# Patient Record
Sex: MALE | Race: WHITE | NOT HISPANIC OR LATINO | Employment: OTHER | URBAN - NONMETROPOLITAN AREA
[De-identification: names, ages, dates, MRNs, and addresses within clinical notes are randomized per-mention and may not be internally consistent; named-entity substitution may affect disease eponyms.]

---

## 2017-01-11 ENCOUNTER — OFFICE VISIT (OUTPATIENT)
Dept: CARDIOLOGY | Facility: CLINIC | Age: 57
End: 2017-01-11

## 2017-01-11 VITALS
SYSTOLIC BLOOD PRESSURE: 134 MMHG | OXYGEN SATURATION: 98 % | DIASTOLIC BLOOD PRESSURE: 80 MMHG | BODY MASS INDEX: 31.42 KG/M2 | WEIGHT: 195.5 LBS | HEART RATE: 69 BPM | HEIGHT: 66 IN

## 2017-01-11 DIAGNOSIS — I73.9 CLAUDICATION OF BOTH LOWER EXTREMITIES (HCC): ICD-10-CM

## 2017-01-11 DIAGNOSIS — I25.10 CORONARY ARTERY DISEASE INVOLVING NATIVE CORONARY ARTERY OF NATIVE HEART WITHOUT ANGINA PECTORIS: Primary | ICD-10-CM

## 2017-01-11 DIAGNOSIS — I10 ESSENTIAL HYPERTENSION: ICD-10-CM

## 2017-01-11 PROCEDURE — 99213 OFFICE O/P EST LOW 20 MIN: CPT | Performed by: INTERNAL MEDICINE

## 2017-01-11 RX ORDER — PRAVASTATIN SODIUM 40 MG
40 TABLET ORAL NIGHTLY
Qty: 30 TABLET | Refills: 6 | Status: SHIPPED | OUTPATIENT
Start: 2017-01-11 | End: 2017-02-10

## 2017-01-11 NOTE — MR AVS SNAPSHOT
Nima Portillo   1/11/2017 2:30 PM   Office Visit    Dept Phone:  [phone]   Encounter #:  [id number]    Provider:  Lara Santiago MD   Department:  [facility] CARDIOLOGY                Your Full Care Plan              Today's Medication Changes          These changes are accurate as of: 1/11/17  3:00 PM.  If you have any questions, ask your nurse or doctor.               New Medication(s)Ordered:     pravastatin 40 MG tablet   Commonly known as:  PRAVACHOL   Take 1 tablet by mouth Every Night for 30 days.   Started by:  Lara Santiago MD         Medication(s)that have changed:     hydrochlorothiazide 12.5 MG tablet   Commonly known as:  HYDRODIURIL   Take 12.5 mg by mouth daily.   What changed:  Another medication with the same name was removed. Continue taking this medication, and follow the directions you see here.   Changed by:  [place]            Where to Get Your Medications      These medications were sent to [facility]/pharmacy #[id number] - [address] [phone] [place] [phone] [address] [address]     Phone:  [phone]     pravastatin 40 MG tablet                  Your Updated Medication List          This list is accurate as of: 1/11/17  3:00 PM.  Always use your most recent med list.                aspirin 81 MG chewable tablet       hydrochlorothiazide 12.5 MG tablet   Commonly known as:  HYDRODIURIL       isosorbide mononitrate 30 MG 24 hr tablet   Commonly known as:  IMDUR   TAKE 1 TABLET BY MOUTH DAILY       lisinopril 40 MG tablet   Commonly known as:  PRINIVIL,ZESTRIL       magnesium oxide 400 MG tablet   Commonly known as:  MAG-OX       metoprolol succinate XL 25 MG 24 hr tablet   Commonly known as:  TOPROL-XL       pravastatin 40 MG tablet   Commonly known as:  PRAVACHOL   Take 1 tablet by mouth Every Night for 30 days.               You Were Diagnosed With        Codes Comments    Coronary artery disease  "involving native coronary artery of native heart without angina pectoris    -  Primary ICD-10-CM: I25.10  ICD-9-CM: 414.01     Claudication of both lower extremities     ICD-10-CM: I73.9  ICD-9-CM: 443.9     Essential hypertension     ICD-10-CM: I10  ICD-9-CM: 401.9       Instructions     None    Patient Instructions History      Upcoming Appointments     Visit Type Date Time Department    OFFICE VISIT 2017  2:30 PM [facility]    OFFICE VISIT 2017  2:00 PM [facility]     [facility] [employer] allows you to send messages to your doctor, view your test results, renew your prescriptions, schedule appointments, and more. To sign up, go to [employer] and click on the Sign Up Now link in the New User? box. Enter your [employer] Activation Code exactly as it appears below along with the last four digits of your Social Security Number and your Date of Birth () to complete the sign-up process. If you do not sign up before the expiration date, you must request a new code.    [employer] Activation Code: [email or web address]  Expires: 2017  2:59 PM    If you have questions, you can email [email or web address] or call [phone] to talk to our [employer] staff. Remember, [employer] is NOT to be used for urgent needs. For medical emergencies, dial 911.               Other Info from Your Visit           Your Appointments     2017  2:00 PM CDT   Office Visit with Lara Santiago MD   [facility]ARD[facility] (--)    [address] [place] [address] [address]  [facility] [address] [place] [id number] [phone]           Arrive 15 minutes prior to appointment.              Allergies     No Known Allergies      Reason for Visit     Coronary Artery Disease     Hypertension           Vital Signs     Blood Pressure Pulse Height Weight Oxygen Saturation Body Mass Index    134/80 (BP Location: Left arm, Patient Position: Sitting, Cuff Size: Adult) 69 66\" (167.6 cm) 195 " lb 8 oz (88.7 kg) 98% 31.55 kg/m2    Smoking Status                   Current Every Day Smoker           Problems and Diagnoses Noted     Claudication of both lower extremities    Coronary artery disease involving native coronary artery of native heart without angina pectoris    High blood pressure

## 2017-01-11 NOTE — PROGRESS NOTES
Chief complaint: CAD, HTN, f/u    History of Present Illness: Mr. Portillo is in the office today for a follow up visit. He has no chest pain, PND, orthopnea, palpitations, shortness of breath, or dyspnea on exertion. He states that he hurts in the upper gastric region when he drinks hot or cold liquids. This is non-radiating pain. He has pain in his legs.    Subjective      Review of Systems   Constitution: Negative.   HENT: Negative.    Cardiovascular: Positive for chest pain and claudication.   Respiratory: Negative.    Endocrine: Negative.    Skin: Negative.    Musculoskeletal: Negative.    Gastrointestinal: Negative.    Genitourinary: Negative.    Neurological: Negative.    Psychiatric/Behavioral: Negative.        Past Medical History   Diagnosis Date   • Acute bronchitis    • Acute sinusitis    • Chest pain    • Chronic bronchitis      secondary to smoking   • Claudication    • Coronary arteriosclerosis    • Cough    • Elevated blood-pressure reading without diagnosis of hypertension    • Gastroesophageal reflux disease    • Health maintenance examination      Individual health examination   • History of echocardiogram 10/21/2013     Echocardiogram W/ color flow 66593 (1) - Left atrium normal. Mild CLVH. EF 50%. Valves intact. Mild mitral and tricuspid regurg. Pericardium normal.   • History of echocardiogram 10/25/2011     Echocardiogram W/O color flow 75749 (1) - Normal LV sytolic function with mild LVH & mild diastolic dysfunction   • Hyperlipidemia    • Hypertensive disorder    • Pernicious anemia    • Right upper quadrant pain    • Tobacco dependence syndrome        No family history on file.    Review of patient's allergies indicates no known allergies.     reports that he has been smoking.  He does not have any smokeless tobacco history on file.    Objective     Vital Signs  Heart Rate:  [69] 69  BP: (134)/(80) 134/80    Physical Exam   Constitutional: He is oriented to person, place, and time. He  appears well-developed and well-nourished.   HENT:   Head: Normocephalic and atraumatic.   Eyes: Conjunctivae and EOM are normal. Pupils are equal, round, and reactive to light.   Neck: Neck supple. No JVD present. Carotid bruit is not present. No tracheal deviation and no edema present.   Cardiovascular: Normal rate, regular rhythm, S1 normal, S2 normal, normal heart sounds and intact distal pulses.  Exam reveals no gallop, no S3, no S4 and no friction rub.    No murmur heard.  Pulmonary/Chest: Effort normal and breath sounds normal. He has no wheezes. He has no rales. He exhibits no tenderness.   Abdominal: Bowel sounds are normal. He exhibits no abdominal bruit and no pulsatile midline mass. There is no rebound and no guarding.   Musculoskeletal: Normal range of motion. He exhibits no edema.   Neurological: He is alert and oriented to person, place, and time.   Skin: Skin is warm and dry.   Psychiatric: He has a normal mood and affect.       Procedures    Assessment/Plan     Patient Active Problem List   Diagnosis   • Claudication of both lower extremities   • Coronary artery disease involving native coronary artery of native heart without angina pectoris   • Essential hypertension   • Tobacco abuse     1. Coronary artery disease involving native coronary artery of native heart without angina pectoris  Patient has been advised and counseled again regarding tobacco smoking cessation.  He stated that he is not ready to quit smoking.  That he promised that he is going to try and cut back.  We will continue with current guideline direct medical therapy.  I have added pravastatin to his medication regimen.  We'll obtain lipid panel and liver function test when he comes back in 6 months.    2. Claudication of both lower extremities  Patient does have peripheral arterial disease based on the CT angiogram that was performed.  I have counseled patient regarding risk factor modification especially tobacco smoking  cessation.    3. Essential hypertension  Patient's blood pressure is well controlled we'll continue with current medications.    I discussed the patients findings and my recommendations with patient.    Lara Santiago MD  01/11/17  2:54 PM    EMR Dragon/Transcription disclaimer:   Much of this encounter note is an electronic transcription/translation of spoken language to printed text. The electronic translation of spoken language may permit erroneous, or at times, nonsensical words or phrases to be inadvertently transcribed; Although I have reviewed the note for such errors, some may still exist.

## 2017-01-13 RX ORDER — METOPROLOL SUCCINATE 25 MG/1
TABLET, EXTENDED RELEASE ORAL
Qty: 30 TABLET | Refills: 11 | Status: SHIPPED | OUTPATIENT
Start: 2017-01-13 | End: 2018-01-31 | Stop reason: SDUPTHER

## 2017-02-21 RX ORDER — LISINOPRIL 40 MG/1
TABLET ORAL
Qty: 30 TABLET | Refills: 6 | Status: SHIPPED | OUTPATIENT
Start: 2017-02-21 | End: 2017-10-02 | Stop reason: SDUPTHER

## 2017-02-21 RX ORDER — HYDROCHLOROTHIAZIDE 12.5 MG/1
CAPSULE, GELATIN COATED ORAL
Qty: 30 CAPSULE | Refills: 6 | Status: SHIPPED | OUTPATIENT
Start: 2017-02-21 | End: 2017-10-02 | Stop reason: SDUPTHER

## 2017-04-20 RX ORDER — ISOSORBIDE MONONITRATE 30 MG/1
TABLET, EXTENDED RELEASE ORAL
Qty: 30 TABLET | Refills: 5 | Status: SHIPPED | OUTPATIENT
Start: 2017-04-20 | End: 2017-10-29 | Stop reason: SDUPTHER

## 2017-07-12 ENCOUNTER — OFFICE VISIT (OUTPATIENT)
Dept: CARDIOLOGY | Facility: CLINIC | Age: 57
End: 2017-07-12

## 2017-07-12 VITALS
DIASTOLIC BLOOD PRESSURE: 64 MMHG | WEIGHT: 191 LBS | HEIGHT: 66 IN | HEART RATE: 71 BPM | SYSTOLIC BLOOD PRESSURE: 112 MMHG | BODY MASS INDEX: 30.7 KG/M2 | OXYGEN SATURATION: 91 %

## 2017-07-12 DIAGNOSIS — Z72.0 TOBACCO ABUSE: ICD-10-CM

## 2017-07-12 DIAGNOSIS — I25.10 CORONARY ARTERY DISEASE INVOLVING NATIVE CORONARY ARTERY OF NATIVE HEART WITHOUT ANGINA PECTORIS: ICD-10-CM

## 2017-07-12 DIAGNOSIS — I73.9 CLAUDICATION OF BOTH LOWER EXTREMITIES (HCC): ICD-10-CM

## 2017-07-12 DIAGNOSIS — I25.10 CAD IN NATIVE ARTERY: Primary | ICD-10-CM

## 2017-07-12 DIAGNOSIS — I10 ESSENTIAL HYPERTENSION: Primary | ICD-10-CM

## 2017-07-12 PROCEDURE — 99214 OFFICE O/P EST MOD 30 MIN: CPT | Performed by: INTERNAL MEDICINE

## 2017-07-12 PROCEDURE — 93000 ELECTROCARDIOGRAM COMPLETE: CPT | Performed by: INTERNAL MEDICINE

## 2017-07-12 RX ORDER — PRAVASTATIN SODIUM 40 MG
TABLET ORAL
Refills: 5 | COMMUNITY
Start: 2017-06-17 | End: 2018-07-11 | Stop reason: SDUPTHER

## 2017-07-12 NOTE — PROGRESS NOTES
Chief complaint : Coronary artery disease    History of Present Illness very pleasant 56-year-old gentleman who comes today for a follow-up visit.  Patient has no chest pain or chest discomfort.  He does have some shortness of breath.  He denies orthopnea or PND.  He has no palpitations.  He complains of bilateral lower extremity intermittent claudication.         Review of Systems   Constitution: Negative. Negative for decreased appetite, diaphoresis, weakness, night sweats, weight gain and weight loss.   HENT: Negative for headaches, hearing loss, nosebleeds and sore throat.    Eyes: Negative.  Negative for blurred vision and photophobia.   Cardiovascular: Negative for chest pain, claudication, dyspnea on exertion, irregular heartbeat, leg swelling, palpitations, paroxysmal nocturnal dyspnea and syncope.   Respiratory: Negative for cough, hemoptysis, shortness of breath and wheezing.    Endocrine: Negative for cold intolerance, heat intolerance, polydipsia, polyphagia and polyuria.   Hematologic/Lymphatic: Negative.    Skin: Negative for color change, dry skin, flushing, itching and rash.   Musculoskeletal: Negative.  Negative for muscle cramps, muscle weakness and myalgias.   Gastrointestinal: Negative for abdominal pain, change in bowel habit, diarrhea, hematemesis, melena, nausea and vomiting.   Genitourinary: Negative for dysuria, frequency and hematuria.   Neurological: Negative for dizziness, focal weakness, light-headedness, loss of balance, numbness and seizures.   Psychiatric/Behavioral: Negative.  Negative for substance abuse, suicidal ideas and thoughts of violence.   Allergic/Immunologic: Negative.        Past Medical History:   Diagnosis Date   • Acute bronchitis    • Acute sinusitis    • Chest pain    • Chronic bronchitis     secondary to smoking   • Claudication    • Coronary arteriosclerosis    • Cough    • Elevated blood-pressure reading without diagnosis of hypertension    • Gastroesophageal  reflux disease    • Health maintenance examination     Individual health examination   • History of echocardiogram 10/21/2013    Echocardiogram W/ color flow 25304 (1) - Left atrium normal. Mild CLVH. EF 50%. Valves intact. Mild mitral and tricuspid regurg. Pericardium normal.   • History of echocardiogram 10/25/2011    Echocardiogram W/O color flow 69267 (1) - Normal LV sytolic function with mild LVH & mild diastolic dysfunction   • Hyperlipidemia    • Hypertensive disorder    • Pernicious anemia    • Right upper quadrant pain    • Tobacco dependence syndrome        No family history on file.    Review of patient's allergies indicates no known allergies.     reports that he has been smoking.  He does not have any smokeless tobacco history on file.    Objective     Vital Signs  Heart Rate:  [71] 71  BP: (112)/(64) 112/64    Physical Exam   Constitutional: He is oriented to person, place, and time. He appears well-developed and well-nourished.   HENT:   Head: Normocephalic and atraumatic.   Eyes: Conjunctivae and EOM are normal. Pupils are equal, round, and reactive to light.   Neck: Neck supple. No JVD present. Carotid bruit is not present. No tracheal deviation and no edema present.   Cardiovascular: Normal rate, regular rhythm, S1 normal, S2 normal, normal heart sounds and intact distal pulses.  Exam reveals no gallop, no S3, no S4 and no friction rub.    No murmur heard.  Pulmonary/Chest: Effort normal and breath sounds normal. He has no wheezes. He has no rales. He exhibits no tenderness.   Abdominal: Bowel sounds are normal. He exhibits no abdominal bruit and no pulsatile midline mass. There is no rebound and no guarding.   Musculoskeletal: Normal range of motion. He exhibits no edema.   Neurological: He is alert and oriented to person, place, and time.   Skin: Skin is warm and dry.   Psychiatric: He has a normal mood and affect.         ECG 12 Lead  Date/Time: 7/14/2017 9:32 AM  Performed by: SKIP  LARA  Authorized by: LARA VALDIVIA   Comparison: compared with previous ECG from 12/21/2015  Similar to previous ECG  Rhythm: sinus rhythm  Rate: normal  BPM: 67  Conduction: conduction normal  ST Segments: ST segments normal  T Waves: T waves normal  QRS axis: normal  Other: no other findings  Clinical impression: normal ECG            Assessment/Plan     Patient Active Problem List   Diagnosis   • Claudication of both lower extremities   • Coronary artery disease involving native coronary artery of native heart without angina pectoris   • Essential hypertension   • Tobacco abuse     1. Essential hypertension  Medications  - Doppler Ankle Brachial Index Single Level CAR; Future  - Comprehensive Metabolic Panel; Future  - Lipid Panel; Future    2. Coronary artery disease involving native coronary artery of native heart without angina pectoris  Continue with risk factor modification.  Continue with guideline direct medical therapy.    3. Claudication of both lower extremities  We will obtain ankle brachial index on both lower extremities.  Patient has been counseled regarding risk factor modification.    4. Tobacco abuse  I have counseled patient to cut back and subsequently quit smoking cigarettes.  He states he is not really at this time but he is currently trying to cut back.    I discussed the patients findings and my recommendations with patient.          This document has been electronically signed by Lara Valdivia MD on July 14, 2017 9:31 AM     Lara Valdivia MD  07/12/17  2:30 PM

## 2017-07-25 DIAGNOSIS — I10 ESSENTIAL HYPERTENSION: Primary | ICD-10-CM

## 2017-07-26 ENCOUNTER — TELEPHONE (OUTPATIENT)
Dept: CARDIOLOGY | Facility: CLINIC | Age: 57
End: 2017-07-26

## 2017-08-01 ENCOUNTER — TELEPHONE (OUTPATIENT)
Dept: ENDOCRINOLOGY | Facility: CLINIC | Age: 57
End: 2017-08-01

## 2017-08-28 RX ORDER — PRAVASTATIN SODIUM 40 MG
TABLET ORAL
Qty: 30 TABLET | Refills: 5 | OUTPATIENT
Start: 2017-08-28

## 2017-09-25 RX ORDER — LISINOPRIL 40 MG/1
TABLET ORAL
Qty: 30 TABLET | Refills: 6 | OUTPATIENT
Start: 2017-09-25

## 2017-09-25 RX ORDER — HYDROCHLOROTHIAZIDE 12.5 MG/1
CAPSULE, GELATIN COATED ORAL
Qty: 30 CAPSULE | Refills: 6 | OUTPATIENT
Start: 2017-09-25

## 2017-10-03 RX ORDER — HYDROCHLOROTHIAZIDE 12.5 MG/1
CAPSULE, GELATIN COATED ORAL
Qty: 30 CAPSULE | Refills: 6 | Status: SHIPPED | OUTPATIENT
Start: 2017-10-03 | End: 2018-04-23 | Stop reason: SDUPTHER

## 2017-10-03 RX ORDER — LISINOPRIL 40 MG/1
TABLET ORAL
Qty: 30 TABLET | Refills: 6 | Status: SHIPPED | OUTPATIENT
Start: 2017-10-03 | End: 2018-04-23 | Stop reason: SDUPTHER

## 2017-10-30 RX ORDER — ISOSORBIDE MONONITRATE 30 MG/1
TABLET, EXTENDED RELEASE ORAL
Qty: 30 TABLET | Refills: 5 | Status: SHIPPED | OUTPATIENT
Start: 2017-10-30 | End: 2018-04-24 | Stop reason: SDUPTHER

## 2018-01-04 ENCOUNTER — OFFICE VISIT (OUTPATIENT)
Dept: CARDIOLOGY | Facility: CLINIC | Age: 58
End: 2018-01-04

## 2018-01-04 VITALS
DIASTOLIC BLOOD PRESSURE: 84 MMHG | HEIGHT: 66 IN | WEIGHT: 199 LBS | HEART RATE: 80 BPM | BODY MASS INDEX: 31.98 KG/M2 | SYSTOLIC BLOOD PRESSURE: 116 MMHG

## 2018-01-04 DIAGNOSIS — Z72.0 NICOTINE ABUSE: ICD-10-CM

## 2018-01-04 DIAGNOSIS — E78.00 PURE HYPERCHOLESTEROLEMIA: ICD-10-CM

## 2018-01-04 DIAGNOSIS — I10 ESSENTIAL HYPERTENSION: ICD-10-CM

## 2018-01-04 DIAGNOSIS — R06.02 SOB (SHORTNESS OF BREATH): ICD-10-CM

## 2018-01-04 DIAGNOSIS — I73.9 PERIPHERAL ARTERIAL DISEASE (HCC): ICD-10-CM

## 2018-01-04 DIAGNOSIS — F10.10 ETOH ABUSE: ICD-10-CM

## 2018-01-04 DIAGNOSIS — I25.10 CORONARY ARTERY DISEASE INVOLVING NATIVE CORONARY ARTERY OF NATIVE HEART WITHOUT ANGINA PECTORIS: Primary | ICD-10-CM

## 2018-01-04 PROCEDURE — 99214 OFFICE O/P EST MOD 30 MIN: CPT | Performed by: INTERNAL MEDICINE

## 2018-01-04 NOTE — PROGRESS NOTES
[facility] Cardiology  OFFICE NOTE    Nima Portillo  57 y.o. male    01/04/2018  1. Coronary artery disease involving native coronary artery of native heart without angina pectoris    2. Essential hypertension    3. Pure hypercholesterolemia    4. Nicotine abuse    5. ETOH abuse    6. SOB (shortness of breath)    7. Peripheral arterial disease        Chief complaint -Shortness of breath and claudication pain      History of present Illness- 57-year-old gentleman who smokes about a pack and a half a day and drinks about 6-8 beers a day came with complaining of shortness of breath, he has history of cardiac catheterization had no arthritic coronary disease and a history of hypertension and hyperlipidemia being managed with medical therapy.  He used to see Dr. Santiago and had an YOKO which was unremarkable last year.  He cannot walk more than a block due to claudication mainly on the right calf greater than the left.  He has not changed his lifestyle.      No Known Allergies      Past Medical History:   Diagnosis Date   • Acute bronchitis    • Acute sinusitis    • Chest pain    • Chronic bronchitis     secondary to smoking   • Claudication    • Coronary arteriosclerosis    • Cough    • Elevated blood-pressure reading without diagnosis of hypertension    • Gastroesophageal reflux disease    • Health maintenance examination     Individual health examination   • History of echocardiogram 10/21/2013    Echocardiogram W/ color flow 31628 (1) - Left atrium normal. Mild CLVH. EF 50%. Valves intact. Mild mitral and tricuspid regurg. Pericardium normal.   • History of echocardiogram 10/25/2011    Echocardiogram W/O color flow 85887 (1) - Normal LV sytolic function with mild LVH & mild diastolic dysfunction   • Hyperlipidemia    • Hypertensive disorder    • Pernicious anemia    • Right upper quadrant pain    • Tobacco dependence syndrome          Past Surgical History:   Procedure Laterality Date   •  CARDIAC CATHETERIZATION  04/08/2015    Cardiac cath 92381 (1) - Slective coronary angiogram. Left heart catheterization with LV ventriculogram.         No family history on file.      Social History     Social History   • Marital status:      Spouse name: N/A   • Number of children: N/A   • Years of education: N/A     Occupational History   • Not on file.     Social History Main Topics   • Smoking status: Current Every Day Smoker   • Smokeless tobacco: Never Used   • Alcohol use Yes   • Drug use: No   • Sexual activity: Defer      Comment: Marital status:      Other Topics Concern   • Not on file     Social History Narrative         Current Outpatient Prescriptions   Medication Sig Dispense Refill   • aspirin 81 MG chewable tablet Chew 81 mg daily.     • hydrochlorothiazide (MICROZIDE) 12.5 MG capsule TAKE ONE CAPSULE BY MOUTH EVERY DAY (SKIP) 30 capsule 6   • isosorbide mononitrate (IMDUR) 30 MG 24 hr tablet TAKE 1 TABLET BY MOUTH DAILY 30 tablet 5   • lisinopril (PRINIVIL,ZESTRIL) 40 MG tablet TAKE 1 TABLET(S) BY MOUTH DAILY (SKIP) 30 tablet 6   • magnesium oxide (MAG-OX) 400 MG tablet Take 400 mg by mouth 2 (two) times a day.     • metoprolol succinate XL (TOPROL-XL) 25 MG 24 hr tablet TAKE 1 TAB(S) BY MOUTH DAILY 30 tablet 11   • pravastatin (PRAVACHOL) 40 MG tablet TAKE 1 TABLET BY MOUTH EVERY NIGHT FOR 30 DAYS. (SKIP)  5     No current facility-administered medications for this visit.          Review of Systems     Constitution: Denies any fatigue, fever or chills    HENT: Denies any headache, hearing impairment,     Eyes: Denies any blurring of vision, or photophobia     Cardivascular - As per history of present illness     Respiratory system-Dyspnea on exertion NYHA class II       Endocrine:  Hyperlipidemia       Musculoskeletal:  No history of arthritis with musculoskeletal problems    Gastrointestinal: No nausea, vomiting, or melena    Genitourinary: No dysuria or  "hematuria    Neurological:   No history of seizure disorder, stroke, memory problems    Psychiatric/Behavioral:   EtOH abuse and tobacco abuse    Hematological- no history of easy bruising            OBJECTIVE    /84  Pulse 80  Ht 167.6 cm (66\")  Wt 90.3 kg (199 lb)  BMI 32.12 kg/m2      Physical Exam     Constitutional: is oriented to person, place, and time.     Skin-warm and dry    Well developed and nourished in no acute distress      Head: Normocephalic and atraumatic.     Eyes: Pupils are equal, round, and reactive to light.     Neck: Neck supple. No bruit in the carotids,    Cardiovascular: Erwin in the fifth intercostal space   Regular rate, and  Rhythm,    S1 greater than S2, no S3 or S4, no gallop     Pulmonary/Chest:   Air  Entry is equal on both sides  No wheezing or crackles,      Abdominal: Soft.  No hepatosplenomegaly, bowel sounds are present    Musculoskeletal: No kyphoscoliosis, no significant thickening of the joints    Neurological: is alert and oriented to person, place, and time.    cranial nerve are intact .   No motor or sensory deficit    Extremities-no edema, right dorsalis pedis pulses weak      Psychiatric: He has a normal mood and affect.                  His behavior is normal.           Procedures              A/P    Dyspnea on exertion-with history of hypertension and hyperlipidemia on medical therapy, smoker, not willing to quit smoking will get an echo to assess LV function and valvular function, had a cardiac catheterization sometime ago by Dr. Santiago and showed nonobstructive coronary disease with normal LV systolic function.  Talked about quitting smoking.    Hypertension controlled with lisinopril, isosorbide and HCTZ along with Toprol-XL.    Peripheral vascular disease will get  abdominal aortogram runoff with CT angiogram to rule out any high-grade stenosis.    Needs his factor modification with EtOH abuse and tobacco abuse.  Started on B12 and folic acid " supplements.    Follow-up in 6 months or earlier if the test is abnormal              This document has been electronically signed by Min Sifuentes MD on January 4, 2018 11:06 AM       EMR Dragon/Transcription disclaimer:   Some of this note may be an electronic transcription/translation of spoken language to printed text. The electronic translation of spoken language may permit erroneous, or at times, nonsensical words or phrases to be inadvertently transcribed; Although I have reviewed the note for such errors, some may still exist.

## 2018-01-10 ENCOUNTER — HOSPITAL ENCOUNTER (OUTPATIENT)
Dept: CT IMAGING | Facility: HOSPITAL | Age: 58
Discharge: HOME | End: 2018-01-10
Admitting: INTERNAL MEDICINE

## 2018-01-10 PROCEDURE — 75635 CT ANGIO ABDOMINAL ARTERIES: CPT

## 2018-01-10 PROCEDURE — 0 IOPAMIDOL PER 1 ML: Performed by: INTERNAL MEDICINE

## 2018-01-10 RX ADMIN — IOPAMIDOL 95 ML: 755 INJECTION, SOLUTION INTRAVENOUS at 13:00

## 2018-01-11 ENCOUNTER — DOCUMENTATION (OUTPATIENT)
Dept: CARDIOLOGY | Facility: CLINIC | Age: 58
End: 2018-01-11

## 2018-01-29 LAB
BH CV ECHO MEAS - ACS: 2.1 CM
BH CV ECHO MEAS - AO MAX PG (FULL): 6.4 MMHG
BH CV ECHO MEAS - AO MAX PG: 13.2 MMHG
BH CV ECHO MEAS - AO MEAN PG (FULL): 4 MMHG
BH CV ECHO MEAS - AO MEAN PG: 7 MMHG
BH CV ECHO MEAS - AO ROOT AREA (BSA CORRECTED): 1.7
BH CV ECHO MEAS - AO ROOT AREA: 9.1 CM^2
BH CV ECHO MEAS - AO ROOT DIAM: 3.4 CM
BH CV ECHO MEAS - AO V2 MAX: 182 CM/SEC
BH CV ECHO MEAS - AO V2 MEAN: 127 CM/SEC
BH CV ECHO MEAS - AO V2 VTI: 38.9 CM
BH CV ECHO MEAS - AVA(I,A): 2.1 CM^2
BH CV ECHO MEAS - AVA(I,D): 2.1 CM^2
BH CV ECHO MEAS - AVA(V,A): 2.3 CM^2
BH CV ECHO MEAS - AVA(V,D): 2.3 CM^2
BH CV ECHO MEAS - BSA(HAYCOCK): 2.1 M^2
BH CV ECHO MEAS - BSA: 2 M^2
BH CV ECHO MEAS - BZI_BMI: 32.1 KILOGRAMS/M^2
BH CV ECHO MEAS - BZI_METRIC_HEIGHT: 167.6 CM
BH CV ECHO MEAS - BZI_METRIC_WEIGHT: 90.3 KG
BH CV ECHO MEAS - EDV(CUBED): 157.5 ML
BH CV ECHO MEAS - EDV(TEICH): 141.3 ML
BH CV ECHO MEAS - EF(CUBED): 82.9 %
BH CV ECHO MEAS - EF(MOD-SP4): 60 %
BH CV ECHO MEAS - EF(TEICH): 75.2 %
BH CV ECHO MEAS - ESV(CUBED): 27 ML
BH CV ECHO MEAS - ESV(TEICH): 35 ML
BH CV ECHO MEAS - FS: 44.4 %
BH CV ECHO MEAS - IVS/LVPW: 1
BH CV ECHO MEAS - IVSD: 1.1 CM
BH CV ECHO MEAS - LA DIMENSION: 3.6 CM
BH CV ECHO MEAS - LA/AO: 1.1
BH CV ECHO MEAS - LV MASS(C)D: 234.8 GRAMS
BH CV ECHO MEAS - LV MASS(C)DI: 117.7 GRAMS/M^2
BH CV ECHO MEAS - LV MAX PG: 6.9 MMHG
BH CV ECHO MEAS - LV MEAN PG: 3 MMHG
BH CV ECHO MEAS - LV V1 MAX: 131 CM/SEC
BH CV ECHO MEAS - LV V1 MEAN: 83.7 CM/SEC
BH CV ECHO MEAS - LV V1 VTI: 25.6 CM
BH CV ECHO MEAS - LVIDD: 5.4 CM
BH CV ECHO MEAS - LVIDS: 3 CM
BH CV ECHO MEAS - LVOT AREA (M): 3.1 CM^2
BH CV ECHO MEAS - LVOT AREA: 3.1 CM^2
BH CV ECHO MEAS - LVOT DIAM: 2 CM
BH CV ECHO MEAS - LVPWD: 1.1 CM
BH CV ECHO MEAS - MR MAX PG: 93.7 MMHG
BH CV ECHO MEAS - MR MAX VEL: 484 CM/SEC
BH CV ECHO MEAS - MV A MAX VEL: 69.6 CM/SEC
BH CV ECHO MEAS - MV DEC SLOPE: 702 CM/SEC^2
BH CV ECHO MEAS - MV E MAX VEL: 98.2 CM/SEC
BH CV ECHO MEAS - MV E/A: 1.4
BH CV ECHO MEAS - MV MAX PG: 5.7 MMHG
BH CV ECHO MEAS - MV MEAN PG: 2 MMHG
BH CV ECHO MEAS - MV P1/2T MAX VEL: 122 CM/SEC
BH CV ECHO MEAS - MV P1/2T: 50.9 MSEC
BH CV ECHO MEAS - MV V2 MAX: 119 CM/SEC
BH CV ECHO MEAS - MV V2 MEAN: 53.2 CM/SEC
BH CV ECHO MEAS - MV V2 VTI: 35.4 CM
BH CV ECHO MEAS - MVA P1/2T LCG: 1.8 CM^2
BH CV ECHO MEAS - MVA(P1/2T): 4.3 CM^2
BH CV ECHO MEAS - MVA(VTI): 2.3 CM^2
BH CV ECHO MEAS - PA MAX PG: 4.9 MMHG
BH CV ECHO MEAS - PA V2 MAX: 111 CM/SEC
BH CV ECHO MEAS - RAP SYSTOLE: 5 MMHG
BH CV ECHO MEAS - RVDD: 2.4 CM
BH CV ECHO MEAS - RVSP: 30.2 MMHG
BH CV ECHO MEAS - SI(AO): 177 ML/M^2
BH CV ECHO MEAS - SI(CUBED): 65.4 ML/M^2
BH CV ECHO MEAS - SI(LVOT): 40.3 ML/M^2
BH CV ECHO MEAS - SI(TEICH): 53.3 ML/M^2
BH CV ECHO MEAS - SV(AO): 353.2 ML
BH CV ECHO MEAS - SV(CUBED): 130.5 ML
BH CV ECHO MEAS - SV(LVOT): 80.4 ML
BH CV ECHO MEAS - SV(TEICH): 106.3 ML
BH CV ECHO MEAS - TR MAX VEL: 251 CM/SEC
LV EF 2D ECHO EST: 55 %
MAXIMAL PREDICTED HEART RATE: 163 BPM
STRESS TARGET HR: 139 BPM

## 2018-01-29 RX ORDER — METOPROLOL SUCCINATE 25 MG/1
TABLET, EXTENDED RELEASE ORAL
Qty: 30 TABLET | Refills: 10 | OUTPATIENT
Start: 2018-01-29

## 2018-01-31 RX ORDER — METOPROLOL SUCCINATE 25 MG/1
25 TABLET, EXTENDED RELEASE ORAL DAILY
Qty: 30 TABLET | Refills: 11 | Status: SHIPPED | OUTPATIENT
Start: 2018-01-31 | End: 2019-01-28 | Stop reason: SDUPTHER

## 2018-02-06 ENCOUNTER — TELEPHONE (OUTPATIENT)
Dept: CARDIOLOGY | Facility: CLINIC | Age: 58
End: 2018-02-06

## 2018-04-23 RX ORDER — LISINOPRIL 40 MG/1
40 TABLET ORAL DAILY
Qty: 30 TABLET | Refills: 6 | Status: SHIPPED | OUTPATIENT
Start: 2018-04-23 | End: 2018-11-21 | Stop reason: SDUPTHER

## 2018-04-23 RX ORDER — HYDROCHLOROTHIAZIDE 12.5 MG/1
12.5 CAPSULE, GELATIN COATED ORAL EVERY MORNING
Qty: 30 CAPSULE | Refills: 6 | Status: SHIPPED | OUTPATIENT
Start: 2018-04-23 | End: 2018-11-21 | Stop reason: SDUPTHER

## 2018-04-23 NOTE — TELEPHONE ENCOUNTER
Refill Request came in by fax from   [facility]/pharmacy #[id number] - [place], [place] - [address] [place] [address] - [phone]  - [phone] [address] [address]  Phone: [phone] Fax: [phone]   Refill sent to pharmacy via e-scribe.

## 2018-04-24 RX ORDER — ISOSORBIDE MONONITRATE 30 MG/1
30 TABLET, EXTENDED RELEASE ORAL DAILY
Qty: 30 TABLET | Refills: 5 | Status: SHIPPED | OUTPATIENT
Start: 2018-04-24 | End: 2018-10-22 | Stop reason: SDUPTHER

## 2018-04-24 NOTE — TELEPHONE ENCOUNTER
Refill Request came in by fax from   [facility]/pharmacy #[id number] - [place], [place] - [address] [place] [address] - [phone]  - [phone] [address] [address]  Phone: [phone] Fax: [phone]   Refill sent to pharmacy via e-scribe.

## 2018-07-10 ENCOUNTER — OFFICE VISIT (OUTPATIENT)
Dept: CARDIOLOGY | Facility: CLINIC | Age: 58
End: 2018-07-10

## 2018-07-10 ENCOUNTER — APPOINTMENT (OUTPATIENT)
Dept: LAB | Facility: HOSPITAL | Age: 58
End: 2018-07-10

## 2018-07-10 VITALS
HEIGHT: 66 IN | SYSTOLIC BLOOD PRESSURE: 112 MMHG | WEIGHT: 199 LBS | DIASTOLIC BLOOD PRESSURE: 80 MMHG | BODY MASS INDEX: 31.98 KG/M2 | HEART RATE: 76 BPM

## 2018-07-10 DIAGNOSIS — I10 ESSENTIAL HYPERTENSION: ICD-10-CM

## 2018-07-10 DIAGNOSIS — F10.10 ETOH ABUSE: ICD-10-CM

## 2018-07-10 DIAGNOSIS — I25.10 CORONARY ARTERY DISEASE INVOLVING NATIVE CORONARY ARTERY OF NATIVE HEART WITHOUT ANGINA PECTORIS: Primary | ICD-10-CM

## 2018-07-10 DIAGNOSIS — Z72.0 NICOTINE ABUSE: ICD-10-CM

## 2018-07-10 DIAGNOSIS — E78.00 PURE HYPERCHOLESTEROLEMIA: ICD-10-CM

## 2018-07-10 LAB
ALBUMIN SERPL-MCNC: 4.4 G/DL (ref 3.4–4.8)
ALBUMIN/GLOB SERPL: 1.7 G/DL (ref 1.1–1.8)
ALP SERPL-CCNC: 54 U/L (ref 38–126)
ALT SERPL W P-5'-P-CCNC: 36 U/L (ref 21–72)
ANION GAP SERPL CALCULATED.3IONS-SCNC: 10 MMOL/L (ref 5–15)
ARTICHOKE IGE QN: 126 MG/DL (ref 1–129)
AST SERPL-CCNC: 28 U/L (ref 17–59)
BILIRUB SERPL-MCNC: 0.4 MG/DL (ref 0.2–1.3)
BUN BLD-MCNC: 20 MG/DL (ref 7–21)
BUN/CREAT SERPL: 26.3 (ref 7–25)
CALCIUM SPEC-SCNC: 9 MG/DL (ref 8.4–10.2)
CHLORIDE SERPL-SCNC: 101 MMOL/L (ref 95–110)
CHOLEST SERPL-MCNC: 200 MG/DL (ref 0–199)
CO2 SERPL-SCNC: 26 MMOL/L (ref 22–31)
CREAT BLD-MCNC: 0.76 MG/DL (ref 0.7–1.3)
DEPRECATED RDW RBC AUTO: 46.2 FL (ref 35.1–43.9)
ERYTHROCYTE [DISTWIDTH] IN BLOOD BY AUTOMATED COUNT: 13.9 % (ref 11.5–14.5)
GFR SERPL CREATININE-BSD FRML MDRD: 106 ML/MIN/1.73 (ref 56–130)
GLOBULIN UR ELPH-MCNC: 2.6 GM/DL (ref 2.3–3.5)
GLUCOSE BLD-MCNC: 111 MG/DL (ref 60–100)
HCT VFR BLD AUTO: 41.9 % (ref 39–49)
HDLC SERPL-MCNC: 63 MG/DL (ref 60–200)
HGB BLD-MCNC: 14.3 G/DL (ref 13.7–17.3)
LDLC/HDLC SERPL: 1.86 {RATIO} (ref 0–3.55)
MCH RBC QN AUTO: 30.8 PG (ref 26.5–34)
MCHC RBC AUTO-ENTMCNC: 34.1 G/DL (ref 31.5–36.3)
MCV RBC AUTO: 90.3 FL (ref 80–98)
PLATELET # BLD AUTO: 185 10*3/MM3 (ref 150–450)
PMV BLD AUTO: 9.3 FL (ref 8–12)
POTASSIUM BLD-SCNC: 3.7 MMOL/L (ref 3.5–5.1)
PROT SERPL-MCNC: 7 G/DL (ref 6.3–8.6)
RBC # BLD AUTO: 4.64 10*6/MM3 (ref 4.37–5.74)
SODIUM BLD-SCNC: 137 MMOL/L (ref 137–145)
TRIGL SERPL-MCNC: 99 MG/DL (ref 20–199)
WBC NRBC COR # BLD: 8.81 10*3/MM3 (ref 3.2–9.8)

## 2018-07-10 PROCEDURE — 80053 COMPREHEN METABOLIC PANEL: CPT | Performed by: INTERNAL MEDICINE

## 2018-07-10 PROCEDURE — 85027 COMPLETE CBC AUTOMATED: CPT | Performed by: INTERNAL MEDICINE

## 2018-07-10 PROCEDURE — 80061 LIPID PANEL: CPT | Performed by: INTERNAL MEDICINE

## 2018-07-10 PROCEDURE — 93000 ELECTROCARDIOGRAM COMPLETE: CPT | Performed by: INTERNAL MEDICINE

## 2018-07-10 PROCEDURE — 99214 OFFICE O/P EST MOD 30 MIN: CPT | Performed by: INTERNAL MEDICINE

## 2018-07-10 PROCEDURE — 36415 COLL VENOUS BLD VENIPUNCTURE: CPT | Performed by: INTERNAL MEDICINE

## 2018-07-10 NOTE — PROGRESS NOTES
[facility]  OFFICE NOTE    Nima Portillo  57 y.o. male    07/10/2018  1. Coronary artery disease involving native coronary artery of native heart without angina pectoris    2. Essential hypertension    3. Pure hypercholesterolemia    4. ETOH abuse    5. Nicotine abuse        Chief complaint -Shortness of breath       History of present Illness- 57-year-old gentleman who smokes about a pack and a half a day and drinks about 12 beers a 2-3days/week came with complaining of shortness of breath, he has history of cardiac catheterization 2015 by Dr. Sandhu and which showed small vessel disease in the obtuse marginal one side branch and in the PDA off the right coronary artery which is being managed medically.  He still smokes pretty heavy and drinks regularly.  He denies any chest pain and is on tolerable medical therapy.  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 Mild CLVH. EF 50%. Valves intact. Mild mitral and tricuspid regurg. Pericardium normal.   • History of echocardiogram 10/25/2011    Echocardiogram W/O color flow 73620 (1) - Normal LV sytolic function with mild LVH & mild diastolic dysfunction   • Hyperlipidemia    • Hypertensive disorder    • Pernicious anemia    • Right upper quadrant pain    • Tobacco dependence syndrome          Past Surgical History:   Procedure Laterality Date   • CARDIAC CATHETERIZATION   04/08/2015    Cardiac cath 43619 (1) - Slective coronary angiogram. Left heart catheterization with LV ventriculogram.         No family history on file.      Social History     Social History   • Marital status:      Spouse name: N/A   • Number of children: N/A   • Years of education: N/A     Occupational History   • Not on file.     Social History Main Topics   • Smoking status: Current Every Day Smoker   • Smokeless tobacco: Never Used   • Alcohol use Yes   • Drug use: No   • Sexual activity: Defer      Comment: Marital status:      Other Topics Concern   • Not on file     Social History Narrative   • No narrative on file         Current Outpatient Prescriptions   Medication Sig Dispense Refill   • aspirin 81 MG chewable tablet Chew 81 mg daily.     • hydrochlorothiazide (MICROZIDE) 12.5 MG capsule Take 1 capsule by mouth Every Morning. 30 capsule 6   • isosorbide mononitrate (IMDUR) 30 MG 24 hr tablet Take 1 tablet by mouth Daily. 30 tablet 5   • lisinopril (PRINIVIL,ZESTRIL) 40 MG tablet Take 1 tablet by mouth Daily. 30 tablet 6   • magnesium oxide (MAG-OX) 400 MG tablet Take 400 mg by mouth 2 (two) times a day.     • metoprolol succinate XL (TOPROL-XL) 25 MG 24 hr tablet Take 1 tablet by mouth Daily. 30 tablet 11   • pravastatin (PRAVACHOL) 40 MG tablet TAKE 1 TABLET BY MOUTH EVERY NIGHT FOR 30 DAYS. (SKIP)  5     No current facility-administered medications for this visit.          Review of Systems     Constitution: Denies any fatigue, fever or chills    HENT: Denies any headache, hearing impairment,     Eyes: Denies any blurring of vision, or photophobia     Cardivascular - As per history of present illness     Respiratory system-Dyspnea on exertion NYHA class II       Endocrine:  Hyperlipidemia       Musculoskeletal:  No history of arthritis with musculoskeletal problems    Gastrointestinal: No nausea, vomiting, or melena    Genitourinary: No dysuria or hematuria    Neurological:   No  "history of seizure disorder, stroke, memory problems    Psychiatric/Behavioral:   EtOH abuse and tobacco abuse    Hematological- no history of easy bruising            OBJECTIVE    /80   Pulse 76   Ht 167.6 cm (65.98\")   Wt 90.3 kg (199 lb)   BMI 32.14 kg/m²       Physical Exam     Constitutional: is oriented to person, place, and time.     Skin-warm and dry    Well developed and nourished in no acute distress      Head: Normocephalic and atraumatic.     Eyes: Pupils are equal, round, and reactive to light.     Neck: Neck supple. No bruit in the carotids,    Cardiovascular: [place] in the fifth intercostal space   Regular rate, and  Rhythm,    S1 greater than S2, no S3 or S4, no gallop     Pulmonary/Chest:   Air  Entry is equal on both sides  No wheezing or crackles,      Abdominal: Soft.  No hepatosplenomegaly, bowel sounds are present    Musculoskeletal: No kyphoscoliosis, no significant thickening of the joints    Neurological: is alert and oriented to person, place, and time.    cranial nerve are intact .   No motor or sensory deficit    Extremities-no edema, right dorsalis pedis pulses weak      Psychiatric: He has a normal mood and affect.                  His behavior is normal.             ECG 12 Lead  Date/Time: 7/10/2018 2:07 PM  Performed by: OMAR FRANKLIN  Authorized by: OMAR FRANKLIN   Comparison: not compared with previous ECG   Rhythm: sinus rhythm  Clinical impression: normal ECG                      A/P    CAD with small vessel disease on the side branch continue medical therapy, needs his factor modification but is not willing to do that    Hypertension controlled with lisinopril, isosorbide and HCTZ along with Toprol-XL.    Peripheral vascular disease -had CTA with abdominal aortogram with runoff which did not show any high-grade stenosis and some of his pain in the legs could be due to neuropathy from his alcohol use    Needs his factor modification with EtOH abuse and " tobacco abuse.  Started on B12 and folic acid supplements.    Follow-up in 8 months               This document has been electronically signed by Min Sifuentes MD on July 10, 2018 2:04 PM       EMR Dragon/Transcription disclaimer:   Some of this note may be an electronic transcription/translation of spoken language to printed text. The electronic translation of spoken language may permit erroneous, or at times, nonsensical words or phrases to be inadvertently transcribed; Although I have reviewed the note for such errors, some may still exist.

## 2018-07-11 RX ORDER — PRAVASTATIN SODIUM 20 MG
20 TABLET ORAL DAILY
Qty: 30 TABLET | Refills: 6 | Status: SHIPPED | OUTPATIENT
Start: 2018-07-11 | End: 2019-02-13 | Stop reason: SDUPTHER

## 2018-10-22 RX ORDER — ISOSORBIDE MONONITRATE 30 MG/1
30 TABLET, EXTENDED RELEASE ORAL DAILY
Qty: 30 TABLET | Refills: 6 | Status: SHIPPED | OUTPATIENT
Start: 2018-10-22 | End: 2019-05-31 | Stop reason: SDUPTHER

## 2018-11-21 RX ORDER — LISINOPRIL 40 MG/1
40 TABLET ORAL DAILY
Qty: 30 TABLET | Refills: 6 | Status: SHIPPED | OUTPATIENT
Start: 2018-11-21 | End: 2019-06-28 | Stop reason: SDUPTHER

## 2018-11-21 RX ORDER — HYDROCHLOROTHIAZIDE 12.5 MG/1
12.5 CAPSULE, GELATIN COATED ORAL EVERY MORNING
Qty: 30 CAPSULE | Refills: 6 | Status: SHIPPED | OUTPATIENT
Start: 2018-11-21 | End: 2019-04-16

## 2019-01-28 RX ORDER — METOPROLOL SUCCINATE 25 MG/1
25 TABLET, EXTENDED RELEASE ORAL DAILY
Qty: 30 TABLET | Refills: 11 | Status: SHIPPED | OUTPATIENT
Start: 2019-01-28 | End: 2019-04-16

## 2019-02-13 RX ORDER — PRAVASTATIN SODIUM 20 MG
TABLET ORAL
Qty: 30 TABLET | Refills: 6 | Status: SHIPPED | OUTPATIENT
Start: 2019-02-13 | End: 2019-05-15 | Stop reason: ALTCHOICE

## 2019-03-12 ENCOUNTER — OFFICE VISIT (OUTPATIENT)
Dept: CARDIOLOGY | Facility: CLINIC | Age: 59
End: 2019-03-12

## 2019-03-12 VITALS
WEIGHT: 212 LBS | SYSTOLIC BLOOD PRESSURE: 132 MMHG | HEART RATE: 76 BPM | HEIGHT: 66 IN | OXYGEN SATURATION: 98 % | BODY MASS INDEX: 34.07 KG/M2 | DIASTOLIC BLOOD PRESSURE: 78 MMHG

## 2019-03-12 DIAGNOSIS — Z72.0 NICOTINE ABUSE: ICD-10-CM

## 2019-03-12 DIAGNOSIS — E78.00 PURE HYPERCHOLESTEROLEMIA: ICD-10-CM

## 2019-03-12 DIAGNOSIS — I10 ESSENTIAL HYPERTENSION: ICD-10-CM

## 2019-03-12 DIAGNOSIS — F10.10 ETOH ABUSE: ICD-10-CM

## 2019-03-12 DIAGNOSIS — I25.10 CORONARY ARTERY DISEASE INVOLVING NATIVE CORONARY ARTERY OF NATIVE HEART WITHOUT ANGINA PECTORIS: Primary | ICD-10-CM

## 2019-03-12 PROCEDURE — 99214 OFFICE O/P EST MOD 30 MIN: CPT | Performed by: INTERNAL MEDICINE

## 2019-03-12 NOTE — PROGRESS NOTES
[facility]  OFFICE NOTE    Nima Portillo  58 y.o. male    03/12/2019  1. Coronary artery disease involving native coronary artery of native heart without angina pectoris    2. Essential hypertension    3. Pure hypercholesterolemia    4. Nicotine abuse    5. ETOH abuse        Chief complaint -Shortness of breath       History of present Illness- 58-year-old gentleman who smokes about a pack and a half a day and drinks about 6 beers a day/week came with complaining of shortness of breath, he has history of cardiac catheterization 2015 by Dr. Sandhu and which showed small vessel disease in the obtuse marginal one side branch and in the PDA off the right coronary artery which is being managed medically.  He still smokes pretty heavy and drinks regularly.  He denies any chest pain and is on tolerable medical therapy.  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  His sugar is elevated I talked to him is borderline diabetic need to lose weight and he has gained about 12 pounds from the last time I saw him      No Known Allergies      Past Medical History:   Diagnosis Date   • Acute bronchitis    • Acute sinusitis    • Chest pain    • Chronic bronchitis (CMS/HCC)     secondary to smoking   • Claudication (CMS/HCC)    • Coronary arteriosclerosis    • Cough    • Elevated blood-pressure reading without diagnosis of hypertension    • Gastroesophageal reflux disease    • Health maintenance examination     Individual health examination   • History of echocardiogram 10/21/2013    Echocardiogram W/ color flow 05393 (1) - Left atrium normal. Mild CLVH. EF 50%. Valves intact. Mild mitral and tricuspid regurg. Pericardium normal.   • History of echocardiogram 10/25/2011    Echocardiogram W/O color flow 91487 (1) - Normal LV sytolic function with mild LVH & mild diastolic dysfunction   • Hyperlipidemia    • Hypertensive disorder    • Pernicious anemia    • Right  upper quadrant pain    • Tobacco dependence syndrome          Past Surgical History:   Procedure Laterality Date   • CARDIAC CATHETERIZATION  04/08/2015    Cardiac cath 42603 (1) - Slective coronary angiogram. Left heart catheterization with LV ventriculogram.         No family history on file.      Social History     Socioeconomic History   • Marital status:      Spouse name: Not on file   • Number of children: Not on file   • Years of education: Not on file   • Highest education level: Not on file   Social Needs   • Financial resource strain: Not on file   • Food insecurity - worry: Not on file   • Food insecurity - inability: Not on file   • Transportation needs - medical: Not on file   • Transportation needs - non-medical: Not on file   Occupational History   • Not on file   Tobacco Use   • Smoking status: Current Every Day Smoker   • Smokeless tobacco: Never Used   Substance and Sexual Activity   • Alcohol use: Yes   • Drug use: No   • Sexual activity: Defer     Comment: Marital status:    Other Topics Concern   • Not on file   Social History Narrative   • Not on file         Current Outpatient Medications   Medication Sig Dispense Refill   • aspirin 81 MG chewable tablet Chew 81 mg daily.     • hydrochlorothiazide (MICROZIDE) 12.5 MG capsule TAKE 1 CAPSULE BY MOUTH EVERY MORNING. 30 capsule 6   • isosorbide mononitrate (IMDUR) 30 MG 24 hr tablet TAKE 1 TABLET BY MOUTH DAILY. 30 tablet 6   • lisinopril (PRINIVIL,ZESTRIL) 40 MG tablet TAKE 1 TABLET BY MOUTH DAILY. 30 tablet 6   • magnesium oxide (MAG-OX) 400 MG tablet Take 400 mg by mouth 2 (two) times a day.     • metoprolol succinate XL (TOPROL-XL) 25 MG 24 hr tablet TAKE 1 TABLET BY MOUTH DAILY. 30 tablet 11   • pravastatin (PRAVACHOL) 20 MG tablet TAKE 1 TABLET BY MOUTH EVERY DAY 30 tablet 6     No current facility-administered medications for this visit.          Review of Systems     Constitution: Denies any fatigue, fever or  "chills    HENT: Denies any headache, hearing impairment,     Eyes: Denies any blurring of vision, or photophobia     Cardivascular - As per history of present illness     Respiratory system-Dyspnea on exertion NYHA class II     Endocrine:  Hyperlipidemia, metabolic syndrome with borderline diabetes       Musculoskeletal:  No history of arthritis with musculoskeletal problems    Gastrointestinal: No nausea, vomiting, or melena    Genitourinary: No dysuria or hematuria    Neurological:   No history of seizure disorder, stroke, memory problems    Psychiatric/Behavioral:   EtOH abuse and tobacco abuse    Hematological- no history of easy bruising            OBJECTIVE    /78   Pulse 76   Ht 167.4 cm (65.9\")   Wt 96.2 kg (212 lb)   SpO2 98%   BMI 34.32 kg/m²       Physical Exam     Constitutional: is oriented to person, place, and time.     Skin-warm and dry    Well developed and nourished in no acute distress      Head: Normocephalic and atraumatic.     Eyes: Pupils are equal, round, and reactive to light.     Neck: Neck supple. No bruit in the carotids,    Cardiovascular: [place] in the fifth intercostal space   Regular rate, and  Rhythm,    S1 greater than S2, no S3 or S4, no gallop     Pulmonary/Chest:   Air  Entry is equal on both sides  No wheezing or crackles,      Abdominal: Soft.  No hepatosplenomegaly, bowel sounds are present    Musculoskeletal: No kyphoscoliosis, no significant thickening of the joints    Neurological: is alert and oriented to person, place, and time.    cranial nerve are intact .   No motor or sensory deficit    Extremities-trace to 1+ bilateral pitting edema, right dorsalis pedis pulses weak      Psychiatric: He has a normal mood and affect.                  His behavior is normal.           Procedures              A/P    CAD with small vessel disease on the side branch continue medical therapy, needs his factor modification but is not willing to do that    Hypertension controlled " with lisinopril, isosorbide and HCTZ along with Toprol-XL.    Peripheral vascular disease -had CTA with abdominal aortogram with runoff which did not show any high-grade stenosis and some of his pain in the legs could be due to neuropathy from his alcohol use    Needs his factor modification with EtOH abuse and tobacco abuse.   on B12 and folic acid supplements.    Follow-up in 6 months               This document has been electronically signed by Min Sifuentes MD on March 12, 2019 3:10 PM       EMR Dragon/Transcription disclaimer:   Some of this note may be an electronic transcription/translation of spoken language to printed text. The electronic translation of spoken language may permit erroneous, or at times, nonsensical words or phrases to be inadvertently transcribed; Although I have reviewed the note for such errors, some may still exist.

## 2019-03-22 ENCOUNTER — APPOINTMENT (OUTPATIENT)
Dept: CT IMAGING | Facility: HOSPITAL | Age: 59
End: 2019-03-22

## 2019-03-22 ENCOUNTER — HOSPITAL ENCOUNTER (EMERGENCY)
Facility: HOSPITAL | Age: 59
Discharge: HOME | End: 2019-03-23
Attending: FAMILY MEDICINE | Admitting: FAMILY MEDICINE

## 2019-03-22 DIAGNOSIS — R10.13 EPIGASTRIC PAIN: Primary | ICD-10-CM

## 2019-03-22 LAB
ALBUMIN SERPL-MCNC: 4.5 G/DL (ref 3.4–4.8)
ALBUMIN/GLOB SERPL: 1.7 G/DL (ref 1.1–1.8)
ALP SERPL-CCNC: 54 U/L (ref 38–126)
ALT SERPL W P-5'-P-CCNC: 30 U/L (ref 21–72)
ANION GAP SERPL CALCULATED.3IONS-SCNC: 9 MMOL/L (ref 5–15)
AST SERPL-CCNC: 27 U/L (ref 17–59)
BASOPHILS # BLD AUTO: 0.03 10*3/MM3 (ref 0–0.2)
BASOPHILS NFR BLD AUTO: 0.4 % (ref 0–1.5)
BILIRUB SERPL-MCNC: 0.3 MG/DL (ref 0.2–1.3)
BUN BLD-MCNC: 13 MG/DL (ref 7–21)
BUN/CREAT SERPL: 16.5 (ref 7–25)
CALCIUM SPEC-SCNC: 9.1 MG/DL (ref 8.4–10.2)
CHLORIDE SERPL-SCNC: 97 MMOL/L (ref 95–110)
CO2 SERPL-SCNC: 29 MMOL/L (ref 22–31)
CREAT BLD-MCNC: 0.79 MG/DL (ref 0.7–1.3)
DEPRECATED RDW RBC AUTO: 47.8 FL (ref 37–54)
EOSINOPHIL # BLD AUTO: 0.4 10*3/MM3 (ref 0–0.4)
EOSINOPHIL NFR BLD AUTO: 5.9 % (ref 0.3–6.2)
ERYTHROCYTE [DISTWIDTH] IN BLOOD BY AUTOMATED COUNT: 14.3 % (ref 12.3–15.4)
GFR SERPL CREATININE-BSD FRML MDRD: 101 ML/MIN/1.73 (ref 56–130)
GLOBULIN UR ELPH-MCNC: 2.7 GM/DL (ref 2.3–3.5)
GLUCOSE BLD-MCNC: 87 MG/DL (ref 60–100)
HCT VFR BLD AUTO: 43.1 % (ref 37.5–51)
HGB BLD-MCNC: 14.3 G/DL (ref 13–17.7)
HOLD SPECIMEN: NORMAL
HOLD SPECIMEN: NORMAL
IMM GRANULOCYTES # BLD AUTO: 0.01 10*3/MM3 (ref 0–0.05)
IMM GRANULOCYTES NFR BLD AUTO: 0.1 % (ref 0–0.5)
LIPASE SERPL-CCNC: 76 U/L (ref 23–300)
LYMPHOCYTES # BLD AUTO: 2.18 10*3/MM3 (ref 0.7–3.1)
LYMPHOCYTES NFR BLD AUTO: 32.1 % (ref 19.6–45.3)
MCH RBC QN AUTO: 30.2 PG (ref 26.6–33)
MCHC RBC AUTO-ENTMCNC: 33.2 G/DL (ref 31.5–35.7)
MCV RBC AUTO: 90.9 FL (ref 79–97)
MONOCYTES # BLD AUTO: 0.73 10*3/MM3 (ref 0.1–0.9)
MONOCYTES NFR BLD AUTO: 10.7 % (ref 5–12)
NEUTROPHILS # BLD AUTO: 3.45 10*3/MM3 (ref 1.4–7)
NEUTROPHILS NFR BLD AUTO: 50.8 % (ref 42.7–76)
NRBC BLD AUTO-RTO: 0 /100 WBC (ref 0–0)
PLATELET # BLD AUTO: 228 10*3/MM3 (ref 140–450)
PMV BLD AUTO: 9.4 FL (ref 6–12)
POTASSIUM BLD-SCNC: 3.9 MMOL/L (ref 3.5–5.1)
PROT SERPL-MCNC: 7.2 G/DL (ref 6.3–8.6)
RBC # BLD AUTO: 4.74 10*6/MM3 (ref 4.14–5.8)
SODIUM BLD-SCNC: 135 MMOL/L (ref 137–145)
WBC NRBC COR # BLD: 6.8 10*3/MM3 (ref 3.4–10.8)
WHOLE BLOOD HOLD SPECIMEN: NORMAL
WHOLE BLOOD HOLD SPECIMEN: NORMAL

## 2019-03-22 PROCEDURE — 99284 EMERGENCY DEPT VISIT MOD MDM: CPT

## 2019-03-22 PROCEDURE — 85025 COMPLETE CBC W/AUTO DIFF WBC: CPT

## 2019-03-22 PROCEDURE — 96374 THER/PROPH/DIAG INJ IV PUSH: CPT

## 2019-03-22 PROCEDURE — 74176 CT ABD & PELVIS W/O CONTRAST: CPT

## 2019-03-22 PROCEDURE — 25010000002 ONDANSETRON PER 1 MG: Performed by: FAMILY MEDICINE

## 2019-03-22 PROCEDURE — 96375 TX/PRO/DX INJ NEW DRUG ADDON: CPT

## 2019-03-22 PROCEDURE — 83690 ASSAY OF LIPASE: CPT

## 2019-03-22 PROCEDURE — 80053 COMPREHEN METABOLIC PANEL: CPT

## 2019-03-22 RX ORDER — ALUMINA, MAGNESIA, AND SIMETHICONE 2400; 2400; 240 MG/30ML; MG/30ML; MG/30ML
15 SUSPENSION ORAL ONCE
Status: COMPLETED | OUTPATIENT
Start: 2019-03-22 | End: 2019-03-22

## 2019-03-22 RX ORDER — SODIUM CHLORIDE 0.9 % (FLUSH) 0.9 %
10 SYRINGE (ML) INJECTION AS NEEDED
Status: DISCONTINUED | OUTPATIENT
Start: 2019-03-22 | End: 2019-03-23 | Stop reason: HOSPADM

## 2019-03-22 RX ORDER — ONDANSETRON 2 MG/ML
4 INJECTION INTRAMUSCULAR; INTRAVENOUS ONCE
Status: COMPLETED | OUTPATIENT
Start: 2019-03-22 | End: 2019-03-22

## 2019-03-22 RX ORDER — ONDANSETRON 4 MG/1
4 TABLET, ORALLY DISINTEGRATING ORAL EVERY 6 HOURS PRN
Qty: 10 TABLET | Refills: 0 | Status: SHIPPED | OUTPATIENT
Start: 2019-03-22 | End: 2019-10-14

## 2019-03-22 RX ORDER — PANTOPRAZOLE SODIUM 40 MG/1
40 TABLET, DELAYED RELEASE ORAL DAILY
Qty: 30 TABLET | Refills: 0 | Status: SHIPPED | OUTPATIENT
Start: 2019-03-22 | End: 2020-08-28

## 2019-03-22 RX ORDER — FAMOTIDINE 10 MG/ML
20 INJECTION, SOLUTION INTRAVENOUS ONCE
Status: COMPLETED | OUTPATIENT
Start: 2019-03-22 | End: 2019-03-22

## 2019-03-22 RX ORDER — DICYCLOMINE HCL 20 MG
20 TABLET ORAL EVERY 6 HOURS PRN
Qty: 30 TABLET | Refills: 0 | Status: SHIPPED | OUTPATIENT
Start: 2019-03-22 | End: 2020-08-28

## 2019-03-22 RX ADMIN — SODIUM CHLORIDE 1000 ML: 900 INJECTION, SOLUTION INTRAVENOUS at 22:42

## 2019-03-22 RX ADMIN — LIDOCAINE HYDROCHLORIDE 15 ML: 20 SOLUTION ORAL; TOPICAL at 22:43

## 2019-03-22 RX ADMIN — ALUMINUM HYDROXIDE, MAGNESIUM HYDROXIDE, AND DIMETHICONE 15 ML: 400; 400; 40 SUSPENSION ORAL at 22:42

## 2019-03-22 RX ADMIN — ONDANSETRON 4 MG: 2 INJECTION INTRAMUSCULAR; INTRAVENOUS at 22:42

## 2019-03-22 RX ADMIN — FAMOTIDINE 20 MG: 10 INJECTION, SOLUTION INTRAVENOUS at 22:42

## 2019-03-23 VITALS
OXYGEN SATURATION: 98 % | DIASTOLIC BLOOD PRESSURE: 60 MMHG | SYSTOLIC BLOOD PRESSURE: 137 MMHG | RESPIRATION RATE: 18 BRPM | WEIGHT: 208 LBS | HEART RATE: 63 BPM | TEMPERATURE: 97.8 F | HEIGHT: 65 IN | BODY MASS INDEX: 34.66 KG/M2

## 2019-04-16 ENCOUNTER — OFFICE VISIT (OUTPATIENT)
Dept: FAMILY MEDICINE CLINIC | Facility: CLINIC | Age: 59
End: 2019-04-16

## 2019-04-16 ENCOUNTER — APPOINTMENT (OUTPATIENT)
Dept: LAB | Facility: HOSPITAL | Age: 59
End: 2019-04-16

## 2019-04-16 VITALS
SYSTOLIC BLOOD PRESSURE: 140 MMHG | WEIGHT: 210.1 LBS | BODY MASS INDEX: 35 KG/M2 | OXYGEN SATURATION: 98 % | DIASTOLIC BLOOD PRESSURE: 98 MMHG | HEIGHT: 65 IN | HEART RATE: 91 BPM

## 2019-04-16 DIAGNOSIS — F17.210 CIGARETTE NICOTINE DEPENDENCE WITHOUT COMPLICATION: ICD-10-CM

## 2019-04-16 DIAGNOSIS — J40 BRONCHITIS: ICD-10-CM

## 2019-04-16 DIAGNOSIS — R00.0 CHRONIC TACHYCARDIA: ICD-10-CM

## 2019-04-16 DIAGNOSIS — H53.003 AMBLYOPIA OF BOTH EYES: ICD-10-CM

## 2019-04-16 DIAGNOSIS — Z12.11 ENCOUNTER FOR SCREENING COLONOSCOPY: ICD-10-CM

## 2019-04-16 DIAGNOSIS — Z76.89 ENCOUNTER TO ESTABLISH CARE: ICD-10-CM

## 2019-04-16 DIAGNOSIS — I10 ESSENTIAL HYPERTENSION: ICD-10-CM

## 2019-04-16 DIAGNOSIS — I25.118 CORONARY ARTERY DISEASE OF NATIVE ARTERY OF NATIVE HEART WITH STABLE ANGINA PECTORIS (HCC): Primary | ICD-10-CM

## 2019-04-16 DIAGNOSIS — Z78.9 HEPATITIS VACCINATION STATUS UNKNOWN: ICD-10-CM

## 2019-04-16 LAB
HBV SURFACE AB SER RIA-ACNC: NORMAL
HBV SURFACE AG SERPL QL IA: NORMAL
HCV AB SER DONR QL: NORMAL

## 2019-04-16 PROCEDURE — 86803 HEPATITIS C AB TEST: CPT | Performed by: STUDENT IN AN ORGANIZED HEALTH CARE EDUCATION/TRAINING PROGRAM

## 2019-04-16 PROCEDURE — 87340 HEPATITIS B SURFACE AG IA: CPT | Performed by: STUDENT IN AN ORGANIZED HEALTH CARE EDUCATION/TRAINING PROGRAM

## 2019-04-16 PROCEDURE — 36415 COLL VENOUS BLD VENIPUNCTURE: CPT | Performed by: STUDENT IN AN ORGANIZED HEALTH CARE EDUCATION/TRAINING PROGRAM

## 2019-04-16 PROCEDURE — 86704 HEP B CORE ANTIBODY TOTAL: CPT | Performed by: STUDENT IN AN ORGANIZED HEALTH CARE EDUCATION/TRAINING PROGRAM

## 2019-04-16 PROCEDURE — 86706 HEP B SURFACE ANTIBODY: CPT | Performed by: STUDENT IN AN ORGANIZED HEALTH CARE EDUCATION/TRAINING PROGRAM

## 2019-04-16 PROCEDURE — 99203 OFFICE O/P NEW LOW 30 MIN: CPT | Performed by: STUDENT IN AN ORGANIZED HEALTH CARE EDUCATION/TRAINING PROGRAM

## 2019-04-16 PROCEDURE — 86708 HEPATITIS A ANTIBODY: CPT | Performed by: STUDENT IN AN ORGANIZED HEALTH CARE EDUCATION/TRAINING PROGRAM

## 2019-04-16 RX ORDER — HYDROCHLOROTHIAZIDE 25 MG/1
25 TABLET ORAL DAILY
Qty: 30 TABLET | Refills: 3 | Status: SHIPPED | OUTPATIENT
Start: 2019-04-16 | End: 2019-08-05 | Stop reason: SDUPTHER

## 2019-04-16 RX ORDER — METOPROLOL SUCCINATE 50 MG/1
50 TABLET, EXTENDED RELEASE ORAL DAILY
Qty: 30 TABLET | Refills: 1 | Status: SHIPPED | OUTPATIENT
Start: 2019-04-16 | End: 2019-06-10 | Stop reason: SDUPTHER

## 2019-04-16 RX ORDER — NICOTINE 21-14-7MG
1 KIT TRANSDERMAL DAILY
Qty: 1 EACH | Refills: 1 | Status: SHIPPED | OUTPATIENT
Start: 2019-04-16 | End: 2019-05-15 | Stop reason: ALTCHOICE

## 2019-04-16 NOTE — PROGRESS NOTES
Subjective   Nima Portillo is a 58 y.o. male who presents for initial evaluation for establishment of care smoking cessation, and heart disease.    Heart disease:   Patient with history of small vessel disease with cardiac catheterization done previously.  Patient requires risk factor modification of smoking cessation, reduction in alcohol ingestion, and optimization of cardiac medications.  Patient has not had prior myocardial infarction.  Patient has had workup for claudication, continues to endorse claudication type symptoms however ankle-brachial index was previously negative.  Patient tachycardic and hypertensive in office and does not check home readings.  Patient refractory to risk factor modifications, however has been agreeable and in action phase of decision allergy regarding risk factor modification at this time.    Smoking cessation:   Patient with impressive 40 cigarettes and rolled per day regimen.  Patient has been smoking for 40 years.  Patient counseled that if insurance does not help with cost that money used to purchase tobacco and rolling papers could be used for nicotine patches.  Patient discussed risks and benefits of smoking cessation, including heart disease vessel disease claudication type pain chest discomfort.  Patient also endorses respiratory sounds during breathing but during inhalation and exhalation.    Establishment of care:   Patient to establish care with me, will benefit from hepatitis screening as he is unsure of hepatitis B vaccination status nor is aware of hepatitis C status.  Patient requesting colonoscopy along with maintenance vaccinations of tetanus, pneumococcal with history of smoking, and varicella-zoster.  Medications, allergies, medical surgical history reviewed and updated as appropriate.  Patient also with decreased visual acuity worse in the left eye but suboptimal.  Patient endorses utilizing readers, and will benefit from optometry  evaluation.      Preventative:  Over the past 2 weeks, have you felt down, depressed, or hopeless?No   Over the past 2 weeks, have you felt little interest or pleasure in doing things?No  Clinical depression screening refused by patient.No     On osteoporosis therapy?No     Past Medical Hx:  Past Medical History:   Diagnosis Date   • Acute bronchitis    • Acute sinusitis    • Chest pain    • Chronic bronchitis (CMS/HCC)     secondary to smoking   • Claudication (CMS/HCC)    • Coronary arteriosclerosis    • Cough    • Gastroesophageal reflux disease    • Health maintenance examination     Individual health examination   • History of echocardiogram 10/21/2013    Echocardiogram W/ color flow 56601 (1) - Left atrium normal. Mild CLVH. EF 50%. Valves intact. Mild mitral and tricuspid regurg. Pericardium normal.   • History of echocardiogram 10/25/2011    Echocardiogram W/O color flow 77608 (1) - Normal LV sytolic function with mild LVH & mild diastolic dysfunction   • Hyperlipidemia    • Hypertensive disorder    • Pernicious anemia    • Tobacco dependence syndrome        Past Surgical Hx:  Past Surgical History:   Procedure Laterality Date   • CARDIAC CATHETERIZATION  04/08/2015    Cardiac cath 42837 (1) - Slective coronary angiogram. Left heart catheterization with LV ventriculogram.   • DENTAL PROCEDURE      Removal of all teeth       Health Maintenance:  Health Maintenance   Topic Date Due   • PNEUMOCOCCAL VACCINE (19-64 MEDIUM RISK) (1 of 1 - PPSV23) 04/16/2019 (Originally 10/29/1979)   • HEPATITIS C SCREENING  04/16/2019 (Originally 7/12/2017)   • TDAP/TD VACCINES (1 - Tdap) 04/16/2019 (Originally 10/29/1979)   • COLONOSCOPY  04/23/2019 (Originally 7/12/2017)   • ZOSTER VACCINE (1 of 2) 04/16/2020 (Originally 10/29/2010)   • LIPID PANEL  07/10/2019   • INFLUENZA VACCINE  08/01/2019   • ANNUAL PHYSICAL  04/17/2020       Current Meds:    Current Outpatient Medications:   •  aspirin 81 MG chewable tablet, Chew 81 mg  daily., Disp: , Rfl:   •  dicyclomine (BENTYL) 20 MG tablet, Take 1 tablet by mouth Every 6 (Six) Hours As Needed (abdominal pain)., Disp: 30 tablet, Rfl: 0  •  isosorbide mononitrate (IMDUR) 30 MG 24 hr tablet, TAKE 1 TABLET BY MOUTH DAILY., Disp: 30 tablet, Rfl: 6  •  lisinopril (PRINIVIL,ZESTRIL) 40 MG tablet, TAKE 1 TABLET BY MOUTH DAILY., Disp: 30 tablet, Rfl: 6  •  ondansetron ODT (ZOFRAN-ODT) 4 MG disintegrating tablet, Take 1 tablet by mouth Every 6 (Six) Hours As Needed for Nausea or Vomiting., Disp: 10 tablet, Rfl: 0  •  pantoprazole (PROTONIX) 40 MG EC tablet, Take 1 tablet by mouth Daily., Disp: 30 tablet, Rfl: 0  •  pravastatin (PRAVACHOL) 20 MG tablet, TAKE 1 TABLET BY MOUTH EVERY DAY, Disp: 30 tablet, Rfl: 6  •  hydrochlorothiazide (HYDRODIURIL) 25 MG tablet, Take 1 tablet by mouth Daily., Disp: 30 tablet, Rfl: 3  •  magnesium oxide (MAG-OX) 400 MG tablet, Take 400 mg by mouth 2 (two) times a day., Disp: , Rfl:   •  metoprolol succinate XL (TOPROL-XL) 50 MG 24 hr tablet, Take 1 tablet by mouth Daily., Disp: 30 tablet, Rfl: 1  •  Nicotine 21-14-7 MG/24HR kit, Place 1 each on the skin as directed by provider Daily., Disp: 1 each, Rfl: 1    Allergies:  Patient has no known allergies.    Family Hx:  Family History   Problem Relation Age of Onset   • Heart disease Mother    • Hypertension Mother    • Diabetes Mother    • Heart disease Father    • Hypertension Father    • Diabetes Father    • Diabetes Sister    • Heart disease Sister    • Stroke Sister    • Cancer Paternal Aunt         Breast Cancer   • Cancer Paternal Uncle         Unknown cancer        Social History:  Social History     Socioeconomic History   • Marital status:      Spouse name: Not on file   • Number of children: Not on file   • Years of education: Not on file   • Highest education level: Not on file   Tobacco Use   • Smoking status: Current Every Day Smoker     Packs/day: 2.00     Years: 40.00     Pack years: 80.00   •  Smokeless tobacco: Never Used   Substance and Sexual Activity   • Alcohol use: Yes     Alcohol/week: 4.8 oz     Types: 8 Cans of beer per week   • Drug use: No   • Sexual activity: No     Comment: Marital status:        Review of Systems  Review of Systems   Constitutional: Positive for unexpected weight change. Negative for activity change, appetite change, chills, diaphoresis, fatigue and fever.   HENT: Positive for dental problem (adentulous) and rhinorrhea. Negative for congestion, drooling, ear discharge, ear pain, facial swelling, hearing loss, mouth sores, nosebleeds, postnasal drip, sinus pressure, sinus pain, sneezing, sore throat, tinnitus, trouble swallowing and voice change.    Eyes: Negative for photophobia, pain, discharge, redness, itching and visual disturbance.   Respiratory: Positive for apnea (with cough), cough, choking, chest tightness (cardiac issue further classified later), shortness of breath (only during cough and on exertion), wheezing and stridor.    Cardiovascular: Positive for chest pain. Negative for palpitations and leg swelling.        Claudication   Gastrointestinal: Positive for abdominal distention (post prandial) and diarrhea (? milk intolerance vs lactose intolerance). Negative for abdominal pain, anal bleeding, blood in stool, constipation, nausea, rectal pain and vomiting.   Endocrine: Positive for polyphagia. Negative for cold intolerance, heat intolerance, polydipsia and polyuria.   Genitourinary: Positive for enuresis (eveyr 6th urination 2ndary to caffeine intake) and flank pain. Negative for decreased urine volume, difficulty urinating, discharge, dysuria, frequency, genital sores, hematuria, penile pain, penile swelling, scrotal swelling, testicular pain and urgency.   Musculoskeletal: Positive for arthralgias and myalgias. Negative for back pain, gait problem, joint swelling, neck pain and neck stiffness.   Skin: Negative for color change, pallor, rash and  "wound.   Allergic/Immunologic: Negative for environmental allergies, food allergies and immunocompromised state.   Neurological: Positive for headaches (around eye). Negative for dizziness, tremors, seizures, syncope, facial asymmetry, speech difficulty, weakness, light-headedness and numbness.   Hematological: Negative for adenopathy. Does not bruise/bleed easily.   Psychiatric/Behavioral: Negative for agitation, behavioral problems, confusion, decreased concentration, dysphoric mood, hallucinations, self-injury, sleep disturbance and suicidal ideas. The patient is not nervous/anxious and is not hyperactive.             Objective:     /98   Pulse 91   Ht 165.1 cm (65\")   Wt 95.3 kg (210 lb 1.6 oz)   SpO2 98%   BMI 34.96 kg/m²       Physical Exam   Constitutional: He is oriented to person, place, and time. He appears well-developed and well-nourished. No distress.   HENT:   Head: Normocephalic and atraumatic.   Nose: Nose normal.   Eyes: Conjunctivae and EOM are normal. Pupils are equal, round, and reactive to light. Right eye exhibits no discharge. Left eye exhibits no discharge. No scleral icterus.   Neck: Normal range of motion. Neck supple. No thyromegaly present.   Cardiovascular: Normal rate, regular rhythm, normal heart sounds and intact distal pulses. Exam reveals no gallop and no friction rub.   No murmur heard.  Pulmonary/Chest: Effort normal. No respiratory distress. He has wheezes (Soft expiratory wheeze bilaterally). He has no rales. He exhibits no tenderness.   Abdominal: Soft. Bowel sounds are normal. He exhibits no distension and no mass. There is no tenderness. There is no guarding.   Musculoskeletal: Normal range of motion. He exhibits no edema, tenderness or deformity.   Lymphadenopathy:     He has no cervical adenopathy.   Neurological: He is alert and oriented to person, place, and time. No cranial nerve deficit.   Skin: Skin is warm and dry. Capillary refill takes 2 to 3 seconds. He " is not diaphoretic.   Psychiatric: He has a normal mood and affect. His behavior is normal. Judgment and thought content normal.          Assessment/Plan:     Nima was seen today for establish care and colonoscopy.    Diagnoses and all orders for this visit:    Coronary artery disease of native artery of native heart with stable angina pectoris (CMS/HCC)   Patient with need of risk factor modification with current small vessel disease found on cardiac catheterization.  Patient currently hypertensive and tachycardic above goal in office.  Will increase beta long-acting beta blockade, and distal convoluted tubule diuretic therapy.  Patient already maxed out on ACE inhibitor, without current MIs primary prevention with pravastatin 20 mg is appropriate.  -     metoprolol succinate XL (TOPROL-XL) 50 MG 24 hr tablet; Take 1 tablet by mouth Daily.  -     hydrochlorothiazide (HYDRODIURIL) 25 MG tablet; Take 1 tablet by mouth Daily.    Encounter for screening colonoscopy  -     Ambulatory Referral For Screening Colonoscopy    Hepatitis vaccination status unknown  -     Hepatitis B surface antigen  -     Hepatitis B surface antibody  -     Hepatitis B core antibody, total  -     Hepatitis A antibody, total  -     Hepatitis C antibody    Bronchitis   Patient with 80-pack-year smoking history and with expiratory wheeze.  Will benefit from classification of respiratory status.  -     Spirometry With Bronchodilator    Essential hypertension  -     hydrochlorothiazide (HYDRODIURIL) 25 MG tablet; Take 1 tablet by mouth Daily.    Chronic tachycardia  -     metoprolol succinate XL (TOPROL-XL) 50 MG 24 hr tablet; Take 1 tablet by mouth Daily.    Encounter to establish care   Medications, allergies, medical family social history updated and reviewed as appropriate.  Patient also receiving handwritten prescriptions for health department for maintenance vaccinations of Tdap, pneumococcal vaccination, varicella-zoster  vaccination.    Amblyopia of both eyes   Patient with decreased visual acuity left greater than right and in both eyes patient will benefit from optometry evaluation  -     Ambulatory Referral to Optometry    Cigarette nicotine dependence without complication  Patient with 80-pack-year smoking history is in active phase of contemplation for smoking cessation.  Patient counseled as risk benefits of smoking cessation of increased vessel disease, pain when walking not classified his claudication by ankle-brachial index previously.  Patient also with respiratory compromise and expiratory wheeze further to be classified by spirometry.  -     Nicotine 21-14-7 MG/24HR kit; Place 1 each on the skin as directed by provider Daily.      Follow-up:     Return in about 1 month (around 5/16/2019) for Recheck HTN HR SMOKING.    Goals     • Smoking cessation (pt-stated)      Quitting smoking and living longer            Preventative:  Male Preventative: cutting down smoking and ETOH  Vaccines:   Tetanus vaccine: not up to date - Rx given for health department  Annual influenza vaccine: up to date   Pneumococcal vaccine: not up to date - Rx given for health department  Hep B vaccine: unknown   Zoster vaccine:not up to date - Rx given for health department    Smoking cessation counseling was provided.  regular (moderate)  eat more fruits and vegetables, decrease soda or juice intake, increase water intake, increase physical activity, reduce screen time, reduce portion size, cut out extra servings, reduce fast food intake, family to eat at dinner table more often, keep TV off during meals, plan meals, eat breakfast and have 3 meals a day  Patient's Body mass index is 34.96 kg/m². BMI is above normal parameters. Recommendations include: educational material.      RISK SCORE: 3        This document has been electronically signed by Andrew Villatoro III, MD on April 16, 2019 10:41 AM

## 2019-04-16 NOTE — PROGRESS NOTES
I have seen this patient and discussed the case with resident and agree with the assessment and plan.  NNAMDI Watson M.D.

## 2019-04-17 LAB
HAV AB SER QL IA: POSITIVE
HBV CORE AB SER DONR QL IA: NEGATIVE

## 2019-04-19 ENCOUNTER — HOSPITAL ENCOUNTER (OUTPATIENT)
Dept: PULMONOLOGY | Facility: HOSPITAL | Age: 59
Discharge: HOME | End: 2019-04-19
Admitting: FAMILY MEDICINE

## 2019-04-19 PROCEDURE — 94060 EVALUATION OF WHEEZING: CPT

## 2019-04-19 PROCEDURE — 94060 EVALUATION OF WHEEZING: CPT | Performed by: INTERNAL MEDICINE

## 2019-05-14 ENCOUNTER — PREP FOR SURGERY (OUTPATIENT)
Dept: OTHER | Facility: HOSPITAL | Age: 59
End: 2019-05-14

## 2019-05-14 DIAGNOSIS — Z12.11 SCREEN FOR COLON CANCER: Primary | ICD-10-CM

## 2019-05-14 RX ORDER — DEXTROSE AND SODIUM CHLORIDE 5; .45 G/100ML; G/100ML
100 INJECTION, SOLUTION INTRAVENOUS CONTINUOUS
Status: CANCELLED | OUTPATIENT
Start: 2019-05-17

## 2019-05-16 ENCOUNTER — OFFICE VISIT (OUTPATIENT)
Dept: FAMILY MEDICINE CLINIC | Facility: CLINIC | Age: 59
End: 2019-05-16

## 2019-05-16 VITALS
HEIGHT: 65 IN | WEIGHT: 204.9 LBS | SYSTOLIC BLOOD PRESSURE: 142 MMHG | DIASTOLIC BLOOD PRESSURE: 98 MMHG | OXYGEN SATURATION: 98 % | BODY MASS INDEX: 34.14 KG/M2 | HEART RATE: 67 BPM

## 2019-05-16 DIAGNOSIS — Z00.00 HEALTHCARE MAINTENANCE: ICD-10-CM

## 2019-05-16 DIAGNOSIS — G56.03 BILATERAL CARPAL TUNNEL SYNDROME: Primary | ICD-10-CM

## 2019-05-16 DIAGNOSIS — F17.211 CIGARETTE NICOTINE DEPENDENCE IN REMISSION: ICD-10-CM

## 2019-05-16 PROCEDURE — 99213 OFFICE O/P EST LOW 20 MIN: CPT | Performed by: STUDENT IN AN ORGANIZED HEALTH CARE EDUCATION/TRAINING PROGRAM

## 2019-05-16 PROCEDURE — 99407 BEHAV CHNG SMOKING > 10 MIN: CPT | Performed by: STUDENT IN AN ORGANIZED HEALTH CARE EDUCATION/TRAINING PROGRAM

## 2019-05-16 RX ORDER — BUPROPION HYDROCHLORIDE 150 MG/1
150 TABLET, EXTENDED RELEASE ORAL 2 TIMES DAILY
Qty: 60 TABLET | Refills: 4 | Status: SHIPPED | OUTPATIENT
Start: 2019-05-16 | End: 2020-02-11 | Stop reason: SDUPTHER
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
## 2019-05-17 ENCOUNTER — HOSPITAL ENCOUNTER (OUTPATIENT)
Facility: HOSPITAL | Age: 59
Setting detail: HOSPITAL OUTPATIENT SURGERY
Discharge: HOME | End: 2019-05-17
Attending: SURGERY | Admitting: SURGERY

## 2019-05-17 ENCOUNTER — ANESTHESIA EVENT (OUTPATIENT)
Dept: GASTROENTEROLOGY | Facility: HOSPITAL | Age: 59
End: 2019-05-17

## 2019-05-17 ENCOUNTER — ANESTHESIA (OUTPATIENT)
Dept: GASTROENTEROLOGY | Facility: HOSPITAL | Age: 59
End: 2019-05-17

## 2019-05-17 VITALS
SYSTOLIC BLOOD PRESSURE: 149 MMHG | WEIGHT: 200 LBS | BODY MASS INDEX: 33.32 KG/M2 | TEMPERATURE: 97.6 F | RESPIRATION RATE: 18 BRPM | OXYGEN SATURATION: 97 % | DIASTOLIC BLOOD PRESSURE: 82 MMHG | HEIGHT: 65 IN | HEART RATE: 57 BPM

## 2019-05-17 DIAGNOSIS — Z12.11 SCREEN FOR COLON CANCER: ICD-10-CM

## 2019-05-17 PROCEDURE — G0463 HOSPITAL OUTPT CLINIC VISIT: HCPCS | Performed by: SURGERY

## 2019-05-17 RX ORDER — ONDANSETRON 2 MG/ML
4 INJECTION INTRAMUSCULAR; INTRAVENOUS ONCE AS NEEDED
Status: DISCONTINUED | OUTPATIENT
Start: 2019-05-17 | End: 2019-05-17 | Stop reason: HOSPADM

## 2019-05-17 RX ORDER — DEXTROSE AND SODIUM CHLORIDE 5; .45 G/100ML; G/100ML
100 INJECTION, SOLUTION INTRAVENOUS CONTINUOUS
Status: DISCONTINUED | OUTPATIENT
Start: 2019-05-17 | End: 2019-05-17 | Stop reason: HOSPADM

## 2019-05-17 RX ADMIN — DEXTROSE AND SODIUM CHLORIDE 100 ML/HR: 5; 450 INJECTION, SOLUTION INTRAVENOUS at 07:53

## 2019-05-17 NOTE — ANESTHESIA PREPROCEDURE EVALUATION
Anesthesia Evaluation     Patient summary reviewed and Nursing notes reviewed   NPO Solid Status: > 8 hours  NPO Liquid Status: > 2 hours           Airway   Mallampati: II  TM distance: >3 FB  Dental      Pulmonary - normal exam   (+) a smoker Current Smoked day of surgery,   Cardiovascular - normal exam    Patient on routine beta blocker and Beta blocker given within 24 hours of surgery    (+) hypertension well controlled 2 medications or greater, CAD, hyperlipidemia,     ROS comment: Echocardiogram W/O color flow 96456 (1) - Normal LV sytolic function with mild LVH & mild diastolic dysfunction    Neuro/Psych  (+) numbness (bilateral carpal tunnel syndrome),     GI/Hepatic/Renal/Endo    (+) obesity, morbid obesity, GERD,      Musculoskeletal     Abdominal   (+) obese,    Substance History   (+) alcohol use,      OB/GYN          Other                        Anesthesia Plan    ASA 3     MAC     intravenous induction   Anesthetic plan, all risks, benefits, and alternatives have been provided, discussed and informed consent has been obtained with: patient.

## 2019-05-20 NOTE — PROGRESS NOTES
I have seen the patient.  I have reviewed the notes, assessments, and/or procedures performed by Andrew Villatoro III, MD, I concur with her/his documentation and assessment and plan for Nima Portillo.               This document has been electronically signed by Hubert Thomas MD on May 20, 2019 8:54 AM

## 2019-05-31 RX ORDER — ISOSORBIDE MONONITRATE 30 MG/1
30 TABLET, EXTENDED RELEASE ORAL DAILY
Qty: 30 TABLET | Refills: 6 | Status: SHIPPED | OUTPATIENT
Start: 2019-05-31 | End: 2019-12-12 | Stop reason: SDUPTHER

## 2019-06-10 DIAGNOSIS — R00.0 CHRONIC TACHYCARDIA: ICD-10-CM

## 2019-06-10 DIAGNOSIS — I25.118 CORONARY ARTERY DISEASE OF NATIVE ARTERY OF NATIVE HEART WITH STABLE ANGINA PECTORIS (HCC): ICD-10-CM

## 2019-06-10 RX ORDER — METOPROLOL SUCCINATE 50 MG/1
TABLET, EXTENDED RELEASE ORAL
Qty: 30 TABLET | Refills: 1 | Status: SHIPPED | OUTPATIENT
Start: 2019-06-10 | End: 2019-08-05 | Stop reason: SDUPTHER

## 2019-06-26 ENCOUNTER — OFFICE VISIT (OUTPATIENT)
Dept: FAMILY MEDICINE CLINIC | Facility: CLINIC | Age: 59
End: 2019-06-26

## 2019-06-26 VITALS
OXYGEN SATURATION: 99 % | DIASTOLIC BLOOD PRESSURE: 80 MMHG | HEART RATE: 91 BPM | BODY MASS INDEX: 32.25 KG/M2 | HEIGHT: 65 IN | SYSTOLIC BLOOD PRESSURE: 136 MMHG | WEIGHT: 193.56 LBS

## 2019-06-26 DIAGNOSIS — Z12.2 ENCOUNTER FOR SCREENING FOR LUNG CANCER: ICD-10-CM

## 2019-06-26 DIAGNOSIS — Z12.11 ENCOUNTER FOR SCREENING COLONOSCOPY: ICD-10-CM

## 2019-06-26 DIAGNOSIS — F17.210 CIGARETTE NICOTINE DEPENDENCE WITHOUT COMPLICATION: ICD-10-CM

## 2019-06-26 DIAGNOSIS — G56.03 BILATERAL CARPAL TUNNEL SYNDROME: Primary | ICD-10-CM

## 2019-06-26 PROCEDURE — 99213 OFFICE O/P EST LOW 20 MIN: CPT | Performed by: STUDENT IN AN ORGANIZED HEALTH CARE EDUCATION/TRAINING PROGRAM

## 2019-06-26 PROCEDURE — 99407 BEHAV CHNG SMOKING > 10 MIN: CPT | Performed by: STUDENT IN AN ORGANIZED HEALTH CARE EDUCATION/TRAINING PROGRAM

## 2019-06-26 RX ORDER — ZOSTER VACCINE RECOMBINANT, ADJUVANTED 50 MCG/0.5
KIT INTRAMUSCULAR
Refills: 1 | COMMUNITY
Start: 2019-05-16 | End: 2019-07-22

## 2019-06-26 RX ORDER — PNEUMOCOCCAL VACCINE POLYVALENT 25; 25; 25; 25; 25; 25; 25; 25; 25; 25; 25; 25; 25; 25; 25; 25; 25; 25; 25; 25; 25; 25; 25 UG/.5ML; UG/.5ML; UG/.5ML; UG/.5ML; UG/.5ML; UG/.5ML; UG/.5ML; UG/.5ML; UG/.5ML; UG/.5ML; UG/.5ML; UG/.5ML; UG/.5ML; UG/.5ML; UG/.5ML; UG/.5ML; UG/.5ML; UG/.5ML; UG/.5ML; UG/.5ML; UG/.5ML; UG/.5ML; UG/.5ML
INJECTION, SOLUTION INTRAMUSCULAR; SUBCUTANEOUS
Refills: 0 | COMMUNITY
Start: 2019-05-16 | End: 2019-07-22

## 2019-06-26 RX ORDER — PRAVASTATIN SODIUM 20 MG
20 TABLET ORAL DAILY
Refills: 6 | COMMUNITY
Start: 2019-06-10 | End: 2020-08-28

## 2019-06-26 RX ORDER — TETANUS TOXOID, REDUCED DIPHTHERIA TOXOID AND ACELLULAR PERTUSSIS VACCINE, ADSORBED 5; 2.5; 8; 8; 2.5 [IU]/.5ML; [IU]/.5ML; UG/.5ML; UG/.5ML; UG/.5ML
SUSPENSION INTRAMUSCULAR
Refills: 0 | COMMUNITY
Start: 2019-05-16 | End: 2019-07-22

## 2019-06-26 RX ORDER — HYDROCHLOROTHIAZIDE 12.5 MG/1
12.5 CAPSULE, GELATIN COATED ORAL EVERY MORNING
Refills: 6 | COMMUNITY
Start: 2019-05-31 | End: 2019-08-05

## 2019-06-26 NOTE — PROGRESS NOTES
Subjective   Nima Portillo is a 58 y.o. male who presents for follow up for carpal tunnel     Carpal Tunnel Syndrome: Patient presents for presents evaluation of hand paresthesias.  Onset of the symptoms was several months ago. Current symptoms include tingling involving the above, below aspect of the bilateral hand, of moderate severity. Inciting event/aggravating factors: worse with activity Patient's course of sx:gradually improving. Evaluation to date: none.  Treatment to date: wrist splints used for 1 month, which has been somewhat effective. 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 Pt counseled tokeep it on until sleep when it doesn't  Bother him.    Healthcare maintenance: Patient engaged in joint decision-making with risks and benefits requiring colonoscopy and low-dose cancer screening.  Patient has profound 80 pack year smoking history.  Patient counseled to retry his colonoscopy prep we will continue to drink his prep after midnight stopping all input.    Smoking cessation: Smoking cessation was discussed for 11 minutes, patient recalcitrant to idea.  Risks and benefits of lung cancer bladder cancer cardiovascular disease were discussed and understood with teach back technique for patient.      The following portions of the patient's history were reviewed and updated as appropriate: allergies, current medications, past family history, past medical history, past social history, past surgical history and problem list.    Preventative:  Over the past 2 weeks, have you felt down, depressed, or hopeless?No   Over the past 2 weeks, have you felt little interest or pleasure in doing things?No  Clinical depression screening refused by patient.No     On osteoporosis therapy?Not Indicated     Past Medical Hx:  Past Medical History:   Diagnosis Date   • Acute bronchitis    • Acute sinusitis    • Chest pain    • Chronic bronchitis (CMS/HCC)     secondary to smoking   •  Claudication (CMS/HCC)    • Coronary arteriosclerosis    • Cough    • Gastroesophageal reflux disease    • Health maintenance examination     Individual health examination   • History of echocardiogram 10/21/2013    Echocardiogram W/ color flow 59333 (1) - Left atrium normal. Mild CLVH. EF 50%. Valves intact. Mild mitral and tricuspid regurg. Pericardium normal.   • History of echocardiogram 10/25/2011    Echocardiogram W/O color flow 34719 (1) - Normal LV sytolic function with mild LVH & mild diastolic dysfunction   • Hyperlipidemia    • Hypertensive disorder    • Pernicious anemia    • Tobacco dependence syndrome        Past Surgical Hx:  Past Surgical History:   Procedure Laterality Date   • CARDIAC CATHETERIZATION  04/08/2015    Cardiac cath 13423 (1) - Slective coronary angiogram. Left heart catheterization with LV ventriculogram.   • DENTAL PROCEDURE      Removal of all teeth       Health Maintenance:  Health Maintenance   Topic Date Due   • LUNG CANCER SCREENING  10/29/2015   • COLONOSCOPY  07/12/2017   • ZOSTER VACCINE (1 of 2) 04/16/2020 (Originally 10/29/2010)   • LIPID PANEL  07/10/2019   • INFLUENZA VACCINE  08/01/2019   • ANNUAL PHYSICAL  04/17/2020   • TDAP/TD VACCINES (2 - Td) 05/16/2029   • PNEUMOCOCCAL VACCINE (19-64 MEDIUM RISK)  Completed   • HEPATITIS C SCREENING  Completed       Current Meds:    Current Outpatient Medications:   •  aspirin 81 MG chewable tablet, Chew 81 mg daily., Disp: , Rfl:   •  BOOSTRIX 5-2.5-18.5 LF-MCG/0.5 injection, INJECT 0.5 ML INTO THE APPROPRIATE MUSCLE AS DIRECTED BY PRESCRIBER 1 (ONE) TIME FOR 1 DOSE., Disp: , Rfl: 0  •  buPROPion SR (WELLBUTRIN SR) 150 MG 12 hr tablet, Take 1 tablet by mouth 2 (Two) Times a Day., Disp: 60 tablet, Rfl: 4  •  dicyclomine (BENTYL) 20 MG tablet, Take 1 tablet by mouth Every 6 (Six) Hours As Needed (abdominal pain)., Disp: 30 tablet, Rfl: 0  •  hydrochlorothiazide (HYDRODIURIL) 25 MG tablet, Take 1 tablet by mouth Daily., Disp: 30  tablet, Rfl: 3  •  hydrochlorothiazide (MICROZIDE) 12.5 MG capsule, Take 12.5 mg by mouth Every Morning., Disp: , Rfl: 6  •  isosorbide mononitrate (IMDUR) 30 MG 24 hr tablet, TAKE 1 TABLET BY MOUTH DAILY., Disp: 30 tablet, Rfl: 6  •  metoprolol succinate XL (TOPROL-XL) 50 MG 24 hr tablet, TAKE 1 TABLET BY MOUTH EVERY DAY, Disp: 30 tablet, Rfl: 1  •  ondansetron ODT (ZOFRAN-ODT) 4 MG disintegrating tablet, Take 1 tablet by mouth Every 6 (Six) Hours As Needed for Nausea or Vomiting., Disp: 10 tablet, Rfl: 0  •  pantoprazole (PROTONIX) 40 MG EC tablet, Take 1 tablet by mouth Daily., Disp: 30 tablet, Rfl: 0  •  PNEUMOVAX 23 25 MCG/0.5ML vaccine, INJECT 0.5 ML INTO THE APPROPRIATE MUSCLE AS DIRECTED BY PRESCRIBER 1 (ONE) TIME FOR 1 DOSE., Disp: , Rfl: 0  •  pravastatin (PRAVACHOL) 20 MG tablet, Take 20 mg by mouth Daily., Disp: , Rfl: 6  •  SHINGRIX 50 MCG/0.5ML reconstituted suspension, INJECT 1 EACH INTO THE APPROPRIATE MUSCLE AS DIRECTED BY PRESCRIBER 1 (ONE) TIME FOR 1 DOSE., Disp: , Rfl: 1  •  lisinopril (PRINIVIL,ZESTRIL) 40 MG tablet, TAKE 1 TABLET BY MOUTH DAILY., Disp: 30 tablet, Rfl: 6    Allergies:  Patient has no known allergies.    Family Hx:  Family History   Problem Relation Age of Onset   • Heart disease Mother    • Hypertension Mother    • Diabetes Mother    • Heart disease Father    • Hypertension Father    • Diabetes Father    • Diabetes Sister    • Heart disease Sister    • Stroke Sister    • Cancer Paternal Aunt         Breast Cancer   • Cancer Paternal Uncle         Unknown cancer        Social History:  Social History     Socioeconomic History   • Marital status:      Spouse name: Not on file   • Number of children: Not on file   • Years of education: Not on file   • Highest education level: Not on file   Tobacco Use   • Smoking status: Current Every Day Smoker     Packs/day: 2.00     Years: 40.00     Pack years: 80.00   • Smokeless tobacco: Never Used   Substance and Sexual Activity   •  "Alcohol use: Yes     Alcohol/week: 4.8 oz     Types: 8 Cans of beer per week   • Drug use: No   • Sexual activity: No     Comment: Marital status:        Review of Systems  Review of Systems   Constitutional: Negative for activity change, appetite change, chills, diaphoresis and fever.   HENT: Negative for congestion, rhinorrhea, sinus pressure, sinus pain and sore throat.    Eyes: Negative for visual disturbance.   Respiratory: Positive for cough. Negative for apnea, choking, chest tightness, shortness of breath and wheezing.    Cardiovascular: Negative for chest pain, palpitations and leg swelling.   Gastrointestinal: Negative for abdominal distention, abdominal pain, blood in stool, constipation, diarrhea, nausea and vomiting.   Genitourinary: Negative for difficulty urinating, dysuria, frequency, hematuria and urgency.   Musculoskeletal: Negative for arthralgias, back pain, joint swelling, myalgias and neck pain.   Skin: Negative for color change, pallor, rash and wound.   Neurological: Positive for numbness. Negative for dizziness, weakness and headaches.   Psychiatric/Behavioral: Negative for agitation and behavioral problems.            Objective:     /80   Pulse 91   Ht 165.1 cm (65\")   Wt 87.8 kg (193 lb 9 oz)   SpO2 99%   BMI 32.21 kg/m²       Physical Exam   Constitutional: He is oriented to person, place, and time. He appears well-developed and well-nourished. No distress.   HENT:   Head: Normocephalic and atraumatic.   Right Ear: External ear normal.   Left Ear: External ear normal.   Nose: Nose normal.   Eyes: Conjunctivae and EOM are normal. Pupils are equal, round, and reactive to light. Right eye exhibits no discharge. Left eye exhibits no discharge. No scleral icterus.   Neck: Normal range of motion. Neck supple. No thyromegaly present.   Cardiovascular: Normal rate, regular rhythm, normal heart sounds and intact distal pulses. Exam reveals no gallop and no friction rub.   No " murmur heard.  Pulmonary/Chest: Effort normal. No respiratory distress. He has wheezes (Soft coarse wheeze bilaterally). He has no rales. He exhibits no tenderness.   Abdominal: Soft. Bowel sounds are normal. He exhibits no distension and no mass. There is no tenderness. There is no guarding.   Musculoskeletal: Normal range of motion. He exhibits no edema, tenderness or deformity.   Lymphadenopathy:     He has no cervical adenopathy.   Neurological: He is alert and oriented to person, place, and time. No cranial nerve deficit.   Skin: Skin is warm and dry. Capillary refill takes 2 to 3 seconds. He is not diaphoretic.   Patient sitting in his thumb spica splints in comfort   Psychiatric: He has a normal mood and affect. His behavior is normal. Judgment and thought content normal.          Assessment/Plan:     Nima was seen today for carpal tunnel.    Diagnoses and all orders for this visit:    Bilateral carpal tunnel syndrome   Patient with great effect from thumb spica splint.  Discussion was had regarding surgical release, patient does not wish to go that route this at this time.  Patient counseled to continue splints until bedtime where it does not cause him any troubles during sleep.    Encounter for screening for lung cancer   Joint decision-making was engaged for 90-pack-year smoking history for low-dose cancer screening chest CT.  -     CT Chest Low Dose Wo    Encounter for screening colonoscopy   Patient agreeable for retrial colonoscopy with full preparatory instructions, continuing to drink prep after midnight but stopping all other oral intake.  -     Ambulatory Referral For Screening Colonoscopy    Cigarette nicotine dependence without complication   Smoking cessation was discussed for 11 minutes, patient recalcitrant to idea of nicotine cessation of any sort at this time.            Follow-up:     No Follow-up on file.    Goals     • Smoking cessation (pt-stated)      Quitting smoking and living longer             Preventative:  Male Preventative: Exercises regularly  Vaccines:   Tetanus vaccine: not up to date - today  Annual influenza vaccine: up to date   Pneumococcal vaccine: not up to date - reconciliation form [facility]  Hep B vaccine: up to date   Zoster vaccine:up to date    Smoking cessation counseling was provided.  does not drink  eat more fruits and vegetables, decrease soda or juice intake, increase water intake, increase physical activity, reduce screen time, reduce portion size, cut out extra servings, reduce fast food intake, family to eat at dinner table more often, keep TV off during meals, plan meals, eat breakfast and have 3 meals a day  Patient's Body mass index is 32.21 kg/m². BMI is above normal parameters. Recommendations include: exercise counseling.      RISK SCORE: 4        This document has been electronically signed by Andrew Villatoro III, MD on June 29, 2019 10:12 PM

## 2019-06-28 RX ORDER — HYDROCHLOROTHIAZIDE 12.5 MG/1
12.5 CAPSULE, GELATIN COATED ORAL EVERY MORNING
Qty: 30 CAPSULE | Refills: 6 | OUTPATIENT
Start: 2019-06-28

## 2019-06-28 RX ORDER — LISINOPRIL 40 MG/1
40 TABLET ORAL DAILY
Qty: 30 TABLET | Refills: 6 | Status: SHIPPED | OUTPATIENT
Start: 2019-06-28 | End: 2019-09-13 | Stop reason: SDUPTHER

## 2019-07-03 ENCOUNTER — HOSPITAL ENCOUNTER (OUTPATIENT)
Dept: CT IMAGING | Facility: HOSPITAL | Age: 59
Discharge: HOME | End: 2019-07-03
Admitting: FAMILY MEDICINE

## 2019-07-03 PROCEDURE — G0297 LDCT FOR LUNG CA SCREEN: HCPCS

## 2019-07-22 ENCOUNTER — OFFICE VISIT (OUTPATIENT)
Dept: FAMILY MEDICINE CLINIC | Facility: CLINIC | Age: 59
End: 2019-07-22

## 2019-07-22 VITALS
BODY MASS INDEX: 32.77 KG/M2 | WEIGHT: 196.7 LBS | HEIGHT: 65 IN | TEMPERATURE: 97.4 F | OXYGEN SATURATION: 95 % | HEART RATE: 59 BPM | DIASTOLIC BLOOD PRESSURE: 78 MMHG | SYSTOLIC BLOOD PRESSURE: 136 MMHG

## 2019-07-22 DIAGNOSIS — M79.645 CHRONIC THUMB PAIN, BILATERAL: ICD-10-CM

## 2019-07-22 DIAGNOSIS — G89.29 CHRONIC THUMB PAIN, BILATERAL: ICD-10-CM

## 2019-07-22 DIAGNOSIS — G56.03 BILATERAL CARPAL TUNNEL SYNDROME: Primary | ICD-10-CM

## 2019-07-22 DIAGNOSIS — L60.0 INGROWN TOENAIL OF LEFT FOOT: ICD-10-CM

## 2019-07-22 DIAGNOSIS — M79.644 CHRONIC THUMB PAIN, BILATERAL: ICD-10-CM

## 2019-07-22 PROCEDURE — 99213 OFFICE O/P EST LOW 20 MIN: CPT | Performed by: STUDENT IN AN ORGANIZED HEALTH CARE EDUCATION/TRAINING PROGRAM

## 2019-07-22 RX ORDER — MELOXICAM 7.5 MG/1
7.5 TABLET ORAL DAILY
Qty: 30 TABLET | Refills: 1 | Status: SHIPPED | OUTPATIENT
Start: 2019-07-22 | End: 2019-09-04

## 2019-07-22 NOTE — PROGRESS NOTES
Subjective   Nima Portillo is a 58 y.o. male who presents for follow up for carpal tunnel syndrome.    Patient is ready for a different strategy for his hand and thumb pain. Patient was only wearing thumb spica splint on right hand. It is worse on the right than the left but both are very painful. Patient has been wearing thumb spica splint during the day and removing them at night. He states the pain runs up his thumb to his wrist and arm.     Additionally, patient has been having toe pain after he trimmed his left great toe with his pocket knife.       Past Medical Hx:  Past Medical History:   Diagnosis Date   • Acute bronchitis    • Acute sinusitis    • Chest pain    • Chronic bronchitis (CMS/HCC)     secondary to smoking   • Claudication (CMS/HCC)    • Coronary arteriosclerosis    • Cough    • Gastroesophageal reflux disease    • Health maintenance examination     Individual health examination   • History of echocardiogram 10/21/2013    Echocardiogram W/ color flow 07383 (1) - Left atrium normal. Mild CLVH. EF 50%. Valves intact. Mild mitral and tricuspid regurg. Pericardium normal.   • History of echocardiogram 10/25/2011    Echocardiogram W/O color flow 69012 (1) - Normal LV sytolic function with mild LVH & mild diastolic dysfunction   • Hyperlipidemia    • Hypertensive disorder    • Pernicious anemia    • Tobacco dependence syndrome        Past Surgical Hx:  Past Surgical History:   Procedure Laterality Date   • CARDIAC CATHETERIZATION  04/08/2015    Cardiac cath 44343 (1) - Slective coronary angiogram. Left heart catheterization with LV ventriculogram.   • DENTAL PROCEDURE      Removal of all teeth       Health Maintenance:  Health Maintenance   Topic Date Due   • COLONOSCOPY  07/12/2017   • LIPID PANEL  07/10/2019   • ZOSTER VACCINE (1 of 2) 04/16/2020 (Originally 10/29/2010)   • INFLUENZA VACCINE  08/01/2019   • ANNUAL PHYSICAL  04/17/2020   • LUNG CANCER SCREENING  07/03/2020   •  TDAP/TD VACCINES (2 - Td) 05/16/2029   • PNEUMOCOCCAL VACCINE (19-64 MEDIUM RISK)  Completed   • HEPATITIS C SCREENING  Completed       Current Meds:    Current Outpatient Medications:   •  aspirin 81 MG chewable tablet, Chew 81 mg daily., Disp: , Rfl:   •  BOOSTRIX 5-2.5-18.5 LF-MCG/0.5 injection, INJECT 0.5 ML INTO THE APPROPRIATE MUSCLE AS DIRECTED BY PRESCRIBER 1 (ONE) TIME FOR 1 DOSE., Disp: , Rfl: 0  •  buPROPion SR (WELLBUTRIN SR) 150 MG 12 hr tablet, Take 1 tablet by mouth 2 (Two) Times a Day., Disp: 60 tablet, Rfl: 4  •  dicyclomine (BENTYL) 20 MG tablet, Take 1 tablet by mouth Every 6 (Six) Hours As Needed (abdominal pain)., Disp: 30 tablet, Rfl: 0  •  hydrochlorothiazide (HYDRODIURIL) 25 MG tablet, Take 1 tablet by mouth Daily., Disp: 30 tablet, Rfl: 3  •  hydrochlorothiazide (MICROZIDE) 12.5 MG capsule, Take 12.5 mg by mouth Every Morning., Disp: , Rfl: 6  •  isosorbide mononitrate (IMDUR) 30 MG 24 hr tablet, TAKE 1 TABLET BY MOUTH DAILY., Disp: 30 tablet, Rfl: 6  •  lisinopril (PRINIVIL,ZESTRIL) 40 MG tablet, TAKE 1 TABLET BY MOUTH DAILY., Disp: 30 tablet, Rfl: 6  •  metoprolol succinate XL (TOPROL-XL) 50 MG 24 hr tablet, TAKE 1 TABLET BY MOUTH EVERY DAY, Disp: 30 tablet, Rfl: 1  •  ondansetron ODT (ZOFRAN-ODT) 4 MG disintegrating tablet, Take 1 tablet by mouth Every 6 (Six) Hours As Needed for Nausea or Vomiting., Disp: 10 tablet, Rfl: 0  •  pantoprazole (PROTONIX) 40 MG EC tablet, Take 1 tablet by mouth Daily., Disp: 30 tablet, Rfl: 0  •  PNEUMOVAX 23 25 MCG/0.5ML vaccine, INJECT 0.5 ML INTO THE APPROPRIATE MUSCLE AS DIRECTED BY PRESCRIBER 1 (ONE) TIME FOR 1 DOSE., Disp: , Rfl: 0  •  pravastatin (PRAVACHOL) 20 MG tablet, Take 20 mg by mouth Daily., Disp: , Rfl: 6  •  SHINGRIX 50 MCG/0.5ML reconstituted suspension, INJECT 1 EACH INTO THE APPROPRIATE MUSCLE AS DIRECTED BY PRESCRIBER 1 (ONE) TIME FOR 1 DOSE., Disp: , Rfl: 1  •  meloxicam (MOBIC) 7.5 MG tablet, Take 1 tablet by mouth Daily., Disp: 30  "tablet, Rfl: 1    Allergies:  Patient has no known allergies.    Family Hx:  Family History   Problem Relation Age of Onset   • Heart disease Mother    • Hypertension Mother    • Diabetes Mother    • Heart disease Father    • Hypertension Father    • Diabetes Father    • Diabetes Sister    • Heart disease Sister    • Stroke Sister    • Cancer Paternal Aunt         Breast Cancer   • Cancer Paternal Uncle         Unknown cancer        Social History:  Social History     Socioeconomic History   • Marital status:      Spouse name: Not on file   • Number of children: Not on file   • Years of education: Not on file   • Highest education level: Not on file   Tobacco Use   • Smoking status: Current Every Day Smoker     Packs/day: 1.00     Years: 40.00     Pack years: 40.00     Types: Cigarettes   • Smokeless tobacco: Never Used   Substance and Sexual Activity   • Alcohol use: Yes     Alcohol/week: 4.8 oz     Types: 8 Cans of beer per week   • Drug use: No   • Sexual activity: No     Comment: Marital status:        Review of Systems  Review of Systems   Constitutional: Negative for fever.   HENT: Negative for hearing loss and sneezing.    Respiratory: Positive for cough, shortness of breath and wheezing.    Cardiovascular: Negative for chest pain.   Musculoskeletal: Positive for back pain.   Allergic/Immunologic: Negative for environmental allergies and food allergies.   Neurological: Negative for headaches.   Psychiatric/Behavioral: Negative for agitation.            Objective:     /78 (BP Location: Right arm, Patient Position: Sitting, Cuff Size: Adult)   Pulse 59   Temp 97.4 °F (36.3 °C) (Temporal)   Ht 165.1 cm (65\")   Wt 89.2 kg (196 lb 11.2 oz)   SpO2 95%   BMI 32.73 kg/m²       Physical Exam   Constitutional: He appears well-developed and well-nourished.   HENT:   Head: Normocephalic and atraumatic.   Eyes: Pupils are equal, round, and reactive to light.   Cardiovascular: Normal rate. " "  Abdominal: Soft.   Musculoskeletal:        Right wrist: He exhibits decreased range of motion and tenderness.        Left wrist: He exhibits decreased range of motion and tenderness.        Arms:       Skin: Skin is warm.   Psychiatric: He has a normal mood and affect.       Assessment/Plan:     1. Bilateral carpal tunnel syndrome    2. Chronic thumb pain, bilateral    3. Ingrown toenail of left foot       Bilateral Thumb pain:  -Patient is ready to see orthopedic surgery for a more permanent solution to his pain.  -Pain starts at the lateral thumb and shoots up his thumb to his wrist and up his arm  -Finkelstein's test is negative on right  - Will continue to use splints during the day  -Added meloxicam 7.5 mg for pain relief while monitoring his GI issues    Ingrown left great toe:  -Referred to our procedure clinic to correct  -Patient stated that he trimmed and \"dug\" around his toe with his pocket knife. Advised against using his pocket knife for this.    Smoking cessation:  -Patient stated he was note interested in smoking cessation at this time  -Counseling spent less than 1 minute       Follow-up:     Return for return for ingrown toenail removal at our procedure clinic.    Goals     • Smoking cessation (pt-stated)      Quitting smoking and living longer            Preventative:    Vaccines Recommended at this visit:   No Vaccines recommended today. Patient is up to date on all vaccines.     Vaccines Received at this visit:  No Vaccines recommended today. Patient is up to date on all vaccines.     Screenings Recommended at this visit:  Lipid Panel    Screenings Ordered at this visit:  Lipid Panel    Smoking Status:  Patient is current smoker. Patient is not interested in smoking cessation.      Patient's Body mass index is 32.73 kg/m². BMI is above normal parameters. Recommendations include: educational material.         RISK SCORE: 3    "

## 2019-07-23 NOTE — PROGRESS NOTES
Subjective:     Nima Portillo is a 58 y.o. male who presents for   Chief Complaint   Patient presents with   • Benign Prostatic Hypertrophy     1 month           58-year-old male with history of chronic bronchitis ASCVD with intermittent claudication hyperlipidemia hypertension and tenosynovitis of the right thumb currently wearing a thumb spica cock-up splint without relief.  He denies any paresthesias but continues to have pain with limited range of motion of the right wrist and thumb.  He wears a thumb spica splint during the day while at work but not at night he denies any paresthesias describes the pain is sharp with limited range of motion he does have some pain in his left wrist and thumb as well.  Patient also complains of a painful ingrown left great toenail he has been trimming his toenail using a pocket knife without relief patient has been counseled on a number of occasions regarding smoking cessation he continues to decline and is a dedicated smoker fortunately his pulmonary status is stable he currently denies any chest pain dyspnea fevers chills or night sweats please see Dr. Rojas note for more detailed information regarding presenting illness         Past Medical Hx:  Past Medical History:   Diagnosis Date   • Acute bronchitis    • Acute sinusitis    • Chest pain    • Chronic bronchitis (CMS/HCC)     secondary to smoking   • Claudication (CMS/HCC)    • Coronary arteriosclerosis    • Cough    • Gastroesophageal reflux disease    • Health maintenance examination     Individual health examination   • History of echocardiogram 10/21/2013    Echocardiogram W/ color flow 52221 (1) - Left atrium normal. Mild CLVH. EF 50%. Valves intact. Mild mitral and tricuspid regurg. Pericardium normal.   • History of echocardiogram 10/25/2011    Echocardiogram W/O color flow 62057 (1) - Normal LV sytolic function with mild LVH & mild diastolic dysfunction   • Hyperlipidemia    • Hypertensive disorder    •  Pernicious anemia    • Tobacco dependence syndrome        Past Surgical Hx:  Past Surgical History:   Procedure Laterality Date   • CARDIAC CATHETERIZATION  04/08/2015    Cardiac cath 85791 (1) - Slective coronary angiogram. Left heart catheterization with LV ventriculogram.   • DENTAL PROCEDURE      Removal of all teeth       Health Maintenance:  Health Maintenance   Topic Date Due   • COLONOSCOPY  07/12/2017   • LIPID PANEL  07/10/2019   • ZOSTER VACCINE (1 of 2) 04/16/2020 (Originally 10/29/2010)   • INFLUENZA VACCINE  08/01/2019   • ANNUAL PHYSICAL  04/17/2020   • LUNG CANCER SCREENING  07/03/2020   • TDAP/TD VACCINES (2 - Td) 05/16/2029   • PNEUMOCOCCAL VACCINE (19-64 MEDIUM RISK)  Completed   • HEPATITIS C SCREENING  Completed       Current Meds:    Current Outpatient Medications:   •  aspirin 81 MG chewable tablet, Chew 81 mg daily., Disp: , Rfl:   •  buPROPion SR (WELLBUTRIN SR) 150 MG 12 hr tablet, Take 1 tablet by mouth 2 (Two) Times a Day., Disp: 60 tablet, Rfl: 4  •  dicyclomine (BENTYL) 20 MG tablet, Take 1 tablet by mouth Every 6 (Six) Hours As Needed (abdominal pain)., Disp: 30 tablet, Rfl: 0  •  hydrochlorothiazide (HYDRODIURIL) 25 MG tablet, Take 1 tablet by mouth Daily., Disp: 30 tablet, Rfl: 3  •  hydrochlorothiazide (MICROZIDE) 12.5 MG capsule, Take 12.5 mg by mouth Every Morning., Disp: , Rfl: 6  •  isosorbide mononitrate (IMDUR) 30 MG 24 hr tablet, TAKE 1 TABLET BY MOUTH DAILY., Disp: 30 tablet, Rfl: 6  •  lisinopril (PRINIVIL,ZESTRIL) 40 MG tablet, TAKE 1 TABLET BY MOUTH DAILY., Disp: 30 tablet, Rfl: 6  •  metoprolol succinate XL (TOPROL-XL) 50 MG 24 hr tablet, TAKE 1 TABLET BY MOUTH EVERY DAY, Disp: 30 tablet, Rfl: 1  •  ondansetron ODT (ZOFRAN-ODT) 4 MG disintegrating tablet, Take 1 tablet by mouth Every 6 (Six) Hours As Needed for Nausea or Vomiting., Disp: 10 tablet, Rfl: 0  •  pantoprazole (PROTONIX) 40 MG EC tablet, Take 1 tablet by mouth Daily., Disp: 30 tablet, Rfl: 0  •  pravastatin  "(PRAVACHOL) 20 MG tablet, Take 20 mg by mouth Daily., Disp: , Rfl: 6  •  meloxicam (MOBIC) 7.5 MG tablet, Take 1 tablet by mouth Daily., Disp: 30 tablet, Rfl: 1    Allergies:  Patient has no known allergies.    Family Hx:  Family History   Problem Relation Age of Onset   • Heart disease Mother    • Hypertension Mother    • Diabetes Mother    • Heart disease Father    • Hypertension Father    • Diabetes Father    • Diabetes Sister    • Heart disease Sister    • Stroke Sister    • Cancer Paternal Aunt         Breast Cancer   • Cancer Paternal Uncle         Unknown cancer        Social History:  Social History     Socioeconomic History   • Marital status:      Spouse name: Not on file   • Number of children: Not on file   • Years of education: Not on file   • Highest education level: Not on file   Tobacco Use   • Smoking status: Current Every Day Smoker     Packs/day: 1.00     Years: 40.00     Pack years: 40.00     Types: Cigarettes   • Smokeless tobacco: Never Used   Substance and Sexual Activity   • Alcohol use: Yes     Alcohol/week: 4.8 oz     Types: 8 Cans of beer per week   • Drug use: No   • Sexual activity: No     Comment: Marital status:        Review of Systems  Review of Systems as per HPI otherwise -12 systems review    Objective:     /78 (BP Location: Right arm, Patient Position: Sitting, Cuff Size: Adult)   Pulse 59   Temp 97.4 °F (36.3 °C) (Temporal)   Ht 165.1 cm (65\")   Wt 89.2 kg (196 lb 11.2 oz)   SpO2 95%   BMI 32.73 kg/m²   Physical Exam  Alert no distress ENT grossly normal without asymmetry he has a slightly dusky complexion neck supple without JVD bruits or adenopathy breath sounds are clear to auscultation and percussion without wheezes rales rhonchi tactile fremitus or pleural friction rub heart regular without murmur rub gallop or extrasystole PMI diffuse abdomen soft nontender extremities show negative Finkelstein's both upper extremities although he is palpably " tender over the right proximal dorsal thumb surface distal pulses are palpable examination of the left foot shows the medial aspect of the left great toenail to be ingrown with raised rim of erythema pus is noted no edema no tremor neuro nonfocal no ataxia    Lab Review  Results for orders placed or performed in visit on 04/16/19   Hepatitis B surface antigen   Result Value Ref Range    Hepatitis B Surface Ag Non-Reactive Non-Reactive   Hepatitis B surface antibody   Result Value Ref Range    Hep B S Ab Non-Reactive Non-Reactive   Hepatitis B core antibody, total   Result Value Ref Range    Hep B Core Total Ab Negative Negative   Hepatitis A antibody, total   Result Value Ref Range    Hep A Total Ab Positive (A) Negative   Hepatitis C antibody   Result Value Ref Range    Hepatitis C Ab Non-Reactive Non-Reactive            Assessment:     Nima was seen today for benign prostatic hypertrophy.    Diagnoses and all orders for this visit:    Bilateral carpal tunnel syndrome  -     meloxicam (MOBIC) 7.5 MG tablet; Take 1 tablet by mouth Daily.    Chronic thumb pain, bilateral  -     Ambulatory Referral to Orthopedic Surgery    Ingrown toenail of left foot    I suspect the patient has a partially treated Duker veins tenosynovitis however he wishes to have definitive treatment    Plan:   Patient is given referral for orthopedic evaluation for his pain in his right thumb and wrist we will see him back in the procedures clinic for definitive treatment of his left great ingrown toenail recheck otherwise as previously scheduled or as needed   I have seen and examined the patient.  We will go ahead and treat empirically with meloxicam 7.5 mg daily with this will afford a Trejo to like effect.  Patient is again counseled on smoking cessation.   I have reviewed the notes, assessments, and/or procedures performed by dr Rojas, I concur with her/his documentation and assessment and plan for Nima Portillo.            This document  has been electronically signed by Hubert Thomas MD on July 23, 2019 8:26 AM

## 2019-07-30 ENCOUNTER — PROCEDURE VISIT (OUTPATIENT)
Dept: FAMILY MEDICINE CLINIC | Facility: CLINIC | Age: 59
End: 2019-07-30

## 2019-07-30 VITALS
SYSTOLIC BLOOD PRESSURE: 132 MMHG | HEIGHT: 65 IN | BODY MASS INDEX: 32.65 KG/M2 | WEIGHT: 196 LBS | DIASTOLIC BLOOD PRESSURE: 80 MMHG | OXYGEN SATURATION: 96 % | HEART RATE: 78 BPM

## 2019-07-30 DIAGNOSIS — M79.641 BILATERAL HAND PAIN: Primary | ICD-10-CM

## 2019-07-30 DIAGNOSIS — L60.0 INGROWN NAIL: Primary | ICD-10-CM

## 2019-07-30 DIAGNOSIS — M79.642 BILATERAL HAND PAIN: Primary | ICD-10-CM

## 2019-07-30 PROCEDURE — 11730 AVULSION NAIL PLATE SIMPLE 1: CPT | Performed by: STUDENT IN AN ORGANIZED HEALTH CARE EDUCATION/TRAINING PROGRAM

## 2019-07-30 RX ORDER — CEPHALEXIN 500 MG/1
500 CAPSULE ORAL 2 TIMES DAILY
Qty: 6 CAPSULE | Refills: 0 | Status: SHIPPED | OUTPATIENT
Start: 2019-07-30 | End: 2019-08-02

## 2019-07-30 NOTE — PROGRESS NOTES
I have seen the patient.  I have reviewed the notes, assessments, and/or procedures performed by dr Diehl, I concur with her/his documentation and assessment and plan for Nima Portillo.               This document has been electronically signed by Hubert Thomas MD on July 30, 2019 4:55 PM

## 2019-07-30 NOTE — PROGRESS NOTES
"Procedure   Nail Removal  Date/Time: 7/30/2019 1:40 PM  Performed by: Pietro Diehl MD  Authorized by: Pietro Diehl MD   Consent: Verbal consent obtained. Written consent obtained.  Risks and benefits: risks, benefits and alternatives were discussed  Consent given by: patient  Patient understanding: patient states understanding of the procedure being performed  Patient consent: the patient's understanding of the procedure matches consent given  Procedure consent: procedure consent matches procedure scheduled  Relevant documents: relevant documents present and verified  Test results: test results not available  Site marked: the operative site was marked  Imaging studies: imaging studies not available  Patient identity confirmed: verbally with patient  Time out: Immediately prior to procedure a \"time out\" was called to verify the correct patient, procedure, equipment, support staff and site/side marked as required.  Location: right foot  Location details: right big toe  Anesthesia: digital block    Anesthesia:  Local Anesthetic: lidocaine 1% without epinephrine  Anesthetic total: 7 mL    Sedation:  Patient sedated: no    Preparation: skin prepped with Betadine  Amount removed: 1/2 (MEDIAL HALF OF BOTH TOE NAILS)  Side: medial  Nail bed sutured: no  Removed nail replaced and anchored: no  Dressing: gauze roll  Patient tolerance: Patient tolerated the procedure well with no immediate complications  Comments: Right toe nail was prepared with betadine. R toe was anesthetized was 1% lidocaine without epi.  Right big toe was sufficiently anesthetized.  Right toenail was lifted away from the nailbed using nail .  Medial half of right toenail was removed using clippers.  Nailbed was prepped with phenol and alcohol.  Patient tolerated procedure well.  Right toenail was cleaned and dressed.    Nail Removal  Date/Time: 7/30/2019 3:03 PM  Performed by: Pietro Diehl MD  Authorized by: Pietro Diehl MD   Consent: Verbal " "consent obtained. Written consent obtained.  Risks and benefits: risks, benefits and alternatives were discussed  Consent given by: patient  Patient understanding: patient states understanding of the procedure being performed  Patient consent: the patient's understanding of the procedure matches consent given  Procedure consent: procedure consent matches procedure scheduled  Relevant documents: relevant documents present and verified  Patient identity confirmed: verbally with patient and provided demographic data  Time out: Immediately prior to procedure a \"time out\" was called to verify the correct patient, procedure, equipment, support staff and site/side marked as required.  Location: left foot  Location details: left big toe  Anesthesia: digital block    Anesthesia:  Local Anesthetic: lidocaine 1% without epinephrine  Anesthetic total: 4 mL    Sedation:  Patient sedated: no    Preparation: skin prepped with Betadine  Amount removed: partial  Side: medial  Dressing: gauze roll  Comments: Left toe was prepped with Betadine.  Left big toe was anesthetized using 1% lidocaine without epi.  Left toe was sufficiently anesthetized.  Left toenail was freed from nail bed using toenail .  Medial half of left toenail was clipped using clippers.  Nailbed was cleaned with phenol and alcohol.  Patient tolerated procedure well.  Toe was dressed with gauze.         Patient was advised to obtain correctly fitting shoes.  Patient was advised to return on Friday, August 2, 2019 to recheck.  Patient was advised to take Tylenol for pain control.  Patient was provided with Keflex 500 mg twice daily for 3 days for infection prophylaxis.  Patient was advised if signs and symptoms of infection or inflammation were to occur to report immediately to the emergency department.  Signed,   Pietro Diehl MD  Family Medicine Resident PGY2  [facility]        This document has been electronically signed by Pietro Diehl MD on " July 30, 2019 3:08 PM

## 2019-08-05 DIAGNOSIS — I25.118 CORONARY ARTERY DISEASE OF NATIVE ARTERY OF NATIVE HEART WITH STABLE ANGINA PECTORIS (HCC): ICD-10-CM

## 2019-08-05 DIAGNOSIS — I10 ESSENTIAL HYPERTENSION: ICD-10-CM

## 2019-08-05 DIAGNOSIS — R00.0 CHRONIC TACHYCARDIA: ICD-10-CM

## 2019-08-05 RX ORDER — HYDROCHLOROTHIAZIDE 25 MG/1
TABLET ORAL
Qty: 30 TABLET | Refills: 3 | Status: SHIPPED | OUTPATIENT
Start: 2019-08-05 | End: 2019-08-07 | Stop reason: SDUPTHER

## 2019-08-05 RX ORDER — METOPROLOL SUCCINATE 50 MG/1
TABLET, EXTENDED RELEASE ORAL
Qty: 30 TABLET | Refills: 1 | Status: SHIPPED | OUTPATIENT
Start: 2019-08-05 | End: 2019-09-13 | Stop reason: SDUPTHER

## 2019-08-07 DIAGNOSIS — I25.118 CORONARY ARTERY DISEASE OF NATIVE ARTERY OF NATIVE HEART WITH STABLE ANGINA PECTORIS (HCC): ICD-10-CM

## 2019-08-07 DIAGNOSIS — I10 ESSENTIAL HYPERTENSION: ICD-10-CM

## 2019-08-08 RX ORDER — HYDROCHLOROTHIAZIDE 25 MG/1
25 TABLET ORAL DAILY
Qty: 90 TABLET | Refills: 3 | Status: SHIPPED | OUTPATIENT
Start: 2019-08-08 | End: 2019-11-04 | Stop reason: SDUPTHER

## 2019-08-22 ENCOUNTER — OFFICE VISIT (OUTPATIENT)
Dept: FAMILY MEDICINE CLINIC | Facility: CLINIC | Age: 59
End: 2019-08-22

## 2019-08-22 VITALS
WEIGHT: 197.3 LBS | BODY MASS INDEX: 32.87 KG/M2 | DIASTOLIC BLOOD PRESSURE: 80 MMHG | SYSTOLIC BLOOD PRESSURE: 128 MMHG | HEIGHT: 65 IN | TEMPERATURE: 97.4 F | HEART RATE: 76 BPM | OXYGEN SATURATION: 99 %

## 2019-08-22 DIAGNOSIS — Z98.890 STATUS POST SURGICAL REMOVAL OF NAIL MATRIX OF TOE: Primary | ICD-10-CM

## 2019-08-22 PROCEDURE — 99212 OFFICE O/P EST SF 10 MIN: CPT | Performed by: STUDENT IN AN ORGANIZED HEALTH CARE EDUCATION/TRAINING PROGRAM

## 2019-08-22 NOTE — PROGRESS NOTES
I have seen the patient.  I have reviewed the notes, assessments, and/or procedures performed by *Dr DWIGHT Diehl* during office visit I concur with her/his documentation and assessment and plan for Nima Portillo.              This document has been electronically signed by Sudhir Zamorano MD on August 22, 2019 1:59 PM

## 2019-08-22 NOTE — PROGRESS NOTES
"  Subjective:     Nima Portillo is a 58 y.o. male who presents for follow up for B/L TOE NAIL REMOVAL     Patient presents to clinic for recheck.  Patient underwent toenail removal on 7/30/2019.  Bilateral medial aspect of the great toe nail were removed.  Patient reports no pain, no signs or symptoms of infection or inflammation.  Patient is able to walk without pain.  Patient stated \"I was walking around the day after and was weeding grass earlier today.\"      Patient denies Chest pain, Chest pressure, Palpitations, SOA, fever, chills, N/V, or Diarrhea    Preventative:  Over the past 2 weeks, have you felt down, depressed, or hopeless?No   Over the past 2 weeks, have you felt little interest or pleasure in doing things?No  Clinical depression screening refused by patient.No   PHQ-9 Depression Screening  Little interest or pleasure in doing things?     Feeling down, depressed, or hopeless?     Trouble falling or staying asleep, or sleeping too much?     Feeling tired or having little energy?     Poor appetite or overeating?     Feeling bad about yourself - or that you are a failure or have let yourself or your family down?     Trouble concentrating on things, such as reading the newspaper or watching television?     Moving or speaking so slowly that other people could have noticed? Or the opposite - being so fidgety or restless that you have been moving around a lot more than usual?     Thoughts that you would be better off dead, or of hurting yourself in some way?     PHQ-9 Total Score     If you checked off any problems, how difficult have these problems made it for you to do your work, take care of things at home, or get along with other people?         On osteoporosis therapy?No     Past Medical Hx:  Past Medical History:   Diagnosis Date   • Acute bronchitis    • Acute sinusitis    • Chest pain    • Chronic bronchitis (CMS/HCC)     secondary to smoking   • Claudication (CMS/HCC)    • Coronary " arteriosclerosis    • Cough    • Gastroesophageal reflux disease    • Health maintenance examination     Individual health examination   • History of echocardiogram 10/21/2013    Echocardiogram W/ color flow 60660 (1) - Left atrium normal. Mild CLVH. EF 50%. Valves intact. Mild mitral and tricuspid regurg. Pericardium normal.   • History of echocardiogram 10/25/2011    Echocardiogram W/O color flow 88634 (1) - Normal LV sytolic function with mild LVH & mild diastolic dysfunction   • Hyperlipidemia    • Hypertensive disorder    • Pernicious anemia    • Tobacco dependence syndrome        Past Surgical Hx:  Past Surgical History:   Procedure Laterality Date   • CARDIAC CATHETERIZATION  04/08/2015    Cardiac cath 75281 (1) - Slective coronary angiogram. Left heart catheterization with LV ventriculogram.   • DENTAL PROCEDURE      Removal of all teeth       Health Maintenance:  Health Maintenance   Topic Date Due   • COLONOSCOPY  07/12/2017   • LIPID PANEL  07/10/2019   • INFLUENZA VACCINE  08/01/2019   • ZOSTER VACCINE (1 of 2) 04/16/2020 (Originally 10/29/2010)   • ANNUAL PHYSICAL  04/17/2020   • LUNG CANCER SCREENING  07/03/2020   • TDAP/TD VACCINES (2 - Td) 05/16/2029   • PNEUMOCOCCAL VACCINE (19-64 MEDIUM RISK)  Completed   • HEPATITIS C SCREENING  Completed       Current Meds:    Current Outpatient Medications:   •  aspirin 81 MG chewable tablet, Chew 81 mg daily., Disp: , Rfl:   •  buPROPion SR (WELLBUTRIN SR) 150 MG 12 hr tablet, Take 1 tablet by mouth 2 (Two) Times a Day., Disp: 60 tablet, Rfl: 4  •  dicyclomine (BENTYL) 20 MG tablet, Take 1 tablet by mouth Every 6 (Six) Hours As Needed (abdominal pain)., Disp: 30 tablet, Rfl: 0  •  hydrochlorothiazide (HYDRODIURIL) 25 MG tablet, Take 1 tablet by mouth Daily., Disp: 90 tablet, Rfl: 3  •  isosorbide mononitrate (IMDUR) 30 MG 24 hr tablet, TAKE 1 TABLET BY MOUTH DAILY., Disp: 30 tablet, Rfl: 6  •  lisinopril (PRINIVIL,ZESTRIL) 40 MG tablet, TAKE 1 TABLET BY MOUTH  DAILY., Disp: 30 tablet, Rfl: 6  •  meloxicam (MOBIC) 7.5 MG tablet, Take 1 tablet by mouth Daily., Disp: 30 tablet, Rfl: 1  •  metoprolol succinate XL (TOPROL-XL) 50 MG 24 hr tablet, TAKE 1 TABLET BY MOUTH EVERY DAY, Disp: 30 tablet, Rfl: 1  •  ondansetron ODT (ZOFRAN-ODT) 4 MG disintegrating tablet, Take 1 tablet by mouth Every 6 (Six) Hours As Needed for Nausea or Vomiting., Disp: 10 tablet, Rfl: 0  •  pantoprazole (PROTONIX) 40 MG EC tablet, Take 1 tablet by mouth Daily., Disp: 30 tablet, Rfl: 0  •  pravastatin (PRAVACHOL) 20 MG tablet, Take 20 mg by mouth Daily., Disp: , Rfl: 6    Allergies:  Patient has no known allergies.    Family Hx:  Family History   Problem Relation Age of Onset   • Heart disease Mother    • Hypertension Mother    • Diabetes Mother    • Heart disease Father    • Hypertension Father    • Diabetes Father    • Diabetes Sister    • Heart disease Sister    • Stroke Sister    • Cancer Paternal Aunt         Breast Cancer   • Cancer Paternal Uncle         Unknown cancer        Social History:  Social History     Socioeconomic History   • Marital status:      Spouse name: Not on file   • Number of children: Not on file   • Years of education: Not on file   • Highest education level: Not on file   Tobacco Use   • Smoking status: Current Every Day Smoker     Packs/day: 1.00     Years: 40.00     Pack years: 40.00     Types: Cigarettes   • Smokeless tobacco: Never Used   Substance and Sexual Activity   • Alcohol use: Yes     Alcohol/week: 4.8 oz     Types: 8 Cans of beer per week   • Drug use: No   • Sexual activity: No     Comment: Marital status:        Review of Systems  Review of Systems   Constitutional: Negative for activity change, appetite change, chills and fever.   HENT: Negative for congestion, ear pain and sore throat.    Eyes: Negative for redness and visual disturbance.   Respiratory: Negative for cough, shortness of breath and wheezing.    Cardiovascular: Negative for  "chest pain and palpitations.   Gastrointestinal: Negative for abdominal pain, diarrhea, nausea and vomiting.   Genitourinary: Negative for difficulty urinating and dysuria.   Musculoskeletal: Negative for arthralgias and gait problem.   Skin: Negative for color change and rash.   Neurological: Negative for dizziness, weakness and headaches.   Psychiatric/Behavioral: Negative for dysphoric mood and sleep disturbance. The patient is not nervous/anxious.          Objective:     /80   Pulse 76   Temp 97.4 °F (36.3 °C)   Ht 165.1 cm (65\")   Wt 89.5 kg (197 lb 4.8 oz)   SpO2 99%   BMI 32.83 kg/m²     Constitutional: oriented to person, place, and time. well-developed and well-nourished.   HENT:   Head: Normocephalic and atraumatic.   Right Ear: External ear normal.   Left Ear: External ear normal.   Nose: Nose normal.   Eyes: Conjunctivae and EOM are normal.    Neck: Normal range of motion. Neck supple.   Cardiovascular: Normal rate, regular rhythm and normal heart sounds.    Pulmonary/Chest: Effort normal and breath sounds normal. no wheezes.   Abdominal: Soft. Bowel sounds are normal. exhibits no distension. There is no tenderness.   Musculoskeletal: Normal range of motion.  exhibits no edema or deformity.   Neurological: alert and oriented to person, place, and time.   Skin: Skin is warm. Bilateral great toes showed signs of granulation tissue, no inflammation or signs of infection.  Left toenail showed signs of nail regrowth. WELL HEALING  Psychiatric: has a normal mood and affect. behavior is normal. Thought content normal.   Nursing note and vitals reviewed.    Assessment/Plan:     Nima was seen today for nail problem.    Diagnoses and all orders for this visit:    Status post surgical removal of nail matrix of toe    Advised patient to continue to clean and dress toes twice a day.  Advised patient to call office immediately if signs of infection were to occur.  Advised patient to use Tylenol and " ibuprofen for pain control    Reviewed the following with the patient: advised patient of need for:  immunizations discussed.  Reviewed labs, patient voiced understanding of results.     Follow-up:     Return in about 2 weeks (around 9/5/2019).  For recheck with patient's PCP Dr. Villatoro.    Goals     • Smoking cessation (pt-stated)      Quitting smoking and living longer            Preventative:    Vaccines Recommended at this visit:   Influenza    Vaccines Received at this visit:  Patient refused all vaccinations at this visit.    Screenings Recommended at this visit:  Colonoscopy and Lipid Panel    Screenings Ordered at this visit:  Patient refused all screenings offer at this visit.    Smoking Status:  Patient is current smoker. Patient is not interested in smoking cessation.    Alcohol Intake:  Occasional/rare    Patient's Body mass index is 32.83 kg/m². BMI is above normal parameters. Recommendations include: exercise counseling and nutrition counseling.         RISK SCORE: 3      Signed,   Pietro Diehl MD  Family Medicine Resident PGY2  [facility]        This document has been electronically signed by Pietro Diehl MD on August 22, 2019 1:45 PM    EMR Dragon/Transcription disclaimer:   Some of this note may be an electronic transcription/translation of spoken language to printed text. The electronic translation of spoken language may permit erroneous, or at times, nonsensical words or phrases to be inadvertently transcribed; Although I have reviewed the note for such errors, some may still exist.

## 2019-09-04 ENCOUNTER — HOSPITAL ENCOUNTER (OUTPATIENT)
Facility: HOSPITAL | Age: 59
Setting detail: OBSERVATION
Discharge: HOME | End: 2019-09-05
Attending: EMERGENCY MEDICINE | Admitting: FAMILY MEDICINE

## 2019-09-04 ENCOUNTER — APPOINTMENT (OUTPATIENT)
Dept: GENERAL RADIOLOGY | Facility: HOSPITAL | Age: 59
End: 2019-09-04

## 2019-09-04 ENCOUNTER — TELEPHONE (OUTPATIENT)
Dept: FAMILY MEDICINE CLINIC | Facility: CLINIC | Age: 59
End: 2019-09-04

## 2019-09-04 DIAGNOSIS — R55 SYNCOPE AND COLLAPSE: Primary | ICD-10-CM

## 2019-09-04 LAB
ALBUMIN SERPL-MCNC: 4 G/DL (ref 3.5–5.2)
ALBUMIN/GLOB SERPL: 1.5 G/DL
ALP SERPL-CCNC: 53 U/L (ref 39–117)
ALT SERPL W P-5'-P-CCNC: 18 U/L (ref 1–41)
ANION GAP SERPL CALCULATED.3IONS-SCNC: 11 MMOL/L (ref 5–15)
APTT PPP: 28.9 SECONDS (ref 20–40.3)
AST SERPL-CCNC: 17 U/L (ref 1–40)
BASOPHILS # BLD AUTO: 0.03 10*3/MM3 (ref 0–0.2)
BASOPHILS NFR BLD AUTO: 0.4 % (ref 0–1.5)
BILIRUB SERPL-MCNC: 0.3 MG/DL (ref 0.2–1.2)
BUN BLD-MCNC: 11 MG/DL (ref 6–20)
BUN/CREAT SERPL: 16.2 (ref 7–25)
CALCIUM SPEC-SCNC: 8.4 MG/DL (ref 8.6–10.5)
CHLORIDE SERPL-SCNC: 105 MMOL/L (ref 98–107)
CO2 SERPL-SCNC: 27 MMOL/L (ref 22–29)
CREAT BLD-MCNC: 0.68 MG/DL (ref 0.76–1.27)
D-DIMER, QUANTITATIVE (MAD,POW, STR): 395 NG/ML (FEU) (ref 0–470)
DEPRECATED RDW RBC AUTO: 46.8 FL (ref 37–54)
EOSINOPHIL # BLD AUTO: 0.25 10*3/MM3 (ref 0–0.4)
EOSINOPHIL NFR BLD AUTO: 3.2 % (ref 0.3–6.2)
ERYTHROCYTE [DISTWIDTH] IN BLOOD BY AUTOMATED COUNT: 14.2 % (ref 12.3–15.4)
GFR SERPL CREATININE-BSD FRML MDRD: 120 ML/MIN/1.73
GLOBULIN UR ELPH-MCNC: 2.7 GM/DL
GLUCOSE BLD-MCNC: 85 MG/DL (ref 65–99)
GLUCOSE BLDC GLUCOMTR-MCNC: 107 MG/DL (ref 70–130)
HCT VFR BLD AUTO: 42 % (ref 37.5–51)
HGB BLD-MCNC: 14.4 G/DL (ref 13–17.7)
HOLD SPECIMEN: NORMAL
HOLD SPECIMEN: NORMAL
IMM GRANULOCYTES # BLD AUTO: 0.03 10*3/MM3 (ref 0–0.05)
IMM GRANULOCYTES NFR BLD AUTO: 0.4 % (ref 0–0.5)
INR PPP: 1.04 (ref 0.8–1.2)
LYMPHOCYTES # BLD AUTO: 1.5 10*3/MM3 (ref 0.7–3.1)
LYMPHOCYTES NFR BLD AUTO: 19 % (ref 19.6–45.3)
MAGNESIUM SERPL-MCNC: 2.2 MG/DL (ref 1.6–2.6)
MCH RBC QN AUTO: 31 PG (ref 26.6–33)
MCHC RBC AUTO-ENTMCNC: 34.3 G/DL (ref 31.5–35.7)
MCV RBC AUTO: 90.5 FL (ref 79–97)
MONOCYTES # BLD AUTO: 0.64 10*3/MM3 (ref 0.1–0.9)
MONOCYTES NFR BLD AUTO: 8.1 % (ref 5–12)
NEUTROPHILS # BLD AUTO: 5.43 10*3/MM3 (ref 1.7–7)
NEUTROPHILS NFR BLD AUTO: 68.9 % (ref 42.7–76)
NRBC BLD AUTO-RTO: 0 /100 WBC (ref 0–0.2)
PLATELET # BLD AUTO: 187 10*3/MM3 (ref 140–450)
PMV BLD AUTO: 9.7 FL (ref 6–12)
POTASSIUM BLD-SCNC: 3.5 MMOL/L (ref 3.5–5.2)
PROT SERPL-MCNC: 6.7 G/DL (ref 6–8.5)
PROTHROMBIN TIME: 13.4 SECONDS (ref 11.1–15.3)
RBC # BLD AUTO: 4.64 10*6/MM3 (ref 4.14–5.8)
SODIUM BLD-SCNC: 143 MMOL/L (ref 136–145)
TROPONIN T SERPL-MCNC: <0.01 NG/ML (ref 0–0.03)
TROPONIN T SERPL-MCNC: <0.01 NG/ML (ref 0–0.03)
WBC NRBC COR # BLD: 7.88 10*3/MM3 (ref 3.4–10.8)
WHOLE BLOOD HOLD SPECIMEN: NORMAL
WHOLE BLOOD HOLD SPECIMEN: NORMAL

## 2019-09-04 PROCEDURE — 85025 COMPLETE CBC W/AUTO DIFF WBC: CPT

## 2019-09-04 PROCEDURE — 93005 ELECTROCARDIOGRAM TRACING: CPT

## 2019-09-04 PROCEDURE — 36415 COLL VENOUS BLD VENIPUNCTURE: CPT | Performed by: EMERGENCY MEDICINE

## 2019-09-04 PROCEDURE — G0378 HOSPITAL OBSERVATION PER HR: HCPCS

## 2019-09-04 PROCEDURE — 85730 THROMBOPLASTIN TIME PARTIAL: CPT | Performed by: EMERGENCY MEDICINE

## 2019-09-04 PROCEDURE — 84484 ASSAY OF TROPONIN QUANT: CPT | Performed by: EMERGENCY MEDICINE

## 2019-09-04 PROCEDURE — 83735 ASSAY OF MAGNESIUM: CPT | Performed by: EMERGENCY MEDICINE

## 2019-09-04 PROCEDURE — 85610 PROTHROMBIN TIME: CPT | Performed by: EMERGENCY MEDICINE

## 2019-09-04 PROCEDURE — 85379 FIBRIN DEGRADATION QUANT: CPT | Performed by: EMERGENCY MEDICINE

## 2019-09-04 PROCEDURE — 96361 HYDRATE IV INFUSION ADD-ON: CPT

## 2019-09-04 PROCEDURE — 71045 X-RAY EXAM CHEST 1 VIEW: CPT

## 2019-09-04 PROCEDURE — 99219 PR INITIAL OBSERVATION CARE/DAY 50 MINUTES: CPT | Performed by: STUDENT IN AN ORGANIZED HEALTH CARE EDUCATION/TRAINING PROGRAM

## 2019-09-04 PROCEDURE — 93005 ELECTROCARDIOGRAM TRACING: CPT | Performed by: EMERGENCY MEDICINE

## 2019-09-04 PROCEDURE — 25010000002 ENOXAPARIN PER 10 MG: Performed by: STUDENT IN AN ORGANIZED HEALTH CARE EDUCATION/TRAINING PROGRAM

## 2019-09-04 PROCEDURE — 82962 GLUCOSE BLOOD TEST: CPT

## 2019-09-04 PROCEDURE — 96360 HYDRATION IV INFUSION INIT: CPT

## 2019-09-04 PROCEDURE — 99284 EMERGENCY DEPT VISIT MOD MDM: CPT

## 2019-09-04 PROCEDURE — 80053 COMPREHEN METABOLIC PANEL: CPT | Performed by: EMERGENCY MEDICINE

## 2019-09-04 PROCEDURE — 93010 ELECTROCARDIOGRAM REPORT: CPT | Performed by: INTERNAL MEDICINE

## 2019-09-04 PROCEDURE — 96372 THER/PROPH/DIAG INJ SC/IM: CPT

## 2019-09-04 RX ORDER — LISINOPRIL 40 MG/1
40 TABLET ORAL DAILY
Status: DISCONTINUED | OUTPATIENT
Start: 2019-09-05 | End: 2019-09-05 | Stop reason: HOSPADM

## 2019-09-04 RX ORDER — SODIUM CHLORIDE 0.9 % (FLUSH) 0.9 %
10 SYRINGE (ML) INJECTION EVERY 12 HOURS SCHEDULED
Status: DISCONTINUED | OUTPATIENT
Start: 2019-09-04 | End: 2019-09-05 | Stop reason: HOSPADM

## 2019-09-04 RX ORDER — DOCUSATE SODIUM 100 MG/1
100 CAPSULE, LIQUID FILLED ORAL 2 TIMES DAILY PRN
Status: DISCONTINUED | OUTPATIENT
Start: 2019-09-04 | End: 2019-09-05 | Stop reason: HOSPADM

## 2019-09-04 RX ORDER — SODIUM CHLORIDE 0.9 % (FLUSH) 0.9 %
10 SYRINGE (ML) INJECTION AS NEEDED
Status: DISCONTINUED | OUTPATIENT
Start: 2019-09-04 | End: 2019-09-05 | Stop reason: HOSPADM

## 2019-09-04 RX ORDER — PRAVASTATIN SODIUM 10 MG
20 TABLET ORAL DAILY
Status: DISCONTINUED | OUTPATIENT
Start: 2019-09-05 | End: 2019-09-05

## 2019-09-04 RX ORDER — HYDROCHLOROTHIAZIDE 25 MG/1
25 TABLET ORAL DAILY
Status: DISCONTINUED | OUTPATIENT
Start: 2019-09-05 | End: 2019-09-05 | Stop reason: HOSPADM

## 2019-09-04 RX ORDER — DICYCLOMINE HCL 20 MG
20 TABLET ORAL EVERY 6 HOURS PRN
Status: DISCONTINUED | OUTPATIENT
Start: 2019-09-04 | End: 2019-09-05 | Stop reason: HOSPADM

## 2019-09-04 RX ORDER — PANTOPRAZOLE SODIUM 40 MG/1
40 TABLET, DELAYED RELEASE ORAL DAILY
Status: DISCONTINUED | OUTPATIENT
Start: 2019-09-05 | End: 2019-09-05 | Stop reason: HOSPADM

## 2019-09-04 RX ORDER — METOPROLOL SUCCINATE 50 MG/1
50 TABLET, EXTENDED RELEASE ORAL DAILY
Status: DISCONTINUED | OUTPATIENT
Start: 2019-09-05 | End: 2019-09-05

## 2019-09-04 RX ORDER — NICOTINE 21 MG/24HR
1 PATCH, TRANSDERMAL 24 HOURS TRANSDERMAL EVERY 24 HOURS
Status: DISCONTINUED | OUTPATIENT
Start: 2019-09-04 | End: 2019-09-05 | Stop reason: HOSPADM

## 2019-09-04 RX ORDER — BUPROPION HYDROCHLORIDE 150 MG/1
150 TABLET, EXTENDED RELEASE ORAL 2 TIMES DAILY
Status: DISCONTINUED | OUTPATIENT
Start: 2019-09-04 | End: 2019-09-05 | Stop reason: HOSPADM

## 2019-09-04 RX ORDER — ONDANSETRON 4 MG/1
4 TABLET, ORALLY DISINTEGRATING ORAL EVERY 6 HOURS PRN
Status: DISCONTINUED | OUTPATIENT
Start: 2019-09-04 | End: 2019-09-05 | Stop reason: HOSPADM

## 2019-09-04 RX ORDER — ISOSORBIDE MONONITRATE 30 MG/1
30 TABLET, EXTENDED RELEASE ORAL DAILY
Status: DISCONTINUED | OUTPATIENT
Start: 2019-09-05 | End: 2019-09-05 | Stop reason: HOSPADM

## 2019-09-04 RX ORDER — SODIUM CHLORIDE 9 MG/ML
125 INJECTION, SOLUTION INTRAVENOUS CONTINUOUS
Status: DISCONTINUED | OUTPATIENT
Start: 2019-09-04 | End: 2019-09-04

## 2019-09-04 RX ORDER — ASPIRIN 81 MG/1
81 TABLET, CHEWABLE ORAL DAILY
Status: DISCONTINUED | OUTPATIENT
Start: 2019-09-05 | End: 2019-09-05 | Stop reason: HOSPADM

## 2019-09-04 RX ADMIN — SODIUM CHLORIDE, PRESERVATIVE FREE 10 ML: 5 INJECTION INTRAVENOUS at 21:20

## 2019-09-04 RX ADMIN — ENOXAPARIN SODIUM 40 MG: 40 INJECTION SUBCUTANEOUS at 21:20

## 2019-09-04 RX ADMIN — BUPROPION HYDROCHLORIDE 150 MG: 150 TABLET, EXTENDED RELEASE ORAL at 21:20

## 2019-09-04 RX ADMIN — SODIUM CHLORIDE 125 ML/HR: 900 INJECTION, SOLUTION INTRAVENOUS at 15:20

## 2019-09-04 RX ADMIN — Medication 1 TABLET: at 21:19

## 2019-09-04 RX ADMIN — SODIUM CHLORIDE 1000 ML: 900 INJECTION, SOLUTION INTRAVENOUS at 15:19

## 2019-09-04 NOTE — ED TRIAGE NOTES
"Pt family states that pt had a bad episode of shaking. Pt family states \"He wasn't talking or breathing, he  I could see it in his face. He did this 2 times back to back\".   "

## 2019-09-04 NOTE — TELEPHONE ENCOUNTER
Patients daughter called to let provider know that patient had some type of episode this morning while sitting on the porch.  Patient was mentally out of it for a bit of time.  Patient then started shaking and then fell over and she said she thinks he stopped breathing for a minute or two.  She said he came to but started shaking again and just really wasn't himself for some time.  Patient even broke a table.  Patients daughter asked that we let provider know for next course of action because she thinks he may have had a stoke or panic attack, she isn't sure.  I informed her that we have our walk ins and they are more than welcome to come in and be seen instead of waiting for a response from the provider.    Thanks,  Disha

## 2019-09-04 NOTE — H&P
"    HISTORY AND PHYSICAL  NAME: Nima Portillo  : 1960  MRN: [id number]    DATE OF ADMISSION: 19    DATE & TIME SEEN: 19 5:54 PM    PCP: Andrew Villatoro III, MD    CODE STATUS: Full code    CHIEF COMPLAINT Syncope    HPI:  Nima Portillo is a 58 y.o. male with a PMH of prior MI, nicotine abuse, hypertension presents to the ER today with chief complaint of syncope.  Patient reports being outside this morning for several hours when he was talking with a family member.  Patient got an uncontrollable cough and passed out falling onto the ground.  He denies hitting his head.  It was witnessed by the family member.  No seizure-like activity was reported.  Patient recovered several seconds after collapsing.  Patient was not postictal.  He denies headache or neurologic deficits.  Patient reports while he was coughing he felt \"lightheaded \"immediately prior to passing out.  Right now, patient states he feels normal.  He is very hungry and would like to have something to eat and drink.    In the ER, labs obtained are grossly unremarkable.  He was mildly orthostatic.  Troponin negative x1.  EKG was unremarkable.  CT of the head was not obtained due to no headache, head injury, neurologic deficits.  He received IVF prior to admission.    CONCURRENT MEDICAL HISTORY:  Past Medical History:   Diagnosis Date   • Acute bronchitis    • Acute sinusitis    • Chest pain    • Chronic bronchitis (CMS/HCC)     secondary to smoking   • Claudication (CMS/HCC)    • Coronary arteriosclerosis    • Cough    • Gastroesophageal reflux disease    • Health maintenance examination     Individual health examination   • History of echocardiogram 10/21/2013    Echocardiogram W/ color flow 97297 (1) - Left atrium normal. Mild CLVH. EF 50%. Valves intact. Mild mitral and tricuspid regurg. Pericardium normal.   • History of echocardiogram 10/25/2011    Echocardiogram W/O color flow 79298 (1) - Normal LV sytolic function with " mild LVH & mild diastolic dysfunction   • Hyperlipidemia    • Hypertensive disorder    • Pernicious anemia    • Tobacco dependence syndrome        PAST SURGICAL HISTORY:  Past Surgical History:   Procedure Laterality Date   • CARDIAC CATHETERIZATION  04/08/2015    Cardiac cath 88335 (1) - Slective coronary angiogram. Left heart catheterization with LV ventriculogram.   • DENTAL PROCEDURE      Removal of all teeth       FAMILY HISTORY:  Family History   Problem Relation Age of Onset   • Heart disease Mother    • Hypertension Mother    • Diabetes Mother    • Heart disease Father    • Hypertension Father    • Diabetes Father    • Diabetes Sister    • Heart disease Sister    • Stroke Sister    • Cancer Paternal Aunt         Breast Cancer   • Cancer Paternal Uncle         Unknown cancer        SOCIAL HISTORY:  Social History     Socioeconomic History   • Marital status:      Spouse name: Not on file   • Number of children: Not on file   • Years of education: Not on file   • Highest education level: Not on file   Tobacco Use   • Smoking status: Current Every Day Smoker     Packs/day: 1.00     Years: 40.00     Pack years: 40.00     Types: Cigarettes   • Smokeless tobacco: Never Used   Substance and Sexual Activity   • Alcohol use: Yes     Alcohol/week: 4.8 oz     Types: 8 Cans of beer per week   • Drug use: No   • Sexual activity: No     Comment: Marital status:        HOME MEDICATIONS:  Prior to Admission medications    Medication Sig Start Date End Date Taking? Authorizing Provider   aspirin 81 MG chewable tablet Chew 81 mg daily.   Yes Provider, MD Alma   buPROPion SR (WELLBUTRIN SR) 150 MG 12 hr tablet Take 1 tablet by mouth 2 (Two) Times a Day. 5/16/19  Yes Hubert Thomas MD   hydrochlorothiazide (HYDRODIURIL) 25 MG tablet Take 1 tablet by mouth Daily. 8/8/19  Yes Andrew Villatoro III, MD   isosorbide mononitrate (IMDUR) 30 MG 24 hr tablet TAKE 1 TABLET BY MOUTH DAILY. 5/31/19  Yes  Min Sifuentes MD   lisinopril (PRINIVIL,ZESTRIL) 40 MG tablet TAKE 1 TABLET BY MOUTH DAILY. 6/28/19  Yes Min Sifuentes MD   metoprolol succinate XL (TOPROL-XL) 50 MG 24 hr tablet TAKE 1 TABLET BY MOUTH EVERY DAY 8/5/19  Yes Andrew Villatoro III, MD   ondansetron ODT (ZOFRAN-ODT) 4 MG disintegrating tablet Take 1 tablet by mouth Every 6 (Six) Hours As Needed for Nausea or Vomiting. 3/22/19  Yes Jaen Pierre Leonardo MD   pantoprazole (PROTONIX) 40 MG EC tablet Take 1 tablet by mouth Daily. 3/22/19  Yes Jean Pierre Leonardo MD   pravastatin (PRAVACHOL) 20 MG tablet Take 20 mg by mouth Daily. 6/10/19  Yes Alma George MD   meloxicam (MOBIC) 7.5 MG tablet Take 1 tablet by mouth Daily. 7/22/19 9/4/19 Yes Hubert Thomas MD   dicyclomine (BENTYL) 20 MG tablet Take 1 tablet by mouth Every 6 (Six) Hours As Needed (abdominal pain). 3/22/19   Jean Pierre Leonardo MD       ALLERGIES:  Patient has no known allergies.    REVIEW OF SYSTEMS  Review of Systems   Constitutional: Negative for chills, diaphoresis, fatigue and fever.   HENT: Negative for congestion and rhinorrhea.    Eyes: Negative for discharge and redness.   Respiratory: Negative for cough, chest tightness, shortness of breath and wheezing.    Cardiovascular: Negative for chest pain, palpitations and leg swelling.   Gastrointestinal: Negative for abdominal pain, constipation, diarrhea, nausea and vomiting.   Genitourinary: Negative for dysuria and flank pain.   Skin: Negative for color change and rash.   Neurological: Positive for syncope. Negative for dizziness and headaches.   Psychiatric/Behavioral: Negative for agitation and confusion. The patient is not nervous/anxious.        PHYSICAL EXAM:  Temp:  [97.8 °F (36.6 °C)] 97.8 °F (36.6 °C)  Heart Rate:  [54-85] 60  Resp:  [18-20] 18  BP: (119-152)/(62-78) 133/64  Body mass index is 32.8 kg/m².  Physical Exam   Constitutional: He is oriented to person, place, and time. He appears  well-developed and well-nourished. He is active.  Non-toxic appearance. He does not have a sickly appearance. He does not appear ill. No distress.   HENT:   Head: Normocephalic.   Right Ear: External ear normal.   Left Ear: External ear normal.   Nose: No mucosal edema or rhinorrhea.   Mouth/Throat: Uvula is midline and mucous membranes are normal.   Eyes: Conjunctivae are normal. No scleral icterus.   Cardiovascular: Intact distal pulses.   Pulmonary/Chest: Effort normal and breath sounds normal. No accessory muscle usage. No respiratory distress. He has no decreased breath sounds. He has no wheezes. He exhibits no tenderness.   Abdominal: Soft. Bowel sounds are normal. There is no tenderness (deep palpation). There is no rigidity, no rebound and no guarding.   Neurological: He is alert and oriented to person, place, and time. He is not disoriented. No cranial nerve deficit (grossly intact). GCS eye subscore is 4. GCS verbal subscore is 5. GCS motor subscore is 6.   Skin: Skin is warm and dry. Capillary refill takes less than 2 seconds. He is not diaphoretic.   Nursing note and vitals reviewed.      DIAGNOSTIC DATA:   Lab Results (last 24 hours)     Procedure Component Value Units Date/Time    [place] Draw [[id number]] Collected:  09/04/19 1237    Specimen:  Blood Updated:  09/04/19 7965    Narrative:       The following orders were created for panel order [place] Draw.  Procedure                               Abnormality         Status                     ---------                               -----------         ------                     Light Blue Top[[id number]]                                   Final result               Green Top (Gel)[[id number]]                                  Final result               Lavender Top[[id number]]                                     Final result               Gold Top - SST[[id number]]                                   Final result                 Please view results for these  tests on the individual orders.    Green Top (Gel) [[id number]] Collected:  09/04/19 1640    Specimen:  Blood Updated:  09/04/19 1745     Extra Tube Hold for add-ons.     Comment: Auto resulted.       Gold Top - SST [[id number]] Collected:  09/04/19 1640    Specimen:  Blood Updated:  09/04/19 1745     Extra Tube Hold for add-ons.     Comment: Auto resulted.       Comprehensive Metabolic Panel [[id number]]  (Abnormal) Collected:  09/04/19 1640    Specimen:  Blood Updated:  09/04/19 1708     Glucose 85 mg/dL      BUN 11 mg/dL      Creatinine 0.68 mg/dL      Sodium 143 mmol/L      Potassium 3.5 mmol/L      Chloride 105 mmol/L      CO2 27.0 mmol/L      Calcium 8.4 mg/dL      Total Protein 6.7 g/dL      Albumin 4.00 g/dL      ALT (SGPT) 18 U/L      AST (SGOT) 17 U/L      Alkaline Phosphatase 53 U/L      Total Bilirubin 0.3 mg/dL      eGFR Non African Amer 120 mL/min/1.73      Globulin 2.7 gm/dL      A/G Ratio 1.5 g/dL      BUN/Creatinine Ratio 16.2     Anion Gap 11.0 mmol/L     Narrative:       GFR Normal >60  Chronic Kidney Disease <60  Kidney Failure <15    Magnesium [[id number]]  (Normal) Collected:  09/04/19 1640    Specimen:  Blood Updated:  09/04/19 1708     Magnesium 2.2 mg/dL     Troponin [[id number]]  (Normal) Collected:  09/04/19 1640    Specimen:  Blood Updated:  09/04/19 1708     Troponin T <0.010 ng/mL     Narrative:       Troponin T Reference Range:  <= 0.03 ng/mL-   Negative for AMI  >0.03 ng/mL-     Abnormal for myocardial necrosis.  Clinicians would have to utilize clinical acumen, EKG, Troponin and serial changes to determine if it is an Acute Myocardial Infarction or myocardial injury due to an underlying chronic condition.     Protime-INR [[id number]]  (Normal) Collected:  09/04/19 1640    Specimen:  Blood Updated:  09/04/19 1706     Protime 13.4 Seconds      INR 1.04    Narrative:       Therapeutic range for most indications is 2.0-3.0 INR,  or 2.5-3.5 for mechanical heart valves.    D-dimer, Quantitative  [[id number]]  (Normal) Collected:  09/04/19 1640    Specimen:  Blood Updated:  09/04/19 1706     D-Dimer, Quantitative 395 ng/mL (FEU)     Narrative:       Dimer values <500 ng/ml FEU are FDA approved as aid in diagnosis of deep venous thrombosis and pulmonary embolism.  This test should not be used in an exclusion strategy with pretest probability alone.    A recent guideline regarding diagnosis for pulmonary thromboembolism recommends an adjusted exclusion criterion of age x 10 ng/ml FEU for patients >50 years of age ([employer] Intern Med 2015; 163: 701-711).    aPTT [[id number]]  (Normal) Collected:  09/04/19 1640    Specimen:  Blood Updated:  09/04/19 1706     PTT 28.9 seconds     Narrative:       The recommended Heparin therapeutic range is 68-97 seconds.    Lavender Top [[id number]] Collected:  09/04/19 1237    Specimen:  Blood Updated:  09/04/19 1346     Extra Tube hold for add-on     Comment: Auto resulted       Light Blue Top [[id number]] Collected:  09/04/19 1237    Specimen:  Blood Updated:  09/04/19 1346     Extra Tube hold for add-on     Comment: Auto resulted       POC Glucose Once [[id number]]  (Normal) Collected:  09/04/19 1232    Specimen:  Blood Updated:  09/04/19 1333     Glucose 107 mg/dL      Comment: : [id number] ESTIVEN SUSANMeter ID: [id number]       CBC & Differential [[id number]] Collected:  09/04/19 1237    Specimen:  Blood Updated:  09/04/19 1242    Narrative:       The following orders were created for panel order CBC & Differential.  Procedure                               Abnormality         Status                     ---------                               -----------         ------                     CBC Auto Differential[[id number]]        Abnormal            Final result                 Please view results for these tests on the individual orders.    CBC Auto Differential [[id number]]  (Abnormal) Collected:  09/04/19 1237    Specimen:  Blood Updated:  09/04/19 1242     WBC 7.88  10*3/mm3      RBC 4.64 10*6/mm3      Hemoglobin 14.4 g/dL      Hematocrit 42.0 %      MCV 90.5 fL      MCH 31.0 pg      MCHC 34.3 g/dL      RDW 14.2 %      RDW-SD 46.8 fl      MPV 9.7 fL      Platelets 187 10*3/mm3      Neutrophil % 68.9 %      Lymphocyte % 19.0 %      Monocyte % 8.1 %      Eosinophil % 3.2 %      Basophil % 0.4 %      Immature Grans % 0.4 %      Neutrophils, Absolute 5.43 10*3/mm3      Lymphocytes, Absolute 1.50 10*3/mm3      Monocytes, Absolute 0.64 10*3/mm3      Eosinophils, Absolute 0.25 10*3/mm3      Basophils, Absolute 0.03 10*3/mm3      Immature Grans, Absolute 0.03 10*3/mm3      nRBC 0.0 /100 WBC            Imaging Results (last 24 hours)     Procedure Component Value Units Date/Time    XR Chest 1 View [[id number]] Collected:  09/04/19 1458     Updated:  09/04/19 1514    Narrative:         PORTABLE CHEST    HISTORY: Syncope    Portable AP upright film of the chest was obtained at 2:35 PM.  COMPARISON: March 15, 2015    FINDINGS:   EKG leads.  The lungs are clear of an acute process.  Old granulomatous disease is present.  The heart is not enlarged.  The pulmonary vasculature is not increased.  No pleural effusion.  No pneumothorax.  No acute osseous abnormality.  Old right rib fractures.  Old right clavicular fracture.      Impression:       CONCLUSION:  No Acute Disease    [id number]    Electronically signed by:  Soren Temple MD  9/4/2019 3:13 PM CDT  Workstation: [employer]            I reviewed the patient's new clinical results.    ASSESSMENT AND PLAN: This is a 58 y.o. male with:    Active Hospital Problems    Diagnosis POA   • **Vasovagal episode [R55] Yes     Priority: High     - Vital signs stable in the ER.  - IV fluids administered.  - Repeat orthostatics.  - Monitor with telemetry overnight.     • Nicotine abuse [Z72.0] Yes     -Stable.  -Counseling given to the patient.  - Nicotine patch.     • Pure hypercholesterolemia [E78.00] Yes     -Stable.  -Continue home medication  regimen.     • Coronary artery disease involving native coronary artery of native heart without angina pectoris [I25.10] Yes     -Stable.  -Continue home medication regimen.     • Essential hypertension [I10] Yes     -Stable.  -Continue home medication regimen.         DVT prophylaxis: Mailex     YESICA # [id number], reviewed and consistent with patient reported medications.    Expected Length of Stay: Where: home and When:  tomorrow    I discussed the patients findings and my recommendations with patient.     SANDRA is the attending on record at time of admission, He is aware of the patient's status and agrees with the above history and physical.          This document has been electronically signed by Jackson Ibarra MD on September 4, 2019 6:05 PM

## 2019-09-04 NOTE — ED PROVIDER NOTES
Subjective   57yo male pmh significant htn/hyperlipidemia/cad presents ED c/o witnessed syncopal episode preceeded by soa/vigorous coughing episode.  Pt denies headache/paresthesia/motor weakness/chest pain/abd pain/back pain/n/v/d.        History provided by:  Patient and relative  Syncope   Episode history:  Single  Most recent episode:  Today  Progression:  Resolved  Witnessed: yes    Associated symptoms: shortness of breath    Associated symptoms: no chest pain        Review of Systems   Constitutional: Negative.    HENT: Negative.    Respiratory: Positive for cough and shortness of breath.    Cardiovascular: Positive for syncope. Negative for chest pain.   Gastrointestinal: Negative.    Genitourinary: Negative.    Musculoskeletal: Negative.    Skin: Negative.    Neurological: Positive for syncope.   All other systems reviewed and are negative.      Past Medical History:   Diagnosis Date   • Acute bronchitis    • Acute sinusitis    • Chest pain    • Chronic bronchitis (CMS/HCC)     secondary to smoking   • Claudication (CMS/HCC)    • Coronary arteriosclerosis    • Cough    • Gastroesophageal reflux disease    • Health maintenance examination     Individual health examination   • History of echocardiogram 10/21/2013    Echocardiogram W/ color flow 81043 (1) - Left atrium normal. Mild CLVH. EF 50%. Valves intact. Mild mitral and tricuspid regurg. Pericardium normal.   • History of echocardiogram 10/25/2011    Echocardiogram W/O color flow 26437 (1) - Normal LV sytolic function with mild LVH & mild diastolic dysfunction   • Hyperlipidemia    • Hypertensive disorder    • Pernicious anemia    • Tobacco dependence syndrome        No Known Allergies    Past Surgical History:   Procedure Laterality Date   • CARDIAC CATHETERIZATION  04/08/2015    Cardiac cath 51781 (1) - Slective coronary angiogram. Left heart catheterization with LV ventriculogram.   • DENTAL PROCEDURE      Removal of all teeth       Family History    Problem Relation Age of Onset   • Heart disease Mother    • Hypertension Mother    • Diabetes Mother    • Heart disease Father    • Hypertension Father    • Diabetes Father    • Diabetes Sister    • Heart disease Sister    • Stroke Sister    • Cancer Paternal Aunt         Breast Cancer   • Cancer Paternal Uncle         Unknown cancer       Social History     Socioeconomic History   • Marital status:      Spouse name: Not on file   • Number of children: Not on file   • Years of education: Not on file   • Highest education level: Not on file   Tobacco Use   • Smoking status: Current Every Day Smoker     Packs/day: 1.00     Years: 40.00     Pack years: 40.00     Types: Cigarettes   • Smokeless tobacco: Never Used   Substance and Sexual Activity   • Alcohol use: Yes     Alcohol/week: 4.8 oz     Types: 8 Cans of beer per week   • Drug use: No   • Sexual activity: No     Comment: Marital status:            Objective   Physical Exam   Constitutional: He is oriented to person, place, and time. He appears well-developed and well-nourished.   HENT:   Head: Normocephalic and atraumatic.   Right Ear: External ear normal.   Left Ear: External ear normal.   Nose: Nose normal.   Mouth/Throat: Oropharynx is clear and moist.   Eyes: EOM are normal. Pupils are equal, round, and reactive to light.   Neck: Normal range of motion. Neck supple. No JVD present. No tracheal deviation present.   Cardiovascular: Normal rate, regular rhythm, normal heart sounds and intact distal pulses. Exam reveals no gallop and no friction rub.   No murmur heard.  Pulmonary/Chest: Effort normal and breath sounds normal. He has no wheezes. He has no rales.   Abdominal: Soft. Bowel sounds are normal. He exhibits no mass. There is no tenderness. There is no rebound and no guarding.   Musculoskeletal: He exhibits no edema or tenderness.   Lymphadenopathy:     He has no cervical adenopathy.   Neurological: He is alert and oriented to person,  place, and time. He has normal strength. No cranial nerve deficit or sensory deficit. Coordination normal. GCS eye subscore is 4. GCS verbal subscore is 5. GCS motor subscore is 6.   Nursing note and vitals reviewed.      Procedures           ED Course  ED Course as of Sep 04 1725   Wed Sep 04, 2019   1636 Ekg: SB/rate 55  [SD]      ED Course User Index  [SD] Román Porter MD         Final Diagnosis: as of Sep 04 1725   Syncope and collapse        Labs Reviewed   COMPREHENSIVE METABOLIC PANEL - Abnormal; Notable for the following components:       Result Value    Creatinine 0.68 (*)     Calcium 8.4 (*)     All other components within normal limits    Narrative:     GFR Normal >60  Chronic Kidney Disease <60  Kidney Failure <15   CBC WITH AUTO DIFFERENTIAL - Abnormal; Notable for the following components:    Lymphocyte % 19.0 (*)     All other components within normal limits   MAGNESIUM - Normal   TROPONIN (IN-HOUSE) - Normal    Narrative:     Troponin T Reference Range:  <= 0.03 ng/mL-   Negative for AMI  >0.03 ng/mL-     Abnormal for myocardial necrosis.  Clinicians would have to utilize clinical acumen, EKG, Troponin and serial changes to determine if it is an Acute Myocardial Infarction or myocardial injury due to an underlying chronic condition.    PROTIME-INR - Normal    Narrative:     Therapeutic range for most indications is 2.0-3.0 INR,  or 2.5-3.5 for mechanical heart valves.   APTT - Normal    Narrative:     The recommended Heparin therapeutic range is 68-97 seconds.   D-DIMER, QUANTITATIVE - Normal    Narrative:     Dimer values <500 ng/ml FEU are FDA approved as aid in diagnosis of deep venous thrombosis and pulmonary embolism.  This test should not be used in an exclusion strategy with pretest probability alone.    A recent guideline regarding diagnosis for pulmonary thromboembolism recommends an adjusted exclusion criterion of age x 10 ng/ml FEU for patients >50 years of age ([employer] Med 2015;  163: 701-711).   POCT GLUCOSE FINGERSTICK - Normal   RAINBOW DRAW    Narrative:     The following orders were created for panel order [place] Draw.  Procedure                               Abnormality         Status                     ---------                               -----------         ------                     Light Blue Top[[id number]]                                   Final result               Green Top (Gel)[[id number]]                                  In process                 Lavender Top[[id number]]                                     Final result               Gold Top - SST[[id number]]                                   In process                   Please view results for these tests on the individual orders.   TROPONIN (IN-HOUSE)   POCT GLUCOSE FINGERSTICK   CBC AND DIFFERENTIAL    Narrative:     The following orders were created for panel order CBC & Differential.  Procedure                               Abnormality         Status                     ---------                               -----------         ------                     CBC Auto Differential[[id number]]        Abnormal            Final result                 Please view results for these tests on the individual orders.   LIGHT BLUE TOP   LAVENDER TOP   GREEN TOP   GOLD TOP - SST   ]  Xr Chest 1 View    Result Date: 9/4/2019  Narrative: PORTABLE CHEST HISTORY: Syncope Portable AP upright film of the chest was obtained at 2:35 PM. COMPARISON: March 15, 2015 FINDINGS: EKG leads. The lungs are clear of an acute process. Old granulomatous disease is present. The heart is not enlarged. The pulmonary vasculature is not increased. No pleural effusion. No pneumothorax. No acute osseous abnormality. Old right rib fractures. Old right clavicular fracture.     Impression: CONCLUSION: No Acute Disease 11947 Electronically signed by:  Soren Temple MD  9/4/2019 3:13 PM CDT Workstation: [id number] Guido,  MD  09/04/19 0240

## 2019-09-05 ENCOUNTER — APPOINTMENT (OUTPATIENT)
Dept: ULTRASOUND IMAGING | Facility: HOSPITAL | Age: 59
End: 2019-09-05

## 2019-09-05 ENCOUNTER — APPOINTMENT (OUTPATIENT)
Dept: CARDIOLOGY | Facility: HOSPITAL | Age: 59
End: 2019-09-05

## 2019-09-05 VITALS
BODY MASS INDEX: 30.92 KG/M2 | DIASTOLIC BLOOD PRESSURE: 84 MMHG | HEIGHT: 67 IN | OXYGEN SATURATION: 92 % | RESPIRATION RATE: 18 BRPM | TEMPERATURE: 96.9 F | SYSTOLIC BLOOD PRESSURE: 151 MMHG | WEIGHT: 197 LBS | HEART RATE: 56 BPM

## 2019-09-05 PROBLEM — J41.1 MUCOPURULENT CHRONIC BRONCHITIS: Status: ACTIVE | Noted: 2019-09-05

## 2019-09-05 LAB
ANION GAP SERPL CALCULATED.3IONS-SCNC: 11 MMOL/L (ref 5–15)
BASOPHILS # BLD AUTO: 0.03 10*3/MM3 (ref 0–0.2)
BASOPHILS NFR BLD AUTO: 0.6 % (ref 0–1.5)
BH CV ECHO MEAS - ACS: 2 CM
BH CV ECHO MEAS - AO MAX PG (FULL): 7.9 MMHG
BH CV ECHO MEAS - AO MAX PG: 15.2 MMHG
BH CV ECHO MEAS - AO MEAN PG (FULL): 3 MMHG
BH CV ECHO MEAS - AO MEAN PG: 7 MMHG
BH CV ECHO MEAS - AO ROOT AREA (BSA CORRECTED): 1.7
BH CV ECHO MEAS - AO ROOT AREA: 9.6 CM^2
BH CV ECHO MEAS - AO ROOT DIAM: 3.5 CM
BH CV ECHO MEAS - AO V2 MAX: 195 CM/SEC
BH CV ECHO MEAS - AO V2 MEAN: 126 CM/SEC
BH CV ECHO MEAS - AO V2 VTI: 39.2 CM
BH CV ECHO MEAS - ASC AORTA: 3.2 CM
BH CV ECHO MEAS - AVA(I,A): 2.7 CM^2
BH CV ECHO MEAS - AVA(I,D): 2.7 CM^2
BH CV ECHO MEAS - AVA(V,A): 2.2 CM^2
BH CV ECHO MEAS - AVA(V,D): 2.2 CM^2
BH CV ECHO MEAS - BSA(HAYCOCK): 2.1 M^2
BH CV ECHO MEAS - BSA: 2 M^2
BH CV ECHO MEAS - BZI_BMI: 30.9 KILOGRAMS/M^2
BH CV ECHO MEAS - BZI_METRIC_HEIGHT: 170.2 CM
BH CV ECHO MEAS - BZI_METRIC_WEIGHT: 89.4 KG
BH CV ECHO MEAS - EDV(CUBED): 145.5 ML
BH CV ECHO MEAS - EDV(MOD-SP2): 57.8 ML
BH CV ECHO MEAS - EDV(MOD-SP4): 97.3 ML
BH CV ECHO MEAS - EDV(TEICH): 133 ML
BH CV ECHO MEAS - EF(CUBED): 73.5 %
BH CV ECHO MEAS - EF(MOD-SP2): 52.1 %
BH CV ECHO MEAS - EF(MOD-SP4): 65.6 %
BH CV ECHO MEAS - EF(TEICH): 64.8 %
BH CV ECHO MEAS - ESV(CUBED): 38.6 ML
BH CV ECHO MEAS - ESV(MOD-SP2): 27.7 ML
BH CV ECHO MEAS - ESV(MOD-SP4): 33.5 ML
BH CV ECHO MEAS - ESV(TEICH): 46.8 ML
BH CV ECHO MEAS - FS: 35.7 %
BH CV ECHO MEAS - IVS/LVPW: 1.1
BH CV ECHO MEAS - IVSD: 1.2 CM
BH CV ECHO MEAS - LA DIMENSION: 4.1 CM
BH CV ECHO MEAS - LA/AO: 1.2
BH CV ECHO MEAS - LV DIASTOLIC VOL/BSA (35-75): 48.4 ML/M^2
BH CV ECHO MEAS - LV MASS(C)D: 239 GRAMS
BH CV ECHO MEAS - LV MASS(C)DI: 119 GRAMS/M^2
BH CV ECHO MEAS - LV MAX PG: 7.3 MMHG
BH CV ECHO MEAS - LV MEAN PG: 4 MMHG
BH CV ECHO MEAS - LV SYSTOLIC VOL/BSA (12-30): 16.7 ML/M^2
BH CV ECHO MEAS - LV V1 MAX: 135 CM/SEC
BH CV ECHO MEAS - LV V1 MEAN: 98.8 CM/SEC
BH CV ECHO MEAS - LV V1 VTI: 33.3 CM
BH CV ECHO MEAS - LVIDD: 5.3 CM
BH CV ECHO MEAS - LVIDS: 3.4 CM
BH CV ECHO MEAS - LVLD AP2: 8.3 CM
BH CV ECHO MEAS - LVLD AP4: 8.6 CM
BH CV ECHO MEAS - LVLS AP2: 6.8 CM
BH CV ECHO MEAS - LVLS AP4: 6.7 CM
BH CV ECHO MEAS - LVOT AREA (M): 3.1 CM^2
BH CV ECHO MEAS - LVOT AREA: 3.1 CM^2
BH CV ECHO MEAS - LVOT DIAM: 2 CM
BH CV ECHO MEAS - LVPWD: 1.1 CM
BH CV ECHO MEAS - MR MAX PG: 103.2 MMHG
BH CV ECHO MEAS - MR MAX VEL: 508 CM/SEC
BH CV ECHO MEAS - MV A MAX VEL: 75.7 CM/SEC
BH CV ECHO MEAS - MV DEC SLOPE: 691 CM/SEC^2
BH CV ECHO MEAS - MV E MAX VEL: 110 CM/SEC
BH CV ECHO MEAS - MV E/A: 1.5
BH CV ECHO MEAS - MV MAX PG: 5 MMHG
BH CV ECHO MEAS - MV MEAN PG: 1 MMHG
BH CV ECHO MEAS - MV P1/2T MAX VEL: 109 CM/SEC
BH CV ECHO MEAS - MV P1/2T: 46.2 MSEC
BH CV ECHO MEAS - MV V2 MAX: 112 CM/SEC
BH CV ECHO MEAS - MV V2 MEAN: 45.8 CM/SEC
BH CV ECHO MEAS - MV V2 VTI: 32.9 CM
BH CV ECHO MEAS - MVA P1/2T LCG: 2 CM^2
BH CV ECHO MEAS - MVA(P1/2T): 4.8 CM^2
BH CV ECHO MEAS - MVA(VTI): 3.2 CM^2
BH CV ECHO MEAS - PA MAX PG: 4 MMHG
BH CV ECHO MEAS - PA V2 MAX: 99.9 CM/SEC
BH CV ECHO MEAS - RAP SYSTOLE: 5 MMHG
BH CV ECHO MEAS - RVDD: 2.8 CM
BH CV ECHO MEAS - RVSP: 35 MMHG
BH CV ECHO MEAS - SI(AO): 187.7 ML/M^2
BH CV ECHO MEAS - SI(CUBED): 53.2 ML/M^2
BH CV ECHO MEAS - SI(LVOT): 52.1 ML/M^2
BH CV ECHO MEAS - SI(MOD-SP2): 15 ML/M^2
BH CV ECHO MEAS - SI(MOD-SP4): 31.8 ML/M^2
BH CV ECHO MEAS - SI(TEICH): 42.9 ML/M^2
BH CV ECHO MEAS - SV(AO): 377.1 ML
BH CV ECHO MEAS - SV(CUBED): 106.9 ML
BH CV ECHO MEAS - SV(LVOT): 104.6 ML
BH CV ECHO MEAS - SV(MOD-SP2): 30.1 ML
BH CV ECHO MEAS - SV(MOD-SP4): 63.8 ML
BH CV ECHO MEAS - SV(TEICH): 86.2 ML
BH CV ECHO MEAS - TR MAX VEL: 274 CM/SEC
BUN BLD-MCNC: 14 MG/DL (ref 6–20)
BUN/CREAT SERPL: 15.7 (ref 7–25)
CALCIUM SPEC-SCNC: 8.7 MG/DL (ref 8.6–10.5)
CHLORIDE SERPL-SCNC: 103 MMOL/L (ref 98–107)
CO2 SERPL-SCNC: 28 MMOL/L (ref 22–29)
CREAT BLD-MCNC: 0.89 MG/DL (ref 0.76–1.27)
DEPRECATED RDW RBC AUTO: 47.4 FL (ref 37–54)
EOSINOPHIL # BLD AUTO: 0.26 10*3/MM3 (ref 0–0.4)
EOSINOPHIL NFR BLD AUTO: 5.1 % (ref 0.3–6.2)
ERYTHROCYTE [DISTWIDTH] IN BLOOD BY AUTOMATED COUNT: 14 % (ref 12.3–15.4)
GFR SERPL CREATININE-BSD FRML MDRD: 88 ML/MIN/1.73
GLUCOSE BLD-MCNC: 163 MG/DL (ref 65–99)
HCT VFR BLD AUTO: 40.7 % (ref 37.5–51)
HGB BLD-MCNC: 13.5 G/DL (ref 13–17.7)
IMM GRANULOCYTES # BLD AUTO: 0.01 10*3/MM3 (ref 0–0.05)
IMM GRANULOCYTES NFR BLD AUTO: 0.2 % (ref 0–0.5)
LV EF 2D ECHO EST: 61 %
LYMPHOCYTES # BLD AUTO: 1.69 10*3/MM3 (ref 0.7–3.1)
LYMPHOCYTES NFR BLD AUTO: 33.1 % (ref 19.6–45.3)
MCH RBC QN AUTO: 30.5 PG (ref 26.6–33)
MCHC RBC AUTO-ENTMCNC: 33.2 G/DL (ref 31.5–35.7)
MCV RBC AUTO: 91.9 FL (ref 79–97)
MONOCYTES # BLD AUTO: 0.44 10*3/MM3 (ref 0.1–0.9)
MONOCYTES NFR BLD AUTO: 8.6 % (ref 5–12)
NEUTROPHILS # BLD AUTO: 2.67 10*3/MM3 (ref 1.7–7)
NEUTROPHILS NFR BLD AUTO: 52.4 % (ref 42.7–76)
NRBC BLD AUTO-RTO: 0 /100 WBC (ref 0–0.2)
PLATELET # BLD AUTO: 148 10*3/MM3 (ref 140–450)
PMV BLD AUTO: 9.7 FL (ref 6–12)
POTASSIUM BLD-SCNC: 4.1 MMOL/L (ref 3.5–5.2)
RBC # BLD AUTO: 4.43 10*6/MM3 (ref 4.14–5.8)
SODIUM BLD-SCNC: 142 MMOL/L (ref 136–145)
WBC NRBC COR # BLD: 5.1 10*3/MM3 (ref 3.4–10.8)

## 2019-09-05 PROCEDURE — G0378 HOSPITAL OBSERVATION PER HR: HCPCS

## 2019-09-05 PROCEDURE — 93880 EXTRACRANIAL BILAT STUDY: CPT

## 2019-09-05 PROCEDURE — 80048 BASIC METABOLIC PNL TOTAL CA: CPT | Performed by: STUDENT IN AN ORGANIZED HEALTH CARE EDUCATION/TRAINING PROGRAM

## 2019-09-05 PROCEDURE — 94640 AIRWAY INHALATION TREATMENT: CPT

## 2019-09-05 PROCEDURE — 94799 UNLISTED PULMONARY SVC/PX: CPT

## 2019-09-05 PROCEDURE — 85025 COMPLETE CBC W/AUTO DIFF WBC: CPT | Performed by: STUDENT IN AN ORGANIZED HEALTH CARE EDUCATION/TRAINING PROGRAM

## 2019-09-05 PROCEDURE — 93306 TTE W/DOPPLER COMPLETE: CPT

## 2019-09-05 PROCEDURE — 93306 TTE W/DOPPLER COMPLETE: CPT | Performed by: INTERNAL MEDICINE

## 2019-09-05 PROCEDURE — 99217 PR OBSERVATION CARE DISCHARGE MANAGEMENT: CPT | Performed by: STUDENT IN AN ORGANIZED HEALTH CARE EDUCATION/TRAINING PROGRAM

## 2019-09-05 RX ORDER — ROSUVASTATIN CALCIUM 20 MG/1
20 TABLET, COATED ORAL NIGHTLY
Status: DISCONTINUED | OUTPATIENT
Start: 2019-09-05 | End: 2019-09-05 | Stop reason: HOSPADM

## 2019-09-05 RX ORDER — NICOTINE 21 MG/24HR
1 PATCH, TRANSDERMAL 24 HOURS TRANSDERMAL EVERY 24 HOURS
Qty: 21 EACH | Refills: 4 | Status: SHIPPED | OUTPATIENT
Start: 2019-09-05 | End: 2019-10-14

## 2019-09-05 RX ORDER — IPRATROPIUM BROMIDE AND ALBUTEROL SULFATE 2.5; .5 MG/3ML; MG/3ML
3 SOLUTION RESPIRATORY (INHALATION)
Status: DISCONTINUED | OUTPATIENT
Start: 2019-09-05 | End: 2019-09-05 | Stop reason: HOSPADM

## 2019-09-05 RX ORDER — METOPROLOL SUCCINATE 25 MG/1
25 TABLET, EXTENDED RELEASE ORAL DAILY
Status: DISCONTINUED | OUTPATIENT
Start: 2019-09-05 | End: 2019-09-05 | Stop reason: HOSPADM

## 2019-09-05 RX ADMIN — LISINOPRIL 40 MG: 40 TABLET ORAL at 09:56

## 2019-09-05 RX ADMIN — ASPIRIN 81 MG 81 MG: 81 TABLET ORAL at 09:56

## 2019-09-05 RX ADMIN — HYDROCHLOROTHIAZIDE 25 MG: 25 TABLET ORAL at 09:56

## 2019-09-05 RX ADMIN — Medication 1 TABLET: at 09:56

## 2019-09-05 RX ADMIN — IPRATROPIUM BROMIDE AND ALBUTEROL SULFATE 3 ML: 2.5; .5 SOLUTION RESPIRATORY (INHALATION) at 10:04

## 2019-09-05 RX ADMIN — BUPROPION HYDROCHLORIDE 150 MG: 150 TABLET, EXTENDED RELEASE ORAL at 09:56

## 2019-09-05 RX ADMIN — ISOSORBIDE MONONITRATE 30 MG: 30 TABLET, EXTENDED RELEASE ORAL at 09:56

## 2019-09-05 RX ADMIN — IPRATROPIUM BROMIDE AND ALBUTEROL SULFATE 3 ML: 2.5; .5 SOLUTION RESPIRATORY (INHALATION) at 15:50

## 2019-09-05 RX ADMIN — PRAVASTATIN SODIUM 20 MG: 10 TABLET ORAL at 09:56

## 2019-09-05 RX ADMIN — PANTOPRAZOLE SODIUM 40 MG: 40 TABLET, DELAYED RELEASE ORAL at 09:56

## 2019-09-05 NOTE — PLAN OF CARE
Problem: Patient Care Overview  Goal: Plan of Care Review  Outcome: Ongoing (interventions implemented as appropriate)   09/05/19 0357   Coping/Psychosocial   Plan of Care Reviewed With patient   Plan of Care Review   Progress no change     Goal: Individualization and Mutuality  Outcome: Ongoing (interventions implemented as appropriate)    Goal: Discharge Needs Assessment  Outcome: Ongoing (interventions implemented as appropriate)    Goal: Interprofessional Rounds/Family Conf  Outcome: Ongoing (interventions implemented as appropriate)      Problem: Fall Risk (Adult)  Goal: Identify Related Risk Factors and Signs and Symptoms  Outcome: Ongoing (interventions implemented as appropriate)    Goal: Absence of Fall  Outcome: Ongoing (interventions implemented as appropriate)

## 2019-09-05 NOTE — DISCHARGE SUMMARY
DISCHARGE SUMMARY    PATIENT NAME: Nima Portillo       PHYSICIAN: Andrew Villatoro III, MD  : 1960  MRN: [id number]    ADMITTED: 2019     DISCHARGED: 19    ADMISSION DIAGNOSES:  Active Hospital Problems    Diagnosis  POA   • **Near syncope [R55]  Yes   • Mucopurulent chronic bronchitis (CMS/HCC) [J41.1]  Unknown   • Nicotine abuse [Z72.0]  Yes   • Pure hypercholesterolemia [E78.00]  Yes   • Coronary artery disease involving native coronary artery of native heart without angina pectoris [I25.10]  Yes   • Essential hypertension [I10]  Yes      Resolved Hospital Problems   No resolved problems to display.     DISCHARGE DIAGNOSES:   Active Hospital Problems    Diagnosis  POA   • **Near syncope [R55]  Yes   • Mucopurulent chronic bronchitis (CMS/HCC) [J41.1]  Unknown   • Nicotine abuse [Z72.0]  Yes   • Pure hypercholesterolemia [E78.00]  Yes   • Coronary artery disease involving native coronary artery of native heart without angina pectoris [I25.10]  Yes   • Essential hypertension [I10]  Yes      Resolved Hospital Problems   No resolved problems to display.       SERVICE: Family Medicine.  Attending: Dr. Encinas  Resident: Andrew Villatoro III, MD    CONSULTS:   Consult Orders (all) (From admission, onward)    Start     Ordered    19 1726  Family Practice - Resident (on-call MD unless specified)  Once     Specialty:  Family Medicine  Provider:  (Not yet assigned)    19 1725          PROCEDURES:   TTE  Carotid Ultrasound    HISTORY OF PRESENT ILLNESS:   Nima Portillo is a 58 y.o. male with a PMH of prior MI, nicotine abuse, hypertension presents to the ER today with chief complaint of syncope.  Patient reports being outside this morning for several hours when he was talking with a family member.  Patient got an uncontrollable cough and passed out falling onto the ground.  He denies hitting his head.  It was witnessed by the family member.  No seizure-like activity was  "reported.  Patient recovered several seconds after collapsing.  Patient was not postictal.  He denies headache or neurologic deficits.  Patient reports while he was coughing he felt \"lightheaded \"immediately prior to passing out.  Right now, patient states he feels normal.  He is very hungry and would like to have something to eat and drink.     In the ER, labs obtained are grossly unremarkable.  He was mildly orthostatic.  Troponin negative x1.  EKG was unremarkable.  CT of the head was not obtained due to no headache, head injury, neurologic deficits.  He received IVF prior to admission.    DIAGNOSTIC DATA:   Lab Results (last 72 hours)     Procedure Component Value Units Date/Time    Basic Metabolic Panel [[id number]]  (Abnormal) Collected:  09/05/19 0549    Specimen:  Blood Updated:  09/05/19 0644     Glucose 163 mg/dL      BUN 14 mg/dL      Creatinine 0.89 mg/dL      Sodium 142 mmol/L      Potassium 4.1 mmol/L      Chloride 103 mmol/L      CO2 28.0 mmol/L      Calcium 8.7 mg/dL      eGFR Non African Amer 88 mL/min/1.73      BUN/Creatinine Ratio 15.7     Anion Gap 11.0 mmol/L     Narrative:       GFR Normal >60  Chronic Kidney Disease <60  Kidney Failure <15    CBC & Differential [[id number]] Collected:  09/05/19 0549    Specimen:  Blood Updated:  09/05/19 0614    Narrative:       The following orders were created for panel order CBC & Differential.  Procedure                               Abnormality         Status                     ---------                               -----------         ------                     CBC Auto Differential[[id number]]        Normal              Final result                 Please view results for these tests on the individual orders.    CBC Auto Differential [[id number]]  (Normal) Collected:  09/05/19 0549    Specimen:  Blood Updated:  09/05/19 0614     WBC 5.10 10*3/mm3      RBC 4.43 10*6/mm3      Hemoglobin 13.5 g/dL      Hematocrit 40.7 %      MCV 91.9 fL      MCH 30.5 pg      " MCHC 33.2 g/dL      RDW 14.0 %      RDW-SD 47.4 fl      MPV 9.7 fL      Platelets 148 10*3/mm3      Neutrophil % 52.4 %      Lymphocyte % 33.1 %      Monocyte % 8.6 %      Eosinophil % 5.1 %      Basophil % 0.6 %      Immature Grans % 0.2 %      Neutrophils, Absolute 2.67 10*3/mm3      Lymphocytes, Absolute 1.69 10*3/mm3      Monocytes, Absolute 0.44 10*3/mm3      Eosinophils, Absolute 0.26 10*3/mm3      Basophils, Absolute 0.03 10*3/mm3      Immature Grans, Absolute 0.01 10*3/mm3      nRBC 0.0 /100 WBC     Troponin [[id number]]  (Normal) Collected:  09/04/19 1821    Specimen:  Blood Updated:  09/04/19 1852     Troponin T <0.010 ng/mL     Narrative:       Troponin T Reference Range:  <= 0.03 ng/mL-   Negative for AMI  >0.03 ng/mL-     Abnormal for myocardial necrosis.  Clinicians would have to utilize clinical acumen, EKG, Troponin and serial changes to determine if it is an Acute Myocardial Infarction or myocardial injury due to an underlying chronic condition.     [place] Draw [[id number]] Collected:  09/04/19 1237    Specimen:  Blood Updated:  09/04/19 1745    Narrative:       The following orders were created for panel order [place] Draw.  Procedure                               Abnormality         Status                     ---------                               -----------         ------                     Light Blue Top[[id number]]                                   Final result               Green Top (Gel)[[id number]]                                  Final result               Lavender Top[[id number]]                                     Final result               Gold Top - SST[[id number]]                                   Final result                 Please view results for these tests on the individual orders.    Green Top (Gel) [[id number]] Collected:  09/04/19 1640    Specimen:  Blood Updated:  09/04/19 1745     Extra Tube Hold for add-ons.     Comment: Auto resulted.       Gold Top - SST [[id number]]  Collected:  09/04/19 1640    Specimen:  Blood Updated:  09/04/19 1745     Extra Tube Hold for add-ons.     Comment: Auto resulted.       Comprehensive Metabolic Panel [[id number]]  (Abnormal) Collected:  09/04/19 1640    Specimen:  Blood Updated:  09/04/19 1708     Glucose 85 mg/dL      BUN 11 mg/dL      Creatinine 0.68 mg/dL      Sodium 143 mmol/L      Potassium 3.5 mmol/L      Chloride 105 mmol/L      CO2 27.0 mmol/L      Calcium 8.4 mg/dL      Total Protein 6.7 g/dL      Albumin 4.00 g/dL      ALT (SGPT) 18 U/L      AST (SGOT) 17 U/L      Alkaline Phosphatase 53 U/L      Total Bilirubin 0.3 mg/dL      eGFR Non African Amer 120 mL/min/1.73      Globulin 2.7 gm/dL      A/G Ratio 1.5 g/dL      BUN/Creatinine Ratio 16.2     Anion Gap 11.0 mmol/L     Narrative:       GFR Normal >60  Chronic Kidney Disease <60  Kidney Failure <15    Magnesium [[id number]]  (Normal) Collected:  09/04/19 1640    Specimen:  Blood Updated:  09/04/19 1708     Magnesium 2.2 mg/dL     Troponin [[id number]]  (Normal) Collected:  09/04/19 1640    Specimen:  Blood Updated:  09/04/19 1708     Troponin T <0.010 ng/mL     Narrative:       Troponin T Reference Range:  <= 0.03 ng/mL-   Negative for AMI  >0.03 ng/mL-     Abnormal for myocardial necrosis.  Clinicians would have to utilize clinical acumen, EKG, Troponin and serial changes to determine if it is an Acute Myocardial Infarction or myocardial injury due to an underlying chronic condition.     Protime-INR [[id number]]  (Normal) Collected:  09/04/19 1640    Specimen:  Blood Updated:  09/04/19 1706     Protime 13.4 Seconds      INR 1.04    Narrative:       Therapeutic range for most indications is 2.0-3.0 INR,  or 2.5-3.5 for mechanical heart valves.    D-dimer, Quantitative [[id number]]  (Normal) Collected:  09/04/19 1640    Specimen:  Blood Updated:  09/04/19 1706     D-Dimer, Quantitative 395 ng/mL (FEU)     Narrative:       Dimer values <500 ng/ml FEU are FDA approved as aid in diagnosis of deep  venous thrombosis and pulmonary embolism.  This test should not be used in an exclusion strategy with pretest probability alone.    A recent guideline regarding diagnosis for pulmonary thromboembolism recommends an adjusted exclusion criterion of age x 10 ng/ml FEU for patients >50 years of age ([employer] [facility] Med 2015; 163: 701-711).    aPTT [[id number]]  (Normal) Collected:  09/04/19 1640    Specimen:  Blood Updated:  09/04/19 1706     PTT 28.9 seconds     Narrative:       The recommended Heparin therapeutic range is 68-97 seconds.    Lavender Top [[id number]] Collected:  09/04/19 1237    Specimen:  Blood Updated:  09/04/19 1346     Extra Tube hold for add-on     Comment: Auto resulted       Light Blue Top [[id number]] Collected:  09/04/19 1237    Specimen:  Blood Updated:  09/04/19 1346     Extra Tube hold for add-on     Comment: Auto resulted       POC Glucose Once [[id number]]  (Normal) Collected:  09/04/19 1232    Specimen:  Blood Updated:  09/04/19 1333     Glucose 107 mg/dL      Comment: : [id number] ESTIVEN JOAQUINMeter ID: [id number]       CBC & Differential [[id number]] Collected:  09/04/19 1237    Specimen:  Blood Updated:  09/04/19 1242    Narrative:       The following orders were created for panel order CBC & Differential.  Procedure                               Abnormality         Status                     ---------                               -----------         ------                     CBC Auto Differential[[id number]]        Abnormal            Final result                 Please view results for these tests on the individual orders.    CBC Auto Differential [[id number]]  (Abnormal) Collected:  09/04/19 1237    Specimen:  Blood Updated:  09/04/19 1242     WBC 7.88 10*3/mm3      RBC 4.64 10*6/mm3      Hemoglobin 14.4 g/dL      Hematocrit 42.0 %      MCV 90.5 fL      MCH 31.0 pg      MCHC 34.3 g/dL      RDW 14.2 %      RDW-SD 46.8 fl      MPV 9.7 fL      Platelets 187 10*3/mm3      Neutrophil %  68.9 %      Lymphocyte % 19.0 %      Monocyte % 8.1 %      Eosinophil % 3.2 %      Basophil % 0.4 %      Immature Grans % 0.4 %      Neutrophils, Absolute 5.43 10*3/mm3      Lymphocytes, Absolute 1.50 10*3/mm3      Monocytes, Absolute 0.64 10*3/mm3      Eosinophils, Absolute 0.25 10*3/mm3      Basophils, Absolute 0.03 10*3/mm3      Immature Grans, Absolute 0.03 10*3/mm3      nRBC 0.0 /100 WBC         Imaging Results (all)     Procedure Component Value Units Date/Time    US Carotid Bilateral [[id number]] Collected:  09/05/19 0819     Updated:  09/05/19 1307    Narrative:       Upper duplex carotid ultrasound.       HISTORY:Syncope, alteration of mental status.    COMPARISON: Doppler duplex examination October 21, 2013.       TECHNIQUE: Grayscale, color Doppler and pulsed Doppler  interrogation of the common, internal and external carotid  arteries was performed with special attention to the internal  carotids. Limited evaluation of the vertebral and subclavian  arteries was also performed.    FINDINGS:     On the right, peak systolic internal carotid artery velocity  101.6 image seven. ICA over CCA ratio 1.6. This suggests  narrowing of less than 50%.    On the left, peak systolic internal carotid artery velocity one  6.2 cm/s. ICA/CCA ratio 1.2. This suggests narrowing of less than  50%. The flow velocities within each internal carotid artery are  within normal limits. Each vertebral artery has antegrade flow.  Minimal intimal thickening is noted. No discrete plaque is  identified.      Impression:       CONCLUSION: No hemodynamically significant stenoses are  identified within either internal carotid artery.  .    Electronically signed by:  David Spaulding MD  9/5/2019 1:06 PM CDT  Workstation: [id number]    XR Chest 1 View [[id number]] Collected:  09/04/19 1458     Updated:  09/04/19 1514    Narrative:         PORTABLE CHEST    HISTORY: Syncope    Portable AP upright film of the chest was obtained at 2:35 PM.  COMPARISON:  March 15, 2015    FINDINGS:   EKG leads.  The lungs are clear of an acute process.  Old granulomatous disease is present.  The heart is not enlarged.  The pulmonary vasculature is not increased.  No pleural effusion.  No pneumothorax.  No acute osseous abnormality.  Old right rib fractures.  Old right clavicular fracture.      Impression:       CONCLUSION:  No Acute Disease    [id number]    Electronically signed by:  Soren Temple MD  9/4/2019 3:13 PM CDT  Workstation: [facility]           HOSPITAL COURSE:  58-year-old male persistent smoker after syncope episode.  Initially was thought to be vasovagal.  Secondarily became history of second syncopal episode unrelated to coughing.  Patient was found to be bradycardic and reduced his metoprolol succinate from 50-25.  Patient with bilateral carotid ultrasound that was negative and transthoracic echocardiogram with mild diastolic change.  Patient ambulating well with physical therapy.  Patient with close follow-up with primary care with reduction of beta-blockade.    DISCHARGE CONDITION:   Stable    DISPOSITION:  Home or Self Care      DISCHARGE MEDICATIONS     Discharge Medications      New Medications      Instructions Start Date   nicotine 14 MG/24HR patch  Commonly known as:  NICODERM CQ   1 patch, Transdermal, Every 24 Hours         Continue These Medications      Instructions Start Date   aspirin 81 MG chewable tablet   81 mg, Oral, Daily      buPROPion  MG 12 hr tablet  Commonly known as:  WELLBUTRIN SR   150 mg, Oral, 2 Times Daily      dicyclomine 20 MG tablet  Commonly known as:  BENTYL   20 mg, Oral, Every 6 Hours PRN      hydrochlorothiazide 25 MG tablet  Commonly known as:  HYDRODIURIL   25 mg, Oral, Daily      isosorbide mononitrate 30 MG 24 hr tablet  Commonly known as:  IMDUR   30 mg, Oral, Daily      lisinopril 40 MG tablet  Commonly known as:  PRINIVIL,ZESTRIL   40 mg, Oral, Daily      metoprolol succinate XL 50 MG 24 hr tablet  Commonly known as:   TOPROL-XL   TAKE 1 TABLET BY MOUTH EVERY DAY      ondansetron ODT 4 MG disintegrating tablet  Commonly known as:  ZOFRAN-ODT   4 mg, Oral, Every 6 Hours PRN      pantoprazole 40 MG EC tablet  Commonly known as:  PROTONIX   40 mg, Oral, Daily      pravastatin 20 MG tablet  Commonly known as:  PRAVACHOL   20 mg, Oral, Daily             INSTRUCTIONS:  Activity:   Activity Instructions     Activity as Tolerated           Diet:   Diet Instructions     Diet: Regular      Discharge Diet:  Regular         Special instructions: Patient instructed to call MD or return to ED with worsening shortness of breath, chest pain, fever greater than 100.4 degrees F or any other medical concerns..    FOLLOW UP:   Additional Instructions for the Follow-ups that You Need to Schedule     Call MD With Problems / Concerns   As directed      Instructions: palpitations, dizziness fatigue,    Order Comments:  Instructions: palpitations, dizziness fatigue,          Discharge Follow-up with PCP   As directed       Currently Documented PCP:    Andrew Villatoro III, MD    PCP Phone Number:    [phone]     Follow Up Details:  1 week           Follow-up Information     Andrew Villatoro III, MD .    Specialty:  Family Medicine  Why:  1 week  Contact information:  [address] [place] [address]  [phone]                   PENDING TEST RESULTS AT DISCHARGE      Time: greater than 30 minutes were spent in the planning of this discharge.    Dr. Encinas is the attending at time of discharge, He is aware of the patient's status and agrees with the above discharge summary.        This document has been electronically signed by Andrew Villatoro III, MD on September 5, 2019 5:08 PM

## 2019-09-05 NOTE — PLAN OF CARE
Problem: Patient Care Overview  Goal: Plan of Care Review  Outcome: Ongoing (interventions implemented as appropriate)   09/05/19 1550   Coping/Psychosocial   Plan of Care Reviewed With patient   Plan of Care Review   Progress no change   OTHER   Outcome Summary pt vss. Pt rested well this shift     Goal: Individualization and Mutuality  Outcome: Ongoing (interventions implemented as appropriate)    Goal: Discharge Needs Assessment  Outcome: Ongoing (interventions implemented as appropriate)    Goal: Interprofessional Rounds/Family Conf  Outcome: Ongoing (interventions implemented as appropriate)      Problem: Fall Risk (Adult)  Goal: Identify Related Risk Factors and Signs and Symptoms  Outcome: Ongoing (interventions implemented as appropriate)    Goal: Absence of Fall  Outcome: Ongoing (interventions implemented as appropriate)      Problem: Syncope (Adult)  Goal: Identify Related Risk Factors and Signs and Symptoms  Outcome: Outcome(s) achieved Date Met: 09/05/19    Goal: Physical Safety/Health Maintenance  Outcome: Ongoing (interventions implemented as appropriate)    Goal: Optimal Emotional/Functional [place]  Outcome: Ongoing (interventions implemented as appropriate)

## 2019-09-05 NOTE — PROGRESS NOTES
FAMILY MEDICINE DAILY PROGRESS NOTE  NAME: Nima Portillo  : 1960  MRN: [id number]     LOS: 0 days     PROVIDER OF SERVICE: Andrew Villatoro III, MD    Chief Complaint: Near syncope    Subjective:     Interval History:  History taken from: patient chart RN  No acute overnight events.  Patient comfortably sitting at bedside in multiple positions.  No observed syncopal episodes nor events on cardiac monitoring.  Further history obtained reported multiple syncopal episodes unrelated to coughing while at rest sitting on patio after a phone conversation.    Review of Systems:   Review of Systems   Constitutional: Negative for activity change, appetite change, chills, diaphoresis and fever.   HENT: Negative for congestion, rhinorrhea, sinus pressure, sinus pain and sore throat.    Eyes: Negative for visual disturbance.   Respiratory: Negative for apnea, cough, choking, chest tightness, shortness of breath and wheezing.    Cardiovascular: Negative for chest pain, palpitations and leg swelling.   Gastrointestinal: Negative for abdominal distention, abdominal pain, blood in stool, constipation, diarrhea, nausea and vomiting.   Genitourinary: Negative for difficulty urinating, dysuria, frequency, hematuria and urgency.   Musculoskeletal: Negative for arthralgias, back pain, joint swelling, myalgias and neck pain.   Skin: Negative for color change, pallor, rash and wound.   Neurological: Negative for dizziness, weakness, numbness and headaches.   Psychiatric/Behavioral: Negative for agitation and behavioral problems.       Objective:     Vital Signs  Temp:  [97.7 °F (36.5 °C)-98.4 °F (36.9 °C)] 98.4 °F (36.9 °C)  Heart Rate:  [46-85] 46  Resp:  [18-20] 18  BP: (119-152)/(62-95) 140/85    Physical Exam  Physical Exam   Constitutional: He is oriented to person, place, and time. He appears well-developed and well-nourished. No distress.   HENT:   Head: Normocephalic and atraumatic.   Right Ear: External ear normal.    Left Ear: External ear normal.   Nose: Nose normal.   Eyes: Conjunctivae and EOM are normal. Pupils are equal, round, and reactive to light. Right eye exhibits no discharge. Left eye exhibits no discharge. No scleral icterus.   Neck: Normal range of motion. Neck supple. No thyromegaly present.   Cardiovascular: Normal rate, regular rhythm, normal heart sounds and intact distal pulses. Exam reveals no gallop and no friction rub.   No murmur heard.  Pulmonary/Chest: Effort normal. No respiratory distress. He has wheezes (Mild inspiratory and expiratory wheezes bilaterally mildly worsening on forced expiration.). He has no rales. He exhibits no tenderness.   Abdominal: Soft. Bowel sounds are normal. He exhibits no distension and no mass. There is no tenderness. There is no guarding.   Musculoskeletal: Normal range of motion. He exhibits no edema, tenderness or deformity.   Lymphadenopathy:     He has no cervical adenopathy.   Neurological: He is alert and oriented to person, place, and time. No cranial nerve deficit.   Skin: Skin is warm and dry. Capillary refill takes 2 to 3 seconds. He is not diaphoretic.   Psychiatric: He has a normal mood and affect. His behavior is normal. Judgment and thought content normal.       Medication Review    Current Facility-Administered Medications:   •  aspirin chewable tablet 81 mg, 81 mg, Oral, Daily, Jackson Ibarra MD  •  buPROPion SR (WELLBUTRIN SR) 12 hr tablet 150 mg, 150 mg, Oral, BID, Jackson Ibarra MD, 150 mg at 09/04/19 2120  •  calcium carbonate-vitamin d 600-400 MG-UNIT per tablet 1 tablet, 1 tablet, Oral, Daily, Jackson Ibarra MD, 1 tablet at 09/04/19 2119  •  dicyclomine (BENTYL) tablet 20 mg, 20 mg, Oral, Q6H PRN, Jackson Ibarra MD  •  docusate sodium (COLACE) capsule 100 mg, 100 mg, Oral, BID PRN, Jackson Ibarra MD  •  enoxaparin (LOVENOX) syringe 40 mg, 40 mg, Subcutaneous, Q24H, Jackson Ibarra MD, 40 mg at 09/04/19 2120  •  hydrochlorothiazide  (HYDRODIURIL) tablet 25 mg, 25 mg, Oral, Daily, Jackson Ibarra MD  •  ipratropium-albuterol (DUO-NEB) nebulizer solution 3 mL, 3 mL, Nebulization, 4x Daily - RT, Andrew Villatoro III, MD  •  isosorbide mononitrate (IMDUR) 24 hr tablet 30 mg, 30 mg, Oral, Daily, Jackson Ibarra MD  •  lisinopril (PRINIVIL,ZESTRIL) tablet 40 mg, 40 mg, Oral, Daily, Jackson Ibarra MD  •  metoprolol succinate XL (TOPROL-XL) 24 hr tablet 25 mg, 25 mg, Oral, Daily, Andrew Villatoro III, MD  •  nicotine (NICODERM CQ) 14 MG/24HR patch 1 patch, 1 patch, Transdermal, Q24H, Jackson Ibarra MD  •  ondansetron ODT (ZOFRAN-ODT) disintegrating tablet 4 mg, 4 mg, Oral, Q6H PRN, Jackson Ibarra MD  •  pantoprazole (PROTONIX) EC tablet 40 mg, 40 mg, Oral, Daily, Jackson Ibarra MD  •  pravastatin (PRAVACHOL) tablet 20 mg, 20 mg, Oral, Daily, Jackson Ibarra MD  •  sodium chloride 0.9 % flush 10 mL, 10 mL, Intravenous, PRN, Román Porter MD  •  [COMPLETED] Insert peripheral IV, , , Once **AND** sodium chloride 0.9 % flush 10 mL, 10 mL, Intravenous, PRN, Román Porter MD  •  sodium chloride 0.9 % flush 10 mL, 10 mL, Intravenous, Q12H, Jackson Ibarra MD, 10 mL at 09/04/19 2120  •  sodium chloride 0.9 % flush 10 mL, 10 mL, Intravenous, PRN, Jackson Ibarra MD     Diagnostic Data    Lab Results (last 24 hours)     Procedure Component Value Units Date/Time    Basic Metabolic Panel [[id number]]  (Abnormal) Collected:  09/05/19 0549    Specimen:  Blood Updated:  09/05/19 0644     Glucose 163 mg/dL      BUN 14 mg/dL      Creatinine 0.89 mg/dL      Sodium 142 mmol/L      Potassium 4.1 mmol/L      Chloride 103 mmol/L      CO2 28.0 mmol/L      Calcium 8.7 mg/dL      eGFR Non African Amer 88 mL/min/1.73      BUN/Creatinine Ratio 15.7     Anion Gap 11.0 mmol/L     Narrative:       GFR Normal >60  Chronic Kidney Disease <60  Kidney Failure <15    CBC & Differential [[id number]] Collected:  09/05/19 0549    Specimen:  Blood Updated:   09/05/19 0614    Narrative:       The following orders were created for panel order CBC & Differential.  Procedure                               Abnormality         Status                     ---------                               -----------         ------                     CBC Auto Differential[[id number]]        Normal              Final result                 Please view results for these tests on the individual orders.    CBC Auto Differential [[id number]]  (Normal) Collected:  09/05/19 0549    Specimen:  Blood Updated:  09/05/19 0614     WBC 5.10 10*3/mm3      RBC 4.43 10*6/mm3      Hemoglobin 13.5 g/dL      Hematocrit 40.7 %      MCV 91.9 fL      MCH 30.5 pg      MCHC 33.2 g/dL      RDW 14.0 %      RDW-SD 47.4 fl      MPV 9.7 fL      Platelets 148 10*3/mm3      Neutrophil % 52.4 %      Lymphocyte % 33.1 %      Monocyte % 8.6 %      Eosinophil % 5.1 %      Basophil % 0.6 %      Immature Grans % 0.2 %      Neutrophils, Absolute 2.67 10*3/mm3      Lymphocytes, Absolute 1.69 10*3/mm3      Monocytes, Absolute 0.44 10*3/mm3      Eosinophils, Absolute 0.26 10*3/mm3      Basophils, Absolute 0.03 10*3/mm3      Immature Grans, Absolute 0.01 10*3/mm3      nRBC 0.0 /100 WBC     Troponin [[id number]]  (Normal) Collected:  09/04/19 1821    Specimen:  Blood Updated:  09/04/19 1852     Troponin T <0.010 ng/mL     Narrative:       Troponin T Reference Range:  <= 0.03 ng/mL-   Negative for AMI  >0.03 ng/mL-     Abnormal for myocardial necrosis.  Clinicians would have to utilize clinical acumen, EKG, Troponin and serial changes to determine if it is an Acute Myocardial Infarction or myocardial injury due to an underlying chronic condition.     [place] Draw [[id number]] Collected:  09/04/19 1237    Specimen:  Blood Updated:  09/04/19 1745    Narrative:       The following orders were created for panel order [place] Draw.  Procedure                               Abnormality         Status                     ---------                                -----------         ------                     Light Blue Top[[id number]]                                   Final result               Green Top (Gel)[[id number]]                                  Final result               Lavender Top[[id number]]                                     Final result               Gold Top - SST[[id number]]                                   Final result                 Please view results for these tests on the individual orders.    Green Top (Gel) [[id number]] Collected:  09/04/19 1640    Specimen:  Blood Updated:  09/04/19 1745     Extra Tube Hold for add-ons.     Comment: Auto resulted.       Gold Top - SST [[id number]] Collected:  09/04/19 1640    Specimen:  Blood Updated:  09/04/19 1745     Extra Tube Hold for add-ons.     Comment: Auto resulted.       Comprehensive Metabolic Panel [[id number]]  (Abnormal) Collected:  09/04/19 1640    Specimen:  Blood Updated:  09/04/19 1708     Glucose 85 mg/dL      BUN 11 mg/dL      Creatinine 0.68 mg/dL      Sodium 143 mmol/L      Potassium 3.5 mmol/L      Chloride 105 mmol/L      CO2 27.0 mmol/L      Calcium 8.4 mg/dL      Total Protein 6.7 g/dL      Albumin 4.00 g/dL      ALT (SGPT) 18 U/L      AST (SGOT) 17 U/L      Alkaline Phosphatase 53 U/L      Total Bilirubin 0.3 mg/dL      eGFR Non African Amer 120 mL/min/1.73      Globulin 2.7 gm/dL      A/G Ratio 1.5 g/dL      BUN/Creatinine Ratio 16.2     Anion Gap 11.0 mmol/L     Narrative:       GFR Normal >60  Chronic Kidney Disease <60  Kidney Failure <15    Magnesium [[id number]]  (Normal) Collected:  09/04/19 1640    Specimen:  Blood Updated:  09/04/19 1708     Magnesium 2.2 mg/dL     Troponin [[id number]]  (Normal) Collected:  09/04/19 1640    Specimen:  Blood Updated:  09/04/19 1708     Troponin T <0.010 ng/mL     Narrative:       Troponin T Reference Range:  <= 0.03 ng/mL-   Negative for AMI  >0.03 ng/mL-     Abnormal for myocardial necrosis.  Clinicians would have to utilize clinical  acumen, EKG, Troponin and serial changes to determine if it is an Acute Myocardial Infarction or myocardial injury due to an underlying chronic condition.     Protime-INR [[id number]]  (Normal) Collected:  09/04/19 1640    Specimen:  Blood Updated:  09/04/19 1706     Protime 13.4 Seconds      INR 1.04    Narrative:       Therapeutic range for most indications is 2.0-3.0 INR,  or 2.5-3.5 for mechanical heart valves.    D-dimer, Quantitative [[id number]]  (Normal) Collected:  09/04/19 1640    Specimen:  Blood Updated:  09/04/19 1706     D-Dimer, Quantitative 395 ng/mL (FEU)     Narrative:       Dimer values <500 ng/ml FEU are FDA approved as aid in diagnosis of deep venous thrombosis and pulmonary embolism.  This test should not be used in an exclusion strategy with pretest probability alone.    A recent guideline regarding diagnosis for pulmonary thromboembolism recommends an adjusted exclusion criterion of age x 10 ng/ml FEU for patients >50 years of age ([employer] Med 2015; 163: 701-711).    aPTT [[id number]]  (Normal) Collected:  09/04/19 1640    Specimen:  Blood Updated:  09/04/19 1706     PTT 28.9 seconds     Narrative:       The recommended Heparin therapeutic range is 68-97 seconds.    Lavender Top [[id number]] Collected:  09/04/19 1237    Specimen:  Blood Updated:  09/04/19 1346     Extra Tube hold for add-on     Comment: Auto resulted       Light Blue Top [[id number]] Collected:  09/04/19 1237    Specimen:  Blood Updated:  09/04/19 1346     Extra Tube hold for add-on     Comment: Auto resulted       POC Glucose Once [[id number]]  (Normal) Collected:  09/04/19 1232    Specimen:  Blood Updated:  09/04/19 1333     Glucose 107 mg/dL      Comment: : [id number] ESTIVEN LLOYDANMeter ID: [id number]       CBC & Differential [[id number]] Collected:  09/04/19 1237    Specimen:  Blood Updated:  09/04/19 1242    Narrative:       The following orders were created for panel order CBC & Differential.  Procedure                                Abnormality         Status                     ---------                               -----------         ------                     CBC Auto Differential[[id number]]        Abnormal            Final result                 Please view results for these tests on the individual orders.    CBC Auto Differential [[id number]]  (Abnormal) Collected:  09/04/19 1237    Specimen:  Blood Updated:  09/04/19 1242     WBC 7.88 10*3/mm3      RBC 4.64 10*6/mm3      Hemoglobin 14.4 g/dL      Hematocrit 42.0 %      MCV 90.5 fL      MCH 31.0 pg      MCHC 34.3 g/dL      RDW 14.2 %      RDW-SD 46.8 fl      MPV 9.7 fL      Platelets 187 10*3/mm3      Neutrophil % 68.9 %      Lymphocyte % 19.0 %      Monocyte % 8.1 %      Eosinophil % 3.2 %      Basophil % 0.4 %      Immature Grans % 0.4 %      Neutrophils, Absolute 5.43 10*3/mm3      Lymphocytes, Absolute 1.50 10*3/mm3      Monocytes, Absolute 0.64 10*3/mm3      Eosinophils, Absolute 0.25 10*3/mm3      Basophils, Absolute 0.03 10*3/mm3      Immature Grans, Absolute 0.03 10*3/mm3      nRBC 0.0 /100 WBC             I reviewed the patient's new clinical results.    Assessment/Plan:     Active Hospital Problems    Diagnosis POA   • **Near syncope [R55] Yes     Orthostatics negative.  Further history obtained reported to syncopal episodes back to back with eyes rolling back unrelated to coughing.  Patient with concurrent COPD and currently with mild wheeze on exam.  With this history we will go forward with TTE and vascular evaluation.  Also confounding component of bradycardia patient with potentially too much beta-blockade and inability to compensate for blood pressure fluctuations  -TTE  -Carotid ultrasound  - Decrease metoprolol succinate from 50 mg to 20 mg p.o. daily.     • Mucopurulent chronic bronchitis (CMS/HCC) [J41.1] Unknown     Patient not currently in exacerbation.  Patient with soft wheeze on exam.  Will add nebulizer treatments to help optimize pulmonary  status without current hypoxic respiratory failure.  -Ipratropium-albuterol 3 mL inhalation every 6 hours round-the-clock     • Nicotine abuse [Z72.0] Yes     -Stable.  -Counseling given to the patient.  - Nicotine patch.     • Pure hypercholesterolemia [E78.00] Yes     -Stable.  -Continue home medication regimen.     • Coronary artery disease involving native coronary artery of native heart without angina pectoris [I25.10] Yes     Resuming home medications, with reduction of long-acting beta blockade.  -Aspirin 81 mg p.o. daily  - Statin 20 mg p.o. daily  - Metoprolol succinate 25 mg p.o. daily  -Isosorbide mononitrate 30 mg p.o. daily     • Essential hypertension [I10] Yes     Will continue home regimen.  - Hydrochlorothiazide 25 mg p.o. daily  -Isosorbide mononitrate 30 mg p.o. daily  -Lisinopril 40 mg p.o. daily  -Metoprolol succinate 25 mg p.o. daily           DVT prophylaxis: Enoxaparin  Code Status and Medical Interventions:   Ordered at: 09/04/19 180     Level Of Support Discussed With:    Patient     Code Status:    CPR     Medical Interventions (Level of Support Prior to Arrest):    Full       Plan for disposition:Anticipated discharge in 24 to 72 hours.        This document has been electronically signed by Andrew Villatoro III, MD on September 5, 2019 9:04 AM

## 2019-09-13 ENCOUNTER — APPOINTMENT (OUTPATIENT)
Dept: LAB | Facility: HOSPITAL | Age: 59
End: 2019-09-13

## 2019-09-13 ENCOUNTER — OFFICE VISIT (OUTPATIENT)
Dept: FAMILY MEDICINE CLINIC | Facility: CLINIC | Age: 59
End: 2019-09-13

## 2019-09-13 VITALS
WEIGHT: 191.19 LBS | HEART RATE: 74 BPM | HEIGHT: 65 IN | BODY MASS INDEX: 31.85 KG/M2 | OXYGEN SATURATION: 100 % | SYSTOLIC BLOOD PRESSURE: 110 MMHG | DIASTOLIC BLOOD PRESSURE: 78 MMHG | TEMPERATURE: 97.8 F

## 2019-09-13 DIAGNOSIS — I25.118 CORONARY ARTERY DISEASE OF NATIVE ARTERY OF NATIVE HEART WITH STABLE ANGINA PECTORIS (HCC): ICD-10-CM

## 2019-09-13 DIAGNOSIS — E78.00 PURE HYPERCHOLESTEROLEMIA: ICD-10-CM

## 2019-09-13 DIAGNOSIS — I95.9 SYMPTOMATIC HYPOTENSION: Primary | ICD-10-CM

## 2019-09-13 DIAGNOSIS — R00.0 CHRONIC TACHYCARDIA: ICD-10-CM

## 2019-09-13 LAB
CHOLEST SERPL-MCNC: 173 MG/DL (ref 0–200)
HDLC SERPL-MCNC: 49 MG/DL (ref 40–60)
LDLC SERPL CALC-MCNC: 84 MG/DL (ref 0–100)
LDLC/HDLC SERPL: 1.71 {RATIO}
TRIGL SERPL-MCNC: 200 MG/DL (ref 0–150)
VLDLC SERPL-MCNC: 40 MG/DL (ref 5–40)

## 2019-09-13 PROCEDURE — 99213 OFFICE O/P EST LOW 20 MIN: CPT | Performed by: STUDENT IN AN ORGANIZED HEALTH CARE EDUCATION/TRAINING PROGRAM

## 2019-09-13 PROCEDURE — 36415 COLL VENOUS BLD VENIPUNCTURE: CPT | Performed by: STUDENT IN AN ORGANIZED HEALTH CARE EDUCATION/TRAINING PROGRAM

## 2019-09-13 PROCEDURE — 80061 LIPID PANEL: CPT | Performed by: STUDENT IN AN ORGANIZED HEALTH CARE EDUCATION/TRAINING PROGRAM

## 2019-09-13 RX ORDER — MELOXICAM 7.5 MG/1
7.5 TABLET ORAL DAILY
Refills: 1 | COMMUNITY
Start: 2019-09-09 | End: 2019-10-14 | Stop reason: SDUPTHER

## 2019-09-13 RX ORDER — LISINOPRIL 20 MG/1
40 TABLET ORAL DAILY
Qty: 30 TABLET | Refills: 4 | Status: SHIPPED | OUTPATIENT
Start: 2019-09-13 | End: 2019-11-04

## 2019-09-13 RX ORDER — METOPROLOL SUCCINATE 25 MG/1
25 TABLET, EXTENDED RELEASE ORAL DAILY
Qty: 30 TABLET | Refills: 5 | Status: SHIPPED | OUTPATIENT
Start: 2019-09-13 | End: 2020-02-28 | Stop reason: SDUPTHER

## 2019-09-14 NOTE — PROGRESS NOTES
Subjective   Nima Portillo is a 58 y.o. male who presents for follow up for near syncopal hospital admission    Syncope: Patient complains of near syncope. Onset was 7 days ago, with improving course since that time. Patient describes the episode as never actually lost consciousness, had precursor symptoms only, including diaphoresis, feeling of almost losing consciousness, nausea. Patient also has associated symptoms of  none. The patient denies abdominal pain, diarrhea, excessive thirst, focal neurologic symptoms of  , general feeling of lightheadedness, headache, heavy menstrual bleeding, history of CAD, melena, nausea, tachycardia/palpitations and visual aura. Taking culprit meds?: beta blockers, hypotensive medications.  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  Patient will need higher blood pressure goals related to elevation changes.        The following portions of the patient's history were reviewed and updated as appropriate: allergies, current medications, past family history, past medical history, past social history, past surgical history and problem list.    Preventative:  Over the past 2 weeks, have you felt down, depressed, or hopeless?No   Over the past 2 weeks, have you felt little interest or pleasure in doing things?No  Clinical depression screening refused by patient.No     On osteoporosis therapy?Not Indicated     Past Medical Hx:  Past Medical History:   Diagnosis Date   • Acute bronchitis    • Acute sinusitis    • Chest pain    • Chronic bronchitis (CMS/HCC)     secondary to smoking   • Claudication (CMS/HCC)    • Coronary arteriosclerosis    • Cough    • Gastroesophageal reflux disease    • Health maintenance examination     Individual health examination   • History of echocardiogram 10/21/2013    Echocardiogram W/ color flow 91992 (1) - Left atrium normal. Mild CLVH. EF 50%. Valves intact. Mild mitral and tricuspid regurg. Pericardium normal.   • History of echocardiogram  10/25/2011    Echocardiogram W/O color flow 20838 (1) - Normal LV sytolic function with mild LVH & mild diastolic dysfunction   • Hyperlipidemia    • Hypertensive disorder    • Pernicious anemia    • Tobacco dependence syndrome        Past Surgical Hx:  Past Surgical History:   Procedure Laterality Date   • CARDIAC CATHETERIZATION  04/08/2015    Cardiac cath 22127 (1) - Slective coronary angiogram. Left heart catheterization with LV ventriculogram.   • DENTAL PROCEDURE      Removal of all teeth       Health Maintenance:  Health Maintenance   Topic Date Due   • COLONOSCOPY  07/12/2017   • LIPID PANEL  07/10/2019   • INFLUENZA VACCINE  08/01/2019   • ZOSTER VACCINE (1 of 2) 04/16/2020 (Originally 10/29/2010)   • ANNUAL PHYSICAL  04/17/2020   • LUNG CANCER SCREENING  07/03/2020   • TDAP/TD VACCINES (2 - Td) 05/16/2029   • PNEUMOCOCCAL VACCINE (19-64 MEDIUM RISK)  Completed   • HEPATITIS C SCREENING  Completed       Current Meds:    Current Outpatient Medications:   •  aspirin 81 MG chewable tablet, Chew 81 mg daily., Disp: , Rfl:   •  buPROPion SR (WELLBUTRIN SR) 150 MG 12 hr tablet, Take 1 tablet by mouth 2 (Two) Times a Day., Disp: 60 tablet, Rfl: 4  •  dicyclomine (BENTYL) 20 MG tablet, Take 1 tablet by mouth Every 6 (Six) Hours As Needed (abdominal pain)., Disp: 30 tablet, Rfl: 0  •  hydrochlorothiazide (HYDRODIURIL) 25 MG tablet, Take 1 tablet by mouth Daily., Disp: 90 tablet, Rfl: 3  •  isosorbide mononitrate (IMDUR) 30 MG 24 hr tablet, TAKE 1 TABLET BY MOUTH DAILY., Disp: 30 tablet, Rfl: 6  •  lisinopril (PRINIVIL,ZESTRIL) 20 MG tablet, Take 2 tablets by mouth Daily., Disp: 30 tablet, Rfl: 4  •  meloxicam (MOBIC) 7.5 MG tablet, Take 7.5 mg by mouth Daily., Disp: , Rfl: 1  •  metoprolol succinate XL (TOPROL-XL) 25 MG 24 hr tablet, Take 1 tablet by mouth Daily., Disp: 30 tablet, Rfl: 5  •  nicotine (NICODERM CQ) 14 MG/24HR patch, Place 1 patch on the skin as directed by provider Daily., Disp: 21 each, Rfl: 4  •   ondansetron ODT (ZOFRAN-ODT) 4 MG disintegrating tablet, Take 1 tablet by mouth Every 6 (Six) Hours As Needed for Nausea or Vomiting., Disp: 10 tablet, Rfl: 0  •  pantoprazole (PROTONIX) 40 MG EC tablet, Take 1 tablet by mouth Daily., Disp: 30 tablet, Rfl: 0  •  pravastatin (PRAVACHOL) 20 MG tablet, Take 20 mg by mouth Daily., Disp: , Rfl: 6    Allergies:  Patient has no known allergies.    Family Hx:  Family History   Problem Relation Age of Onset   • Heart disease Mother    • Hypertension Mother    • Diabetes Mother    • Heart disease Father    • Hypertension Father    • Diabetes Father    • Diabetes Sister    • Heart disease Sister    • Stroke Sister    • Cancer Paternal Aunt         Breast Cancer   • Cancer Paternal Uncle         Unknown cancer        Social History:  Social History     Socioeconomic History   • Marital status:      Spouse name: Not on file   • Number of children: Not on file   • Years of education: Not on file   • Highest education level: Not on file   Tobacco Use   • Smoking status: Current Every Day Smoker     Packs/day: 1.00     Years: 40.00     Pack years: 40.00     Types: Cigarettes   • Smokeless tobacco: Never Used   Substance and Sexual Activity   • Alcohol use: Yes     Alcohol/week: 4.8 oz     Types: 8 Cans of beer per week   • Drug use: No   • Sexual activity: No     Comment: Marital status:        Review of Systems  Review of Systems   Constitutional: Negative for activity change, appetite change, chills, diaphoresis and fever.   HENT: Negative for congestion, rhinorrhea, sinus pressure, sinus pain and sore throat.    Eyes: Negative for visual disturbance.   Respiratory: Negative for apnea, cough, choking, chest tightness, shortness of breath and wheezing.    Cardiovascular: Negative for chest pain, palpitations and leg swelling.   Gastrointestinal: Negative for abdominal distention, abdominal pain, blood in stool, constipation, diarrhea, nausea and vomiting.  "  Genitourinary: Negative for difficulty urinating, dysuria, frequency, hematuria and urgency.   Musculoskeletal: Negative for arthralgias, back pain, joint swelling, myalgias and neck pain.   Skin: Negative for color change, pallor, rash and wound.   Neurological: Positive for dizziness and light-headedness. Negative for weakness, numbness and headaches.   Psychiatric/Behavioral: Negative for agitation and behavioral problems.            Objective:     /78   Pulse 74   Temp 97.8 °F (36.6 °C) (Tympanic)   Ht 165.1 cm (65\")   Wt 86.7 kg (191 lb 3 oz)   SpO2 100%   BMI 31.82 kg/m²       Physical Exam   Constitutional: He is oriented to person, place, and time. He appears well-developed and well-nourished. No distress.   HENT:   Head: Normocephalic and atraumatic.   Right Ear: External ear normal.   Left Ear: External ear normal.   Nose: Nose normal.   Eyes: Conjunctivae and EOM are normal. Pupils are equal, round, and reactive to light. Right eye exhibits no discharge. Left eye exhibits no discharge. No scleral icterus.   Neck: Normal range of motion. Neck supple. No thyromegaly present.   Cardiovascular: Normal rate, regular rhythm, normal heart sounds and intact distal pulses. Exam reveals no gallop and no friction rub.   No murmur heard.  Pulmonary/Chest: Effort normal and breath sounds normal. No respiratory distress. He has no wheezes. He has no rales. He exhibits no tenderness.   Abdominal: Soft. Bowel sounds are normal. He exhibits no distension and no mass. There is no tenderness. There is no guarding.   Musculoskeletal: Normal range of motion. He exhibits no edema, tenderness or deformity.   Lymphadenopathy:     He has no cervical adenopathy.   Neurological: He is alert and oriented to person, place, and time. No cranial nerve deficit.   Skin: Skin is warm and dry. Capillary refill takes 2 to 3 seconds. He is not diaphoretic.   Psychiatric: He has a normal mood and affect. His behavior is normal. " Judgment and thought content normal.          Assessment/Plan:     Nima was seen today for loss of consciousness.    Diagnoses and all orders for this visit:    Symptomatic hypotension   Patient continuing reduction of his beta-blockade to aid with tachycardic response to hypotension.  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  Will half his lisinopril dose at this time and watch for blood pressure and symptom changes.  -     lisinopril (PRINIVIL,ZESTRIL) 20 MG tablet; Take 2 tablets by mouth Daily.    Coronary artery disease of native artery of native heart with stable angina pectoris (CMS/HCC)   Beta-blocker, ACE inhibitor, and reclassification of lipid panel, continuing therapy with dose reduction for primary problem today.  -     metoprolol succinate XL (TOPROL-XL) 25 MG 24 hr tablet; Take 1 tablet by mouth Daily.  -     lisinopril (PRINIVIL,ZESTRIL) 20 MG tablet; Take 2 tablets by mouth Daily.  -     Lipid panel    Chronic tachycardia   Reduction of beta-blockade secondary to rule out for permissive tachycardia related to hypotensive changes based on altitude.  -     metoprolol succinate XL (TOPROL-XL) 25 MG 24 hr tablet; Take 1 tablet by mouth Daily.    Pure hypercholesterolemia   Reclassification of lipid panel with patient previously on pravastatin 20.  -     Lipid panel      Follow-up:     Return in about 1 month (around 10/13/2019) for Recheck HOTN and carpal tunnel.    Goals     • Smoking cessation (pt-stated)      Quitting smoking and living longer            Preventative:  Male Preventative: Exercises regularly  Vaccines:   Tetanus vaccine: up to date  Annual influenza vaccine: up to date   Pneumococcal vaccine: up to date  Hep B vaccine: up to date   Zoster vaccine:up to date    Smoking cessation counseling was provided.  does not drink  eat more fruits and vegetables, decrease soda or juice intake, increase water intake, increase physical  activity, reduce screen time, reduce portion size, cut out extra servings, reduce fast food intake, family to eat at dinner table more often, keep TV off during meals, plan meals, eat breakfast and have 3 meals a day  Patient's Body mass index is 31.82 kg/m². BMI is above normal parameters. Recommendations include: nutrition counseling.      RISK SCORE: 3        This document has been electronically signed by Andrew Villatoro III, MD on September 13, 2019 9:42 PM

## 2019-09-19 NOTE — PROGRESS NOTES
I have seen the patient.  I have reviewed the notes, assessments, and/or procedures performed by *Andrew Villatoro III, MD  * during office visit I concur with her/his documentation and assessment and plan for Nima Portillo.              This document has been electronically signed by Sudhir Zamorano MD on September 19, 2019 11:20 AM

## 2019-10-14 ENCOUNTER — OFFICE VISIT (OUTPATIENT)
Dept: FAMILY MEDICINE CLINIC | Facility: CLINIC | Age: 59
End: 2019-10-14

## 2019-10-14 VITALS
HEIGHT: 65 IN | OXYGEN SATURATION: 96 % | WEIGHT: 194.6 LBS | BODY MASS INDEX: 32.42 KG/M2 | RESPIRATION RATE: 20 BRPM | SYSTOLIC BLOOD PRESSURE: 134 MMHG | HEART RATE: 82 BPM | TEMPERATURE: 97.8 F | DIASTOLIC BLOOD PRESSURE: 86 MMHG

## 2019-10-14 DIAGNOSIS — Z23 INFLUENZA VACCINATION ADMINISTERED AT CURRENT VISIT: ICD-10-CM

## 2019-10-14 DIAGNOSIS — I25.119 CORONARY ARTERY DISEASE INVOLVING NATIVE CORONARY ARTERY OF NATIVE HEART WITH ANGINA PECTORIS (HCC): Primary | ICD-10-CM

## 2019-10-14 DIAGNOSIS — Z12.11 ENCOUNTER FOR SCREENING COLONOSCOPY: ICD-10-CM

## 2019-10-14 DIAGNOSIS — I10 ESSENTIAL HYPERTENSION: ICD-10-CM

## 2019-10-14 DIAGNOSIS — M79.645 THUMB PAIN, LEFT: ICD-10-CM

## 2019-10-14 DIAGNOSIS — G89.29 CHRONIC PAIN OF RIGHT THUMB: ICD-10-CM

## 2019-10-14 DIAGNOSIS — Z72.0 TOBACCO ABUSE: ICD-10-CM

## 2019-10-14 DIAGNOSIS — M79.644 CHRONIC PAIN OF RIGHT THUMB: ICD-10-CM

## 2019-10-14 PROCEDURE — 90471 IMMUNIZATION ADMIN: CPT | Performed by: STUDENT IN AN ORGANIZED HEALTH CARE EDUCATION/TRAINING PROGRAM

## 2019-10-14 PROCEDURE — 99213 OFFICE O/P EST LOW 20 MIN: CPT | Performed by: STUDENT IN AN ORGANIZED HEALTH CARE EDUCATION/TRAINING PROGRAM

## 2019-10-14 PROCEDURE — 90674 CCIIV4 VAC NO PRSV 0.5 ML IM: CPT | Performed by: STUDENT IN AN ORGANIZED HEALTH CARE EDUCATION/TRAINING PROGRAM

## 2019-10-14 RX ORDER — LISINOPRIL 40 MG/1
40 TABLET ORAL DAILY
Refills: 6 | COMMUNITY
Start: 2019-10-01 | End: 2020-01-27 | Stop reason: SDUPTHER

## 2019-10-14 RX ORDER — MELOXICAM 7.5 MG/1
7.5 TABLET ORAL DAILY
Qty: 30 TABLET | Refills: 2 | Status: SHIPPED | OUTPATIENT
Start: 2019-10-14 | End: 2019-11-04 | Stop reason: SDUPTHER

## 2019-10-14 RX ORDER — NITROGLYCERIN 0.4 MG/1
0.4 TABLET SUBLINGUAL
Qty: 30 TABLET | Refills: 12 | Status: SHIPPED | OUTPATIENT
Start: 2019-10-14

## 2019-10-14 NOTE — PROGRESS NOTES
Subjective   Nima Portillo is a 58 y.o. male who presents for initial evaluation for bilateral thumb pain, CAD, nicotine cessation.    Bilateral thumb pain: Patient with 3 weeks with pain 6/10 with radiation up forearms, hurts at rest not on exertion, pt has been dropping soup and beans. Pt in wrist splints for carpal tunnel without issues. Pt wishes to continue on NSAID until pain is figured out. BMP reviewed.    CAD: Pt with chronic angina precordial without radiation 2-3/10 for minutes. Pt without nitro and told to take with inception of pain and see if it changes it.     Cigarettes nicotine dependence: Pt rolls own tobacco and resistant to smoking cessation and has not been using patches.  Patient does not see financial benefit due to really is on tobacco nor is he even incontinent pre-contemplative stage of change for smoking cessation.    Essential hypertension: Patient requesting refill of lisinopril, blood pressure currently at goal.  Patient not taking blood pressure at home.  Patient denying symptoms of dizziness which were previously an issue.    The following portions of the patient's history were reviewed and updated as appropriate: allergies, current medications, past family history, past medical history, past social history, past surgical history and problem list.    Preventative:  Over the past 2 weeks, have you felt down, depressed, or hopeless?No   Over the past 2 weeks, have you felt little interest or pleasure in doing things?No  Clinical depression screening refused by patient.No     On osteoporosis therapy?Not Indicated     Past Medical Hx:  Past Medical History:   Diagnosis Date   • Acute bronchitis    • Acute sinusitis    • Chest pain    • Chronic bronchitis (CMS/HCC)     secondary to smoking   • Claudication (CMS/HCC)    • Coronary arteriosclerosis    • Cough    • Gastroesophageal reflux disease    • Health maintenance examination     Individual health examination   • History of  echocardiogram 10/21/2013    Echocardiogram W/ color flow 15380 (1) - Left atrium normal. Mild CLVH. EF 50%. Valves intact. Mild mitral and tricuspid regurg. Pericardium normal.   • History of echocardiogram 10/25/2011    Echocardiogram W/O color flow 32474 (1) - Normal LV sytolic function with mild LVH & mild diastolic dysfunction   • Hyperlipidemia    • Hypertensive disorder    • Pernicious anemia    • Thumb pain, left 10/14/2019   • Tobacco dependence syndrome        Past Surgical Hx:  Past Surgical History:   Procedure Laterality Date   • CARDIAC CATHETERIZATION  04/08/2015    Cardiac cath 86464 (1) - Slective coronary angiogram. Left heart catheterization with LV ventriculogram.   • DENTAL PROCEDURE      Removal of all teeth       Health Maintenance:  Health Maintenance   Topic Date Due   • COLONOSCOPY  11/15/2019 (Originally 7/12/2017)   • ZOSTER VACCINE (1 of 2) 04/16/2020 (Originally 10/29/2010)   • ANNUAL PHYSICAL  04/17/2020   • LUNG CANCER SCREENING  07/03/2020   • LIPID PANEL  09/13/2020   • TDAP/TD VACCINES (2 - Td) 05/16/2029   • PNEUMOCOCCAL VACCINE (19-64 MEDIUM RISK)  Completed   • HEPATITIS C SCREENING  Completed   • INFLUENZA VACCINE  Completed       Current Meds:    Current Outpatient Medications:   •  aspirin 81 MG chewable tablet, Chew 81 mg daily., Disp: , Rfl:   •  buPROPion SR (WELLBUTRIN SR) 150 MG 12 hr tablet, Take 1 tablet by mouth 2 (Two) Times a Day., Disp: 60 tablet, Rfl: 4  •  dicyclomine (BENTYL) 20 MG tablet, Take 1 tablet by mouth Every 6 (Six) Hours As Needed (abdominal pain)., Disp: 30 tablet, Rfl: 0  •  hydrochlorothiazide (HYDRODIURIL) 25 MG tablet, Take 1 tablet by mouth Daily., Disp: 90 tablet, Rfl: 3  •  isosorbide mononitrate (IMDUR) 30 MG 24 hr tablet, TAKE 1 TABLET BY MOUTH DAILY., Disp: 30 tablet, Rfl: 6  •  lisinopril (PRINIVIL,ZESTRIL) 20 MG tablet, Take 2 tablets by mouth Daily., Disp: 30 tablet, Rfl: 4  •  meloxicam (MOBIC) 7.5 MG tablet, Take 1 tablet by mouth  Daily., Disp: 30 tablet, Rfl: 2  •  metoprolol succinate XL (TOPROL-XL) 25 MG 24 hr tablet, Take 1 tablet by mouth Daily., Disp: 30 tablet, Rfl: 5  •  pantoprazole (PROTONIX) 40 MG EC tablet, Take 1 tablet by mouth Daily., Disp: 30 tablet, Rfl: 0  •  lisinopril (PRINIVIL,ZESTRIL) 40 MG tablet, Take 40 mg by mouth Daily., Disp: , Rfl: 6  •  nitroglycerin (NITROSTAT) 0.4 MG SL tablet, Place 1 tablet under the tongue Every 5 (Five) Minutes As Needed for Chest Pain. Take no more than 3 doses in 15 minutes., Disp: 30 tablet, Rfl: 12  •  pravastatin (PRAVACHOL) 20 MG tablet, Take 20 mg by mouth Daily., Disp: , Rfl: 6    Allergies:  Patient has no known allergies.    Family Hx:  Family History   Problem Relation Age of Onset   • Heart disease Mother    • Hypertension Mother    • Diabetes Mother    • Heart disease Father    • Hypertension Father    • Diabetes Father    • Diabetes Sister    • Heart disease Sister    • Stroke Sister    • Cancer Paternal Aunt         Breast Cancer   • Cancer Paternal Uncle         Unknown cancer        Social History:  Social History     Socioeconomic History   • Marital status:      Spouse name: Not on file   • Number of children: Not on file   • Years of education: Not on file   • Highest education level: Not on file   Tobacco Use   • Smoking status: Current Every Day Smoker     Packs/day: 1.00     Years: 40.00     Pack years: 40.00     Types: Cigarettes   • Smokeless tobacco: Never Used   Substance and Sexual Activity   • Alcohol use: Yes     Alcohol/week: 4.8 oz     Types: 8 Cans of beer per week   • Drug use: No   • Sexual activity: No     Comment: Marital status:        Review of Systems  Review of Systems   Constitutional: Negative for activity change, appetite change, chills, diaphoresis and fever.   HENT: Negative for congestion, rhinorrhea, sinus pressure, sinus pain and sore throat.    Eyes: Negative for visual disturbance.   Respiratory: Negative for apnea, cough,  "choking, chest tightness, shortness of breath and wheezing.    Cardiovascular: Negative for chest pain, palpitations and leg swelling.   Gastrointestinal: Negative for abdominal distention, abdominal pain, blood in stool, constipation, diarrhea, nausea and vomiting.   Genitourinary: Negative for difficulty urinating, dysuria, frequency, hematuria and urgency.   Musculoskeletal: Positive for arthralgias and myalgias. Negative for back pain, joint swelling and neck pain.   Skin: Negative for color change, pallor, rash and wound.   Neurological: Negative for dizziness, weakness, numbness and headaches.   Psychiatric/Behavioral: Negative for agitation and behavioral problems.            Objective:     /86 (BP Location: Right arm, Patient Position: Sitting, Cuff Size: Adult)   Pulse 82   Temp 97.8 °F (36.6 °C) (Temporal)   Resp 20   Ht 165.1 cm (65\")   Wt 88.3 kg (194 lb 9.6 oz)   SpO2 96%   BMI 32.38 kg/m²        Physical Exam   Constitutional: He is oriented to person, place, and time. He appears well-developed and well-nourished. No distress.   HENT:   Head: Normocephalic and atraumatic.   Right Ear: External ear normal.   Left Ear: External ear normal.   Nose: Nose normal.   Eyes: Conjunctivae and EOM are normal. Pupils are equal, round, and reactive to light. Right eye exhibits no discharge. Left eye exhibits no discharge. No scleral icterus.   Neck: Normal range of motion. Neck supple. No thyromegaly present.   Cardiovascular: Normal rate, regular rhythm, normal heart sounds and intact distal pulses. Exam reveals no gallop and no friction rub.   No murmur heard.  Pulmonary/Chest: Effort normal and breath sounds normal. No respiratory distress. He has no wheezes. He has no rales. He exhibits no tenderness.   Abdominal: Soft. Bowel sounds are normal. He exhibits no distension and no mass. There is no tenderness. There is no guarding.   Musculoskeletal: Normal range of motion. He exhibits tenderness (Over " MCP joint bilaterally). He exhibits no edema or deformity.   Lymphadenopathy:     He has no cervical adenopathy.   Neurological: He is alert and oriented to person, place, and time. He displays normal reflexes. No cranial nerve deficit or sensory deficit. He exhibits normal muscle tone.   Skin: Skin is warm and dry. Capillary refill takes 2 to 3 seconds. He is not diaphoretic.   Psychiatric: He has a normal mood and affect. His behavior is normal. Judgment and thought content normal.          Assessment/Plan:     Nima was seen today for carpal tunnel.    Diagnoses and all orders for this visit:    Coronary artery disease involving native coronary artery of native heart with angina pectoris (CMS/[facility])   Patient with history of stentsAnd without sublingual nitroglycerin with chronic angina intermittently. Patient counseled to utilize nitroglycerin per minute chest discomfort actually is anginal in nature.  -     nitroglycerin (NITROSTAT) 0.4 MG SL tablet; Place 1 tablet under the tongue Every 5 (Five) Minutes As Needed for Chest Pain. Take no more than 3 doses in 15 minutes.    Tobacco abuse   Listed as important modifiable risk factor for coronary disease.  Patient not even in pre-contemplative state for smoking cessation.  Patient rolls on tobacco and has no financial incentive and also has no desire for smoking cessation at this time.    Influenza vaccination administered at current visit  -     Flucelvax Quad=>4Years ([id number])    Thumb pain, left   Continuing NSAIDs and will rule out bony abnormality.  Patient with likely musculotendinous strain related thumb spica splints for carpal tunnel bilaterally.  -     meloxicam (MOBIC) 7.5 MG tablet; Take 1 tablet by mouth Daily.  -     XR Hand 3+ View Left    Chronic pain of right thumb   Continuing NSAIDs rule out bony abnormality on the other side as well.  Possible component of carpal tunnel splints   -     meloxicam (MOBIC) 7.5 MG tablet; Take 1 tablet by mouth  Daily.  -     XR Hand 3+ View Right    Encounter for screening colonoscopy   Patient with sensation of polyethylene glycol prep after being n.p.o. after midnight, were still prepped left with an incomplete prep we will attempt full colonoscopy again with referral back to provider whom be did enjoy.  -     Ambulatory Referral For Screening Colonoscopy    Essential hypertension   Patient with out dizziness requesting refill of ACE inhibitor which is important for his hypertension and coronary artery disease.Blood pressure is at goal.      Follow-up:     Return in about 1 month (around 11/14/2019) for Recheck hand pain and CAD.    Goals     • Smoking cessation (pt-stated)      Quitting smoking and living longer            Preventative:  Male Preventative: Exercises regularly  Vaccines:   Tetanus vaccine: up to date  Annual influenza vaccine: not up to date - Today   Pneumococcal vaccine: up to date  Hep B vaccine: up to date   Zoster vaccine:up to date    Smoking cessation counseling was provided.  does not drink  eat more fruits and vegetables, decrease soda or juice intake, increase water intake, increase physical activity, reduce screen time, reduce portion size, cut out extra servings, reduce fast food intake, family to eat at dinner table more often, keep TV off during meals, plan meals, eat breakfast and have 3 meals a day  Patient's Body mass index is 32.38 kg/m². BMI is above normal parameters. Recommendations include: exercise counseling and nutrition counseling.      RISK SCORE: 3        This document has been electronically signed by Andrew Villatoro III, MD on October 16, 2019 1:04 PM

## 2019-10-16 ENCOUNTER — OFFICE VISIT (OUTPATIENT)
Dept: CARDIOLOGY | Facility: CLINIC | Age: 59
End: 2019-10-16

## 2019-10-16 VITALS
OXYGEN SATURATION: 98 % | BODY MASS INDEX: 32.32 KG/M2 | DIASTOLIC BLOOD PRESSURE: 68 MMHG | SYSTOLIC BLOOD PRESSURE: 128 MMHG | HEIGHT: 65 IN | WEIGHT: 194 LBS | HEART RATE: 76 BPM

## 2019-10-16 DIAGNOSIS — R55 SYNCOPE AND COLLAPSE: Primary | ICD-10-CM

## 2019-10-16 PROCEDURE — 99214 OFFICE O/P EST MOD 30 MIN: CPT | Performed by: INTERNAL MEDICINE

## 2019-10-16 NOTE — PROGRESS NOTES
[facility] Cardiology  OFFICE NOTE    Cardiovascular Medicine  Barbara Ardon M.D., RPVI         No referring provider defined for this encounter.    Thank you for asking me to see Nima Portillo for CAD, syncope.    History of Present Illness  This is a 58 y.o. male with:  1. Coronary artery disease involving native coronary artery of native heart without angina pectoris    2. Essential hypertension    3. Pure hypercholesterolemia    4. Nicotine abuse    5. ETOH abuse          Chief complaint -syncope        History of present Illness- 58-year-old gentleman who smokes about a pack and a half a day and drinks about 6 beers a day/week, he has history of cardiac catheterization 2015 by Dr. Sandhu and which showed small vessel disease in the obtuse marginal one side branch and in the PDA off the right coronary artery which is being managed medically.  He still smokes pretty heavy and drinks regularly.  He denies any chest pain and is on tolerable medical therapy.  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  His sugar is elevated I talked to him is borderline diabetic need to lose weight and he has gained about 12 pounds from the last time I saw him.   Recently admitted to the hospital for syncopal episode.  Patient reported he had a bout of coughing which is followed by loss of consciousness.  Patient is admitted to the hospital for acute coronary syndrome he was noted to be bradycardic on arrival subsequently his metoprolol was cut back.  Was also also orthostatic.  He had carotid ultrasound done which were without any significant disease.  No recurrence of syncopal episodes since then.  Denies any chest pain or exertion.           Review of Systems - ROS  Constitution: Negative for weakness, weight gain and weight loss.   HENT: Negative for congestion.    Eyes: Negative for blurred vision.   Cardiovascular: As mentioned above  Respiratory: Negative for  cough and hemoptysis.    Endocrine: Negative for polydipsia and polyuria.   Hematologic/Lymphatic: Negative for bleeding problem. Does not bruise/bleed easily.   Skin: Negative for flushing.   Musculoskeletal: Negative for neck pain and stiffness.   Gastrointestinal: Negative for abdominal pain, diarrhea, jaundice, melena, nausea and vomiting.   Genitourinary: Negative for dysuria and hematuria.   Neurological: Negative for dizziness, focal weakness and numbness.   Psychiatric/Behavioral: Negative for altered mental status and depression.          All other systems were reviewed and were negative.    family history includes Cancer in his paternal aunt and paternal uncle; Diabetes in his father, mother, and sister; Heart disease in his father, mother, and sister; Hypertension in his father and mother; Stroke in his sister.     reports that he has been smoking cigarettes.  He has a 40.00 pack-year smoking history. He has never used smokeless tobacco. He reports that he drinks about 4.8 oz of alcohol per week. He reports that he does not use drugs.    No Known Allergies      Current Outpatient Medications:   •  aspirin 81 MG chewable tablet, Chew 81 mg daily., Disp: , Rfl:   •  buPROPion SR (WELLBUTRIN SR) 150 MG 12 hr tablet, Take 1 tablet by mouth 2 (Two) Times a Day., Disp: 60 tablet, Rfl: 4  •  dicyclomine (BENTYL) 20 MG tablet, Take 1 tablet by mouth Every 6 (Six) Hours As Needed (abdominal pain)., Disp: 30 tablet, Rfl: 0  •  hydrochlorothiazide (HYDRODIURIL) 25 MG tablet, Take 1 tablet by mouth Daily., Disp: 90 tablet, Rfl: 3  •  isosorbide mononitrate (IMDUR) 30 MG 24 hr tablet, TAKE 1 TABLET BY MOUTH DAILY., Disp: 30 tablet, Rfl: 6  •  lisinopril (PRINIVIL,ZESTRIL) 20 MG tablet, Take 2 tablets by mouth Daily., Disp: 30 tablet, Rfl: 4  •  lisinopril (PRINIVIL,ZESTRIL) 40 MG tablet, Take 40 mg by mouth Daily., Disp: , Rfl: 6  •  meloxicam (MOBIC) 7.5 MG tablet, Take 1 tablet by mouth Daily., Disp: 30 tablet, Rfl:  "2  •  metoprolol succinate XL (TOPROL-XL) 25 MG 24 hr tablet, Take 1 tablet by mouth Daily., Disp: 30 tablet, Rfl: 5  •  nitroglycerin (NITROSTAT) 0.4 MG SL tablet, Place 1 tablet under the tongue Every 5 (Five) Minutes As Needed for Chest Pain. Take no more than 3 doses in 15 minutes., Disp: 30 tablet, Rfl: 12  •  pantoprazole (PROTONIX) 40 MG EC tablet, Take 1 tablet by mouth Daily., Disp: 30 tablet, Rfl: 0  •  pravastatin (PRAVACHOL) 20 MG tablet, Take 20 mg by mouth Daily., Disp: , Rfl: 6    Physical Exam:  Vitals:    10/16/19 1126   BP: 128/68   BP Location: Left arm   Patient Position: Sitting   Cuff Size: Adult   Pulse: 76   SpO2: 98%   Weight: 88 kg (194 lb)   Height: 165.1 cm (65\")     Current Pain Level: none  Pulse Ox: Normal  on room air  General: alert, appears stated age and cooperative     Body Habitus: well-nourished    HEENT: Head: Normocephalic, no lesions, without obvious abnormality. No arcus senilis, xanthelasma or xanthomas.    Neuro: alert, oriented x3  Pulses: 2+ and symmetric  JVP: Volume/Pulsation: Normal.  Normal waveforms.   Appropriate inspiratory decrease.  No Kussmaul's. No Germain's.   Carotid Exam: no bruit normal pulsation bilaterally   Carotid Volume: normal.     Respirations: no increased work of breathing   Chest:  Normal    Pulmonary:Normal   Precordium: Normal impulses. P2 is not palpable.  RV Heave: absent  LV Heave: absent  Elcho:  normal size and placement  Palpable S4: absent.  Heart rate: normal    Heart Rhythm: regular     Heart Sounds: S1: normal  S2: normal  S3: absent   S4: absent  Opening Snap: absent    Pericardial Rub:  Absent: .    Abdomen:   Appearance: normal .  Palpation: Soft, non-tender to palpation, bowel sounds positive in all four quadrants; no guarding or rebound tenderness  Extremity: no edema.   LE Skin: no rashes  LE Hair:  normal  LE Pulses: well perfused with normal pulses in the distal extremities  Pallor on elevation: Absent. Rubor on dependency: " None      DATA REVIEWED:         ECG/EMG Results (all)     None        ---------------------------------------------------  TTE/GEOFFREY:  Results for orders placed during the hospital encounter of 09/04/19   Adult Transthoracic Echo Complete W/ Cont if Necessary Per Protocol    Narrative · Estimated EF = 61%.  · Left ventricular systolic function is normal.  · Left ventricular diastolic dysfunction (grade I) consistent with   impaired relaxation.  · Mild mitral valve regurgitation is present  · Mild tricuspid valve regurgitation is present.  · Mild tricuspid valve stenosis is present.        -----------------------------------------------------  CXR/Imaging:   --------------  CT:   Xr Hand 3+ View Left    Result Date: 10/14/2019  1. Unremarkable left hand. 2. Mild degenerative changes with narrowing at the first carpometacarpal joint spaces of the wrist. Electronically signed by:  Matthew Claros MD  10/14/2019 2:55 PM [employer] Workstation: [id number]    Xr Hand 3+ View Right    Result Date: 10/14/2019  1. Unremarkable right hand. 2. Mild degenerative changes at the first carpometacarpal joint spaces of the wrist. Electronically signed by:  Matthew Claros MD  10/14/2019 2:54 PM [employer] Workstation: [id number]      ----------------------------------------------------      --------------------------------------------------------------------------------------------------  LABS:     The CVD Risk score (Leeann et al., 2008) failed to calculate for the following reasons:    The patient has a prior MI, stroke, CHF, or peripheral vascular disease diagnosis         Lab Results   Component Value Date    GLUCOSE 163 (H) 09/05/2019    BUN 14 09/05/2019    CREATININE 0.89 09/05/2019    EGFRIFNONA 88 09/05/2019    BCR 15.7 09/05/2019    K 4.1 09/05/2019    CO2 28.0 09/05/2019    CALCIUM 8.7 09/05/2019    ALBUMIN 4.00 09/04/2019    AST 17 09/04/2019    ALT 18 09/04/2019     Lab Results   Component Value Date    WBC 5.10 09/05/2019    HGB 13.5  09/05/2019    HCT 40.7 09/05/2019    MCV 91.9 09/05/2019     09/05/2019     Lab Results   Component Value Date    CHOL 173 09/13/2019    TRIG 200 (H) 09/13/2019    HDL 49 09/13/2019    LDL 84 09/13/2019     Lab Results   Component Value Date    TSH 2.08 03/17/2015     Lab Results   Component Value Date    CKTOTAL 102 03/16/2015    CKMB 3.7 03/16/2015    TROPONINI 0.251 (C) 03/16/2015    TROPONINT <0.010 09/04/2019     No results found for: HGBA1C  No results found for: DDIMER  Lab Results   Component Value Date    ALT 18 09/04/2019     No results found for: HGBA1C  Lab Results   Component Value Date    CREATININE 0.89 09/05/2019     No results found for: IRON, TIBC, FERRITIN  Lab Results   Component Value Date    INR 1.04 09/04/2019    INR 0.9 04/08/2015    PROTIME 13.4 09/04/2019    PROTIME 12.2 04/08/2015       Assessment/Plan     1. Syncope and collapse  Secondary to vasovagal syncope in the setting of significant coughing and competent orthostatic hypotension.  Patient was also bradycardic.  Can cut back on metoprolol with improvement in his heart rate.  Cardia Massimo was without any structural abnormalities.  Carotid Doppler was without any significant abnormalities.  We will plan on performing a ZIO Patch for further assessment of his bradycardia out any malignant arrhythmias.    - Holter Monitor - 72 Hour Up To 21 Days; Future  - Doppler Ankle Brachial Index Single Level CAR    2. CAD with small vessel disease on the side branch continue medical therapy, needs his factor modification but is not willing to do that     Hypertension controlled with lisinopril, isosorbide and HCTZ along with Toprol-XL.     Peripheral vascular disease -had CTA with abdominal aortogram with runoff which did not show any high-grade stenosis and some of his pain in the legs could be due to neuropathy from his alcohol use     Needs his factor modification with EtOH abuse and tobacco abuse.   on B12 and folic acid  supplements.    Prevention:  Patient's Body mass index is 32.28 kg/m². BMI is above normal parameters. Recommendations include: exercise counseling and nutrition counseling.      Nima Portillo is a current cigarettes user.  He currently smokes 1 pack of cigarettes per day for a duration of 10 years. I have educated him on the risk of diseases from using tobacco products such as cancer, COPD and heart diease.     I advised him to quit and he is not willing to quit.    I spent 3  minutes counseling the patient.          AAA Screening:     Return in about 6 months (around 4/16/2020).          This document has been electronically signed by Barbara Ardon MD on October 16, 2019 11:45 AM

## 2019-10-18 NOTE — PROGRESS NOTES
I have seen the patient.  I have reviewed the notes, assessments, and/or procedures performed by Dr. Villatoro, I concur with her/his documentation and assessment and plan for Nima Portillo.       This document has been electronically signed by Rahul Encinas MD on October 18, 2019 3:18 PM

## 2019-11-04 ENCOUNTER — OFFICE VISIT (OUTPATIENT)
Dept: FAMILY MEDICINE CLINIC | Facility: CLINIC | Age: 59
End: 2019-11-04

## 2019-11-04 VITALS
OXYGEN SATURATION: 96 % | BODY MASS INDEX: 31.04 KG/M2 | HEIGHT: 67 IN | HEART RATE: 63 BPM | SYSTOLIC BLOOD PRESSURE: 142 MMHG | TEMPERATURE: 97.1 F | DIASTOLIC BLOOD PRESSURE: 82 MMHG | RESPIRATION RATE: 20 BRPM | WEIGHT: 197.8 LBS

## 2019-11-04 DIAGNOSIS — M79.645 THUMB PAIN, LEFT: ICD-10-CM

## 2019-11-04 DIAGNOSIS — M79.644 CHRONIC PAIN OF RIGHT THUMB: ICD-10-CM

## 2019-11-04 DIAGNOSIS — G89.29 CHRONIC PAIN OF RIGHT THUMB: ICD-10-CM

## 2019-11-04 DIAGNOSIS — I10 ESSENTIAL HYPERTENSION: Primary | ICD-10-CM

## 2019-11-04 DIAGNOSIS — J06.9 VIRAL UPPER RESPIRATORY TRACT INFECTION: ICD-10-CM

## 2019-11-04 PROCEDURE — 99213 OFFICE O/P EST LOW 20 MIN: CPT | Performed by: STUDENT IN AN ORGANIZED HEALTH CARE EDUCATION/TRAINING PROGRAM

## 2019-11-04 RX ORDER — CHLORPHENIRAMINE MALEATE 12 MG/1
1 TABLET, FILM COATED, EXTENDED RELEASE ORAL 2 TIMES DAILY PRN
Qty: 30 EACH | Refills: 0 | Status: SHIPPED | OUTPATIENT
Start: 2019-11-04 | End: 2020-02-11

## 2019-11-04 RX ORDER — HYDROCHLOROTHIAZIDE 25 MG/1
25 TABLET ORAL DAILY
Qty: 90 TABLET | Refills: 3 | Status: SHIPPED | OUTPATIENT
Start: 2019-11-04 | End: 2020-08-25 | Stop reason: SDUPTHER

## 2019-11-04 RX ORDER — MELOXICAM 7.5 MG/1
7.5 TABLET ORAL DAILY
Qty: 30 TABLET | Refills: 4 | Status: CANCELLED | OUTPATIENT
Start: 2019-11-04

## 2019-11-04 RX ORDER — BENZONATATE 100 MG/1
100 CAPSULE ORAL 3 TIMES DAILY PRN
Qty: 90 CAPSULE | Refills: 0 | Status: SHIPPED | OUTPATIENT
Start: 2019-11-04 | End: 2020-02-11

## 2019-11-04 RX ORDER — MELOXICAM 7.5 MG/1
7.5 TABLET ORAL DAILY
Qty: 30 TABLET | Refills: 2 | Status: SHIPPED | OUTPATIENT
Start: 2019-11-04 | End: 2019-11-13 | Stop reason: SDUPTHER

## 2019-11-04 NOTE — PROGRESS NOTES
I have seen the patient.  I have reviewed the notes, assessments, and/or procedures performed by Andrew Villatoro III, MD, I concur with her/his documentation and assessment and plan for Nima Portillo.               This document has been electronically signed by Hubert Thomas MD on November 4, 2019 3:58 PM

## 2019-11-04 NOTE — PROGRESS NOTES
Subjective   Nima Portillo is a 59 y.o. male who presents for initial evaluation for URI, HTN, bilateral thumb artritits    Upper Respiratory Infection: Patient complains of symptoms of a URI. Symptoms include congestion, cough and sore throat. Onset of symptoms was 4 days ago, gradually worsening since that time. He also c/o achiness, fever subjective, post nasal drip, productive cough with  yellow colored sputum and purulent nasal discharge for the past 4 days .  He is drinking plenty of fluids. Evaluation to date: none. Treatment to date: cough suppressants.    HTN: Requesting refill, needs automatic cuff, no diuzziness and bp elevated to 142/82 with acute process.     Bilateral Thumb Arthritis: Pt issues with making appointment, requesting once a day numbness and burning relief.    Smoking Cessation: 1/2 ppd not intersted in cessation.    The following portions of the patient's history were reviewed and updated as appropriate: allergies, current medications, past family history, past medical history, past social history, past surgical history and problem list.    Preventative:  Over the past 2 weeks, have you felt down, depressed, or hopeless?No   Over the past 2 weeks, have you felt little interest or pleasure in doing things?No  Clinical depression screening refused by patient.No     On osteoporosis therapy?Not Indicated     Past Medical Hx:  Past Medical History:   Diagnosis Date   • Acute bronchitis    • Acute sinusitis    • Chest pain    • Chronic bronchitis (CMS/HCC)     secondary to smoking   • Claudication (CMS/HCC)    • Coronary arteriosclerosis    • Cough    • Gastroesophageal reflux disease    • Health maintenance examination     Individual health examination   • History of echocardiogram 10/21/2013    Echocardiogram W/ color flow 09702 (1) - Left atrium normal. Mild CLVH. EF 50%. Valves intact. Mild mitral and tricuspid regurg. Pericardium normal.   • History of echocardiogram 10/25/2011     Echocardiogram W/O color flow 78748 (1) - Normal LV sytolic function with mild LVH & mild diastolic dysfunction   • Hyperlipidemia    • Hypertensive disorder    • Pernicious anemia    • Thumb pain, left 10/14/2019   • Tobacco dependence syndrome        Past Surgical Hx:  Past Surgical History:   Procedure Laterality Date   • CARDIAC CATHETERIZATION  04/08/2015    Cardiac cath 01692 (1) - Slective coronary angiogram. Left heart catheterization with LV ventriculogram.   • DENTAL PROCEDURE      Removal of all teeth       Health Maintenance:  Health Maintenance   Topic Date Due   • COLONOSCOPY  11/15/2019 (Originally 7/12/2017)   • ZOSTER VACCINE (1 of 2) 04/16/2020 (Originally 10/29/2010)   • ANNUAL PHYSICAL  04/17/2020   • LIPID PANEL  09/13/2020   • TDAP/TD VACCINES (2 - Td) 05/16/2029   • PNEUMOCOCCAL VACCINE (19-64 MEDIUM RISK)  Completed   • HEPATITIS C SCREENING  Completed   • INFLUENZA VACCINE  Completed       Current Meds:    Current Outpatient Medications:   •  aspirin 81 MG chewable tablet, Chew 81 mg daily., Disp: , Rfl:   •  buPROPion SR (WELLBUTRIN SR) 150 MG 12 hr tablet, Take 1 tablet by mouth 2 (Two) Times a Day., Disp: 60 tablet, Rfl: 4  •  dicyclomine (BENTYL) 20 MG tablet, Take 1 tablet by mouth Every 6 (Six) Hours As Needed (abdominal pain)., Disp: 30 tablet, Rfl: 0  •  hydroCHLOROthiazide (HYDRODIURIL) 25 MG tablet, Take 1 tablet by mouth Daily., Disp: 90 tablet, Rfl: 3  •  isosorbide mononitrate (IMDUR) 30 MG 24 hr tablet, TAKE 1 TABLET BY MOUTH DAILY., Disp: 30 tablet, Rfl: 6  •  lisinopril (PRINIVIL,ZESTRIL) 40 MG tablet, Take 40 mg by mouth Daily., Disp: , Rfl: 6  •  meloxicam (MOBIC) 7.5 MG tablet, Take 1 tablet by mouth Daily., Disp: 30 tablet, Rfl: 2  •  metoprolol succinate XL (TOPROL-XL) 25 MG 24 hr tablet, Take 1 tablet by mouth Daily., Disp: 30 tablet, Rfl: 5  •  nitroglycerin (NITROSTAT) 0.4 MG SL tablet, Place 1 tablet under the tongue Every 5 (Five) Minutes As Needed for Chest Pain.  Take no more than 3 doses in 15 minutes., Disp: 30 tablet, Rfl: 12  •  pantoprazole (PROTONIX) 40 MG EC tablet, Take 1 tablet by mouth Daily., Disp: 30 tablet, Rfl: 0  •  pravastatin (PRAVACHOL) 20 MG tablet, Take 20 mg by mouth Daily., Disp: , Rfl: 6  •  benzonatate (TESSALON) 100 MG capsule, Take 1 capsule by mouth 3 (Three) Times a Day As Needed for Cough., Disp: 90 capsule, Rfl: 0  •  Chlorpheniramine Maleate ER 12 MG tablet controlled-release, Take 1 each by mouth 2 (Two) Times a Day As Needed (Congestion cough)., Disp: 30 each, Rfl: 0    Allergies:  Patient has no known allergies.    Family Hx:  Family History   Problem Relation Age of Onset   • Heart disease Mother    • Hypertension Mother    • Diabetes Mother    • Heart disease Father    • Hypertension Father    • Diabetes Father    • Diabetes Sister    • Heart disease Sister    • Stroke Sister    • Cancer Paternal Aunt         Breast Cancer   • Cancer Paternal Uncle         Unknown cancer        Social History:  Social History     Socioeconomic History   • Marital status:      Spouse name: Not on file   • Number of children: Not on file   • Years of education: Not on file   • Highest education level: Not on file   Tobacco Use   • Smoking status: Current Every Day Smoker     Packs/day: 1.00     Years: 40.00     Pack years: 40.00     Types: Cigarettes   • Smokeless tobacco: Never Used   Substance and Sexual Activity   • Alcohol use: Yes     Alcohol/week: 4.8 oz     Types: 8 Cans of beer per week   • Drug use: No   • Sexual activity: No     Comment: Marital status:        Review of Systems  Review of Systems   Constitutional: Positive for activity change. Negative for appetite change, chills, diaphoresis and fever.   HENT: Positive for congestion, postnasal drip and rhinorrhea. Negative for sinus pressure, sinus pain and sore throat.    Eyes: Negative for visual disturbance.   Respiratory: Positive for choking. Negative for apnea, cough, chest  "tightness, shortness of breath and wheezing.    Cardiovascular: Negative for chest pain, palpitations and leg swelling.   Gastrointestinal: Negative for abdominal distention, abdominal pain, blood in stool, constipation, diarrhea, nausea and vomiting.   Genitourinary: Negative for difficulty urinating, dysuria, frequency, hematuria and urgency.   Musculoskeletal: Positive for arthralgias, myalgias and neck pain. Negative for back pain and joint swelling.   Skin: Negative for color change, pallor, rash and wound.   Neurological: Positive for numbness. Negative for dizziness, weakness and headaches.   Psychiatric/Behavioral: Negative for agitation and behavioral problems.            Objective:     /82 (BP Location: Right arm)   Pulse 63   Temp 97.1 °F (36.2 °C) (Temporal)   Resp 20   Ht 170.2 cm (67\")   Wt 89.7 kg (197 lb 12.8 oz)   SpO2 96%   BMI 30.98 kg/m²       Physical Exam   Constitutional: He is oriented to person, place, and time. He appears well-developed and well-nourished. No distress.   HENT:   Head: Normocephalic and atraumatic.   Right Ear: External ear normal.   Left Ear: External ear normal.   Nose: Nose normal.   Eyes: Conjunctivae and EOM are normal. Pupils are equal, round, and reactive to light. Right eye exhibits no discharge. Left eye exhibits no discharge. No scleral icterus.   Neck: Normal range of motion. Neck supple. No thyromegaly present.   Cardiovascular: Normal rate, regular rhythm, normal heart sounds and intact distal pulses. Exam reveals no gallop and no friction rub.   No murmur heard.  Pulmonary/Chest: Effort normal and breath sounds normal. No respiratory distress. He has no wheezes. He has no rales. He exhibits no tenderness.   Abdominal: Soft. Bowel sounds are normal. He exhibits no distension and no mass. There is no tenderness. There is no guarding.   Musculoskeletal: Normal range of motion. He exhibits no edema, tenderness or deformity.   Lymphadenopathy:     He " has no cervical adenopathy.   Neurological: He is alert and oriented to person, place, and time. No cranial nerve deficit.   Skin: Skin is warm and dry. Capillary refill takes 2 to 3 seconds. He is not diaphoretic.   Psychiatric: He has a normal mood and affect. His behavior is normal. Judgment and thought content normal.          Assessment/Plan:     Nima was seen today for cough.    Diagnoses and all orders for this visit:    Essential hypertension   Patient requesting refill of maintenance medication with good effect.  Patient mildly hypertensive in office today most likely related to acute viral upper respiratory infection.  Patient counseled to utilize automated self device to take his blood pressures and measure them at home so we can more accurately titrate his blood pressure medication.  Patient without symptoms of hypotension dizziness walking at altitudes currently.  -     hydroCHLOROthiazide (HYDRODIURIL) 25 MG tablet; Take 1 tablet by mouth Daily.  -     Blood Pressure Device    Viral upper respiratory tract infection   Symptomatic medications prescribed for help with cough and congestion during the day.  Patient reports sleeping well at night otherwise additional antihistamines may have been used for sleep relief.  -     Chlorpheniramine Maleate ER 12 MG tablet controlled-release; Take 1 each by mouth 2 (Two) Times a Day As Needed (Congestion cough).  -     benzonatate (TESSALON) 100 MG capsule; Take 1 capsule by mouth 3 (Three) Times a Day As Needed for Cough.    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  Patient will also be referred to orthopedics for intra-articular injection if this is not effective.  -     meloxicam (MOBIC) 7.5 MG tablet; Take 1 tablet by mouth Daily.    Chronic pain of right thumb   Similar to his left thumb patient will try long-acting NSAIDs with plan  for orthopedic intra-articular injection if ineffective.  -     meloxicam (MOBIC) 7.5 MG tablet; Take 1 tablet by mouth Daily.      Follow-up:     Return in about 1 month (around 12/4/2019) for Recheck URi HTN .    Goals     • Smoking cessation (pt-stated)      Quitting smoking and living longer    Barriers: Wanting to quit            Preventative:  Male Preventative: Cholesterol screening up to date  Vaccines:   Tetanus vaccine: up to date  Annual influenza vaccine: up to date   Pneumococcal vaccine: up to date  Hep B vaccine: up to date   Zoster vaccine:up to date    Smoking cessation counseling was provided.  does not drink  eat more fruits and vegetables, decrease soda or juice intake, increase water intake, increase physical activity, reduce screen time, reduce portion size, cut out extra servings, reduce fast food intake, family to eat at dinner table more often, keep TV off during meals, plan meals, eat breakfast and have 3 meals a day  Patient's Body mass index is 30.98 kg/m². BMI is above normal parameters. Recommendations include: exercise counseling and nutrition counseling.      RISK SCORE: 4        This document has been electronically signed by Andrew Villatoro III, MD on November 4, 2019 3:20 PM

## 2019-11-08 LAB
BH CV LOWER ARTERIAL LEFT ABI RATIO: 1.17
BH CV LOWER ARTERIAL LEFT DORSALIS PEDIS SYS MAX: 173 MMHG
BH CV LOWER ARTERIAL LEFT POST TIBIAL SYS MAX: 161 MMHG
BH CV LOWER ARTERIAL RIGHT ABI RATIO: 1.1
BH CV LOWER ARTERIAL RIGHT DORSALIS PEDIS SYS MAX: 163 MMHG
BH CV LOWER ARTERIAL RIGHT POST TIBIAL SYS MAX: 159 MMHG
UPPER ARTERIAL LEFT ARM BRACHIAL SYS MAX: 133 MMHG
UPPER ARTERIAL RIGHT ARM BRACHIAL SYS MAX: 148 MMHG

## 2019-11-13 ENCOUNTER — TELEPHONE (OUTPATIENT)
Dept: CARDIOLOGY | Facility: CLINIC | Age: 59
End: 2019-11-13

## 2019-11-13 ENCOUNTER — OFFICE VISIT (OUTPATIENT)
Dept: FAMILY MEDICINE CLINIC | Facility: CLINIC | Age: 59
End: 2019-11-13

## 2019-11-13 VITALS
BODY MASS INDEX: 32.15 KG/M2 | OXYGEN SATURATION: 95 % | DIASTOLIC BLOOD PRESSURE: 76 MMHG | WEIGHT: 193 LBS | HEART RATE: 73 BPM | RESPIRATION RATE: 20 BRPM | HEIGHT: 65 IN | SYSTOLIC BLOOD PRESSURE: 122 MMHG | TEMPERATURE: 97 F

## 2019-11-13 DIAGNOSIS — I25.10 CORONARY ARTERY DISEASE INVOLVING NATIVE CORONARY ARTERY OF NATIVE HEART WITHOUT ANGINA PECTORIS: Primary | ICD-10-CM

## 2019-11-13 DIAGNOSIS — M79.644 CHRONIC PAIN OF RIGHT THUMB: ICD-10-CM

## 2019-11-13 DIAGNOSIS — G56.03 BILATERAL CARPAL TUNNEL SYNDROME: ICD-10-CM

## 2019-11-13 DIAGNOSIS — F17.211 CIGARETTE NICOTINE DEPENDENCE IN REMISSION: ICD-10-CM

## 2019-11-13 DIAGNOSIS — M79.645 THUMB PAIN, LEFT: ICD-10-CM

## 2019-11-13 DIAGNOSIS — G89.29 CHRONIC PAIN OF RIGHT THUMB: ICD-10-CM

## 2019-11-13 PROBLEM — Z72.0 NICOTINE ABUSE: Status: RESOLVED | Noted: 2018-01-04 | Resolved: 2019-11-13

## 2019-11-13 PROCEDURE — 99213 OFFICE O/P EST LOW 20 MIN: CPT | Performed by: STUDENT IN AN ORGANIZED HEALTH CARE EDUCATION/TRAINING PROGRAM

## 2019-11-13 RX ORDER — MELOXICAM 7.5 MG/1
7.5 TABLET ORAL DAILY
Qty: 30 TABLET | Refills: 2 | Status: SHIPPED | OUTPATIENT
Start: 2019-11-13 | End: 2020-02-11 | Stop reason: SDUPTHER

## 2019-11-13 NOTE — PROGRESS NOTES
Subjective   Nima Portillo is a 59 y.o. male who presents for follow up for URI, thumb and wrist pain    Upper respiratory infection: Resolved, pt reports decreased sputum production and return to normal health.  Patient does report minimal coughing greatly helps with benzonatate.  Patient reports medications given for antihistamines and symptomatic relief were extremely helpful.    Wrist and thumb pain: Pt with resolution of  Of wrist and thumb pain on long acting meloxicam.  Patient has reported taking this medication in the morning and has reduced his symptoms during his early morning shift and has lasted throughout his shift.  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  Minimal joint space at first carpometacarpal junction bilaterally.    CAD: Pt with issues with cardiac monitor, placed by cardiology.  Patient had initial adhesive issues replaced at home during rest without symptoms and has subsequently mailed it back.  Patient however does report smoking cessation down to half a pack a day on bupropion therapy    The following portions of the patient's history were reviewed and updated as appropriate: allergies, current medications, past family history, past medical history, past social history, past surgical history and problem list.    Preventative:  Over the past 2 weeks, have you felt down, depressed, or hopeless?No   Over the past 2 weeks, have you felt little interest or pleasure in doing things?No  Clinical depression screening refused by patient.No     On osteoporosis therapy?Not Indicated     Past Medical Hx:  Past Medical History:   Diagnosis Date   • Acute bronchitis    • Acute sinusitis    • Chest pain    • Chronic bronchitis (CMS/HCC)     secondary to smoking   • Claudication (CMS/HCC)    • Coronary arteriosclerosis    • Cough    • Gastroesophageal reflux disease    • Health maintenance examination     Individual health examination   • History of echocardiogram  10/21/2013    Echocardiogram W/ color flow 76126 (1) - Left atrium normal. Mild CLVH. EF 50%. Valves intact. Mild mitral and tricuspid regurg. Pericardium normal.   • History of echocardiogram 10/25/2011    Echocardiogram W/O color flow 30428 (1) - Normal LV sytolic function with mild LVH & mild diastolic dysfunction   • Hyperlipidemia    • Hypertensive disorder    • Pernicious anemia    • Thumb pain, left 10/14/2019   • Tobacco dependence syndrome        Past Surgical Hx:  Past Surgical History:   Procedure Laterality Date   • CARDIAC CATHETERIZATION  04/08/2015    Cardiac cath 88581 (1) - Slective coronary angiogram. Left heart catheterization with LV ventriculogram.   • DENTAL PROCEDURE      Removal of all teeth       Health Maintenance:  Health Maintenance   Topic Date Due   • COLONOSCOPY  11/15/2019 (Originally 7/12/2017)   • ZOSTER VACCINE (1 of 2) 04/16/2020 (Originally 10/29/2010)   • ANNUAL PHYSICAL  04/17/2020   • LUNG CANCER SCREENING  07/03/2020   • LIPID PANEL  09/13/2020   • TDAP/TD VACCINES (2 - Td) 05/16/2029   • PNEUMOCOCCAL VACCINE (19-64 MEDIUM RISK)  Completed   • HEPATITIS C SCREENING  Completed   • INFLUENZA VACCINE  Completed       Current Meds:    Current Outpatient Medications:   •  aspirin 81 MG chewable tablet, Chew 81 mg daily., Disp: , Rfl:   •  benzonatate (TESSALON) 100 MG capsule, Take 1 capsule by mouth 3 (Three) Times a Day As Needed for Cough., Disp: 90 capsule, Rfl: 0  •  buPROPion SR (WELLBUTRIN SR) 150 MG 12 hr tablet, Take 1 tablet by mouth 2 (Two) Times a Day., Disp: 60 tablet, Rfl: 4  •  Chlorpheniramine Maleate ER 12 MG tablet controlled-release, Take 1 each by mouth 2 (Two) Times a Day As Needed (Congestion cough)., Disp: 30 each, Rfl: 0  •  dicyclomine (BENTYL) 20 MG tablet, Take 1 tablet by mouth Every 6 (Six) Hours As Needed (abdominal pain)., Disp: 30 tablet, Rfl: 0  •  hydroCHLOROthiazide (HYDRODIURIL) 25 MG tablet, Take 1 tablet by mouth Daily., Disp: 90 tablet, Rfl:  3  •  isosorbide mononitrate (IMDUR) 30 MG 24 hr tablet, TAKE 1 TABLET BY MOUTH DAILY., Disp: 30 tablet, Rfl: 6  •  lisinopril (PRINIVIL,ZESTRIL) 40 MG tablet, Take 40 mg by mouth Daily., Disp: , Rfl: 6  •  meloxicam (MOBIC) 7.5 MG tablet, Take 1 tablet by mouth Daily., Disp: 30 tablet, Rfl: 2  •  metoprolol succinate XL (TOPROL-XL) 25 MG 24 hr tablet, Take 1 tablet by mouth Daily., Disp: 30 tablet, Rfl: 5  •  nitroglycerin (NITROSTAT) 0.4 MG SL tablet, Place 1 tablet under the tongue Every 5 (Five) Minutes As Needed for Chest Pain. Take no more than 3 doses in 15 minutes., Disp: 30 tablet, Rfl: 12  •  pantoprazole (PROTONIX) 40 MG EC tablet, Take 1 tablet by mouth Daily., Disp: 30 tablet, Rfl: 0  •  pravastatin (PRAVACHOL) 20 MG tablet, Take 20 mg by mouth Daily., Disp: , Rfl: 6    Allergies:  Patient has no known allergies.    Family Hx:  Family History   Problem Relation Age of Onset   • Heart disease Mother    • Hypertension Mother    • Diabetes Mother    • Heart disease Father    • Hypertension Father    • Diabetes Father    • Diabetes Sister    • Heart disease Sister    • Stroke Sister    • Cancer Paternal Aunt         Breast Cancer   • Cancer Paternal Uncle         Unknown cancer        Social History:  Social History     Socioeconomic History   • Marital status:      Spouse name: Not on file   • Number of children: Not on file   • Years of education: Not on file   • Highest education level: Not on file   Tobacco Use   • Smoking status: Current Every Day Smoker     Packs/day: 1.00     Years: 40.00     Pack years: 40.00     Types: Cigarettes   • Smokeless tobacco: Never Used   Substance and Sexual Activity   • Alcohol use: Yes     Alcohol/week: 4.8 oz     Types: 8 Cans of beer per week   • Drug use: No   • Sexual activity: No     Comment: Marital status:        Review of Systems  Review of Systems   Constitutional: Negative for activity change, appetite change, chills, diaphoresis and fever.  "  HENT: Negative for congestion, rhinorrhea, sinus pressure, sinus pain and sore throat.    Eyes: Negative for visual disturbance.   Respiratory: Negative for apnea, cough, choking, chest tightness, shortness of breath and wheezing.    Cardiovascular: Negative for chest pain, palpitations and leg swelling.   Gastrointestinal: Negative for abdominal distention, abdominal pain, blood in stool, constipation, diarrhea, nausea and vomiting.   Genitourinary: Negative for difficulty urinating, dysuria, frequency, hematuria and urgency.   Musculoskeletal: Positive for arthralgias and myalgias. Negative for back pain, joint swelling and neck pain.   Skin: Negative for color change, pallor, rash and wound.   Neurological: Negative for dizziness, weakness, numbness and headaches.   Psychiatric/Behavioral: Negative for agitation and behavioral problems.            Objective:     /76 (BP Location: Left arm, Patient Position: Sitting, Cuff Size: Adult)   Pulse 73   Temp 97 °F (36.1 °C) (Temporal)   Resp 20   Ht 165.1 cm (65\")   Wt 87.5 kg (193 lb)   SpO2 95%   BMI 32.12 kg/m²       Physical Exam   Constitutional: He is oriented to person, place, and time. He appears well-developed and well-nourished. No distress.   HENT:   Head: Normocephalic and atraumatic.   Right Ear: External ear normal.   Left Ear: External ear normal.   Nose: Nose normal.   Eyes: Conjunctivae and EOM are normal. Pupils are equal, round, and reactive to light. Right eye exhibits no discharge. Left eye exhibits no discharge. No scleral icterus.   Neck: Normal range of motion. Neck supple. No thyromegaly present.   Cardiovascular: Normal rate, regular rhythm, normal heart sounds and intact distal pulses. Exam reveals no gallop and no friction rub.   No murmur heard.  Pulmonary/Chest: Effort normal and breath sounds normal. No respiratory distress. He has no wheezes. He has no rales. He exhibits no tenderness.   Abdominal: Soft. Bowel sounds are " normal. He exhibits no distension and no mass. There is no tenderness. There is no guarding.   Musculoskeletal: Normal range of motion. He exhibits no edema, tenderness or deformity.   Lymphadenopathy:     He has no cervical adenopathy.   Neurological: He is alert and oriented to person, place, and time. No cranial nerve deficit.   Skin: Skin is warm and dry. Capillary refill takes 2 to 3 seconds. He is not diaphoretic.   Psychiatric: He has a normal mood and affect. His behavior is normal. Judgment and thought content normal.          Assessment/Plan:     Nima was seen today for coronary artery disease.    Diagnoses and all orders for this visit:    Coronary artery disease involving native coronary artery of native heart without angina pectoris    Thumb pain, left  -     meloxicam (MOBIC) 7.5 MG tablet; Take 1 tablet by mouth Daily.    Chronic pain of right thumb  -     meloxicam (MOBIC) 7.5 MG tablet; Take 1 tablet by mouth Daily.    Bilateral carpal tunnel syndrome    Cigarette nicotine dependence in remission    Patient on 1/2 pack a day smoking on bupropion therapy.  -Patient without new cardiac chest pain with resolution of the upper respiratory infection, repeated counseling for utilization of sublingual nitrogen with cardiac type chest pain.  - Thumb and carpal tunnel pain have been much well managed on long-acting NSAID therapy and will check renal profile in 6 months.  We will continue symptomatic management and seek referral for orthopedics with issues with kidney function or failure of medication.    Follow-up:     Return in about 3 months (around 2/13/2020) for Recheck CAD hypertension wrist pain.    Goals     • Smoking cessation (pt-stated)      Quitting smoking and living longer    Barriers: Wanting to quit            Preventative:  Male Preventative: Cholesterol screening up to date  Vaccines:   Tetanus vaccine: up to date  Annual influenza vaccine: up to date   Pneumococcal vaccine: up to date  Hep  B vaccine: up to date   Zoster vaccine:up to date    Smoking cessation counseling was provided.  Pharmacotherapy was prescribed as ordered.  does not drink  eat more fruits and vegetables, decrease soda or juice intake, increase water intake, increase physical activity, reduce screen time, reduce portion size, cut out extra servings, reduce fast food intake, family to eat at dinner table more often, keep TV off during meals, plan meals, eat breakfast and have 3 meals a day  Patient's Body mass index is 32.12 kg/m². BMI is within normal parameters. No follow-up required..      RISK SCORE: 4        This document has been electronically signed by Andrew Villatoro III, MD on November 13, 2019 2:42 PM

## 2019-11-13 NOTE — TELEPHONE ENCOUNTER
----- Message from Claudette Christensen RN sent at 11/13/2019  3:31 PM CST -----  His ABIS are normal

## 2019-11-19 NOTE — PROGRESS NOTES
I have seen the patient.  I have reviewed the notes, assessments, and/or procedures performed by Andrew Villatoro III, MD during office visit I concur with her/his documentation and assessment and plan for Nima Portillo.            This document has been electronically signed by Familia Sampson MD on November 19, 2019 2:56 PM

## 2019-12-12 RX ORDER — ISOSORBIDE MONONITRATE 30 MG/1
30 TABLET, EXTENDED RELEASE ORAL DAILY
Qty: 30 TABLET | Refills: 6 | Status: SHIPPED | OUTPATIENT
Start: 2019-12-12 | End: 2020-08-04

## 2020-01-27 DIAGNOSIS — I10 ESSENTIAL HYPERTENSION: ICD-10-CM

## 2020-01-27 RX ORDER — LISINOPRIL 40 MG/1
40 TABLET ORAL DAILY
Qty: 90 TABLET | Refills: 3 | Status: SHIPPED | OUTPATIENT
Start: 2020-01-27 | End: 2020-08-25 | Stop reason: SDUPTHER

## 2020-02-11 ENCOUNTER — OFFICE VISIT (OUTPATIENT)
Dept: FAMILY MEDICINE CLINIC | Facility: CLINIC | Age: 60
End: 2020-02-11

## 2020-02-11 VITALS
HEIGHT: 65 IN | SYSTOLIC BLOOD PRESSURE: 148 MMHG | HEART RATE: 76 BPM | TEMPERATURE: 97.9 F | WEIGHT: 198.5 LBS | DIASTOLIC BLOOD PRESSURE: 86 MMHG | OXYGEN SATURATION: 97 % | BODY MASS INDEX: 33.07 KG/M2

## 2020-02-11 DIAGNOSIS — G56.03 BILATERAL CARPAL TUNNEL SYNDROME: ICD-10-CM

## 2020-02-11 DIAGNOSIS — M79.644 CHRONIC THUMB PAIN, BILATERAL: Primary | ICD-10-CM

## 2020-02-11 DIAGNOSIS — F17.211 CIGARETTE NICOTINE DEPENDENCE IN REMISSION: ICD-10-CM

## 2020-02-11 DIAGNOSIS — M79.645 CHRONIC THUMB PAIN, BILATERAL: Primary | ICD-10-CM

## 2020-02-11 DIAGNOSIS — G89.29 CHRONIC THUMB PAIN, BILATERAL: Primary | ICD-10-CM

## 2020-02-11 DIAGNOSIS — L91.8 SKIN TAG: ICD-10-CM

## 2020-02-11 PROCEDURE — 99213 OFFICE O/P EST LOW 20 MIN: CPT | Performed by: STUDENT IN AN ORGANIZED HEALTH CARE EDUCATION/TRAINING PROGRAM

## 2020-02-11 RX ORDER — BUPROPION HYDROCHLORIDE 150 MG/1
150 TABLET, EXTENDED RELEASE ORAL 2 TIMES DAILY
Qty: 60 TABLET | Refills: 4 | Status: SHIPPED | OUTPATIENT
Start: 2020-02-11 | End: 2020-07-21

## 2020-02-11 RX ORDER — MELOXICAM 7.5 MG/1
7.5 TABLET ORAL DAILY
Qty: 30 TABLET | Refills: 2 | Status: SHIPPED | OUTPATIENT
Start: 2020-02-11 | End: 2020-02-14 | Stop reason: CLARIF

## 2020-02-11 NOTE — PROGRESS NOTES
I have seen the patient.  I have reviewed the notes, assessments, and/or procedures performed by Andrew Villatoro III, MD during office visit I concur with her/his documentation and assessment and plan for Nima Portillo.            This document has been electronically signed by Familia Sampson MD on February 11, 2020 2:25 PM

## 2020-02-11 NOTE — PROGRESS NOTES
[facility]  Andrew Villatoro III, MD    Subjective:     Nima Portillo is a 59 y.o. male who presents for carpal tunnel/thumb pain, hypertension, smoking cessation, skin tag.    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  Patient also been noncompliant taking his meloxicam and has not refilled the.  Patient concerned about pain and dropping things.  Patient only reporting weakness on musculature of caudal surface of each hand at the thenar eminence bilaterally.  Patient has been in bilateral restraints for approximately 6 months with palliation of pain with those and meloxicam during the day and is not symptomatic at night.    Patient reporting increased blood pressure with alcohol consumption last night.  Patient negative for cage questions and reports only having a daily beer with a few more on weekends.  Patient has been reportedly compliant his blood pressure medications and handed medication list today.    Patient reports decreasing to 4 to 5 cigarettes daily on bupropion therapy.    Patient also complained about skin tag on left superior ocular lid interfering with his eyelashes.  Patient also reporting secondary right sided skin tag periauricular and more superior to the lid.  His left side bothers him more than his right but does mumble and then remove.  Risks and benefits of primary care versus ophthalmology was discussed with patient.    The following portions of the patient's history were reviewed and updated as appropriate: allergies, current medications, past family history, past medical history, past social history, past surgical history and problem list.    Past Medical Hx:  Past Medical History:   Diagnosis Date   • Acute bronchitis    • Acute sinusitis    • Chest pain    • Chronic bronchitis (CMS/HCC)     secondary to smoking   • Claudication (CMS/HCC)    • Coronary  arteriosclerosis    • Cough    • Gastroesophageal reflux disease    • Health maintenance examination     Individual health examination   • History of echocardiogram 10/21/2013    Echocardiogram W/ color flow 22916 (1) - Left atrium normal. Mild CLVH. EF 50%. Valves intact. Mild mitral and tricuspid regurg. Pericardium normal.   • History of echocardiogram 10/25/2011    Echocardiogram W/O color flow 16844 (1) - Normal LV sytolic function with mild LVH & mild diastolic dysfunction   • Hyperlipidemia    • Hypertensive disorder    • Pernicious anemia    • Thumb pain, left 10/14/2019   • Tobacco dependence syndrome        Past Surgical Hx:  Past Surgical History:   Procedure Laterality Date   • CARDIAC CATHETERIZATION  04/08/2015    Cardiac cath 01856 (1) - Slective coronary angiogram. Left heart catheterization with LV ventriculogram.   • DENTAL PROCEDURE      Removal of all teeth       Current Meds:    Current Outpatient Medications:   •  aspirin 81 MG chewable tablet, Chew 81 mg daily., Disp: , Rfl:   •  buPROPion SR (WELLBUTRIN SR) 150 MG 12 hr tablet, Take 1 tablet by mouth 2 (Two) Times a Day., Disp: 60 tablet, Rfl: 4  •  dicyclomine (BENTYL) 20 MG tablet, Take 1 tablet by mouth Every 6 (Six) Hours As Needed (abdominal pain)., Disp: 30 tablet, Rfl: 0  •  hydroCHLOROthiazide (HYDRODIURIL) 25 MG tablet, Take 1 tablet by mouth Daily., Disp: 90 tablet, Rfl: 3  •  isosorbide mononitrate (IMDUR) 30 MG 24 hr tablet, Take 1 tablet by mouth Daily., Disp: 30 tablet, Rfl: 6  •  lisinopril (PRINIVIL,ZESTRIL) 40 MG tablet, Take 1 tablet by mouth Daily., Disp: 90 tablet, Rfl: 3  •  meloxicam (MOBIC) 7.5 MG tablet, Take 1 tablet by mouth Daily., Disp: 30 tablet, Rfl: 2  •  metoprolol succinate XL (TOPROL-XL) 25 MG 24 hr tablet, Take 1 tablet by mouth Daily., Disp: 30 tablet, Rfl: 5  •  nitroglycerin (NITROSTAT) 0.4 MG SL tablet, Place 1 tablet under the tongue Every 5 (Five) Minutes As Needed for Chest Pain. Take no more than 3  doses in 15 minutes., Disp: 30 tablet, Rfl: 12  •  pantoprazole (PROTONIX) 40 MG EC tablet, Take 1 tablet by mouth Daily., Disp: 30 tablet, Rfl: 0  •  pravastatin (PRAVACHOL) 20 MG tablet, Take 20 mg by mouth Daily., Disp: , Rfl: 6    Allergies:  No Known Allergies    Family Hx:  Family History   Problem Relation Age of Onset   • Heart disease Mother    • Hypertension Mother    • Diabetes Mother    • Heart disease Father    • Hypertension Father    • Diabetes Father    • Diabetes Sister    • Heart disease Sister    • Stroke Sister    • Cancer Paternal Aunt         Breast Cancer   • Cancer Paternal Uncle         Unknown cancer        Social History:  Social History     Socioeconomic History   • Marital status:      Spouse name: Not on file   • Number of children: Not on file   • Years of education: Not on file   • Highest education level: Not on file   Tobacco Use   • Smoking status: Current Every Day Smoker     Packs/day: 1.00     Years: 40.00     Pack years: 40.00     Types: Cigarettes   • Smokeless tobacco: Never Used   Substance and Sexual Activity   • Alcohol use: Yes     Alcohol/week: 8.0 standard drinks     Types: 8 Cans of beer per week   • Drug use: No   • Sexual activity: Never     Comment: Marital status:        Review of Systems  Review of Systems   Constitutional: Negative for activity change, appetite change, chills, diaphoresis and fever.   HENT: Negative for congestion, rhinorrhea, sinus pressure, sinus pain and sore throat.    Eyes: Positive for pain. Negative for photophobia, discharge, itching and visual disturbance.   Respiratory: Negative for apnea, cough, choking, chest tightness, shortness of breath and wheezing.    Cardiovascular: Negative for chest pain, palpitations and leg swelling.   Gastrointestinal: Negative for abdominal distention, abdominal pain, blood in stool, constipation, diarrhea, nausea and vomiting.   Genitourinary: Negative for difficulty urinating, dysuria,  "frequency, hematuria and urgency.   Musculoskeletal: Negative for arthralgias, back pain, joint swelling, myalgias and neck pain.   Skin: Negative for color change, pallor, rash and wound.   Neurological: Positive for weakness. Negative for dizziness, numbness and headaches.   Psychiatric/Behavioral: Negative for agitation and behavioral problems.       Objective:     /86   Pulse 76   Temp 97.9 °F (36.6 °C) (Temporal)   Ht 165.1 cm (65\")   Wt 90 kg (198 lb 8 oz)   SpO2 97%   BMI 33.03 kg/m²   Physical Exam   Constitutional: He is oriented to person, place, and time. He appears well-developed and well-nourished. No distress.   HENT:   Head: Normocephalic and atraumatic.   Right Ear: External ear normal.   Left Ear: External ear normal.   Nose: Nose normal.   Eyes: Pupils are equal, round, and reactive to light. Conjunctivae and EOM are normal. Right eye exhibits no discharge. Left eye exhibits no discharge. No scleral icterus.   Neck: Normal range of motion. Neck supple. No thyromegaly present.   Cardiovascular: Normal rate, regular rhythm, normal heart sounds and intact distal pulses. Exam reveals no gallop and no friction rub.   No murmur heard.  Pulmonary/Chest: Effort normal and breath sounds normal. No respiratory distress. He has no wheezes. He has no rales. He exhibits no tenderness.   Abdominal: Soft. Bowel sounds are normal. He exhibits no distension and no mass. There is no tenderness. There is no guarding.   Musculoskeletal: Normal range of motion. He exhibits tenderness (Tender over thenar region musculature without tenderness over first MCP or DIP of first finger bilaterally.). He exhibits no edema or deformity.   Lymphadenopathy:     He has no cervical adenopathy.   Neurological: He is alert and oriented to person, place, and time. No cranial nerve deficit.   Skin: Skin is warm and dry. Capillary refill takes 2 to 3 seconds. He is not diaphoretic.   7 mm pedunculated skin tag of superior lid " of left eye.    Cystic structure superior to eyelid   Psychiatric: He has a normal mood and affect. His behavior is normal. Judgment and thought content normal.        Assessment/Plan:     Nima was seen today for hypertension.    Diagnoses and all orders for this visit:    Chronic thumb pain, bilateral  -     meloxicam (MOBIC) 7.5 MG tablet; Take 1 tablet by mouth Daily.  -     Ambulatory Referral to Orthopedic Surgery    Skin tag  -     Ambulatory Referral to Ophthalmology    Bilateral carpal tunnel syndrome  -     meloxicam (MOBIC) 7.5 MG tablet; Take 1 tablet by mouth Daily.  -     Ambulatory Referral to Orthopedic Surgery    Cigarette nicotine dependence in remission  -     buPROPion SR (WELLBUTRIN SR) 150 MG 12 hr tablet; Take 1 tablet by mouth 2 (Two) Times a Day.    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  Refill of patient's palliative pain medication and patient continues to use braces even in the office and has been very compliant with braces.    Ophthalmologic referral for skin tags periauricular and on lids with thin skin there with high risk of complication.    Refill of maintenance medication for smoking cessation as patient is down to 4 to 5 cigarettes/day.    · Rx changes: Refills of medication  · Patient Education: Carpal tunnel  · Compliance at present is estimated to be poor.   · Efforts to improve compliance (if necessary) will be directed at Medication compliance.     Follow-up:     Return in about 1 month (around 3/11/2020) for Recheck blood pressure.    Preventative:  Health Maintenance   Topic Date Due   • COLONOSCOPY  07/12/2017   • ZOSTER VACCINE (1 of 2) 04/16/2020 (Originally 10/29/2010)   • ANNUAL PHYSICAL  04/17/2020   • LUNG CANCER SCREENING  07/03/2020   • LIPID PANEL  09/13/2020   • TDAP/TD VACCINES (2 - Td) 05/16/2029   • PNEUMOCOCCAL VACCINE (19-64 MEDIUM RISK)  Completed   • HEPATITIS C SCREENING   Completed   • INFLUENZA VACCINE  Completed     Male Preventative: Cholesterol screening up to date, patient told to follow-up with general surgery for repeat colonoscopy with poor prep previously.  Recommended: none  Vaccine Counseling: N/A    Weight  -Class: Obese Class I: 30-34.9kg/m2  -Patient's Body mass index is 33.03 kg/m². BMI is above normal parameters. Recommendations include: exercise counseling and nutrition counseling.   eat more fruits and vegetables, decrease soda or juice intake, increase water intake, increase physical activity, reduce screen time, reduce portion size, cut out extra servings, reduce fast food intake, family to eat at dinner table more often, keep TV off during meals, plan meals, eat breakfast and have 3 meals a day    Alcohol use:  reports that he drinks about 8.0 standard drinks of alcohol per week.  Nicotine status  reports that he has been smoking cigarettes. He has a 40.00 pack-year smoking history. He has never used smokeless tobacco.    Goals     • Smoking cessation (pt-stated)      Quitting smoking and living longer    Barriers: Wanting to quit            RISK SCORE: 3            This document has been electronically signed by Andrew Villatoro III, MD on February 11, 2020 2:04 PM

## 2020-02-14 ENCOUNTER — OFFICE VISIT (OUTPATIENT)
Dept: ORTHOPEDIC SURGERY | Facility: CLINIC | Age: 60
End: 2020-02-14

## 2020-02-14 VITALS — HEIGHT: 65 IN | WEIGHT: 199 LBS | BODY MASS INDEX: 33.15 KG/M2

## 2020-02-14 DIAGNOSIS — G89.29 CHRONIC PAIN OF RIGHT THUMB: Primary | ICD-10-CM

## 2020-02-14 DIAGNOSIS — M18.0 ARTHRITIS OF CARPOMETACARPAL (CMC) JOINT OF BOTH THUMBS: ICD-10-CM

## 2020-02-14 DIAGNOSIS — M79.644 CHRONIC PAIN OF RIGHT THUMB: Primary | ICD-10-CM

## 2020-02-14 PROCEDURE — 99203 OFFICE O/P NEW LOW 30 MIN: CPT | Performed by: ORTHOPAEDIC SURGERY

## 2020-02-14 RX ORDER — MELOXICAM 15 MG/1
TABLET ORAL
Qty: 30 TABLET | Refills: 3 | Status: SHIPPED | OUTPATIENT
Start: 2020-02-14 | End: 2020-04-10 | Stop reason: SDUPTHER

## 2020-02-14 NOTE — PROGRESS NOTES
Nima Portillo is a 59 y.o. male   Primary provider:  Andrew Villatoro III, MD       Chief Complaint   Patient presents with   • Right Hand - Pain   • Left Hand - Pain       HISTORY OF PRESENT ILLNESS: Patient is here today for bilateral thumb pain. He states that the pain radiates up his arm depending on what he is doing.  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  He does have some symptoms he describes he would prefer is some numbness and point.  He is tried Tylenol for pain and does not really help him much.  Is worse with activity better with rest    Pain   This is a new problem. The current episode started 1 to 4 weeks ago. The problem occurs intermittently. Associated symptoms include coughing, headaches and joint swelling. Pertinent negatives include no abdominal pain, chest pain, chills, fever, nausea, rash or vomiting. He has tried NSAIDs and rest for the symptoms.        CONCURRENT MEDICAL HISTORY:    Past Medical History:   Diagnosis Date   • Acute bronchitis    • Acute sinusitis    • Chest pain    • Chronic bronchitis (CMS/HCC)     secondary to smoking   • Claudication (CMS/HCC)    • Coronary arteriosclerosis    • Cough    • Gastroesophageal reflux disease    • Health maintenance examination     Individual health examination   • History of echocardiogram 10/21/2013    Echocardiogram W/ color flow 80764 (1) - Left atrium normal. Mild CLVH. EF 50%. Valves intact. Mild mitral and tricuspid regurg. Pericardium normal.   • History of echocardiogram 10/25/2011    Echocardiogram W/O color flow 58300 (1) - Normal LV sytolic function with mild LVH & mild diastolic dysfunction   • Hyperlipidemia    • Hypertensive disorder    • Pernicious anemia    • Thumb pain, left 10/14/2019   • Tobacco dependence syndrome        No Known Allergies      Current Outpatient Medications:   •  aspirin 81 MG chewable tablet, Chew 81 mg daily.,  Disp: , Rfl:   •  buPROPion SR (WELLBUTRIN SR) 150 MG 12 hr tablet, Take 1 tablet by mouth 2 (Two) Times a Day., Disp: 60 tablet, Rfl: 4  •  diclofenac (VOLTAREN) 1 % gel gel, Apply 4 g topically to the appropriate area as directed 2 (Two) Times a Day. Small amount to affected area, Disp: 100 g, Rfl:   •  dicyclomine (BENTYL) 20 MG tablet, Take 1 tablet by mouth Every 6 (Six) Hours As Needed (abdominal pain)., Disp: 30 tablet, Rfl: 0  •  hydroCHLOROthiazide (HYDRODIURIL) 25 MG tablet, Take 1 tablet by mouth Daily., Disp: 90 tablet, Rfl: 3  •  isosorbide mononitrate (IMDUR) 30 MG 24 hr tablet, Take 1 tablet by mouth Daily., Disp: 30 tablet, Rfl: 6  •  lisinopril (PRINIVIL,ZESTRIL) 40 MG tablet, Take 1 tablet by mouth Daily., Disp: 90 tablet, Rfl: 3  •  meloxicam (MOBIC) 15 MG tablet, 1 PO Daily with food., Disp: 30 tablet, Rfl: 3  •  metoprolol succinate XL (TOPROL-XL) 25 MG 24 hr tablet, Take 1 tablet by mouth Daily., Disp: 30 tablet, Rfl: 5  •  nitroglycerin (NITROSTAT) 0.4 MG SL tablet, Place 1 tablet under the tongue Every 5 (Five) Minutes As Needed for Chest Pain. Take no more than 3 doses in 15 minutes., Disp: 30 tablet, Rfl: 12  •  pantoprazole (PROTONIX) 40 MG EC tablet, Take 1 tablet by mouth Daily., Disp: 30 tablet, Rfl: 0  •  pravastatin (PRAVACHOL) 20 MG tablet, Take 20 mg by mouth Daily., Disp: , Rfl: 6    Past Surgical History:   Procedure Laterality Date   • CARDIAC CATHETERIZATION  04/08/2015    Cardiac cath 41478 (1) - Slective coronary angiogram. Left heart catheterization with LV ventriculogram.   • DENTAL PROCEDURE      Removal of all teeth       Family History   Problem Relation Age of Onset   • Heart disease Mother    • Hypertension Mother    • Diabetes Mother    • Heart disease Father    • Hypertension Father    • Diabetes Father    • Diabetes Sister    • Heart disease Sister    • Stroke Sister    • Cancer Paternal Aunt         Breast Cancer   • Cancer Paternal Uncle         Unknown cancer       "  Social History     Socioeconomic History   • Marital status:      Spouse name: Not on file   • Number of children: Not on file   • Years of education: Not on file   • Highest education level: Not on file   Tobacco Use   • Smoking status: Current Every Day Smoker     Packs/day: 1.00     Years: 40.00     Pack years: 40.00     Types: Cigarettes   • Smokeless tobacco: Never Used   Substance and Sexual Activity   • Alcohol use: Yes     Alcohol/week: 8.0 standard drinks     Types: 8 Cans of beer per week   • Drug use: No   • Sexual activity: Never     Comment: Marital status:         Review of Systems   Constitutional: Negative.  Negative for chills and fever.   HENT: Negative for facial swelling.    Eyes: Negative.  Negative for photophobia.   Respiratory: Positive for cough and wheezing. Negative for apnea and shortness of breath.    Cardiovascular: Negative.  Negative for chest pain and leg swelling.   Gastrointestinal: Negative.  Negative for abdominal pain, nausea and vomiting.   Endocrine: Negative.    Genitourinary: Negative.  Negative for dysuria.   Musculoskeletal: Positive for joint swelling.   Skin: Negative.  Negative for color change and rash.   Allergic/Immunologic: Negative.    Neurological: Positive for headaches. Negative for seizures and syncope.   Hematological: Negative.    Psychiatric/Behavioral: Negative.  Negative for behavioral problems and dysphoric mood.       PHYSICAL EXAMINATION:       Ht 165.1 cm (65\")   Wt 90.3 kg (199 lb)   BMI 33.12 kg/m²     Physical Exam   Constitutional: He is oriented to person, place, and time. He appears well-developed.   HENT:   Head: Normocephalic and atraumatic.   Eyes: Pupils are equal, round, and reactive to light. EOM are normal.   Neck: Neck supple. No tracheal deviation present.   Pulmonary/Chest: Effort normal.   Musculoskeletal: He exhibits tenderness. He exhibits no edema or deformity.   Neurological: He is alert and oriented to person, " place, and time.   Skin: Skin is warm and dry. No erythema.   Psychiatric: He has a normal mood and affect.       GAIT:     []  Normal  []  Antalgic    Assistive device: [x]  None  []  Walker     []  Crutches  []  Cane     []  Wheelchair  []  Stretcher    Ortho Exam  Tender in problem both CMC joints especially volarly the left is worse than the right.  Some numbness of the thumb but Tinel's is really negative at the wrist negative at the elbow Phalen's is negative.    No results found.    Views of each hand show CMC arthritis fairly symmetric with mild subluxation of the thumb.    ASSESSMENT:    Diagnoses and all orders for this visit:    Chronic pain of right thumb    Arthritis of carpometacarpal (CMC) joint of both thumbs    Other orders  -     diclofenac (VOLTAREN) 1 % gel gel; Apply 4 g topically to the appropriate area as directed 2 (Two) Times a Day. Small amount to affected area  -     meloxicam (MOBIC) 15 MG tablet; 1 PO Daily with food.        Patient's Body mass index is 33.12 kg/m². BMI is above normal parameters. Recommendations include: exercise counseling and nutrition counseling.  PLAN I have gone over spectrum of treatment for this.  We discussed injection, medications, splinting.  I have gone over her to get a CMC brace on the Internet and perhaps he can 1 of his children do this for him.  We will try some Voltaren gel as well as some Mobic.  He will stop if he has any side effects.  If is not better over the next month consider injection will call me and let me know we can proceed at that point.    No follow-ups on file.        This document has been electronically signed by Reinaldo Acevedo MD on February 14, 2020 3:29 PM

## 2020-02-27 DIAGNOSIS — R00.0 CHRONIC TACHYCARDIA: ICD-10-CM

## 2020-02-27 DIAGNOSIS — I25.118 CORONARY ARTERY DISEASE OF NATIVE ARTERY OF NATIVE HEART WITH STABLE ANGINA PECTORIS (HCC): ICD-10-CM

## 2020-02-28 DIAGNOSIS — R00.0 CHRONIC TACHYCARDIA: ICD-10-CM

## 2020-02-28 DIAGNOSIS — I25.118 CORONARY ARTERY DISEASE OF NATIVE ARTERY OF NATIVE HEART WITH STABLE ANGINA PECTORIS (HCC): ICD-10-CM

## 2020-02-28 RX ORDER — METOPROLOL SUCCINATE 50 MG/1
TABLET, EXTENDED RELEASE ORAL
Qty: 30 TABLET | Refills: 1 | OUTPATIENT
Start: 2020-02-28

## 2020-02-28 RX ORDER — METOPROLOL SUCCINATE 25 MG/1
25 TABLET, EXTENDED RELEASE ORAL DAILY
Qty: 90 TABLET | Refills: 3 | Status: SHIPPED | OUTPATIENT
Start: 2020-02-28 | End: 2020-08-04 | Stop reason: SDUPTHER

## 2020-04-08 ENCOUNTER — OFFICE VISIT (OUTPATIENT)
Dept: CARDIOLOGY | Facility: CLINIC | Age: 60
End: 2020-04-08

## 2020-04-08 VITALS — WEIGHT: 200 LBS | HEIGHT: 65 IN | BODY MASS INDEX: 33.32 KG/M2

## 2020-04-08 DIAGNOSIS — I10 ESSENTIAL HYPERTENSION: ICD-10-CM

## 2020-04-08 DIAGNOSIS — I25.10 CORONARY ARTERY DISEASE INVOLVING NATIVE CORONARY ARTERY OF NATIVE HEART WITHOUT ANGINA PECTORIS: Primary | ICD-10-CM

## 2020-04-08 DIAGNOSIS — E78.00 PURE HYPERCHOLESTEROLEMIA: ICD-10-CM

## 2020-04-08 PROCEDURE — 99214 OFFICE O/P EST MOD 30 MIN: CPT | Performed by: INTERNAL MEDICINE

## 2020-04-08 NOTE — PROGRESS NOTES
[facility] Cardiology  OFFICE NOTE    Cardiovascular Medicine  Barbara Ardon M.D., RPVI         No referring provider defined for this encounter.    You have chosen to receive care through a telephone visit today. Do you consent to use a telephone visit for your medical care today? Yes    This visit has been rescheduled as a phone visit to comply with patient safety concerns in accordance with CDC recommendations. Total time of discussion was 11 minutes.        Thank you for asking me to see Nima Portillo for CAD, syncope.    History of Present Illness  This is a 59 y.o. male with:  1. Coronary artery disease involving native coronary artery of native heart without angina pectoris    2. Essential hypertension    3. Pure hypercholesterolemia    4. Nicotine abuse    5. ETOH abuse          Chief complaint -syncope        History of present Illness- 59-year-old gentleman who smokeed about a pack and a half a day and drinks about 6 beers a day/week, he has history of cardiac catheterization 2015 by Dr. Sandhu and which showed small vessel disease in the obtuse marginal one side branch and in the LPDA with moderate diffuse disease in the LAD,   He has cut down on his smoking and drinking.  Patient was admitted to the hospital for syncopal episode in 2019.  Patient reported he had a bout of coughing which is followed by loss of consciousness.  Patient is admitted to the hospital for acute coronary syndrome he was noted to be bradycardic on arrival subsequently his metoprolol was cut back.  Was also also orthostatic.  He had carotid ultrasound done which were without any significant disease.  No recurrence of syncopal episodes since then.  Denies any chest pain or exertion.    04/08/2020  Patient reported he has been doing well.  He continues to have chest pain however the episodes are usually about once a month, they have not increased in severity or frequency.  His chest pain usually last for 2 to  3 minutes and resolve when he takes a sublingual nitroglycerin.  Denying any claudications, lower extremity swelling             Review of Systems - ROS  Constitution: Negative for weakness, weight gain and weight loss.   HENT: Negative for congestion.    Eyes: Negative for blurred vision.   Cardiovascular: As mentioned above  Respiratory: Negative for cough and hemoptysis.    Endocrine: Negative for polydipsia and polyuria.   Hematologic/Lymphatic: Negative for bleeding problem. Does not bruise/bleed easily.   Skin: Negative for flushing.   Musculoskeletal: Negative for neck pain and stiffness.   Gastrointestinal: Negative for abdominal pain, diarrhea, jaundice, melena, nausea and vomiting.   Genitourinary: Negative for dysuria and hematuria.   Neurological: Negative for dizziness, focal weakness and numbness.   Psychiatric/Behavioral: Negative for altered mental status and depression.          All other systems were reviewed and were negative.    family history includes Cancer in his paternal aunt and paternal uncle; Diabetes in his father, mother, and sister; Heart disease in his father, mother, and sister; Hypertension in his father and mother; Stroke in his sister.     reports that he has been smoking cigarettes. He has a 40.00 pack-year smoking history. He has never used smokeless tobacco. He reports that he drinks about 8.0 standard drinks of alcohol per week. He reports that he does not use drugs.    No Known Allergies      Current Outpatient Medications:   •  aspirin 81 MG chewable tablet, Chew 81 mg daily., Disp: , Rfl:   •  buPROPion SR (WELLBUTRIN SR) 150 MG 12 hr tablet, Take 1 tablet by mouth 2 (Two) Times a Day., Disp: 60 tablet, Rfl: 4  •  diclofenac (VOLTAREN) 1 % gel gel, Apply 4 g topically to the appropriate area as directed 2 (Two) Times a Day. Small amount to affected area, Disp: 100 g, Rfl:   •  dicyclomine (BENTYL) 20 MG tablet, Take 1 tablet by mouth Every 6 (Six) Hours As Needed (abdominal  pain)., Disp: 30 tablet, Rfl: 0  •  hydroCHLOROthiazide (HYDRODIURIL) 25 MG tablet, Take 1 tablet by mouth Daily., Disp: 90 tablet, Rfl: 3  •  isosorbide mononitrate (IMDUR) 30 MG 24 hr tablet, Take 1 tablet by mouth Daily., Disp: 30 tablet, Rfl: 6  •  lisinopril (PRINIVIL,ZESTRIL) 40 MG tablet, Take 1 tablet by mouth Daily., Disp: 90 tablet, Rfl: 3  •  meloxicam (MOBIC) 15 MG tablet, 1 PO Daily with food., Disp: 30 tablet, Rfl: 3  •  metoprolol succinate XL (TOPROL-XL) 25 MG 24 hr tablet, Take 1 tablet by mouth Daily., Disp: 90 tablet, Rfl: 3  •  nitroglycerin (NITROSTAT) 0.4 MG SL tablet, Place 1 tablet under the tongue Every 5 (Five) Minutes As Needed for Chest Pain. Take no more than 3 doses in 15 minutes., Disp: 30 tablet, Rfl: 12  •  pantoprazole (PROTONIX) 40 MG EC tablet, Take 1 tablet by mouth Daily., Disp: 30 tablet, Rfl: 0  •  pravastatin (PRAVACHOL) 20 MG tablet, Take 20 mg by mouth Daily., Disp: , Rfl: 6    Physical Exam:  This was a telephone visit and physical exam was not performed    DATA REVIEWED:         ECG/EMG Results (all)     None        ---------------------------------------------------  TTE/GEOFFREY:  Results for orders placed during the hospital encounter of 09/04/19   Adult Transthoracic Echo Complete W/ Cont if Necessary Per Protocol    Narrative · Estimated EF = 61%.  · Left ventricular systolic function is normal.  · Left ventricular diastolic dysfunction (grade I) consistent with   impaired relaxation.  · Mild mitral valve regurgitation is present  · Mild tricuspid valve regurgitation is present.  · Mild tricuspid valve stenosis is present.        -----------------------------------------------------  CXR/Imaging:   --------------  CT:   No radiology results for the last 30 days.    ----------------------------------------------------      --------------------------------------------------------------------------------------------------  LABS:     The CVD Risk score (Leeann et al.,  2008) failed to calculate for the following reasons:    The patient has a prior MI, stroke, CHF, or peripheral vascular disease diagnosis         Lab Results   Component Value Date    GLUCOSE 163 (H) 09/05/2019    BUN 14 09/05/2019    CREATININE 0.89 09/05/2019    EGFRIFNONA 88 09/05/2019    BCR 15.7 09/05/2019    K 4.1 09/05/2019    CO2 28.0 09/05/2019    CALCIUM 8.7 09/05/2019    ALBUMIN 4.00 09/04/2019    AST 17 09/04/2019    ALT 18 09/04/2019     Lab Results   Component Value Date    WBC 5.10 09/05/2019    HGB 13.5 09/05/2019    HCT 40.7 09/05/2019    MCV 91.9 09/05/2019     09/05/2019     Lab Results   Component Value Date    CHOL 173 09/13/2019    TRIG 200 (H) 09/13/2019    HDL 49 09/13/2019    LDL 84 09/13/2019     Lab Results   Component Value Date    TSH 2.08 03/17/2015     Lab Results   Component Value Date    CKTOTAL 102 03/16/2015    CKMB 3.7 03/16/2015    TROPONINI 0.251 (C) 03/16/2015    TROPONINT <0.010 09/04/2019     No results found for: HGBA1C  No results found for: DDIMER  Lab Results   Component Value Date    ALT 18 09/04/2019     No results found for: HGBA1C  Lab Results   Component Value Date    CREATININE 0.89 09/05/2019     No results found for: IRON, TIBC, FERRITIN  Lab Results   Component Value Date    INR 1.04 09/04/2019    INR 0.9 04/08/2015    PROTIME 13.4 09/04/2019    PROTIME 12.2 04/08/2015       Assessment/Plan     1. Syncope and collapse  Secondary to vasovagal syncope in the setting of significant coughing and competent orthostatic hypotension.  Patient was also bradycardic.  Can cut back on metoprolol with improvement in his heart rate.  Echo cardiogram was without any structural abnormalities.  Carotid Doppler was without any significant abnormalities.  Patient also had a monitor which was without any significant arrhythmias.    2. CAD:  Reviewed his angiogram from 2015 he does have moderate diffuse disease in LAD, he has a significant stenosis in inferior branch of a  small OM and L PDA.  He has chronic stable angina will continue medical treatment at this point.  If patient were to have worsening of her symptoms will consider repeat cardiac cath  Continue aspirin/beta-blocker/nitro    Hypertension controlled with lisinopril, isosorbide and HCTZ along with Toprol-XL.     Peripheral vascular disease -had CTA with abdominal aortogram with runoff which did not show any high-grade stenosis and some of his pain in the legs could be due to neuropathy from his alcohol use       Prevention:  Patient's Body mass index is 33.28 kg/m². BMI is above normal parameters. Recommendations include: exercise counseling and nutrition counseling.      Nima Portillo is a current cigarettes user.  He currently smokes 4 cigarettes per day for a duration of 10 years. I have educated him on the risk of diseases from using tobacco products such as cancer, COPD and heart diease.     I advised him to quit and he is not willing to quit.    I spent 3  minutes counseling the patient.          AAA Screening:     Return in about 4 months (around 8/8/2020).          This document has been electronically signed by Barbara Ardon MD on April 8, 2020 09:36

## 2020-04-10 ENCOUNTER — OFFICE VISIT (OUTPATIENT)
Dept: FAMILY MEDICINE CLINIC | Facility: CLINIC | Age: 60
End: 2020-04-10

## 2020-04-10 VITALS — BODY MASS INDEX: 32.14 KG/M2 | HEIGHT: 66 IN | WEIGHT: 200 LBS

## 2020-04-10 DIAGNOSIS — F17.211 CIGARETTE NICOTINE DEPENDENCE IN REMISSION: ICD-10-CM

## 2020-04-10 DIAGNOSIS — I10 ESSENTIAL HYPERTENSION: ICD-10-CM

## 2020-04-10 DIAGNOSIS — G56.03 BILATERAL CARPAL TUNNEL SYNDROME: Primary | ICD-10-CM

## 2020-04-10 PROCEDURE — 99213 OFFICE O/P EST LOW 20 MIN: CPT | Performed by: STUDENT IN AN ORGANIZED HEALTH CARE EDUCATION/TRAINING PROGRAM

## 2020-04-10 RX ORDER — MELOXICAM 15 MG/1
TABLET ORAL
Qty: 30 TABLET | Refills: 3 | Status: SHIPPED | OUTPATIENT
Start: 2020-04-10 | End: 2020-04-11 | Stop reason: SDUPTHER

## 2020-04-10 NOTE — PROGRESS NOTES
Family Medicine Residency  Andrew Villatoro III, MD    Subjective:   You have chosen to receive care through a telephone visit today. Do you consent to use a telephone visit for your medical care today? Yes  Nima Portillo is a 59 y.o. male who presents for for follow-up on bilateral carpal tunnel, hypertension, smoking cessation.    Patient with worsening of carpal tunnel.  Review of orthopedic notes was talk of injection versus surgical procedure.  Patient continues to have issues holding up plates and cups.  Patient continues to wear braces during the day without any symptoms at night.  Patient reports not receiving diclofenac gel or oral meloxicam.  This was a recommendation previously from orthopedist.    Patient with hypertension not checking his blood pressure at home.  Patient agreeable to test blood pressure at home with a kit.  Patient however has not been taking NSAIDs related to this secondary to Covid-19 and inability for surgical injection or procedure.  Patient counseled about blood pressure being asymptomatic especially with NSAID therapy topical and oral.    Patient is down to half a pack a day and has been cutting down with his bupropion therapy.  Patient reports this is helping out with his addiction.    The following portions of the patient's history were reviewed and updated as appropriate: allergies, current medications, past family history, past medical history, past social history, past surgical history and problem list.    Past Medical Hx:  Past Medical History:   Diagnosis Date   • Acute bronchitis    • Acute sinusitis    • Chest pain    • Chronic bronchitis (CMS/HCC)     secondary to smoking   • Claudication (CMS/HCC)    • Coronary arteriosclerosis    • Cough    • Gastroesophageal reflux disease    • Health maintenance examination     Individual health examination   • History of echocardiogram 10/21/2013    Echocardiogram W/ color flow 62922 (1) - Left atrium normal. Mild CLVH. EF  50%. Valves intact. Mild mitral and tricuspid regurg. Pericardium normal.   • History of echocardiogram 10/25/2011    Echocardiogram W/O color flow 57445 (1) - Normal LV sytolic function with mild LVH & mild diastolic dysfunction   • Hyperlipidemia    • Hypertensive disorder    • Pernicious anemia    • Thumb pain, left 10/14/2019   • Tobacco dependence syndrome        Past Surgical Hx:  Past Surgical History:   Procedure Laterality Date   • CARDIAC CATHETERIZATION  04/08/2015    Cardiac cath 60649 (1) - Slective coronary angiogram. Left heart catheterization with LV ventriculogram.   • DENTAL PROCEDURE      Removal of all teeth       Current Meds:    Current Outpatient Medications:   •  aspirin 81 MG chewable tablet, Chew 81 mg daily., Disp: , Rfl:   •  buPROPion SR (WELLBUTRIN SR) 150 MG 12 hr tablet, Take 1 tablet by mouth 2 (Two) Times a Day., Disp: 60 tablet, Rfl: 4  •  diclofenac (VOLTAREN) 1 % gel gel, Apply 4 g topically to the appropriate area as directed 2 (Two) Times a Day. Small amount to affected area, Disp: 100 g, Rfl: 3  •  dicyclomine (BENTYL) 20 MG tablet, Take 1 tablet by mouth Every 6 (Six) Hours As Needed (abdominal pain)., Disp: 30 tablet, Rfl: 0  •  hydroCHLOROthiazide (HYDRODIURIL) 25 MG tablet, Take 1 tablet by mouth Daily., Disp: 90 tablet, Rfl: 3  •  isosorbide mononitrate (IMDUR) 30 MG 24 hr tablet, Take 1 tablet by mouth Daily., Disp: 30 tablet, Rfl: 6  •  lisinopril (PRINIVIL,ZESTRIL) 40 MG tablet, Take 1 tablet by mouth Daily., Disp: 90 tablet, Rfl: 3  •  meloxicam (MOBIC) 15 MG tablet, 1 PO Daily with food., Disp: 30 tablet, Rfl: 3  •  metoprolol succinate XL (TOPROL-XL) 25 MG 24 hr tablet, Take 1 tablet by mouth Daily., Disp: 90 tablet, Rfl: 3  •  nitroglycerin (NITROSTAT) 0.4 MG SL tablet, Place 1 tablet under the tongue Every 5 (Five) Minutes As Needed for Chest Pain. Take no more than 3 doses in 15 minutes., Disp: 30 tablet, Rfl: 12  •  pantoprazole (PROTONIX) 40 MG EC tablet, Take  1 tablet by mouth Daily., Disp: 30 tablet, Rfl: 0  •  pravastatin (PRAVACHOL) 20 MG tablet, Take 20 mg by mouth Daily., Disp: , Rfl: 6    Allergies:  No Known Allergies    Family Hx:  Family History   Problem Relation Age of Onset   • Heart disease Mother    • Hypertension Mother    • Diabetes Mother    • Heart disease Father    • Hypertension Father    • Diabetes Father    • Diabetes Sister    • Heart disease Sister    • Stroke Sister    • Cancer Paternal Aunt         Breast Cancer   • Cancer Paternal Uncle         Unknown cancer        Social History:  Social History     Socioeconomic History   • Marital status:      Spouse name: Not on file   • Number of children: Not on file   • Years of education: Not on file   • Highest education level: Not on file   Tobacco Use   • Smoking status: Current Every Day Smoker     Packs/day: 0.25     Years: 40.00     Pack years: 10.00     Types: Cigarettes   • Smokeless tobacco: Never Used   Substance and Sexual Activity   • Alcohol use: Yes     Alcohol/week: 8.0 standard drinks     Types: 8 Cans of beer per week   • Drug use: No   • Sexual activity: Never     Comment: Marital status:        Review of Systems  Review of Systems   Constitutional: Negative for activity change, appetite change, chills, diaphoresis and fever.   HENT: Negative for congestion, rhinorrhea, sinus pressure, sinus pain and sore throat.    Eyes: Negative for visual disturbance.   Respiratory: Negative for apnea, cough, choking, chest tightness, shortness of breath and wheezing.    Cardiovascular: Negative for chest pain, palpitations and leg swelling.   Gastrointestinal: Negative for abdominal distention, abdominal pain, blood in stool, constipation, diarrhea, nausea and vomiting.   Genitourinary: Negative for difficulty urinating, dysuria, frequency, hematuria and urgency.   Musculoskeletal: Negative for arthralgias, back pain, joint swelling, myalgias and neck pain.   Skin: Negative for  "color change, pallor, rash and wound.   Neurological: Positive for weakness and numbness. Negative for dizziness and headaches.   Psychiatric/Behavioral: Negative for agitation and behavioral problems.       Objective:     Ht 167.6 cm (66\")   Wt 90.7 kg (200 lb) Comment: quoted via telephone  BMI 32.28 kg/m²   Physical Exam deferred secondary to telemedicine encounter  Assessment/Plan:     Nima was seen today for hypertension.    Diagnoses and all orders for this visit:    Bilateral carpal tunnel syndrome  -     diclofenac (VOLTAREN) 1 % gel gel; Apply 4 g topically to the appropriate area as directed 2 (Two) Times a Day. Small amount to affected area  -     meloxicam (MOBIC) 15 MG tablet; 1 PO Daily with food.  -     Blood Pressure Device  -     Ambulatory Referral to Orthopedic Surgery    Essential hypertension  -     Blood Pressure Device    Cigarette nicotine dependence in remission    Will reorder patient's topical NSAIDs along with long-acting oral NSAIDs.  Urgent referral placed to orthopedist related to injection versus carpal tunnel releases patient is continuing to drop plates and cups.    Patient without blood pressure readings, 2+ therapies which will increase with NSAID to help with his symptomatic bilateral carpal tunnel syndrome.    Patient counseled to continue bupropion therapy as being efficacious for him for smoking cessation.    · Rx changes: Topical NSAID long-acting oral NSAID  · Patient Education: Carpal tunnel  · Compliance at present is estimated to be excellent.   · Efforts to improve compliance (if necessary) will be directed at increased exercise.     Follow-up:     Return in about 1 month (around 5/10/2020) for Recheck HTN.    Preventative:  Health Maintenance   Topic Date Due   • COLONOSCOPY  07/12/2017   • ZOSTER VACCINE (2 of 2) 07/11/2019   • ANNUAL PHYSICAL  04/17/2020   • INFLUENZA VACCINE  08/01/2020   • LIPID PANEL  09/13/2020   • TDAP/TD VACCINES (2 - Td) 05/16/2029   • " PNEUMOCOCCAL VACCINE (19-64 MEDIUM RISK)  Completed   • HEPATITIS C SCREENING  Completed     Male Preventative: Cholesterol screening up to date  Recommended: none  Vaccine Counseling: N/A    Weight  -Class: Obese Class I: 30-34.9kg/m2  -Patient's Body mass index is 32.28 kg/m². BMI is above normal parameters. Recommendations include: exercise counseling and nutrition counseling.   eat more fruits and vegetables, decrease soda or juice intake, increase water intake, increase physical activity, reduce screen time, reduce portion size, cut out extra servings, reduce fast food intake, family to eat at dinner table more often, keep TV off during meals, plan meals, eat breakfast and have 3 meals a day    Alcohol use:  reports that he drinks about 8.0 standard drinks of alcohol per week.  Nicotine status  reports that he has been smoking cigarettes. He has a 10.00 pack-year smoking history. He has never used smokeless tobacco.    Goals     • Smoking cessation (pt-stated)      Quitting smoking and living longer    Barriers: Wanting to quit            RISK SCORE: 3    This was an audio and video enabled telemedicine encounter.  15 minutes was utilized for this telemedicine encounter        This document has been electronically signed by Andrew Villatoro III, MD on April 10, 2020 18:51      Dragon disclaimer: Parts of this note were transcribed using dragon dictation software.

## 2020-04-11 ENCOUNTER — TELEPHONE (OUTPATIENT)
Dept: FAMILY MEDICINE CLINIC | Facility: CLINIC | Age: 60
End: 2020-04-11

## 2020-04-11 DIAGNOSIS — I10 ESSENTIAL HYPERTENSION: Primary | ICD-10-CM

## 2020-04-11 DIAGNOSIS — G56.03 BILATERAL CARPAL TUNNEL SYNDROME: ICD-10-CM

## 2020-04-11 RX ORDER — MELOXICAM 15 MG/1
TABLET ORAL
Qty: 30 TABLET | Refills: 3 | Status: SHIPPED | OUTPATIENT
Start: 2020-04-11 | End: 2020-06-16

## 2020-04-11 NOTE — TELEPHONE ENCOUNTER
"Spoke with patient about needing his mobic refilled. Patient stated he was unable to get his \"pill\" or his \"machine\" that were prescribed in office yesterday. On review of documentation, Dr. Cason had sent in for mobic 15 mg daily and a blood pressure device. Will try resending that to his pharmacy [facility].        Tavia Arriaga MD PGY3  [facility] Family Medicine Residency  This document has been electronically signed by Tavia Arriaga MD on April 11, 2020 14:09    "

## 2020-04-13 NOTE — PROGRESS NOTES
I have spoken with the patient.  I have reviewed the notes, assessments, and/or procedures performed by Andrew iVllatoro III, MD, I concur with her/his documentation and assessment and plan for Nima Portillo.               This document has been electronically signed by Hubert Thomas MD on April 13, 2020 10:10

## 2020-04-14 ENCOUNTER — TELEPHONE (OUTPATIENT)
Dept: FAMILY MEDICINE CLINIC | Facility: CLINIC | Age: 60
End: 2020-04-14

## 2020-04-14 NOTE — TELEPHONE ENCOUNTER
PATIENT CALLED AND DID A TELEPHONE VISIT WITH DR CAMPBELL ON 04/10/2020. THE PATIENT SAYS HE DOESN'T KNOW THE MEDICATION THAT DR CAMPBELL PRESCRIBED HIM FOR HIS THUMB BUT HE NEVER GOT IT. IT WAS SUPPOSE TO BE SENT TO [facility] IN [place].  HIS NUMBER TO CALL BACK [phone].      THANK YOU,      CHARAN

## 2020-04-14 NOTE — TELEPHONE ENCOUNTER
Called patient back, left voicemail stating I called [facility] pharmacy, they confirmed the medication Voltaren gel that was sent in by Dr. Cason on 4/10/20 has been picked up and the medication Mobic sent in by Dr. Arriaga on 4/11/20 is ready for pick-up as well.       IRASEMA Monge MA.    4/14/20

## 2020-05-08 ENCOUNTER — OFFICE VISIT (OUTPATIENT)
Dept: FAMILY MEDICINE CLINIC | Facility: CLINIC | Age: 60
End: 2020-05-08

## 2020-05-08 VITALS — BODY MASS INDEX: 30.53 KG/M2 | HEIGHT: 66 IN | WEIGHT: 190 LBS

## 2020-05-08 DIAGNOSIS — I10 ESSENTIAL HYPERTENSION: ICD-10-CM

## 2020-05-08 DIAGNOSIS — G56.03 BILATERAL CARPAL TUNNEL SYNDROME: Primary | ICD-10-CM

## 2020-05-08 PROCEDURE — 99442 PR PHYS/QHP TELEPHONE EVALUATION 11-20 MIN: CPT | Performed by: STUDENT IN AN ORGANIZED HEALTH CARE EDUCATION/TRAINING PROGRAM

## 2020-05-08 NOTE — PROGRESS NOTES
You have chosen to receive care through a telephone visit today. Do you consent to use a telephone visit for your medical care today? Yes    Family Medicine Residency  Andrew Villatoro III, MD    Subjective:     Nima Portillo is a 59 y.o. male who presents for bilateral carpal tunnel, essential hypertension.    Patient reports interval improvement with diclofenac.  Patient is eager for orthopedic referral for injections versus carpal tunnel release.    Patient with mixed history regarding his blood pressures.  He had some 130 some 180.  This is a mixed history with potential wrist blood pressure cuff.  This is being used from a friend.  We also had a discussion to grab his own biceps blood pressure cuff.  To buy this from [employer] and also get a reading at [employer] when he goes.      The following portions of the patient's history were reviewed and updated as appropriate: allergies, current medications, past family history, past medical history, past social history, past surgical history and problem list.    Past Medical Hx:  Past Medical History:   Diagnosis Date   • Acute bronchitis    • Acute sinusitis    • Chest pain    • Chronic bronchitis (CMS/HCC)     secondary to smoking   • Claudication (CMS/HCC)    • Coronary arteriosclerosis    • Cough    • Gastroesophageal reflux disease    • Health maintenance examination     Individual health examination   • History of echocardiogram 10/21/2013    Echocardiogram W/ color flow 95748 (1) - Left atrium normal. Mild CLVH. EF 50%. Valves intact. Mild mitral and tricuspid regurg. Pericardium normal.   • History of echocardiogram 10/25/2011    Echocardiogram W/O color flow 29476 (1) - Normal LV sytolic function with mild LVH & mild diastolic dysfunction   • Hyperlipidemia    • Hypertensive disorder    • Pernicious anemia    • Thumb pain, left 10/14/2019   • Tobacco dependence syndrome        Past Surgical Hx:  Past Surgical History:   Procedure Laterality Date   •  CARDIAC CATHETERIZATION  04/08/2015    Cardiac cath 75174 (1) - Slective coronary angiogram. Left heart catheterization with LV ventriculogram.   • DENTAL PROCEDURE      Removal of all teeth       Current Meds:    Current Outpatient Medications:   •  aspirin 81 MG chewable tablet, Chew 81 mg daily., Disp: , Rfl:   •  buPROPion SR (WELLBUTRIN SR) 150 MG 12 hr tablet, Take 1 tablet by mouth 2 (Two) Times a Day., Disp: 60 tablet, Rfl: 4  •  diclofenac (VOLTAREN) 1 % gel gel, Apply 4 g topically to the appropriate area as directed 2 (Two) Times a Day. Small amount to affected area, Disp: 100 g, Rfl: 3  •  dicyclomine (BENTYL) 20 MG tablet, Take 1 tablet by mouth Every 6 (Six) Hours As Needed (abdominal pain)., Disp: 30 tablet, Rfl: 0  •  hydroCHLOROthiazide (HYDRODIURIL) 25 MG tablet, Take 1 tablet by mouth Daily., Disp: 90 tablet, Rfl: 3  •  isosorbide mononitrate (IMDUR) 30 MG 24 hr tablet, Take 1 tablet by mouth Daily., Disp: 30 tablet, Rfl: 6  •  lisinopril (PRINIVIL,ZESTRIL) 40 MG tablet, Take 1 tablet by mouth Daily., Disp: 90 tablet, Rfl: 3  •  meloxicam (MOBIC) 15 MG tablet, 1 PO Daily with food., Disp: 30 tablet, Rfl: 3  •  metoprolol succinate XL (TOPROL-XL) 25 MG 24 hr tablet, Take 1 tablet by mouth Daily., Disp: 90 tablet, Rfl: 3  •  nitroglycerin (NITROSTAT) 0.4 MG SL tablet, Place 1 tablet under the tongue Every 5 (Five) Minutes As Needed for Chest Pain. Take no more than 3 doses in 15 minutes., Disp: 30 tablet, Rfl: 12  •  pantoprazole (PROTONIX) 40 MG EC tablet, Take 1 tablet by mouth Daily., Disp: 30 tablet, Rfl: 0  •  pravastatin (PRAVACHOL) 20 MG tablet, Take 20 mg by mouth Daily., Disp: , Rfl: 6    Allergies:  No Known Allergies    Family Hx:  Family History   Problem Relation Age of Onset   • Heart disease Mother    • Hypertension Mother    • Diabetes Mother    • Heart disease Father    • Hypertension Father    • Diabetes Father    • Diabetes Sister    • Heart disease Sister    • Stroke Sister    •  "Cancer Paternal Aunt         Breast Cancer   • Cancer Paternal Uncle         Unknown cancer        Social History:  Social History     Socioeconomic History   • Marital status:      Spouse name: Not on file   • Number of children: Not on file   • Years of education: Not on file   • Highest education level: Not on file   Tobacco Use   • Smoking status: Current Every Day Smoker     Packs/day: 0.25     Years: 40.00     Pack years: 10.00     Types: Cigarettes   • Smokeless tobacco: Never Used   Substance and Sexual Activity   • Alcohol use: Yes     Alcohol/week: 8.0 standard drinks     Types: 8 Cans of beer per week   • Drug use: No   • Sexual activity: Never     Comment: Marital status:        Review of Systems  Review of Systems   Constitutional: Negative for activity change, appetite change, chills, diaphoresis and fever.   HENT: Negative for congestion, rhinorrhea, sinus pressure, sinus pain and sore throat.    Eyes: Negative for visual disturbance.   Respiratory: Negative for apnea, cough, choking, chest tightness, shortness of breath and wheezing.    Cardiovascular: Negative for chest pain, palpitations and leg swelling.   Gastrointestinal: Negative for abdominal distention, abdominal pain, blood in stool, constipation, diarrhea, nausea and vomiting.   Genitourinary: Negative for difficulty urinating, dysuria, frequency, hematuria and urgency.   Musculoskeletal: Negative for arthralgias, back pain, joint swelling, myalgias and neck pain.   Skin: Negative for color change, pallor, rash and wound.   Neurological: Positive for numbness. Negative for dizziness, weakness and headaches.   Psychiatric/Behavioral: Negative for agitation and behavioral problems.       Objective:     Ht 167.6 cm (66\")   Wt 86.2 kg (190 lb)   BMI 30.67 kg/m²   Physical Exam deferred secondary to audio telemedicine encounter.  Patient with mumbled voice but speaking in full sentences with moderate intelligibility.     "   Assessment/Plan:     Nima was seen today for hypertension.    Diagnoses and all orders for this visit:    Bilateral carpal tunnel syndrome    Essential hypertension    Will continue current management of topical and oral NSAIDs.  Patient is receiving orthopedic referral on Monday 5- bilateral carpal tunnel injection versus referral for carpal tunnel release.    Patient with mixed history of systolic blood pressures.  Patient counseled to get a machine and to write the numbers down and to report it.  Patient with questionable history of phone to look at blood pressure cuff and blood pressure readings.  We will schedule patient for in person visit to help with blood pressure checks.    · Rx changes: None  · Patient Education: Carpal tunnel and blood pressure reading  · Compliance at present is estimated to be excellent.   · Efforts to improve compliance (if necessary) will be directed at increased exercise.     Follow-up:     Return in about 1 month (around 6/8/2020) for Recheck htn eye pain.    Preventative:  Health Maintenance   Topic Date Due   • COLONOSCOPY  07/12/2017   • ZOSTER VACCINE (2 of 2) 07/11/2019   • ANNUAL PHYSICAL  04/17/2020   • INFLUENZA VACCINE  08/01/2020   • LIPID PANEL  09/13/2020   • TDAP/TD VACCINES (2 - Td) 05/16/2029   • PNEUMOCOCCAL VACCINE (19-64 MEDIUM RISK)  Completed   • HEPATITIS C SCREENING  Completed     Male Preventative: Cholesterol screening up to date  Recommended: none  Vaccine Counseling: N/A    Weight  -Class: Obese Class I: 30-34.9kg/m2  -Patient's Body mass index is 30.67 kg/m². BMI is above normal parameters. Recommendations include: exercise counseling and nutrition counseling.   eat more fruits and vegetables, decrease soda or juice intake, increase water intake, increase physical activity, reduce screen time, reduce portion size, cut out extra servings, reduce fast food intake, family to eat at dinner table more often, keep TV off during meals, plan meals, eat  breakfast and have 3 meals a day    Alcohol use:  reports that he drinks about 8.0 standard drinks of alcohol per week.  Nicotine status  reports that he has been smoking cigarettes. He has a 10.00 pack-year smoking history. He has never used smokeless tobacco.    Goals     • Smoking cessation (pt-stated)      Quitting smoking and living longer    Barriers: Wanting to quit            RISK SCORE: 3  This was an audio  enabled telemedicine encounter.  - 18 minutes was utilized for this audio telemedicine encounter          This document has been electronically signed by Andrew Villatoro III, MD on May 8, 2020 14:08      Dragon disclaimer: Parts of this note were transcribed using dragon dictation software.

## 2020-05-11 ENCOUNTER — OFFICE VISIT (OUTPATIENT)
Dept: ORTHOPEDIC SURGERY | Facility: CLINIC | Age: 60
End: 2020-05-11

## 2020-05-11 VITALS — WEIGHT: 190.8 LBS | HEIGHT: 66 IN | BODY MASS INDEX: 30.67 KG/M2

## 2020-05-11 DIAGNOSIS — M79.644 CHRONIC PAIN OF RIGHT THUMB: Primary | ICD-10-CM

## 2020-05-11 DIAGNOSIS — M79.642 BILATERAL HAND PAIN: ICD-10-CM

## 2020-05-11 DIAGNOSIS — M79.641 BILATERAL HAND PAIN: ICD-10-CM

## 2020-05-11 DIAGNOSIS — G89.29 CHRONIC PAIN OF RIGHT THUMB: Primary | ICD-10-CM

## 2020-05-11 DIAGNOSIS — M18.0 ARTHRITIS OF CARPOMETACARPAL (CMC) JOINT OF BOTH THUMBS: ICD-10-CM

## 2020-05-11 PROCEDURE — 20600 DRAIN/INJ JOINT/BURSA W/O US: CPT | Performed by: ORTHOPAEDIC SURGERY

## 2020-05-11 PROCEDURE — 99213 OFFICE O/P EST LOW 20 MIN: CPT | Performed by: ORTHOPAEDIC SURGERY

## 2020-05-11 RX ORDER — TRIAMCINOLONE ACETONIDE 40 MG/ML
40 INJECTION, SUSPENSION INTRA-ARTICULAR; INTRAMUSCULAR
Status: COMPLETED | OUTPATIENT
Start: 2020-05-11 | End: 2020-05-11

## 2020-05-11 RX ORDER — LIDOCAINE HYDROCHLORIDE 10 MG/ML
1 INJECTION, SOLUTION EPIDURAL; INFILTRATION; INTRACAUDAL; PERINEURAL
Status: COMPLETED | OUTPATIENT
Start: 2020-05-11 | End: 2020-05-11

## 2020-05-11 RX ADMIN — LIDOCAINE HYDROCHLORIDE 1 ML: 10 INJECTION, SOLUTION EPIDURAL; INFILTRATION; INTRACAUDAL; PERINEURAL at 09:45

## 2020-05-11 RX ADMIN — TRIAMCINOLONE ACETONIDE 40 MG: 40 INJECTION, SUSPENSION INTRA-ARTICULAR; INTRAMUSCULAR at 09:45

## 2020-05-11 NOTE — PROGRESS NOTES
Nima Portillo is a 59 y.o. male returns for     Chief Complaint   Patient presents with   • Left Hand - Follow-up   • Right Hand - Follow-up       HISTORY OF PRESENT ILLNESS: Patient being seen for bilateral thumb follow up.  We put some cream on them before to help him a little bit he found some impact gloves that helped him somewhat.  The right is worse than the left is still having problems.  He denies having any neurologic symptoms at this time.       CONCURRENT MEDICAL HISTORY:    Past Medical History:   Diagnosis Date   • Acute bronchitis    • Acute sinusitis    • Chest pain    • Chronic bronchitis (CMS/HCC)     secondary to smoking   • Claudication (CMS/HCC)    • Coronary arteriosclerosis    • Cough    • Gastroesophageal reflux disease    • Health maintenance examination     Individual health examination   • History of echocardiogram 10/21/2013    Echocardiogram W/ color flow 08956 (1) - Left atrium normal. Mild CLVH. EF 50%. Valves intact. Mild mitral and tricuspid regurg. Pericardium normal.   • History of echocardiogram 10/25/2011    Echocardiogram W/O color flow 77681 (1) - Normal LV sytolic function with mild LVH & mild diastolic dysfunction   • Hyperlipidemia    • Hypertensive disorder    • Pernicious anemia    • Thumb pain, left 10/14/2019   • Tobacco dependence syndrome        No Known Allergies      Current Outpatient Medications:   •  aspirin 81 MG chewable tablet, Chew 81 mg daily., Disp: , Rfl:   •  buPROPion SR (WELLBUTRIN SR) 150 MG 12 hr tablet, Take 1 tablet by mouth 2 (Two) Times a Day., Disp: 60 tablet, Rfl: 4  •  diclofenac (VOLTAREN) 1 % gel gel, Apply 4 g topically to the appropriate area as directed 2 (Two) Times a Day. Small amount to affected area, Disp: 100 g, Rfl: 3  •  dicyclomine (BENTYL) 20 MG tablet, Take 1 tablet by mouth Every 6 (Six) Hours As Needed (abdominal pain)., Disp: 30 tablet, Rfl: 0  •  hydroCHLOROthiazide (HYDRODIURIL) 25 MG tablet, Take 1 tablet by mouth  "Daily., Disp: 90 tablet, Rfl: 3  •  isosorbide mononitrate (IMDUR) 30 MG 24 hr tablet, Take 1 tablet by mouth Daily., Disp: 30 tablet, Rfl: 6  •  lisinopril (PRINIVIL,ZESTRIL) 40 MG tablet, Take 1 tablet by mouth Daily., Disp: 90 tablet, Rfl: 3  •  meloxicam (MOBIC) 15 MG tablet, 1 PO Daily with food., Disp: 30 tablet, Rfl: 3  •  metoprolol succinate XL (TOPROL-XL) 25 MG 24 hr tablet, Take 1 tablet by mouth Daily., Disp: 90 tablet, Rfl: 3  •  nitroglycerin (NITROSTAT) 0.4 MG SL tablet, Place 1 tablet under the tongue Every 5 (Five) Minutes As Needed for Chest Pain. Take no more than 3 doses in 15 minutes., Disp: 30 tablet, Rfl: 12  •  pantoprazole (PROTONIX) 40 MG EC tablet, Take 1 tablet by mouth Daily., Disp: 30 tablet, Rfl: 0  •  pravastatin (PRAVACHOL) 20 MG tablet, Take 20 mg by mouth Daily., Disp: , Rfl: 6    Past Surgical History:   Procedure Laterality Date   • CARDIAC CATHETERIZATION  04/08/2015    Cardiac cath 26549 (1) - Slective coronary angiogram. Left heart catheterization with LV ventriculogram.   • DENTAL PROCEDURE      Removal of all teeth           ROS: Review of systems has been updated as of today's date.  All other systems are negative except as noted previously.    PHYSICAL EXAMINATION:       Ht 167.6 cm (66\")   Wt 86.5 kg (190 lb 12.8 oz)   BMI 30.80 kg/m²     Physical Exam   Constitutional: He is oriented to person, place, and time. He appears well-developed.   HENT:   Head: Normocephalic and atraumatic.   Eyes: Pupils are equal, round, and reactive to light. EOM are normal.   Neck: Neck supple. No tracheal deviation present.   Pulmonary/Chest: Effort normal.   Musculoskeletal: Normal range of motion. He exhibits tenderness and deformity. He exhibits no edema.   Neurological: He is alert and oriented to person, place, and time. No sensory deficit.   Skin: Skin is warm and dry. No erythema.   Psychiatric: He has a normal mood and affect.       GAIT:     []  Normal  []  Antalgic    Assistive " device: [x]  None  []  Walker     []  Crutches  []  Cane     []  Wheelchair  []  Stretcher    Ortho Exam  Enter the CMC joint.  Right somewhat worse than left.  Crepitus is palpated.  Motion really well-maintained.  No numbness is noted today.  No atrophy.    PROCEDURE: Right hand     DATE OF EXAM: 10/14/2019     HISTORY: Pain in fingers and right thumb     Three views of the hand were obtained.      FINDINGS:     No fractures or acute bony abnormalities are seen. The joint  spaces are satisfactorily preserved. No radiopaque foreign bodies  are seen. No significant soft tissue swelling is evident.     Mild degenerative changes in the wrist are seen with slight  narrowing at the first carpometacarpal joint spaces.     IMPRESSION:  1. Unremarkable right hand.   2. Mild degenerative changes at the first carpometacarpal joint  spaces of the wrist.        Electronically signed by:  Matthew Claros MD  10/14/2019 2:54 PM  CDT Workstation: [id number]    PROCEDURE: Left hand     DATE OF EXAM: 10/14/2019     HISTORY: Thumb pain     Three views of the hand were obtained.      FINDINGS:     No fractures or acute bony abnormalities are seen. The joint  spaces are satisfactorily preserved. No radiopaque foreign bodies  are seen. No significant soft tissue swelling is evident.     Mild generative changes of the wrist are seen with narrowing at  the first carpometacarpal joint spaces.     IMPRESSION:  1. Unremarkable left hand.  2. Mild degenerative changes with narrowing at the first  carpometacarpal joint spaces of the wrist.        Electronically signed by:  Matthew Claros MD  10/14/2019 2:55 PM  CDT Workstation: [id number]          ASSESSMENT:    Diagnoses and all orders for this visit:    Chronic pain of right thumb  -     Small Joint Arthrocentesis: R thumb CMC    Arthritis of carpometacarpal (CMC) joint of both thumbs  -     Small Joint Arthrocentesis: R thumb CMC    Bilateral hand pain  -     Small Joint Arthrocentesis: R thumb  CMC    Other orders  -     Cancel: Medium Joint Arthrocentesis          PLAN today he does not complain of any neurologic symptoms.  I think his problem is really CMC arthritis.  I have injected the CMC joint on the right side.  He has good initial relief and the effect of the Xylocaine moves better.  He wants to hold off on injecting the left.  I will see him at any point he will call me back if he like to inject the other side.  Have instructed to ice the wrist down tonight where we injected him.    Small Joint Arthrocentesis: R thumb CMC  Consent given by: patient  Site marked: site marked  Timeout: Immediately prior to procedure a time out was called to verify the correct patient, procedure, equipment, support staff and site/side marked as required   Supporting Documentation  Indications: pain   Procedure Details  Location: thumb - R thumb CMC  Preparation: Patient was prepped and draped in the usual sterile fashion  Needle size: 25 G  Medications administered: 1 mL lidocaine PF 1% 1 %; 40 mg triamcinolone acetonide 40 MG/ML              Patient's Body mass index is 30.8 kg/m². BMI is above normal parameters. Recommendations include: exercise counseling and nutrition counseling.      No follow-ups on file.      This document has been electronically signed by Reinaldo Acevedo MD on May 11, 2020 09:51

## 2020-05-15 NOTE — PROGRESS NOTES
I have seen the patient.  I have reviewed the notes, assessments, and/or procedures performed by *Andrew Villatoro III, MD  ** during office visit I concur with her/his documentation and assessment and plan for Nima Portillo.              This document has been electronically signed by Sudhir Zamorano MD on May 15, 2020 09:32

## 2020-05-18 ENCOUNTER — OFFICE VISIT (OUTPATIENT)
Dept: ORTHOPEDIC SURGERY | Facility: CLINIC | Age: 60
End: 2020-05-18

## 2020-05-18 VITALS — BODY MASS INDEX: 31.02 KG/M2 | WEIGHT: 193 LBS | HEIGHT: 66 IN

## 2020-05-18 DIAGNOSIS — M79.644 CHRONIC PAIN OF RIGHT THUMB: Primary | ICD-10-CM

## 2020-05-18 DIAGNOSIS — M79.641 BILATERAL HAND PAIN: ICD-10-CM

## 2020-05-18 DIAGNOSIS — M79.642 BILATERAL HAND PAIN: ICD-10-CM

## 2020-05-18 DIAGNOSIS — G89.29 CHRONIC PAIN OF RIGHT THUMB: Primary | ICD-10-CM

## 2020-05-18 DIAGNOSIS — M18.0 ARTHRITIS OF CARPOMETACARPAL (CMC) JOINT OF BOTH THUMBS: ICD-10-CM

## 2020-05-18 PROCEDURE — 20600 DRAIN/INJ JOINT/BURSA W/O US: CPT | Performed by: ORTHOPAEDIC SURGERY

## 2020-05-18 RX ORDER — TRIAMCINOLONE ACETONIDE 40 MG/ML
40 INJECTION, SUSPENSION INTRA-ARTICULAR; INTRAMUSCULAR
Status: COMPLETED | OUTPATIENT
Start: 2020-05-18 | End: 2020-05-18

## 2020-05-18 RX ORDER — LIDOCAINE HYDROCHLORIDE 10 MG/ML
1 INJECTION, SOLUTION EPIDURAL; INFILTRATION; INTRACAUDAL; PERINEURAL
Status: COMPLETED | OUTPATIENT
Start: 2020-05-18 | End: 2020-05-18

## 2020-05-18 RX ADMIN — TRIAMCINOLONE ACETONIDE 40 MG: 40 INJECTION, SUSPENSION INTRA-ARTICULAR; INTRAMUSCULAR at 09:53

## 2020-05-18 RX ADMIN — LIDOCAINE HYDROCHLORIDE 1 ML: 10 INJECTION, SOLUTION EPIDURAL; INFILTRATION; INTRACAUDAL; PERINEURAL at 09:53

## 2020-05-18 NOTE — PROGRESS NOTES
"Nima Portillo is a 59 y.o. male returns for     Chief Complaint   Patient presents with   • Right Hand - Follow-up   • Left Hand - Follow-up       HISTORY OF PRESENT ILLNESS:   Injection from 5/11/20 into right thumb did well.  Patient wants one in his left thumb today.  The shot last time helped him still having the same pain he wants to get the other side done       CONCURRENT MEDICAL HISTORY:    The following portions of the patient's history were reviewed and updated as appropriate: allergies, current medications, past family history, past medical history, past social history, past surgical history and problem list.     ROS  No fevers or chills.  No chest pain or shortness of air.  No GI or  disturbances.  No cardiac issues noted.  All other systems are reported negative or without change.    PHYSICAL EXAMINATION:       Ht 167.6 cm (66\")   Wt 87.5 kg (193 lb)   BMI 31.15 kg/m²     Physical Exam    GAIT:     [x]  Normal  []  Antalgic    Assistive device: [x]  None  []  Walker     []  Crutches  []  Cane     []  Wheelchair  []  Stretcher    Ortho Exam  Swollen tender about the CMC joint.  Decreased motion.  He is neurovascular intact    No results found.          ASSESSMENT:    Diagnoses and all orders for this visit:    Chronic pain of right thumb  -     Small Joint Arthrocentesis: L thumb CMC    Bilateral hand pain  -     Small Joint Arthrocentesis: L thumb CMC    Arthritis of carpometacarpal (CMC) joint of both thumbs  -     Small Joint Arthrocentesis: L thumb CMC          PLAN we will inject the left side at this point mixture of Xylocaine and Kenalog as above.  He will ice it down we will see him back as needed.    Patient's Body mass index is 31.15 kg/m². BMI is above normal parameters. Recommendations include: exercise counseling and nutrition counseling.      No follow-ups on file.    Small Joint Arthrocentesis: L thumb CMC  Consent given by: patient  Site marked: site marked  Timeout: Immediately " prior to procedure a time out was called to verify the correct patient, procedure, equipment, support staff and site/side marked as required   Supporting Documentation  Indications: pain   Procedure Details  Location: thumb - L thumb CMC  Preparation: Patient was prepped and draped in the usual sterile fashion  Needle size: 22 G  Medications administered: 40 mg triamcinolone acetonide 40 MG/ML; 1 mL lidocaine PF 1% 1 %            This document has been electronically signed by Reinaldo Acevedo MD on May 18, 2020 10:04

## 2020-06-16 ENCOUNTER — OFFICE VISIT (OUTPATIENT)
Dept: FAMILY MEDICINE CLINIC | Facility: CLINIC | Age: 60
End: 2020-06-16

## 2020-06-16 ENCOUNTER — LAB (OUTPATIENT)
Dept: LAB | Facility: HOSPITAL | Age: 60
End: 2020-06-16

## 2020-06-16 VITALS
DIASTOLIC BLOOD PRESSURE: 88 MMHG | WEIGHT: 185 LBS | OXYGEN SATURATION: 98 % | SYSTOLIC BLOOD PRESSURE: 132 MMHG | HEART RATE: 74 BPM | HEIGHT: 66 IN | BODY MASS INDEX: 29.73 KG/M2 | TEMPERATURE: 98 F

## 2020-06-16 DIAGNOSIS — M18.0 ARTHRITIS OF CARPOMETACARPAL (CMC) JOINT OF BOTH THUMBS: Primary | ICD-10-CM

## 2020-06-16 DIAGNOSIS — L91.8 SKIN TAG: ICD-10-CM

## 2020-06-16 DIAGNOSIS — R10.13 EPIGASTRIC PAIN: ICD-10-CM

## 2020-06-16 DIAGNOSIS — I10 ESSENTIAL HYPERTENSION: ICD-10-CM

## 2020-06-16 PROCEDURE — 85025 COMPLETE CBC W/AUTO DIFF WBC: CPT | Performed by: STUDENT IN AN ORGANIZED HEALTH CARE EDUCATION/TRAINING PROGRAM

## 2020-06-16 PROCEDURE — 80053 COMPREHEN METABOLIC PANEL: CPT | Performed by: STUDENT IN AN ORGANIZED HEALTH CARE EDUCATION/TRAINING PROGRAM

## 2020-06-16 PROCEDURE — 36415 COLL VENOUS BLD VENIPUNCTURE: CPT | Performed by: STUDENT IN AN ORGANIZED HEALTH CARE EDUCATION/TRAINING PROGRAM

## 2020-06-16 PROCEDURE — 99213 OFFICE O/P EST LOW 20 MIN: CPT | Performed by: STUDENT IN AN ORGANIZED HEALTH CARE EDUCATION/TRAINING PROGRAM

## 2020-06-16 NOTE — PROGRESS NOTES
[facility]  Andrew Villatoro III, MD    Subjective:     Nima Portillo is a 59 y.o. male who presents for bilateral carpal tunnel, skin tag, hypertension, epigastric pain.    Patient with bilateral carpal tunnel status post injection of corticosteroid and lidocaine by orthopedics.  He is noting good interval improvement.  His only pain is now on the dorsum of his hand at his fourth or fifth metacarpal.  This is transient and responds well to diclofenac per his reports.    Patient with ocular skin tag of left eye.  Patient reports issues with blinking and vision related to this.  Patient reports not hearing from ophthalmology, is interested in further evaluation of the skin tag.  Patient interested in cutting it off himself, however it was counseled about risk of self cutting a skin tag related to eye care.    Patient with essential hypertension without any history of dizziness or lightheadedness as previous.  Patient reports regular 3-4 times urinations per day.  Patient is on HCTZ therapy and is helping with his reduction of preload without making his blood pressure any worse.    Patient with new 1 week  epigastric pain.  Patient been on meloxicam for several months related to this with bilateral carpal tunnel.  Patient denies hematochezia or melena.  Patient reports pain is relieved by EtOH    The following portions of the patient's history were reviewed and updated as appropriate: allergies, current medications, past family history, past medical history, past social history, past surgical history and problem list.    Past Medical Hx:  Past Medical History:   Diagnosis Date   • Acute bronchitis    • Acute sinusitis    • Chest pain    • Chronic bronchitis (CMS/HCC)     secondary to smoking   • Claudication (CMS/HCC)    • Coronary arteriosclerosis    • Cough    • Gastroesophageal reflux disease    • Health maintenance examination     Individual health examination   • History of echocardiogram  10/21/2013    Echocardiogram W/ color flow 40748 (1) - Left atrium normal. Mild CLVH. EF 50%. Valves intact. Mild mitral and tricuspid regurg. Pericardium normal.   • History of echocardiogram 10/25/2011    Echocardiogram W/O color flow 95038 (1) - Normal LV sytolic function with mild LVH & mild diastolic dysfunction   • Hyperlipidemia    • Hypertensive disorder    • Pernicious anemia    • Thumb pain, left 10/14/2019   • Tobacco dependence syndrome        Past Surgical Hx:  Past Surgical History:   Procedure Laterality Date   • CARDIAC CATHETERIZATION  04/08/2015    Cardiac cath 03764 (1) - Slective coronary angiogram. Left heart catheterization with LV ventriculogram.   • DENTAL PROCEDURE      Removal of all teeth       Current Meds:    Current Outpatient Medications:   •  aspirin 81 MG chewable tablet, Chew 81 mg daily., Disp: , Rfl:   •  buPROPion SR (WELLBUTRIN SR) 150 MG 12 hr tablet, Take 1 tablet by mouth 2 (Two) Times a Day., Disp: 60 tablet, Rfl: 4  •  diclofenac (VOLTAREN) 1 % gel gel, Apply 4 g topically to the appropriate area as directed 2 (Two) Times a Day. Small amount to affected area, Disp: 100 g, Rfl: 3  •  dicyclomine (BENTYL) 20 MG tablet, Take 1 tablet by mouth Every 6 (Six) Hours As Needed (abdominal pain)., Disp: 30 tablet, Rfl: 0  •  hydroCHLOROthiazide (HYDRODIURIL) 25 MG tablet, Take 1 tablet by mouth Daily., Disp: 90 tablet, Rfl: 3  •  isosorbide mononitrate (IMDUR) 30 MG 24 hr tablet, Take 1 tablet by mouth Daily., Disp: 30 tablet, Rfl: 6  •  lisinopril (PRINIVIL,ZESTRIL) 40 MG tablet, Take 1 tablet by mouth Daily., Disp: 90 tablet, Rfl: 3  •  metoprolol succinate XL (TOPROL-XL) 25 MG 24 hr tablet, Take 1 tablet by mouth Daily., Disp: 90 tablet, Rfl: 3  •  nitroglycerin (NITROSTAT) 0.4 MG SL tablet, Place 1 tablet under the tongue Every 5 (Five) Minutes As Needed for Chest Pain. Take no more than 3 doses in 15 minutes., Disp: 30 tablet, Rfl: 12  •  pantoprazole (PROTONIX) 40 MG EC tablet,  Take 1 tablet by mouth Daily., Disp: 30 tablet, Rfl: 0  •  pravastatin (PRAVACHOL) 20 MG tablet, Take 20 mg by mouth Daily., Disp: , Rfl: 6    Allergies:  No Known Allergies    Family Hx:  Family History   Problem Relation Age of Onset   • Heart disease Mother    • Hypertension Mother    • Diabetes Mother    • Heart disease Father    • Hypertension Father    • Diabetes Father    • Diabetes Sister    • Heart disease Sister    • Stroke Sister    • Cancer Paternal Aunt         Breast Cancer   • Cancer Paternal Uncle         Unknown cancer        Social History:  Social History     Socioeconomic History   • Marital status:      Spouse name: Not on file   • Number of children: Not on file   • Years of education: Not on file   • Highest education level: Not on file   Tobacco Use   • Smoking status: Current Every Day Smoker     Packs/day: 0.25     Years: 40.00     Pack years: 10.00     Types: Cigarettes   • Smokeless tobacco: Never Used   Substance and Sexual Activity   • Alcohol use: Yes     Alcohol/week: 8.0 standard drinks     Types: 8 Cans of beer per week   • Drug use: No   • Sexual activity: Never     Comment: Marital status:        Review of Systems  Review of Systems   Constitutional: Negative for activity change, appetite change, chills, diaphoresis and fever.   HENT: Negative for congestion, rhinorrhea, sinus pressure, sinus pain and sore throat.    Eyes: Positive for pain, itching and visual disturbance.   Respiratory: Negative for apnea, cough, choking, chest tightness, shortness of breath and wheezing.    Cardiovascular: Negative for chest pain, palpitations and leg swelling.   Gastrointestinal: Positive for abdominal pain. Negative for abdominal distention, blood in stool, constipation, diarrhea, nausea and vomiting.   Genitourinary: Negative for difficulty urinating, dysuria, frequency, hematuria and urgency.   Musculoskeletal: Positive for arthralgias and myalgias. Negative for back pain,  "joint swelling and neck pain.   Skin: Negative for color change, pallor, rash and wound.   Neurological: Negative for dizziness, weakness, numbness and headaches.   Psychiatric/Behavioral: Negative for agitation and behavioral problems.       Objective:     /88   Pulse 74   Temp 98 °F (36.7 °C)   Ht 167.6 cm (66\")   Wt 83.9 kg (185 lb)   SpO2 98%   BMI 29.86 kg/m²   Physical Exam   Constitutional: He is oriented to person, place, and time. He appears well-developed and well-nourished. No distress.   HENT:   Head: Normocephalic and atraumatic.   Right Ear: External ear normal.   Left Ear: External ear normal.   Nose: Nose normal.   Eyes: Pupils are equal, round, and reactive to light. Conjunctivae and EOM are normal. Right eye exhibits no discharge. Left eye exhibits no discharge. No scleral icterus.   Neck: Normal range of motion. Neck supple. No thyromegaly present.   Cardiovascular: Normal rate, regular rhythm, normal heart sounds and intact distal pulses. Exam reveals no gallop and no friction rub.   No murmur heard.  Pulmonary/Chest: Effort normal and breath sounds normal. No respiratory distress. He has no wheezes. He has no rales. He exhibits no tenderness.   Abdominal: Soft. Bowel sounds are normal. He exhibits no distension and no mass. There is no tenderness. There is no guarding.   Rectal exam completed.  Hemoccult negative.  Patient with no defects of rectal wall, moderate stool burden in vault.  Patient without any palpable prostatic irregularities.   Musculoskeletal: Normal range of motion. He exhibits no edema, tenderness or deformity.   Lymphadenopathy:     He has no cervical adenopathy.   Neurological: He is alert and oriented to person, place, and time. No cranial nerve deficit.   Skin: Skin is warm and dry. Capillary refill takes 2 to 3 seconds. He is not diaphoretic.   1.2 x 1.3 cm skin tag of Left upper eyelid   Psychiatric: He has a normal mood and affect. His behavior is normal. " Judgment and thought content normal.        Assessment/Plan:     Nima was seen today for hypertension and skin problem.    Diagnoses and all orders for this visit:    Arthritis of carpometacarpal (CMC) joint of both thumbs    Skin tag  -     Ambulatory Referral to Optometry    Essential hypertension    Epigastric pain  -     CBC w AUTO Differential  -     Comprehensive metabolic panel  -     CBC Auto Differential    We will discontinue patient's meloxicam for his bilateral carpal tunnel.  This is both resolved by prolonged glucocorticoid injection by orthopedics.    Referral to optometry to help with skin tag around his left upper eyelid.  Will send to optometry as ophthalmology has not been helpful.  Will consider ENT optometry or ophthalmology cannot help.    We will continue hypertensive medications and monitor his diastolic load is diastolic blood pressure is 88 today patient urinating enough and will closely monitor his blood pressure.    Patient with epigastric pain with concern for gastritis related to NSAIDs.  Patient counseled to stop his NSAIDs for his carpal tunnel continue his topical diclofenac.  Hemoccult was negative today.  We will monitor his abdominal pain and he is counseled to monitor his change in color his stool will also check CBC and CMP for uremia and anemia.  This will help to differentiate blood loss anemia related to epigastric pain for concern for gastric ulceration    · Rx changes: Referrals and labs  · Patient Education: Stool, not to cut skin tag and eyelid.  · Compliance at present is estimated to be good.   · Efforts to improve compliance (if necessary) will be directed at increased exercise.     Follow-up:     Return in about 1 month (around 7/16/2020) for Recheck HTN Skin tag .    Preventative:  Health Maintenance   Topic Date Due   • COLONOSCOPY  07/12/2017   • ZOSTER VACCINE (2 of 2) 07/11/2019   • ANNUAL PHYSICAL  04/17/2020   • INFLUENZA VACCINE  08/01/2020   • LIPID PANEL   09/13/2020   • TDAP/TD VACCINES (2 - Td) 05/16/2029   • PNEUMOCOCCAL VACCINE (19-64 MEDIUM RISK)  Completed   • HEPATITIS C SCREENING  Completed     Male Preventative: Cholesterol screening up to date  Recommended: none  Vaccine Counseling: N/A    Weight  -Class: Overweight: 25.0-29.9kg/m2   -Patient's Body mass index is 29.86 kg/m². BMI is within normal parameters. No follow-up required..   eat more fruits and vegetables, decrease soda or juice intake, increase water intake, increase physical activity, reduce screen time, reduce portion size, cut out extra servings, reduce fast food intake, family to eat at dinner table more often, keep TV off during meals, plan meals, eat breakfast and have 3 meals a day    Alcohol use:  reports that he drinks about 8.0 standard drinks of alcohol per week.  Nicotine status  reports that he has been smoking cigarettes. He has a 10.00 pack-year smoking history. He has never used smokeless tobacco.    Goals     • Smoking cessation (pt-stated)      Quitting smoking and living longer    Barriers: Wanting to quit            RISK SCORE: 3            This document has been electronically signed by Andrew Villatoro III, MD on June 16, 2020 17:31      Dragon disclaimer: Parts of this note were transcribed using dragon dictation software.

## 2020-06-17 LAB
ALBUMIN SERPL-MCNC: 4.7 G/DL (ref 3.5–5.2)
ALBUMIN/GLOB SERPL: 2 G/DL
ALP SERPL-CCNC: 44 U/L (ref 39–117)
ALT SERPL W P-5'-P-CCNC: 22 U/L (ref 1–41)
ANION GAP SERPL CALCULATED.3IONS-SCNC: 9.5 MMOL/L (ref 5–15)
AST SERPL-CCNC: 22 U/L (ref 1–40)
BASOPHILS # BLD AUTO: 0.03 10*3/MM3 (ref 0–0.2)
BASOPHILS NFR BLD AUTO: 0.3 % (ref 0–1.5)
BILIRUB SERPL-MCNC: 0.3 MG/DL (ref 0.2–1.2)
BUN BLD-MCNC: 17 MG/DL (ref 6–20)
BUN/CREAT SERPL: 19.1 (ref 7–25)
CALCIUM SPEC-SCNC: 9.3 MG/DL (ref 8.6–10.5)
CHLORIDE SERPL-SCNC: 104 MMOL/L (ref 98–107)
CO2 SERPL-SCNC: 29.5 MMOL/L (ref 22–29)
CREAT BLD-MCNC: 0.89 MG/DL (ref 0.76–1.27)
DEPRECATED RDW RBC AUTO: 45.8 FL (ref 37–54)
EOSINOPHIL # BLD AUTO: 0.19 10*3/MM3 (ref 0–0.4)
EOSINOPHIL NFR BLD AUTO: 2.1 % (ref 0.3–6.2)
ERYTHROCYTE [DISTWIDTH] IN BLOOD BY AUTOMATED COUNT: 13.6 % (ref 12.3–15.4)
GFR SERPL CREATININE-BSD FRML MDRD: 87 ML/MIN/1.73
GLOBULIN UR ELPH-MCNC: 2.3 GM/DL
GLUCOSE BLD-MCNC: 74 MG/DL (ref 65–99)
HCT VFR BLD AUTO: 42 % (ref 37.5–51)
HGB BLD-MCNC: 14.7 G/DL (ref 13–17.7)
IMM GRANULOCYTES # BLD AUTO: 0.03 10*3/MM3 (ref 0–0.05)
IMM GRANULOCYTES NFR BLD AUTO: 0.3 % (ref 0–0.5)
LYMPHOCYTES # BLD AUTO: 2.03 10*3/MM3 (ref 0.7–3.1)
LYMPHOCYTES NFR BLD AUTO: 22.9 % (ref 19.6–45.3)
MCH RBC QN AUTO: 32 PG (ref 26.6–33)
MCHC RBC AUTO-ENTMCNC: 35 G/DL (ref 31.5–35.7)
MCV RBC AUTO: 91.5 FL (ref 79–97)
MONOCYTES # BLD AUTO: 0.94 10*3/MM3 (ref 0.1–0.9)
MONOCYTES NFR BLD AUTO: 10.6 % (ref 5–12)
NEUTROPHILS # BLD AUTO: 5.64 10*3/MM3 (ref 1.7–7)
NEUTROPHILS NFR BLD AUTO: 63.8 % (ref 42.7–76)
NRBC BLD AUTO-RTO: 0 /100 WBC (ref 0–0.2)
PLATELET # BLD AUTO: 214 10*3/MM3 (ref 140–450)
PMV BLD AUTO: 9.8 FL (ref 6–12)
POTASSIUM BLD-SCNC: 4.4 MMOL/L (ref 3.5–5.2)
PROT SERPL-MCNC: 7 G/DL (ref 6–8.5)
RBC # BLD AUTO: 4.59 10*6/MM3 (ref 4.14–5.8)
SODIUM BLD-SCNC: 143 MMOL/L (ref 136–145)
WBC NRBC COR # BLD: 8.86 10*3/MM3 (ref 3.4–10.8)

## 2020-06-18 NOTE — PROGRESS NOTES
I have seen the patient.  I have reviewed the notes, assessments, and/or procedures performed by Andrew Villatoro III, MD, I concur with her/his documentation and assessment and plan for Nima Portillo.               This document has been electronically signed by Hubert Thomas MD on June 18, 2020 10:11

## 2020-06-29 ENCOUNTER — HOSPITAL ENCOUNTER (EMERGENCY)
Facility: HOSPITAL | Age: 60
Discharge: HOME | End: 2020-06-29
Attending: FAMILY MEDICINE | Admitting: EMERGENCY MEDICINE

## 2020-06-29 VITALS
DIASTOLIC BLOOD PRESSURE: 81 MMHG | SYSTOLIC BLOOD PRESSURE: 144 MMHG | HEART RATE: 78 BPM | RESPIRATION RATE: 18 BRPM | BODY MASS INDEX: 29.81 KG/M2 | TEMPERATURE: 97.6 F | OXYGEN SATURATION: 95 % | HEIGHT: 66 IN | WEIGHT: 185.5 LBS
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
## 2020-06-29 PROCEDURE — 99282 EMERGENCY DEPT VISIT SF MDM: CPT

## 2020-07-21 DIAGNOSIS — F17.211 CIGARETTE NICOTINE DEPENDENCE IN REMISSION: ICD-10-CM

## 2020-07-21 RX ORDER — BUPROPION HYDROCHLORIDE 150 MG/1
TABLET, EXTENDED RELEASE ORAL
Qty: 60 TABLET | Refills: 4 | Status: SHIPPED | OUTPATIENT
Start: 2020-07-21 | End: 2021-02-26

## 2020-08-04 ENCOUNTER — OFFICE VISIT (OUTPATIENT)
Dept: FAMILY MEDICINE CLINIC | Facility: CLINIC | Age: 60
End: 2020-08-04

## 2020-08-04 ENCOUNTER — LAB (OUTPATIENT)
Dept: LAB | Facility: HOSPITAL | Age: 60
End: 2020-08-04

## 2020-08-04 VITALS
WEIGHT: 183.06 LBS | HEART RATE: 75 BPM | DIASTOLIC BLOOD PRESSURE: 82 MMHG | HEIGHT: 66 IN | BODY MASS INDEX: 29.42 KG/M2 | SYSTOLIC BLOOD PRESSURE: 140 MMHG | TEMPERATURE: 97.1 F | OXYGEN SATURATION: 96 %

## 2020-08-04 DIAGNOSIS — Z23 NEED FOR SHINGLES VACCINE: ICD-10-CM

## 2020-08-04 DIAGNOSIS — R06.09 DOE (DYSPNEA ON EXERTION): ICD-10-CM

## 2020-08-04 DIAGNOSIS — I25.118 CORONARY ARTERY DISEASE OF NATIVE ARTERY OF NATIVE HEART WITH STABLE ANGINA PECTORIS (HCC): ICD-10-CM

## 2020-08-04 DIAGNOSIS — I10 ESSENTIAL HYPERTENSION: Primary | ICD-10-CM

## 2020-08-04 LAB — NT-PROBNP SERPL-MCNC: 48.9 PG/ML (ref 0–900)

## 2020-08-04 PROCEDURE — 36415 COLL VENOUS BLD VENIPUNCTURE: CPT | Performed by: STUDENT IN AN ORGANIZED HEALTH CARE EDUCATION/TRAINING PROGRAM

## 2020-08-04 PROCEDURE — 83880 ASSAY OF NATRIURETIC PEPTIDE: CPT | Performed by: STUDENT IN AN ORGANIZED HEALTH CARE EDUCATION/TRAINING PROGRAM

## 2020-08-04 PROCEDURE — 99213 OFFICE O/P EST LOW 20 MIN: CPT | Performed by: STUDENT IN AN ORGANIZED HEALTH CARE EDUCATION/TRAINING PROGRAM

## 2020-08-04 RX ORDER — METOPROLOL SUCCINATE 25 MG/1
25 TABLET, EXTENDED RELEASE ORAL DAILY
Qty: 90 TABLET | Refills: 3 | Status: SHIPPED | OUTPATIENT
Start: 2020-08-04 | End: 2020-08-25 | Stop reason: SDUPTHER

## 2020-08-04 RX ORDER — ISOSORBIDE MONONITRATE 60 MG/1
60 TABLET, EXTENDED RELEASE ORAL DAILY
Qty: 30 TABLET | Refills: 2 | Status: SHIPPED | OUTPATIENT
Start: 2020-08-04 | End: 2020-08-25 | Stop reason: SDUPTHER

## 2020-08-04 NOTE — PROGRESS NOTES
I have spoken with the patient.  I have reviewed the notes, assessments, and/or procedures performed by Dr. Andrew Villatoro, I concur with his  documentation and assessment and plan for Nima Portillo.          This document has been electronically signed by Jacek Quick MD on August 4, 2020 15:19

## 2020-08-04 NOTE — PROGRESS NOTES
Family Medicine Residency  Andrew Villatoro III, MD    Subjective:     Nima Portillo is a 59 y.o. male who presents for essential hypertension, coronary artery disease,  dyspnea on exertion, need for shingles vaccine.    Patient requesting refills of his metoprolol for his coronary artery disease.  Patient has responded well to this and does not notice any palpitations or does not feel lightheaded or dizzy.    Patient's hypertension has been poorly controlled in the office at 144/80 in the last 2 visits.  Patient not take his blood pressure at home.  Patient eats high salt diet.  Patient again with no other symptoms of hyper or hypotension.  Patient denying chest pressure or peripheral edema.  Patient again continues to have high salt diet and is unwilling to change.    Patient with dyspnea on exertion, he is unable to walk 100 feet swelling and shortness of breath.  Reports again this is been going on for several months to years.  Patient has not been working, however reports decreasing his oral intake for food and has lost 10 pounds in that time.  Patient denying night sweats, active diuresis, peripheral edema, issues with oral intake or voiding.    Patient requesting shingles vaccine to complete his health maintenance immunizations at this time.    The following portions of the patient's history were reviewed and updated as appropriate: allergies, current medications, past family history, past medical history, past social history, past surgical history and problem list.    Past Medical Hx:  Past Medical History:   Diagnosis Date   • Acute bronchitis    • Acute sinusitis    • Chest pain    • Chronic bronchitis (CMS/HCC)     secondary to smoking   • Claudication (CMS/HCC)    • Coronary arteriosclerosis    • Cough    • Gastroesophageal reflux disease    • Health maintenance examination     Individual health examination   • History of echocardiogram 10/21/2013    Echocardiogram W/ color flow 92587 (1) - Left  atrium normal. Mild CLVH. EF 50%. Valves intact. Mild mitral and tricuspid regurg. Pericardium normal.   • History of echocardiogram 10/25/2011    Echocardiogram W/O color flow 89631 (1) - Normal LV sytolic function with mild LVH & mild diastolic dysfunction   • Hyperlipidemia    • Hypertensive disorder    • Pernicious anemia    • Thumb pain, left 10/14/2019   • Tobacco dependence syndrome        Past Surgical Hx:  Past Surgical History:   Procedure Laterality Date   • CARDIAC CATHETERIZATION  04/08/2015    Cardiac cath 75330 (1) - Slective coronary angiogram. Left heart catheterization with LV ventriculogram.   • DENTAL PROCEDURE      Removal of all teeth       Current Meds:    Current Outpatient Medications:   •  aspirin 81 MG chewable tablet, Chew 81 mg daily., Disp: , Rfl:   •  buPROPion SR (WELLBUTRIN SR) 150 MG 12 hr tablet, TAKE 1 TABLET BY MOUTH TWICE A DAY, Disp: 60 tablet, Rfl: 4  •  diclofenac (VOLTAREN) 1 % gel gel, Apply 4 g topically to the appropriate area as directed 2 (Two) Times a Day. Small amount to affected area, Disp: 100 g, Rfl: 3  •  dicyclomine (BENTYL) 20 MG tablet, Take 1 tablet by mouth Every 6 (Six) Hours As Needed (abdominal pain)., Disp: 30 tablet, Rfl: 0  •  hydroCHLOROthiazide (HYDRODIURIL) 25 MG tablet, Take 1 tablet by mouth Daily., Disp: 90 tablet, Rfl: 3  •  isosorbide mononitrate (IMDUR) 60 MG 24 hr tablet, Take 1 tablet by mouth Daily., Disp: 30 tablet, Rfl: 2  •  lisinopril (PRINIVIL,ZESTRIL) 40 MG tablet, Take 1 tablet by mouth Daily., Disp: 90 tablet, Rfl: 3  •  metoprolol succinate XL (TOPROL-XL) 25 MG 24 hr tablet, Take 1 tablet by mouth Daily., Disp: 90 tablet, Rfl: 3  •  nitroglycerin (NITROSTAT) 0.4 MG SL tablet, Place 1 tablet under the tongue Every 5 (Five) Minutes As Needed for Chest Pain. Take no more than 3 doses in 15 minutes., Disp: 30 tablet, Rfl: 12  •  pantoprazole (PROTONIX) 40 MG EC tablet, Take 1 tablet by mouth Daily., Disp: 30 tablet, Rfl: 0  •   pravastatin (PRAVACHOL) 20 MG tablet, Take 20 mg by mouth Daily., Disp: , Rfl: 6  •  Zoster Vac Recomb Adjuvanted (Shingrix) 50 MCG/0.5ML reconstituted suspension, Inject 0.5 mL into the appropriate muscle as directed by prescriber 1 (One) Time for 1 dose., Disp: 1 each, Rfl: 0    Allergies:  No Known Allergies    Family Hx:  Family History   Problem Relation Age of Onset   • Heart disease Mother    • Hypertension Mother    • Diabetes Mother    • Heart disease Father    • Hypertension Father    • Diabetes Father    • Diabetes Sister    • Heart disease Sister    • Stroke Sister    • Cancer Paternal Aunt         Breast Cancer   • Cancer Paternal Uncle         Unknown cancer        Social History:  Social History     Socioeconomic History   • Marital status:      Spouse name: Not on file   • Number of children: Not on file   • Years of education: Not on file   • Highest education level: Not on file   Tobacco Use   • Smoking status: Current Every Day Smoker     Packs/day: 0.25     Years: 40.00     Pack years: 10.00     Types: Cigarettes   • Smokeless tobacco: Never Used   Substance and Sexual Activity   • Alcohol use: Yes     Alcohol/week: 8.0 standard drinks     Types: 8 Cans of beer per week   • Drug use: No   • Sexual activity: Never     Comment: Marital status:        Review of Systems  Review of Systems   Constitutional: Positive for activity change. Negative for appetite change, chills, diaphoresis and fever.   HENT: Negative for congestion, rhinorrhea, sinus pressure, sinus pain and sore throat.    Eyes: Negative for visual disturbance.   Respiratory: Positive for chest tightness and shortness of breath. Negative for apnea, cough, choking and wheezing.    Cardiovascular: Negative for chest pain, palpitations and leg swelling.   Gastrointestinal: Negative for abdominal distention, abdominal pain, blood in stool, constipation, diarrhea, nausea and vomiting.   Genitourinary: Negative for difficulty  "urinating, dysuria, frequency, hematuria and urgency.   Musculoskeletal: Negative for arthralgias, back pain, joint swelling, myalgias and neck pain.   Skin: Negative for color change, pallor, rash and wound.   Neurological: Negative for dizziness, weakness, numbness and headaches.   Psychiatric/Behavioral: Negative for agitation and behavioral problems.       Objective:     /82   Pulse 75   Temp 97.1 °F (36.2 °C) (Tympanic)   Ht 167.6 cm (66\")   Wt 83 kg (183 lb 1 oz)   SpO2 96%   BMI 29.55 kg/m²   Physical Exam   Constitutional: He is oriented to person, place, and time. He appears well-developed and well-nourished. No distress.   HENT:   Head: Normocephalic and atraumatic.   Right Ear: External ear normal.   Left Ear: External ear normal.   Nose: Nose normal.   Eyes: Pupils are equal, round, and reactive to light. Conjunctivae and EOM are normal. Right eye exhibits no discharge. Left eye exhibits no discharge. No scleral icterus.   Neck: Normal range of motion. Neck supple. No thyromegaly present.   Cardiovascular: Normal rate, regular rhythm, normal heart sounds and intact distal pulses. Exam reveals no gallop and no friction rub.   No murmur heard.  Pulmonary/Chest: Effort normal. No respiratory distress. He has wheezes ( right upper lung field only end expiratory). He has no rales. He exhibits no tenderness.   Abdominal: Soft. Bowel sounds are normal. He exhibits no distension and no mass. There is no tenderness. There is no guarding.   Musculoskeletal: Normal range of motion. He exhibits no edema, tenderness or deformity.   Lymphadenopathy:     He has no cervical adenopathy.   Neurological: He is alert and oriented to person, place, and time. No cranial nerve deficit.   Skin: Skin is warm and dry. Capillary refill takes 2 to 3 seconds. He is not diaphoretic.   Psychiatric: He has a normal mood and affect. His behavior is normal. Judgment and thought content normal.        Assessment/Plan:     Nima " was seen today for hypertension.    Diagnoses and all orders for this visit:    Essential hypertension  -     metoprolol succinate XL (TOPROL-XL) 25 MG 24 hr tablet; Take 1 tablet by mouth Daily.  -     isosorbide mononitrate (IMDUR) 60 MG 24 hr tablet; Take 1 tablet by mouth Daily.    Coronary artery disease of native artery of native heart with stable angina pectoris (CMS/HCC)  -     metoprolol succinate XL (TOPROL-XL) 25 MG 24 hr tablet; Take 1 tablet by mouth Daily.  -     isosorbide mononitrate (IMDUR) 60 MG 24 hr tablet; Take 1 tablet by mouth Daily.    Cigarette nicotine dependence in remission    VALDOVINOS (dyspnea on exertion)  -     XR Chest PA & Lateral  -     BNP    Need for shingles vaccine  -     Zoster Vac Recomb Adjuvanted (Shingrix) 50 MCG/0.5ML reconstituted suspension; Inject 0.5 mL into the appropriate muscle as directed by prescriber 1 (One) Time for 1 dose.    Will refill patient's metoprolol succinate with good effect.    We will increase his isosorbide mononitrate for his hypertension from 30-60 patient counseled take 2 of his current doses and the new refill rate from his [facility].  This is to help his blood pressure reach current goal.    Patient with dyspnea on exertion likely related to chronic smoking and wheezing.  Review of patient's PFT personally with me today reveals no significant impairment or no diffusion long of carbon monoxide issues.  We will rule out active pulmonary edema with x-ray of his chest and BNP if at that time those are negative we will proceed with albuterol as needed for shortness of breath.    Maintenance vaccine for second round of shingles vaccine sent to [facility] which she will try to get achieved today.    · Rx changes: Increase isosorbide  · Patient Education: Shortness of breath and shingles vaccine  · Compliance at present is estimated to be excellent.   · Efforts to improve compliance (if necessary) will be directed at increased exercise.     Follow-up:     Return in  about 2 weeks (around 8/18/2020) for Annual.    Preventative:  Health Maintenance   Topic Date Due   • COLONOSCOPY  07/12/2017   • ZOSTER VACCINE (2 of 2) 07/11/2019   • ANNUAL PHYSICAL  04/17/2020   • INFLUENZA VACCINE  08/01/2020   • LIPID PANEL  09/13/2020   • TDAP/TD VACCINES (2 - Td) 05/16/2029   • PNEUMOCOCCAL VACCINE (19-64 MEDIUM RISK)  Completed   • HEPATITIS C SCREENING  Completed     Male Preventative: Cholesterol screening up to date  Recommended: Varicella  Vaccine Counseling: Patient was counseled on R/B/A to Zoster vaccine(s). Vaccine components reviewed and all questions answered.  VIS version(s) Current given. Patient allowed to accept or refuse vaccine.  Informed consent obtained.     Weight  -Class: Overweight: 25.0-29.9kg/m2   -Patient's Body mass index is 29.55 kg/m². BMI is within normal parameters. No follow-up required..   eat more fruits and vegetables, decrease soda or juice intake, increase water intake, increase physical activity, reduce screen time, reduce portion size, cut out extra servings, reduce fast food intake, family to eat at dinner table more often, keep TV off during meals, plan meals, eat breakfast and have 3 meals a day    Alcohol use:  reports that he drinks about 8.0 standard drinks of alcohol per week.  Nicotine status  reports that he has been smoking cigarettes. He has a 10.00 pack-year smoking history. He has never used smokeless tobacco.    Goals     • Smoking cessation (pt-stated)      Quitting smoking and living longer    Barriers: Wanting to quit            RISK SCORE: 3            This document has been electronically signed by Andrew Villatoro III, MD on August 4, 2020 15:18      Dragon disclaimer: Parts of this note were transcribed using dragon dictation software.

## 2020-08-25 ENCOUNTER — OFFICE VISIT (OUTPATIENT)
Dept: FAMILY MEDICINE CLINIC | Facility: CLINIC | Age: 60
End: 2020-08-25

## 2020-08-25 VITALS
WEIGHT: 184.4 LBS | TEMPERATURE: 98 F | RESPIRATION RATE: 20 BRPM | DIASTOLIC BLOOD PRESSURE: 74 MMHG | SYSTOLIC BLOOD PRESSURE: 118 MMHG | OXYGEN SATURATION: 98 % | BODY MASS INDEX: 29.63 KG/M2 | HEIGHT: 66 IN | HEART RATE: 60 BPM

## 2020-08-25 DIAGNOSIS — I25.118 CORONARY ARTERY DISEASE OF NATIVE ARTERY OF NATIVE HEART WITH STABLE ANGINA PECTORIS (HCC): ICD-10-CM

## 2020-08-25 DIAGNOSIS — I10 ESSENTIAL HYPERTENSION: ICD-10-CM

## 2020-08-25 DIAGNOSIS — I73.9 CLAUDICATION (HCC): Primary | ICD-10-CM

## 2020-08-25 DIAGNOSIS — Z00.00 HEALTHCARE MAINTENANCE: ICD-10-CM

## 2020-08-25 DIAGNOSIS — F17.210 SMOKING GREATER THAN 30 PACK YEARS: ICD-10-CM

## 2020-08-25 PROCEDURE — 99213 OFFICE O/P EST LOW 20 MIN: CPT | Performed by: STUDENT IN AN ORGANIZED HEALTH CARE EDUCATION/TRAINING PROGRAM

## 2020-08-25 RX ORDER — ISOSORBIDE MONONITRATE 60 MG/1
60 TABLET, EXTENDED RELEASE ORAL DAILY
Qty: 30 TABLET | Refills: 2 | Status: SHIPPED | OUTPATIENT
Start: 2020-08-25 | End: 2021-10-25 | Stop reason: SDUPTHER

## 2020-08-25 RX ORDER — HYDROCHLOROTHIAZIDE 25 MG/1
25 TABLET ORAL DAILY
Qty: 90 TABLET | Refills: 3 | Status: SHIPPED | OUTPATIENT
Start: 2020-08-25 | End: 2021-10-07 | Stop reason: SDUPTHER

## 2020-08-25 RX ORDER — METOPROLOL SUCCINATE 25 MG/1
25 TABLET, EXTENDED RELEASE ORAL DAILY
Qty: 90 TABLET | Refills: 3 | Status: SHIPPED | OUTPATIENT
Start: 2020-08-25 | End: 2021-10-25

## 2020-08-25 RX ORDER — LISINOPRIL 40 MG/1
40 TABLET ORAL DAILY
Qty: 90 TABLET | Refills: 3 | Status: SHIPPED | OUTPATIENT
Start: 2020-08-25 | End: 2021-10-25

## 2020-08-28 ENCOUNTER — OFFICE VISIT (OUTPATIENT)
Dept: CARDIOLOGY | Facility: CLINIC | Age: 60
End: 2020-08-28

## 2020-08-28 VITALS
WEIGHT: 190 LBS | OXYGEN SATURATION: 97 % | SYSTOLIC BLOOD PRESSURE: 140 MMHG | BODY MASS INDEX: 30.53 KG/M2 | DIASTOLIC BLOOD PRESSURE: 95 MMHG | HEART RATE: 66 BPM | HEIGHT: 66 IN

## 2020-08-28 DIAGNOSIS — I25.10 CORONARY ARTERY DISEASE INVOLVING NATIVE CORONARY ARTERY OF NATIVE HEART WITHOUT ANGINA PECTORIS: Primary | ICD-10-CM

## 2020-08-28 PROCEDURE — 93000 ELECTROCARDIOGRAM COMPLETE: CPT | Performed by: INTERNAL MEDICINE

## 2020-08-28 PROCEDURE — 99214 OFFICE O/P EST MOD 30 MIN: CPT | Performed by: INTERNAL MEDICINE

## 2020-08-28 RX ORDER — ATORVASTATIN CALCIUM 40 MG/1
40 TABLET, FILM COATED ORAL DAILY
Qty: 90 TABLET | Refills: 3 | Status: SHIPPED | OUTPATIENT
Start: 2020-08-28 | End: 2021-08-26

## 2020-08-28 RX ORDER — AMLODIPINE BESYLATE 5 MG/1
5 TABLET ORAL DAILY
Qty: 30 TABLET | Refills: 11 | Status: SHIPPED | OUTPATIENT
Start: 2020-08-28 | End: 2021-08-27

## 2020-08-28 NOTE — PROGRESS NOTES
[facility] Cardiology  OFFICE NOTE    Cardiovascular Medicine  Barbara Ardon M.D., RPVI         No referring provider defined for this encounter.    Thank you for asking me to see Nima Lokideidra Lugoalyson for CAD, syncope.    History of Present Illness  This is a 59 y.o. male with:  1. Coronary artery disease involving native coronary artery of native heart without angina pectoris    2. Essential hypertension    3. Pure hypercholesterolemia    4. Nicotine abuse    5. ETOH abuse          Chief complaint -syncope        History of present Illness- 59-year-old gentleman who smokeed about a pack and a half a day and drinks about 6 beers a day/week, he has history of cardiac catheterization 2015 by Dr. Sandhu and which showed small vessel disease in the obtuse marginal one side branch and in the LPDA with moderate diffuse disease in the LAD,   He has cut down on his smoking and drinking.  Patient was admitted to the hospital for syncopal episode in 2019.  Patient reported he had a bout of coughing which is followed by loss of consciousness.  Patient is admitted to the hospital for acute coronary syndrome he was noted to be bradycardic on arrival subsequently his metoprolol was cut back.  Was also also orthostatic.  He had carotid ultrasound done which were without any significant disease.  No recurrence of syncopal episodes since then.  Denies any chest pain or exertion.    04/08/2020  Patient reported he has been doing well.  He continues to have chest pain however the episodes are usually about once a month, they have not increased in severity or frequency.  His chest pain usually last for 2 to 3 minutes and resolve when he takes a sublingual nitroglycerin.  Denying any claudications, lower extremity swelling    08/28/2020:  No acute issues since last visit.  He is pretty active at baseline and denying any chest pain on exertion.  He has not used his nitroglycerin since last visit.             Review of  Systems - ROS  Constitution: Negative for weakness, weight gain and weight loss.   HENT: Negative for congestion.    Eyes: Negative for blurred vision.   Cardiovascular: As mentioned above  Respiratory: Negative for cough and hemoptysis.    Endocrine: Negative for polydipsia and polyuria.   Hematologic/Lymphatic: Negative for bleeding problem. Does not bruise/bleed easily.   Skin: Negative for flushing.   Musculoskeletal: Negative for neck pain and stiffness.   Gastrointestinal: Negative for abdominal pain, diarrhea, jaundice, melena, nausea and vomiting.   Genitourinary: Negative for dysuria and hematuria.   Neurological: Negative for dizziness, focal weakness and numbness.   Psychiatric/Behavioral: Negative for altered mental status and depression.          All other systems were reviewed and were negative.    family history includes Cancer in his paternal aunt and paternal uncle; Diabetes in his father, mother, and sister; Heart disease in his father, mother, and sister; Hypertension in his father and mother; Stroke in his sister.     reports that he has been smoking cigarettes. He has a 60.00 pack-year smoking history. He has never used smokeless tobacco. He reports that he drinks about 12.0 standard drinks of alcohol per week. He reports that he does not use drugs.    No Known Allergies      Current Outpatient Medications:   •  aspirin 81 MG chewable tablet, Chew 81 mg daily., Disp: , Rfl:   •  buPROPion SR (WELLBUTRIN SR) 150 MG 12 hr tablet, TAKE 1 TABLET BY MOUTH TWICE A DAY, Disp: 60 tablet, Rfl: 4  •  diclofenac (VOLTAREN) 1 % gel gel, Apply 4 g topically to the appropriate area as directed 2 (Two) Times a Day. Small amount to affected area, Disp: 100 g, Rfl: 3  •  hydroCHLOROthiazide (HYDRODIURIL) 25 MG tablet, Take 1 tablet by mouth Daily., Disp: 90 tablet, Rfl: 3  •  isosorbide mononitrate (IMDUR) 60 MG 24 hr tablet, Take 1 tablet by mouth Daily., Disp: 30 tablet, Rfl: 2  •  lisinopril  "(PRINIVIL,ZESTRIL) 40 MG tablet, Take 1 tablet by mouth Daily., Disp: 90 tablet, Rfl: 3  •  metoprolol succinate XL (TOPROL-XL) 25 MG 24 hr tablet, Take 1 tablet by mouth Daily., Disp: 90 tablet, Rfl: 3  •  nitroglycerin (NITROSTAT) 0.4 MG SL tablet, Place 1 tablet under the tongue Every 5 (Five) Minutes As Needed for Chest Pain. Take no more than 3 doses in 15 minutes., Disp: 30 tablet, Rfl: 12  •  pravastatin (PRAVACHOL) 20 MG tablet, Take 20 mg by mouth Daily., Disp: , Rfl: 6    Physical Exam:  Vitals:    08/28/20 1005   BP: 140/95   BP Location: Left arm   Patient Position: Sitting   Cuff Size: Adult   Pulse: 66   SpO2: 97%   Weight: 86.2 kg (190 lb)   Height: 167.6 cm (66\")     Current Pain Level: none  Pulse Ox: Normal        on room air  General: alert, appears stated age and cooperative     Body Habitus: well-nourished    HEENT: Head: Normocephalic, no lesions, without obvious abnormality. No arcus senilis, xanthelasma or xanthomas.    Neuro: alert, oriented x3  Pulses: 2+ and symmetric  JVP: Volume/Pulsation:       Normal.  Normal waveforms.   Appropriate inspiratory decrease.  No Kussmaul's. No Germain's.   Carotid Exam: no bruit normal pulsation bilaterally    Carotid Volume: normal.                     Respirations: no increased work of breathing            Chest:  Normal              Pulmonary:Normal       Precordium: Normal impulses. P2 is not palpable.  RV Heave: absent  LV Heave: absent  [place]:  normal size and placement  Palpable S4: absent.  Heart rate: normal    Heart Rhythm: regular                                     Heart Sounds: S1: normal    S2: normal  S3: absent                               S4: absent  Opening Snap: absent            Pericardial Rub:  Absent: .                 Abdomen:   Appearance: normal .  Palpation: Soft, non-tender to palpation, bowel sounds positive in all four quadrants; no guarding or rebound tenderness  Extremity: no edema.   LE Skin: no rashes  LE Hair:  " normal  LE Pulses: well perfused with normal pulses in the distal extremities  Pallor on elevation: Absent. Rubor on dependency: None       DATA REVIEWED:         ECG/EMG Results (all)     None        ---------------------------------------------------  TTE/GEOFFREY:  Results for orders placed during the hospital encounter of 09/04/19   Adult Transthoracic Echo Complete W/ Cont if Necessary Per Protocol    Narrative · Estimated EF = 61%.  · Left ventricular systolic function is normal.  · Left ventricular diastolic dysfunction (grade I) consistent with   impaired relaxation.  · Mild mitral valve regurgitation is present  · Mild tricuspid valve regurgitation is present.  · Mild tricuspid valve stenosis is present.        -----------------------------------------------------  CXR/Imaging:   --------------  CT:   Xr Chest Pa & Lateral    Result Date: 8/4/2020  No evidence of active disease. Electronically signed by:  David Spaulding MD  8/4/2020 3:02 PM CDT Workstation: [id number]      ----------------------------------------------------      --------------------------------------------------------------------------------------------------  LABS:     The CVD Risk score (Leeann et al., 2008) failed to calculate for the following reasons:    The patient has a prior MI, stroke, CHF, or peripheral vascular disease diagnosis         Lab Results   Component Value Date    GLUCOSE 74 06/16/2020    BUN 17 06/16/2020    CREATININE 0.89 06/16/2020    EGFRIFNONA 87 06/16/2020    BCR 19.1 06/16/2020    K 4.4 06/16/2020    CO2 29.5 (H) 06/16/2020    CALCIUM 9.3 06/16/2020    ALBUMIN 4.70 06/16/2020    AST 22 06/16/2020    ALT 22 06/16/2020     Lab Results   Component Value Date    WBC 8.86 06/16/2020    HGB 14.7 06/16/2020    HCT 42.0 06/16/2020    MCV 91.5 06/16/2020     06/16/2020     Lab Results   Component Value Date    CHOL 173 09/13/2019    TRIG 200 (H) 09/13/2019    HDL 49 09/13/2019    LDL 84 09/13/2019     Lab Results    Component Value Date    TSH 2.08 03/17/2015     Lab Results   Component Value Date    CKTOTAL 102 03/16/2015    CKMB 3.7 03/16/2015    TROPONINI 0.251 (C) 03/16/2015    TROPONINT <0.010 09/04/2019     No results found for: HGBA1C  No results found for: DDIMER  Lab Results   Component Value Date    ALT 22 06/16/2020     No results found for: HGBA1C  Lab Results   Component Value Date    CREATININE 0.89 06/16/2020     No results found for: IRON, TIBC, FERRITIN  Lab Results   Component Value Date    INR 1.04 09/04/2019    INR 0.9 04/08/2015    PROTIME 13.4 09/04/2019    PROTIME 12.2 04/08/2015       Assessment/Plan     1. Syncope and collapse  Secondary to vasovagal syncope in the setting of significant coughing and competent orthostatic hypotension.  Patient was also bradycardic.  Can cut back on metoprolol with improvement in his heart rate.  Echo cardiogram was without any structural abnormalities.  Carotid Doppler was without any significant abnormalities.  Patient also had a monitor which was without any significant arrhythmias.    2. CAD:  Reviewed his angiogram from 2015 he does have moderate diffuse disease in LAD, he has a significant stenosis in inferior branch of a small OM and L PDA.  He has chronic stable angina will continue medical treatment at this point.  If patient were to have worsening of her symptoms will consider repeat cardiac cath  Continue aspirin/beta-blocker/nitro    Hypertension on lisinopril, isosorbide and HCTZ along with Toprol-XL.  I am adding amlodipine 5 mg     Peripheral vascular disease -had CTA with abdominal aortogram with runoff which did not show any high-grade stenosis and some of his pain in the legs could be due to neuropathy from his alcohol use    HLD:  Will switch him to Lipitor 40mg from pravastatin with his elevated ASVD risk score       Prevention:  Patient's Body mass index is 30.67 kg/m². BMI is above normal parameters. Recommendations include: exercise counseling  and nutrition counseling.      Nima Portillo is a current cigarettes user.  He currently smokes 4 cigarettes per day for a duration of 10 years. I have educated him on the risk of diseases from using tobacco products such as cancer, COPD and heart diease.     I advised him to quit and he is not willing to quit.    I spent 3  minutes counseling the patient.          AAA Screening:             This document has been electronically signed by Barbara Ardon MD on August 28, 2020 10:15

## 2020-09-02 ENCOUNTER — APPOINTMENT (OUTPATIENT)
Dept: CT IMAGING | Facility: HOSPITAL | Age: 60
End: 2020-09-02

## 2020-09-08 ENCOUNTER — TELEPHONE (OUTPATIENT)
Dept: GENERAL RADIOLOGY | Facility: HOSPITAL | Age: 60
End: 2020-09-08

## 2020-09-09 NOTE — TELEPHONE ENCOUNTER
PT: KEVIN BRIDGES : 1960, DID NOT SHOW UP FOR HIS CT CHEST LOW DOSE APPOINTMENT ON 2020 AT 3:30PM.

## 2020-09-24 ENCOUNTER — OFFICE VISIT (OUTPATIENT)
Dept: GASTROENTEROLOGY | Facility: CLINIC | Age: 60
End: 2020-09-24

## 2020-09-24 VITALS — WEIGHT: 186 LBS | HEIGHT: 66 IN | BODY MASS INDEX: 29.89 KG/M2

## 2020-09-24 DIAGNOSIS — Z12.11 SCREEN FOR COLON CANCER: Primary | ICD-10-CM

## 2020-09-24 PROCEDURE — S0285 CNSLT BEFORE SCREEN COLONOSC: HCPCS | Performed by: PHYSICIAN ASSISTANT

## 2020-09-24 RX ORDER — DEXTROSE AND SODIUM CHLORIDE 5; .45 G/100ML; G/100ML
30 INJECTION, SOLUTION INTRAVENOUS CONTINUOUS PRN
Status: CANCELLED | OUTPATIENT
Start: 2020-10-26

## 2020-10-07 ENCOUNTER — OFFICE VISIT (OUTPATIENT)
Dept: ORTHOPEDIC SURGERY | Facility: CLINIC | Age: 60
End: 2020-10-07

## 2020-10-07 VITALS — BODY MASS INDEX: 30.05 KG/M2 | WEIGHT: 187 LBS | HEIGHT: 66 IN

## 2020-10-07 DIAGNOSIS — M79.642 BILATERAL HAND PAIN: Primary | ICD-10-CM

## 2020-10-07 DIAGNOSIS — M79.641 BILATERAL HAND PAIN: Primary | ICD-10-CM

## 2020-10-07 DIAGNOSIS — M79.644 CHRONIC PAIN OF RIGHT THUMB: ICD-10-CM

## 2020-10-07 DIAGNOSIS — G89.29 CHRONIC PAIN OF RIGHT THUMB: ICD-10-CM

## 2020-10-07 DIAGNOSIS — M18.0 ARTHRITIS OF CARPOMETACARPAL (CMC) JOINT OF BOTH THUMBS: ICD-10-CM

## 2020-10-07 PROCEDURE — 99213 OFFICE O/P EST LOW 20 MIN: CPT | Performed by: ORTHOPAEDIC SURGERY

## 2020-10-07 PROCEDURE — 20600 DRAIN/INJ JOINT/BURSA W/O US: CPT | Performed by: ORTHOPAEDIC SURGERY

## 2020-10-07 RX ORDER — LIDOCAINE HYDROCHLORIDE 10 MG/ML
1 INJECTION, SOLUTION INFILTRATION; PERINEURAL
Status: COMPLETED | OUTPATIENT
Start: 2020-10-07 | End: 2020-10-07

## 2020-10-07 RX ORDER — TRIAMCINOLONE ACETONIDE 40 MG/ML
40 INJECTION, SUSPENSION INTRA-ARTICULAR; INTRAMUSCULAR
Status: COMPLETED | OUTPATIENT
Start: 2020-10-07 | End: 2020-10-07

## 2020-10-07 RX ADMIN — LIDOCAINE HYDROCHLORIDE 1 ML: 10 INJECTION, SOLUTION INFILTRATION; PERINEURAL at 10:41

## 2020-10-07 RX ADMIN — TRIAMCINOLONE ACETONIDE 40 MG: 40 INJECTION, SUSPENSION INTRA-ARTICULAR; INTRAMUSCULAR at 10:41

## 2020-10-22 ENCOUNTER — TELEPHONE (OUTPATIENT)
Dept: FAMILY MEDICINE CLINIC | Facility: CLINIC | Age: 60
End: 2020-10-22

## 2020-10-23 ENCOUNTER — LAB (OUTPATIENT)
Dept: LAB | Facility: HOSPITAL | Age: 60
End: 2020-10-23

## 2020-10-23 DIAGNOSIS — Z01.818 PREOP TESTING: Primary | ICD-10-CM

## 2020-10-23 PROCEDURE — C9803 HOPD COVID-19 SPEC COLLECT: HCPCS

## 2020-10-23 PROCEDURE — U0003 INFECTIOUS AGENT DETECTION BY NUCLEIC ACID (DNA OR RNA); SEVERE ACUTE RESPIRATORY SYNDROME CORONAVIRUS 2 (SARS-COV-2) (CORONAVIRUS DISEASE [COVID-19]), AMPLIFIED PROBE TECHNIQUE, MAKING USE OF HIGH THROUGHPUT TECHNOLOGIES AS DESCRIBED BY CMS-2020-01-R: HCPCS

## 2020-10-24 LAB
COVID LABCORP PRIORITY: NORMAL
SARS-COV-2 RNA RESP QL NAA+PROBE: NOT DETECTED

## 2020-10-26 ENCOUNTER — HOSPITAL ENCOUNTER (OUTPATIENT)
Facility: HOSPITAL | Age: 60
Setting detail: HOSPITAL OUTPATIENT SURGERY
Discharge: HOME | End: 2020-10-26
Attending: INTERNAL MEDICINE | Admitting: INTERNAL MEDICINE

## 2020-10-26 ENCOUNTER — ANESTHESIA (OUTPATIENT)
Dept: GASTROENTEROLOGY | Facility: HOSPITAL | Age: 60
End: 2020-10-26

## 2020-10-26 ENCOUNTER — ANESTHESIA EVENT (OUTPATIENT)
Dept: GASTROENTEROLOGY | Facility: HOSPITAL | Age: 60
End: 2020-10-26

## 2020-10-26 VITALS
OXYGEN SATURATION: 99 % | RESPIRATION RATE: 18 BRPM | SYSTOLIC BLOOD PRESSURE: 125 MMHG | HEIGHT: 66 IN | DIASTOLIC BLOOD PRESSURE: 77 MMHG | WEIGHT: 181 LBS | HEART RATE: 60 BPM | TEMPERATURE: 96.6 F | BODY MASS INDEX: 29.09 KG/M2

## 2020-10-26 DIAGNOSIS — Z12.11 SCREEN FOR COLON CANCER: ICD-10-CM

## 2020-10-26 PROCEDURE — 25010000002 PROPOFOL 10 MG/ML EMULSION: Performed by: NURSE ANESTHETIST, CERTIFIED REGISTERED

## 2020-10-26 PROCEDURE — 45378 DIAGNOSTIC COLONOSCOPY: CPT | Performed by: INTERNAL MEDICINE

## 2020-10-26 RX ORDER — ONDANSETRON 2 MG/ML
4 INJECTION INTRAMUSCULAR; INTRAVENOUS ONCE AS NEEDED
Status: DISCONTINUED | OUTPATIENT
Start: 2020-10-26 | End: 2020-10-26 | Stop reason: HOSPADM

## 2020-10-26 RX ORDER — LIDOCAINE HYDROCHLORIDE 20 MG/ML
INJECTION, SOLUTION INTRAVENOUS AS NEEDED
Status: DISCONTINUED | OUTPATIENT
Start: 2020-10-26 | End: 2020-10-26 | Stop reason: SURG

## 2020-10-26 RX ORDER — PROPOFOL 10 MG/ML
VIAL (ML) INTRAVENOUS AS NEEDED
Status: DISCONTINUED | OUTPATIENT
Start: 2020-10-26 | End: 2020-10-26 | Stop reason: SURG

## 2020-10-26 RX ORDER — DEXTROSE AND SODIUM CHLORIDE 5; .45 G/100ML; G/100ML
30 INJECTION, SOLUTION INTRAVENOUS CONTINUOUS PRN
Status: DISCONTINUED | OUTPATIENT
Start: 2020-10-26 | End: 2020-10-26 | Stop reason: HOSPADM

## 2020-10-26 RX ADMIN — LIDOCAINE HYDROCHLORIDE 60 MG: 20 INJECTION, SOLUTION INTRAVENOUS at 15:04

## 2020-10-26 RX ADMIN — PROPOFOL 70 MG: 10 INJECTION, EMULSION INTRAVENOUS at 15:04

## 2020-10-26 RX ADMIN — PROPOFOL 20 MG: 10 INJECTION, EMULSION INTRAVENOUS at 15:06

## 2020-10-26 RX ADMIN — PROPOFOL 20 MG: 10 INJECTION, EMULSION INTRAVENOUS at 15:08

## 2020-10-26 RX ADMIN — DEXTROSE AND SODIUM CHLORIDE 30 ML/HR: 5; 450 INJECTION, SOLUTION INTRAVENOUS at 14:18

## 2020-10-26 NOTE — ANESTHESIA PREPROCEDURE EVALUATION
Anesthesia Evaluation     Patient summary reviewed   NPO Solid Status: > 8 hours  NPO Liquid Status: > 4 hours           Airway   Mallampati: II  TM distance: >3 FB  Dental      Pulmonary - normal exam   (+) a smoker Current Smoked day of surgery,   Cardiovascular - normal exam    ECG reviewed  Patient on routine beta blocker    (+) hypertension, CAD, hyperlipidemia,     ROS comment: Vent. Rate : 061 BPM     Atrial Rate : 061 BPM     P-R Int : 148 ms          QRS Dur : 084 ms      QT Int : 432 ms       P-R-T Axes : 053 053 021 degrees     QTc Int : 434 ms     Normal sinus rhythm  Normal ECG  When compared with ECG of 04-SEP-2019 12:25,  No significant change was found  Confirmed by LIVE CHRISTOPHER ([id number]) on 8/28/2020 3:22:09 PM    9/5/2019 TTE  · Estimated EF = 61%.  · Left ventricular systolic function is normal.  · Left ventricular diastolic dysfunction (grade I) consistent with impaired relaxation.  · Mild mitral valve regurgitation is present  · Mild tricuspid valve regurgitation is present.  · Mild tricuspid valve stenosis is present.    Neuro/Psych  (+) numbness,     GI/Hepatic/Renal/Endo      Musculoskeletal     Abdominal    Substance History   (+) alcohol use,      OB/GYN          Other   arthritis,                      Anesthesia Plan    ASA 3     MAC     intravenous induction     Anesthetic plan, all risks, benefits, and alternatives have been provided, discussed and informed consent has been obtained with: patient.

## 2020-10-26 NOTE — H&P
No chief complaint on file.      ENDO PROCEDURE ORDERED: COLON screen    Subjective    Nima Portillo is a 59 y.o. male. he is being seen for consultation today at the request of Levi Milian MD    History of Present Illness    This 59-year-old male was sent for evaluation for screening colonoscopy by Dr. Rojas, who saw the patient for claudication 2020. He saw Dr. Ardon on 2020.     Laboratories on 2020 showed fairly normal CBC, CMP with a C02 of 29.5. BMP was normal on 2020. Patient currently denies abdominal pain, heartburn, nausea, vomiting, dysphagia. Bowels are moving without blood or mucous. Weight has been stable. He has never had endoscopy.     Patient does use tobacco and alcohol. Denied illicit substance use.     He has had a heart cath, dental procedures, and anxiety.     Family history of diabetes. No known family history of GI tract malignancy. Father, mother, and spouse are all . One brother, 2 sisters, 4 children in good health.     A/P: 59-year-old male with average risk screening colonoscopy. This is scheduled. Further pending clinical course and the results of the above.     Thank you very much, Dr. Rojas, for involving us in the care of your patient. Will keep you informed.       The following portions of the patient's history were reviewed and updated as appropriate:   Past Medical History:   Diagnosis Date   • Acute bronchitis    • Acute sinusitis    • Chest pain    • Chronic bronchitis (CMS/HCC)     secondary to smoking   • Claudication (CMS/HCC)    • Coronary arteriosclerosis    • Cough    • Gastroesophageal reflux disease    • Health maintenance examination     Individual health examination   • History of echocardiogram 10/21/2013    Echocardiogram W/ color flow 84522 (1) - Left atrium normal. Mild CLVH. EF 50%. Valves intact. Mild mitral and tricuspid regurg. Pericardium normal.   • History of echocardiogram 10/25/2011    Echocardiogram W/O color flow  18659 (1) - Normal LV sytolic function with mild LVH & mild diastolic dysfunction   • Hyperlipidemia    • Hypertensive disorder    • Pernicious anemia    • Thumb pain, left 10/14/2019   • Tobacco dependence syndrome      Past Surgical History:   Procedure Laterality Date   • CARDIAC CATHETERIZATION  04/08/2015    Cardiac cath 25804 (1) - Slective coronary angiogram. Left heart catheterization with LV ventriculogram.   • DENTAL PROCEDURE      Removal of all teeth     Family History   Problem Relation Age of Onset   • Heart disease Mother    • Hypertension Mother    • Diabetes Mother    • Heart disease Father    • Hypertension Father    • Diabetes Father    • Diabetes Sister    • Heart disease Sister    • Stroke Sister    • Cancer Paternal Aunt         Breast Cancer   • Cancer Paternal Uncle         Unknown cancer       No Known Allergies  Social History     Socioeconomic History   • Marital status:      Spouse name: Not on file   • Number of children: Not on file   • Years of education: Not on file   • Highest education level: Not on file   Tobacco Use   • Smoking status: Current Every Day Smoker     Packs/day: 1.50     Years: 40.00     Pack years: 60.00     Types: Cigarettes   • Smokeless tobacco: Former User   Substance and Sexual Activity   • Alcohol use: Yes     Alcohol/week: 12.0 standard drinks     Types: 12 Cans of beer per week     Frequency: 2-3 times a week     Drinks per session: 10 or more     Binge frequency: Weekly   • Drug use: No   • Sexual activity: Never     Comment: Marital status:      Current Medications:  Prior to Admission medications    Medication Sig Start Date End Date Taking? Authorizing Provider   hydroCHLOROthiazide (HYDRODIURIL) 25 MG tablet Take 1 tablet by mouth Daily. 8/25/20  Yes Familia Sampson MD   isosorbide mononitrate (IMDUR) 60 MG 24 hr tablet Take 1 tablet by mouth Daily. 8/25/20  Yes Familia Sampson MD   lisinopril (PRINIVIL,ZESTRIL) 40 MG tablet  Take 1 tablet by mouth Daily. 8/25/20  Yes Familia Sampson MD   metoprolol succinate XL (TOPROL-XL) 25 MG 24 hr tablet Take 1 tablet by mouth Daily. 8/25/20  Yes Familia Sampson MD   nitroglycerin (NITROSTAT) 0.4 MG SL tablet Place 1 tablet under the tongue Every 5 (Five) Minutes As Needed for Chest Pain. Take no more than 3 doses in 15 minutes. 10/14/19  Yes Andrew Villatoro III, MD   amLODIPine (NORVASC) 5 MG tablet Take 1 tablet by mouth Daily. 8/28/20   Barbara Ardon MD   aspirin 81 MG chewable tablet Chew 81 mg daily.    ProviderAlma MD   atorvastatin (LIPITOR) 40 MG tablet Take 1 tablet by mouth Daily. 8/28/20   Barbara Ardon MD   buPROPion SR (WELLBUTRIN SR) 150 MG 12 hr tablet TAKE 1 TABLET BY MOUTH TWICE A DAY 7/21/20   Anthony Richardson MD   diclofenac (VOLTAREN) 1 % gel gel Apply 4 g topically to the appropriate area as directed 2 (Two) Times a Day. Small amount to affected area 4/10/20   Andrew Villatoro III, MD     Review of Systems  Review of Systems   Constitutional: Negative for unexpected weight change.   HENT: Negative for trouble swallowing.    Gastrointestinal: Positive for abdominal pain. Negative for anal bleeding, blood in stool, constipation, diarrhea, nausea, rectal pain and vomiting.          Objective    There were no vitals taken for this visit.  Physical Exam  Vitals signs and nursing note reviewed.   Constitutional:       General: He is not in acute distress.     Appearance: He is well-developed.   HENT:      Head: Normocephalic and atraumatic.   Eyes:      Pupils: Pupils are equal, round, and reactive to light.   Neck:      Musculoskeletal: Normal range of motion.   Cardiovascular:      Rate and Rhythm: Normal rate and regular rhythm.      Heart sounds: Normal heart sounds.   Pulmonary:      Effort: Pulmonary effort is normal.      Breath sounds: Normal breath sounds.   Abdominal:      General: Bowel sounds are normal. There is no distension or  abdominal bruit.      Palpations: Abdomen is soft. Abdomen is not rigid. There is no shifting dullness or mass.      Tenderness: There is abdominal tenderness. There is no guarding or rebound.      Hernia: No hernia is present. There is no hernia in the ventral area.   Musculoskeletal: Normal range of motion.   Skin:     General: Skin is warm and dry.   Neurological:      Mental Status: He is alert and oriented to person, place, and time.   Psychiatric:         Behavior: Behavior normal.         Thought Content: Thought content normal.         Judgment: Judgment normal.       Assessment/Plan      No diagnosis found..   Nima was seen today for eval for colonoscopy.    Diagnoses and all orders for this visit:    Screen for colon cancer  -     Case Request; Standing  -     Case Request    Other orders  -     Follow Anesthesia Guidelines / Standing Orders; Future  -     Obtain Informed Consent; Future        Orders placed during this encounter include:  No orders of the defined types were placed in this encounter.      Medications prescribed:  No orders of the defined types were placed in this encounter.      Requested Prescriptions      No prescriptions requested or ordered in this encounter       Review and/or summary of lab tests, radiology, procedures, medications. Review and summary of old records and obtaining of history. The risks and benefits of my recommendations, as well as other treatment options were discussed with the patient today. Questions were answered.    Follow-up: No follow-ups on file.     COLONOSCOPY (N/A)      This document has been electronically signed by Levi Milian MD on October 26, 2020 13:26 CDT      Results for orders placed or performed in visit on 10/23/20   COVID [facility] Priority - Swab, Nasopharynx    Specimen: Nasopharynx; Swab   Result Value Ref Range    COVID [facility] PRIORITY Comment    COVID-19,[facility] ROUTINE, NP/OP SWAB IN TRANSPORT MEDIA OR ESWAB 72 HR TAT - Swab,  Nasopharynx    Specimen: Nasopharynx; Swab   Result Value Ref Range    SARS-CoV-2, CHARLINE Not Detected Not Detected   Results for orders placed or performed during the hospital encounter of 09/04/20   Duplex Lower Extremity Art / Grafts - Bilateral CAR   Result Value Ref Range    CFA Prox PSV-Right 229.00 cm/s    CFA Prox EDV-Right 0.00 cm/s    DFA Prox PSV-Right 199.00 cm/s    DFA Prox EDV-Right 0.00 cm/s    SFA Prox PSV-Right 234.00 cm/s    SFA Prox EDV-Right 0.00 cm/s    SFA Mid PSV-Right 132.00 cm/s    SFA Mid EDV-Right 0.00 cm/s    SFA Distal PSV-Right 263.00 cm/s    SFA Distal EDV-Right 0.00 cm/s    Popiteal A Prox PSV-Right 82.00 cm/s    Popiteal A Prox EDV-Right 0.00 cm/s    Popiteal A Mid PSV-Right 83.00 cm/s    Popiteal A Mid EDV-Right 0.00 cm/s    Popiteal A Distal PSV-Right 96.00 cm/s    Popiteal A Distal EDV-Right 0.00 cm/s    PTA Mid PSV-Right 72.00 cm/s    PTA Mid EDV-Right 0.00 cm/s    Peroneal Mid PSV-Right 47.00 cm/s    Peroneal Mid EDV-Right 0.00 cm/s    CFA Prox PSV-Left 180.00 cm/s    CFA Prox EDV-Left 6.00 cm/s    DFA Prox PSV-Left 90.00 cm/s    DFA Prox EDV-Left 1.00 cm/s    SFA Prox PSV-Left 231.00 cm/s    SFA Prox EDV-Left 4.00 cm/s    SFA Mid PSV-Left 175.00 cm/s    SFA Mid EDV-Left 4.00 cm/s    SFA Distal PSV-Left 75.00 cm/s    SFA Distal EDV-Left 0.00 cm/s    Popiteal A Prox PSV-Left 89.00 cm/s    Popiteal A Prox EDV-Left 0.00 cm/s    Popiteal A Mid PSV-Left 82.00 cm/s    Popiteal A Mid EDV-Left 0.00 cm/s    Popiteal A Distal PSV-Left 109.00 cm/s    Popiteal A Distal EDV-Left 0.00 cm/s    PTA Mid PSV-Left 99.00 cm/s    PTA Mid EDV-Left 0.00 cm/s    Peroneal Mid PSV-Left 29.00 cm/s    Peroneal Mid EDV-Left 0.00 cm/s   Results for orders placed or performed in visit on 08/04/20   BNP    Specimen: Blood   Result Value Ref Range    proBNP 48.9 0.0 - 900.0 pg/mL   Results for orders placed or performed in visit on 06/16/20   CBC Auto Differential    Specimen: Blood   Result Value Ref Range    WBC  8.86 3.40 - 10.80 10*3/mm3    RBC 4.59 4.14 - 5.80 10*6/mm3    Hemoglobin 14.7 13.0 - 17.7 g/dL    Hematocrit 42.0 37.5 - 51.0 %    MCV 91.5 79.0 - 97.0 fL    MCH 32.0 26.6 - 33.0 pg    MCHC 35.0 31.5 - 35.7 g/dL    RDW 13.6 12.3 - 15.4 %    RDW-SD 45.8 37.0 - 54.0 fl    MPV 9.8 6.0 - 12.0 fL    Platelets 214 140 - 450 10*3/mm3    Neutrophil % 63.8 42.7 - 76.0 %    Lymphocyte % 22.9 19.6 - 45.3 %    Monocyte % 10.6 5.0 - 12.0 %    Eosinophil % 2.1 0.3 - 6.2 %    Basophil % 0.3 0.0 - 1.5 %    Immature Grans % 0.3 0.0 - 0.5 %    Neutrophils, Absolute 5.64 1.70 - 7.00 10*3/mm3    Lymphocytes, Absolute 2.03 0.70 - 3.10 10*3/mm3    Monocytes, Absolute 0.94 (H) 0.10 - 0.90 10*3/mm3    Eosinophils, Absolute 0.19 0.00 - 0.40 10*3/mm3    Basophils, Absolute 0.03 0.00 - 0.20 10*3/mm3    Immature Grans, Absolute 0.03 0.00 - 0.05 10*3/mm3    nRBC 0.0 0.0 - 0.2 /100 WBC   Comprehensive metabolic panel    Specimen: Blood   Result Value Ref Range    Glucose 74 65 - 99 mg/dL    BUN 17 6 - 20 mg/dL    Creatinine 0.89 0.76 - 1.27 mg/dL    Sodium 143 136 - 145 mmol/L    Potassium 4.4 3.5 - 5.2 mmol/L    Chloride 104 98 - 107 mmol/L    CO2 29.5 (H) 22.0 - 29.0 mmol/L    Calcium 9.3 8.6 - 10.5 mg/dL    Total Protein 7.0 6.0 - 8.5 g/dL    Albumin 4.70 3.50 - 5.20 g/dL    ALT (SGPT) 22 1 - 41 U/L    AST (SGOT) 22 1 - 40 U/L    Alkaline Phosphatase 44 39 - 117 U/L    Total Bilirubin 0.3 0.2 - 1.2 mg/dL    eGFR Non African Amer 87 >60 mL/min/1.73    Globulin 2.3 gm/dL    A/G Ratio 2.0 g/dL    BUN/Creatinine Ratio 19.1 7.0 - 25.0    Anion Gap 9.5 5.0 - 15.0 mmol/L   Results for orders placed or performed in visit on 10/16/19   Doppler Ankle Brachial Index Single Level CAR   Result Value Ref Range    RIGHT DORSALIS PEDIS SYS  mmHg    RIGHT POST TIBIAL SYS  mmHg    RIGHT YOKO RATIO 1.1     LEFT DORSALIS PEDIS SYS  mmHg    LEFT POST TIBIAL SYS  mmHg    LEFT YOKO RATIO 1.17     Upper arterial right arm brachial sys max  148 mmHg    Upper arterial left arm brachial sys max 133 mmHg   Results for orders placed or performed in visit on 09/13/19   Lipid panel    Specimen: Blood   Result Value Ref Range    Total Cholesterol 173 0 - 200 mg/dL    Triglycerides 200 (H) 0 - 150 mg/dL    HDL Cholesterol 49 40 - 60 mg/dL    LDL Cholesterol  84 0 - 100 mg/dL    VLDL Cholesterol 40 5 - 40 mg/dL    LDL/HDL Ratio 1.71      *Note: Due to a large number of results and/or encounters for the requested time period, some results have not been displayed. A complete set of results can be found in Results Review.       Some portions of this note have been dictated using voice recognition software and may contain errors and/or omissions.

## 2020-10-26 NOTE — ANESTHESIA POSTPROCEDURE EVALUATION
Patient: Nima Portillo    Procedure Summary     Date: 10/26/20 Room / Location: [facility] ENDOSCOPY 1 / [facility] ENDOSCOPY    Anesthesia Start: 1500 Anesthesia Stop: 1514    Procedure: COLONOSCOPY (N/A ) Diagnosis:       Screen for colon cancer      (Screen for colon cancer [Z12.11])    Surgeon: Levi Milian MD Provider: Sudhakar Mo CRNA    Anesthesia Type: MAC ASA Status: 3          Anesthesia Type: MAC    Vitals  No vitals data found for the desired time range.          Post Anesthesia Care and Evaluation    Patient location during evaluation: PACU  Level of consciousness: awake  Pain score: 0  Pain management: adequate  Airway patency: patent  Anesthetic complications: No anesthetic complications  PONV Status: none  Cardiovascular status: acceptable and hemodynamically stable  Respiratory status: acceptable and spontaneous ventilation  Hydration status: acceptable

## 2021-01-20 ENCOUNTER — OFFICE VISIT (OUTPATIENT)
Dept: ORTHOPEDIC SURGERY | Facility: CLINIC | Age: 61
End: 2021-01-20

## 2021-01-20 VITALS — HEIGHT: 66 IN | WEIGHT: 181 LBS | BODY MASS INDEX: 29.09 KG/M2

## 2021-01-20 DIAGNOSIS — M79.641 BILATERAL HAND PAIN: ICD-10-CM

## 2021-01-20 DIAGNOSIS — M79.642 BILATERAL HAND PAIN: ICD-10-CM

## 2021-01-20 DIAGNOSIS — M18.0 ARTHRITIS OF CARPOMETACARPAL (CMC) JOINT OF BOTH THUMBS: Primary | ICD-10-CM

## 2021-01-20 PROCEDURE — 99213 OFFICE O/P EST LOW 20 MIN: CPT | Performed by: ORTHOPAEDIC SURGERY

## 2021-01-20 NOTE — PROGRESS NOTES
Nima Portillo is a 60 y.o. male returns for     Chief Complaint   Patient presents with   • Right Hand - Follow-up   • Left Hand - Follow-up       HISTORY OF PRESENT ILLNESS:follow up bilateral hands   Pain scale today 8/10.  The left thumb is really hurting more than the right last injections really have not helped him very much.  We have been treating her CMC arthritis he did not get much relief from his last injections.     CONCURRENT MEDICAL HISTORY:    Past Medical History:   Diagnosis Date   • Acute bronchitis    • Acute sinusitis    • Chest pain    • Chronic bronchitis (CMS/HCC)     secondary to smoking   • Claudication (CMS/HCC)    • Coronary arteriosclerosis    • Cough    • Gastroesophageal reflux disease    • Health maintenance examination     Individual health examination   • History of echocardiogram 10/21/2013    Echocardiogram W/ color flow 08536 (1) - Left atrium normal. Mild CLVH. EF 50%. Valves intact. Mild mitral and tricuspid regurg. Pericardium normal.   • History of echocardiogram 10/25/2011    Echocardiogram W/O color flow 76015 (1) - Normal LV sytolic function with mild LVH & mild diastolic dysfunction   • Hyperlipidemia    • Hypertensive disorder    • Pernicious anemia    • Thumb pain, left 10/14/2019   • Tobacco dependence syndrome        No Known Allergies      Current Outpatient Medications:   •  amLODIPine (NORVASC) 5 MG tablet, Take 1 tablet by mouth Daily., Disp: 30 tablet, Rfl: 11  •  aspirin 81 MG chewable tablet, Chew 81 mg daily., Disp: , Rfl:   •  atorvastatin (LIPITOR) 40 MG tablet, Take 1 tablet by mouth Daily., Disp: 90 tablet, Rfl: 3  •  buPROPion SR (WELLBUTRIN SR) 150 MG 12 hr tablet, TAKE 1 TABLET BY MOUTH TWICE A DAY, Disp: 60 tablet, Rfl: 4  •  diclofenac (VOLTAREN) 1 % gel gel, Apply 4 g topically to the appropriate area as directed 2 (Two) Times a Day. Small amount to affected area, Disp: 100 g, Rfl: 3  •  hydroCHLOROthiazide (HYDRODIURIL) 25 MG tablet, Take 1  "tablet by mouth Daily., Disp: 90 tablet, Rfl: 3  •  isosorbide mononitrate (IMDUR) 60 MG 24 hr tablet, Take 1 tablet by mouth Daily., Disp: 30 tablet, Rfl: 2  •  lisinopril (PRINIVIL,ZESTRIL) 40 MG tablet, Take 1 tablet by mouth Daily., Disp: 90 tablet, Rfl: 3  •  metoprolol succinate XL (TOPROL-XL) 25 MG 24 hr tablet, Take 1 tablet by mouth Daily., Disp: 90 tablet, Rfl: 3  •  nitroglycerin (NITROSTAT) 0.4 MG SL tablet, Place 1 tablet under the tongue Every 5 (Five) Minutes As Needed for Chest Pain. Take no more than 3 doses in 15 minutes., Disp: 30 tablet, Rfl: 12  •  polyethylene glycol (GoLYTELY) 236 g solution, Starting at noon on day prior to procedure, drink 8 ounces every 30 minutes until all gone or stools are clear. May add flavor packet., Disp: 4000 mL, Rfl: 0    Past Surgical History:   Procedure Laterality Date   • CARDIAC CATHETERIZATION  04/08/2015    Cardiac cath 01931 (1) - Slective coronary angiogram. Left heart catheterization with LV ventriculogram.   • COLONOSCOPY N/A 10/26/2020    Procedure: COLONOSCOPY;  Surgeon: Levi Milian MD;  Location: [facility] ENDOSCOPY;  Service: Gastroenterology;  Laterality: N/A;   • DENTAL PROCEDURE      Removal of all teeth           ROS: Review of systems has been updated as of today's date.  All other systems are negative except as noted previously.    PHYSICAL EXAMINATION:       Ht 167.6 cm (66\")   Wt 82.1 kg (181 lb)   BMI 29.21 kg/m²     Physical Exam  Constitutional:       Appearance: Normal appearance. He is well-developed.   HENT:      Head: Normocephalic and atraumatic.      Nose: Nose normal. No congestion.   Eyes:      General: No scleral icterus.     Pupils: Pupils are equal, round, and reactive to light.   Neck:      Musculoskeletal: Neck supple.      Trachea: No tracheal deviation.   Pulmonary:      Effort: Pulmonary effort is normal.   Musculoskeletal:         General: Swelling and tenderness present. No deformity.   Skin:     General: Skin is warm " and dry.      Findings: No erythema.   Neurological:      General: No focal deficit present.      Mental Status: He is alert and oriented to person, place, and time.   Psychiatric:         Mood and Affect: Mood normal.         GAIT:     [x]  Normal  []  Antalgic    Assistive device: [x]  None  []  Walker     []  Crutches  []  Cane     []  Wheelchair  []  Stretcher    Ortho Exam  Tender with CMC joint mild redness and swelling.  Decreased motion is noted.    No results found.          ASSESSMENT:    Diagnoses and all orders for this visit:    Arthritis of carpometacarpal (CMC) joint of both thumbs    Bilateral hand pain          PLAN he wants to have something done.  That as he wants to have surgical treatment I think he probably could benefit from a CMC arthroplasty.  I told him it would take him 4 to 6 months to get over.  I also explained that he needs an operation.  In talking to the nurses Dr. Bloom one of my partners does this operation and would like to refer him to Dr. Welch for surgical consultation.  He understands that this is for a surgical consultation and is ready to have something done from that standpoint.        Patient's Body mass index is 29.21 kg/m². BMI is above normal parameters. Recommendations include: exercise counseling and nutrition counseling.    No follow-ups on file.      This document has been electronically signed by Meghan Bacon CSA on January 20, 2021 08:52 CST

## 2021-01-22 DIAGNOSIS — M79.644 CHRONIC PAIN OF RIGHT THUMB: ICD-10-CM

## 2021-01-22 DIAGNOSIS — M18.0 ARTHRITIS OF CARPOMETACARPAL (CMC) JOINT OF BOTH THUMBS: Primary | ICD-10-CM

## 2021-01-22 DIAGNOSIS — G89.29 CHRONIC PAIN OF RIGHT THUMB: ICD-10-CM

## 2021-02-26 ENCOUNTER — OFFICE VISIT (OUTPATIENT)
Dept: CARDIOLOGY | Facility: CLINIC | Age: 61
End: 2021-02-26

## 2021-02-26 VITALS
BODY MASS INDEX: 32.43 KG/M2 | HEIGHT: 66 IN | SYSTOLIC BLOOD PRESSURE: 124 MMHG | WEIGHT: 201.8 LBS | OXYGEN SATURATION: 98 % | HEART RATE: 69 BPM | DIASTOLIC BLOOD PRESSURE: 80 MMHG

## 2021-02-26 DIAGNOSIS — Z01.818 PREOP EXAMINATION: Primary | ICD-10-CM

## 2021-02-26 PROCEDURE — 99214 OFFICE O/P EST MOD 30 MIN: CPT | Performed by: INTERNAL MEDICINE

## 2021-02-26 PROCEDURE — 93000 ELECTROCARDIOGRAM COMPLETE: CPT | Performed by: INTERNAL MEDICINE

## 2021-02-26 NOTE — PROGRESS NOTES
[facility]  OFFICE NOTE    Cardiovascular Medicine  Barbara Ardon M.D., RPVI         No referring provider defined for this encounter.    Thank you for asking me to see Nima Portillo for CAD, syncope.    History of Present Illness  This is a 60 y.o. male with:  1. Coronary artery disease involving native coronary artery of native heart without angina pectoris    2. Essential hypertension    3. Pure hypercholesterolemia    4. Nicotine abuse    5. ETOH abuse          Chief complaint -syncope        History of present Illness- 60-year-old gentleman who smokeed about a pack and a half a day and drinks about 6 beers a day/week, he has history of cardiac catheterization 2015 by Dr. Sandhu and which showed small vessel disease in the obtuse marginal one side branch and in the LPDA with moderate diffuse disease in the LAD,   He has cut down on his smoking and drinking.  Patient was admitted to the hospital for syncopal episode in 2019.  Patient reported he had a bout of coughing which is followed by loss of consciousness.  Patient is admitted to the hospital, he was noted to be bradycardic on arrival subsequently his metoprolol was cut back.  Was also also orthostatic.  He had carotid ultrasound done which were without any significant disease.  No recurrence of syncopal episodes since then.  Denies any chest pain or exertion.    02/26/2021:  Patient has done well since last visit.  Denied any further episodes of chest pain.  Has not used nitroglycerin since last visit.  He does have significant carpal tunnel on both hands with bony spur and has surgery coming up.  He is here for preop evaluation.  He is able to perform his activities of daily living he, he does have limitations from his chronic back and knee pain but no chest pain.  He does have chronic shortness of breath from his COPD from smoking which is not worse currently.               Review of Systems - ROS  Constitution:  Negative for weakness, weight gain and weight loss.   HENT: Negative for congestion.    Eyes: Negative for blurred vision.   Cardiovascular: As mentioned above  Respiratory: Negative for cough and hemoptysis.    Endocrine: Negative for polydipsia and polyuria.   Hematologic/Lymphatic: Negative for bleeding problem. Does not bruise/bleed easily.   Skin: Negative for flushing.   Musculoskeletal: Negative for neck pain and stiffness.   Gastrointestinal: Negative for abdominal pain, diarrhea, jaundice, melena, nausea and vomiting.   Genitourinary: Negative for dysuria and hematuria.   Neurological: Negative for dizziness, focal weakness and numbness.   Psychiatric/Behavioral: Negative for altered mental status and depression.          All other systems were reviewed and were negative.    family history includes Cancer in his paternal aunt and paternal uncle; Diabetes in his father, mother, and sister; Heart disease in his father, mother, and sister; Hypertension in his father and mother; Stroke in his sister.     reports that he has been smoking cigarettes. He has a 60.00 pack-year smoking history. He has quit using smokeless tobacco. He reports current alcohol use of about 12.0 standard drinks of alcohol per week. He reports that he does not use drugs.    No Known Allergies      Current Outpatient Medications:   •  amLODIPine (NORVASC) 5 MG tablet, Take 1 tablet by mouth Daily., Disp: 30 tablet, Rfl: 11  •  aspirin 81 MG chewable tablet, Chew 81 mg daily., Disp: , Rfl:   •  atorvastatin (LIPITOR) 40 MG tablet, Take 1 tablet by mouth Daily., Disp: 90 tablet, Rfl: 3  •  hydroCHLOROthiazide (HYDRODIURIL) 25 MG tablet, Take 1 tablet by mouth Daily., Disp: 90 tablet, Rfl: 3  •  isosorbide mononitrate (IMDUR) 60 MG 24 hr tablet, Take 1 tablet by mouth Daily., Disp: 30 tablet, Rfl: 2  •  lisinopril (PRINIVIL,ZESTRIL) 40 MG tablet, Take 1 tablet by mouth Daily., Disp: 90 tablet, Rfl: 3  •  metoprolol succinate XL (TOPROL-XL)  "25 MG 24 hr tablet, Take 1 tablet by mouth Daily., Disp: 90 tablet, Rfl: 3  •  nitroglycerin (NITROSTAT) 0.4 MG SL tablet, Place 1 tablet under the tongue Every 5 (Five) Minutes As Needed for Chest Pain. Take no more than 3 doses in 15 minutes., Disp: 30 tablet, Rfl: 12    Physical Exam:  Vitals:    02/26/21 0922   BP: 124/80   BP Location: Left arm   Patient Position: Sitting   Cuff Size: Adult   SpO2: 98%   Weight: 91.5 kg (201 lb 12.8 oz)   Height: 167.6 cm (65.98\")     Current Pain Level: none  Pulse Ox: Normal        on room air  General: alert, appears stated age and cooperative     Body Habitus: well-nourished    HEENT: Head: Normocephalic, no lesions, without obvious abnormality. No arcus senilis, xanthelasma or xanthomas.    Neuro: alert, oriented x3  Pulses: 2+ and symmetric  JVP: Volume/Pulsation:       Normal.  Normal waveforms.   Appropriate inspiratory decrease.  No Kussmaul's. No Germain's.   Carotid Exam: no bruit normal pulsation bilaterally    Carotid Volume: normal.                     Respirations: no increased work of breathing            Chest:  Normal              Pulmonary:Normal       Precordium: Normal impulses. P2 is not palpable.  RV Heave: absent  LV Heave: absent  [place]:  normal size and placement  Palpable S4: absent.  Heart rate: normal    Heart Rhythm: regular                                     Heart Sounds: S1: normal    S2: normal  S3: absent                               S4: absent  Opening Snap: absent            Pericardial Rub:  Absent: .                 Abdomen:   Appearance: normal .  Palpation: Soft, non-tender to palpation, bowel sounds positive in all four quadrants; no guarding or rebound tenderness  Extremity: no edema.   LE Skin: no rashes  LE Hair:  normal  LE Pulses: well perfused with normal pulses in the distal extremities  Pallor on elevation: Absent. Rubor on dependency: None       DATA REVIEWED:         ECG/EMG Results (all)     None        "     ---------------------------------------------------  TTE/GEOFFREY:  Results for orders placed during the hospital encounter of 09/04/19   Adult Transthoracic Echo Complete W/ Cont if Necessary Per Protocol    Narrative · Estimated EF = 61%.  · Left ventricular systolic function is normal.  · Left ventricular diastolic dysfunction (grade I) consistent with   impaired relaxation.  · Mild mitral valve regurgitation is present  · Mild tricuspid valve regurgitation is present.  · Mild tricuspid valve stenosis is present.        -----------------------------------------------------  CXR/Imaging:   --------------  CT:   No radiology results for the last 30 days.    ----------------------------------------------------      --------------------------------------------------------------------------------------------------  LABS:     The CVD Risk score (Leeann et al., 2008) failed to calculate for the following reasons:    The patient has a prior MI, stroke, CHF, or peripheral vascular disease diagnosis         Lab Results   Component Value Date    GLUCOSE 74 06/16/2020    BUN 17 06/16/2020    CREATININE 0.89 06/16/2020    EGFRIFNONA 87 06/16/2020    BCR 19.1 06/16/2020    K 4.4 06/16/2020    CO2 29.5 (H) 06/16/2020    CALCIUM 9.3 06/16/2020    ALBUMIN 4.70 06/16/2020    AST 22 06/16/2020    ALT 22 06/16/2020     Lab Results   Component Value Date    WBC 8.86 06/16/2020    HGB 14.7 06/16/2020    HCT 42.0 06/16/2020    MCV 91.5 06/16/2020     06/16/2020     Lab Results   Component Value Date    CHOL 173 09/13/2019    TRIG 200 (H) 09/13/2019    HDL 49 09/13/2019    LDL 84 09/13/2019     Lab Results   Component Value Date    TSH 2.08 03/17/2015     Lab Results   Component Value Date    CKTOTAL 102 03/16/2015    CKMB 3.7 03/16/2015    TROPONINI 0.251 (C) 03/16/2015    TROPONINT <0.010 09/04/2019     No results found for: HGBA1C  No results found for: DDIMER  Lab Results   Component Value Date    ALT 22 06/16/2020     No  results found for: HGBA1C  Lab Results   Component Value Date    CREATININE 0.89 06/16/2020     No results found for: IRON, TIBC, FERRITIN  Lab Results   Component Value Date    INR 1.04 09/04/2019    INR 0.9 04/08/2015    PROTIME 13.4 09/04/2019    PROTIME 12.2 04/08/2015       Assessment/Plan     1. Syncope and collapse  Secondary to vasovagal syncope in the setting of significant coughing and orthostatic hypotension.  Patient was also bradycardic.  cut back on metoprolol with improvement in his heart rate.  Echo cardiogram was without any structural abnormalities.  Carotid Doppler was without any significant abnormalities.  Patient also had a monitor which was without any significant arrhythmias.    2. CAD:  Reviewed his angiogram from 2015 he does have moderate diffuse disease in LAD, he has a significant stenosis in inferior branch of a small OM and L PDA.  Currently patient is asymptomatic.  Continue medical management.  Patient is worsening chest pain will need a cardiac cath to assess for progression of disease.  Continue aspirin/beta-blocker/nitro    Hypertension on lisinopril 40mg, isosorbide 60mg and HCTZ 25mg along with Toprol-XL and amlodipine 5mg     Peripheral vascular disease -had CTA with abdominal aortogram with runoff which did not show any high-grade stenosis and some of his pain in the legs could be due to neuropathy from his alcohol use    HLD:  Switched to lipitor 40mg. Will need repeat lipid panel     Cardiac Risk Estimation*: per the Revised Cardiac Risk Index (Circ. 100:1043, 1999), the patient's risk factors for cardiac complications history of ischemic heart disease place the patient in: RCI RISK CLASS II (1 risk factor, risk of major cardiac compl. appr. 1.3%). The patient is currently Asymptomatic from a cardiovascular standpoint. The patient's current cardiovascular disease(s) are medically optimized. The patient is currently endorsing ability to achieve >4 METS. At the discretion of  "the operating physician, I do think the patient is of acceptable risk to proceed with surgery.  Patient has known stable coronary artery disease, currently he is asymptomatic.    Pre-Op Evaluation Plan  1. Preoperative workup as follows none.  2. Change in medication regimen before surgery: none, continue medication regimen including morning of surgery, with sip of water.  3. Prophylaxis for cardiac events with perioperative beta-blockers for > three RCRI risks: On Beta blocker.  4. Deep vein thrombosis prophylaxis: Indicated, per surgeon.  5. Intra-op body temperature: Normothermia recommended in noncardiac surgery.    *Cardiovascular Risk Estimates provided by the [place] are provisional and no guarantee of outcome. \"Surgical clearance\" is not provided. Only  risk assessment and management options are performed.  The final decision for operative intervention is at the discretion of the operating physician. This office follows the 2014 ACCF guidelines for preoperative risk evaluation.      Prevention:  Patient's Body mass index is 32.59 kg/m². BMI is above normal parameters. Recommendations include: exercise counseling and nutrition counseling.      Nima Portillo is a current cigarettes user.  He currently smokes 4 cigarettes per day for a duration of 10 years. I have educated him on the risk of diseases from using tobacco products such as cancer, COPD and heart diease.     I advised him to quit and he is not willing to quit.    I spent 3  minutes counseling the patient.          AAA Screening:   Will check after 65 year           This document has been electronically signed by Barbara Ardon MD on February 26, 2021 09:39 CST              "

## 2021-03-04 ENCOUNTER — IMMUNIZATION (OUTPATIENT)
Dept: VACCINE CLINIC | Facility: HOSPITAL | Age: 61
End: 2021-03-04

## 2021-03-04 PROCEDURE — 91300 HC SARSCOV02 VAC 30MCG/0.3ML IM: CPT | Performed by: THORACIC SURGERY (CARDIOTHORACIC VASCULAR SURGERY)

## 2021-03-04 PROCEDURE — 0001A: CPT | Performed by: THORACIC SURGERY (CARDIOTHORACIC VASCULAR SURGERY)

## 2021-03-15 LAB
QT INTERVAL: 422 MS
QTC INTERVAL: 452 MS

## 2021-03-25 ENCOUNTER — IMMUNIZATION (OUTPATIENT)
Dept: VACCINE CLINIC | Facility: HOSPITAL | Age: 61
End: 2021-03-25

## 2021-03-25 PROCEDURE — 0002A: CPT | Performed by: THORACIC SURGERY (CARDIOTHORACIC VASCULAR SURGERY)

## 2021-03-25 PROCEDURE — 91300 HC SARSCOV02 VAC 30MCG/0.3ML IM: CPT | Performed by: THORACIC SURGERY (CARDIOTHORACIC VASCULAR SURGERY)

## 2021-05-26 ENCOUNTER — OFFICE VISIT (OUTPATIENT)
Dept: CARDIOLOGY | Facility: CLINIC | Age: 61
End: 2021-05-26

## 2021-05-26 VITALS
BODY MASS INDEX: 32.02 KG/M2 | HEIGHT: 66 IN | OXYGEN SATURATION: 95 % | WEIGHT: 199.2 LBS | DIASTOLIC BLOOD PRESSURE: 68 MMHG | SYSTOLIC BLOOD PRESSURE: 108 MMHG | HEART RATE: 87 BPM

## 2021-05-26 DIAGNOSIS — E78.2 HYPERLIPEMIA, MIXED: ICD-10-CM

## 2021-05-26 DIAGNOSIS — I10 HYPERTENSION, ESSENTIAL: Primary | ICD-10-CM

## 2021-05-26 DIAGNOSIS — I25.10 CORONARY ARTERY DISEASE DUE TO LIPID RICH PLAQUE: ICD-10-CM

## 2021-05-26 DIAGNOSIS — I25.83 CORONARY ARTERY DISEASE DUE TO LIPID RICH PLAQUE: ICD-10-CM

## 2021-05-26 PROCEDURE — 99214 OFFICE O/P EST MOD 30 MIN: CPT | Performed by: INTERNAL MEDICINE

## 2021-05-26 RX ORDER — SENNOSIDES 8.6 MG
650 CAPSULE ORAL EVERY 8 HOURS PRN
COMMUNITY

## 2021-05-26 NOTE — PROGRESS NOTES
[facility]  OFFICE NOTE    Cardiovascular Medicine  Barbara Ardon M.D., RPVI         No referring provider defined for this encounter.    Thank you for asking me to see Nima Lokideidra Lugoalyson for CAD, syncope.    History of Present Illness  This is a 60 y.o. male with:  1. Coronary artery disease involving native coronary artery of native heart without angina pectoris    2. Essential hypertension    3. Pure hypercholesterolemia    4. Nicotine abuse    5. ETOH abuse          Chief complaint -syncope        History of present Illness- 60 y.o.-year-old gentleman who smokeed about a pack and a half a day and drinks about 6 beers a day/week, he has history of cardiac catheterization 2015 by Dr. Sandhu and which showed small vessel disease in the obtuse marginal one side branch and in the LPDA with moderate diffuse disease in the LAD,   He has cut down on his smoking and drinking.  Patient was admitted to the hospital for syncopal episode in 2019.  Patient reported he had a bout of coughing which is followed by loss of consciousness.  Patient is admitted to the hospital, he was noted to be bradycardic on arrival subsequently his metoprolol was cut back.  Was also also orthostatic.  He had carotid ultrasound done which were without any significant disease.  No recurrence of syncopal episodes since then.  Denies any chest pain or exertion.    02/26/2021:  Patient has done well since last visit.  Denied any further episodes of chest pain.  Has not used nitroglycerin since last visit.  He does have significant carpal tunnel on both hands with bony spur and has surgery coming up.  He is here for preop evaluation.  He is able to perform his activities of daily living he, he does have limitations from his chronic back and knee pain but no chest pain.  He does have chronic shortness of breath from his COPD from smoking which is not worse currently.    05/26/2021:  No acute issues since last visit.   Underwent carpal surgery on the left side and surgery coming up on the right side.  Denying any chest pain.  No side effects from medications.      Review of Systems - ROS  Constitution: Negative for weakness, weight gain and weight loss.   HENT: Negative for congestion.    Eyes: Negative for blurred vision.   Cardiovascular: As mentioned above  Respiratory: Negative for cough and hemoptysis.    Endocrine: Negative for polydipsia and polyuria.   Hematologic/Lymphatic: Negative for bleeding problem. Does not bruise/bleed easily.   Skin: Negative for flushing.   Musculoskeletal: Negative for neck pain and stiffness.   Gastrointestinal: Negative for abdominal pain, diarrhea, jaundice, melena, nausea and vomiting.   Genitourinary: Negative for dysuria and hematuria.   Neurological: Negative for dizziness, focal weakness and numbness.   Psychiatric/Behavioral: Negative for altered mental status and depression.          All other systems were reviewed and were negative.    family history includes Cancer in his paternal aunt and paternal uncle; Diabetes in his father, mother, and sister; Heart disease in his father, mother, and sister; Hypertension in his father and mother; Stroke in his sister.     reports that he has been smoking cigarettes. He has a 60.00 pack-year smoking history. He has quit using smokeless tobacco. He reports current alcohol use of about 12.0 standard drinks of alcohol per week. He reports that he does not use drugs.    No Known Allergies      Current Outpatient Medications:   •  acetaminophen (Tylenol 8 Hour) 650 MG 8 hr tablet, Take 650 mg by mouth Every 8 (Eight) Hours As Needed for Mild Pain ., Disp: , Rfl:   •  amLODIPine (NORVASC) 5 MG tablet, Take 1 tablet by mouth Daily., Disp: 30 tablet, Rfl: 11  •  aspirin 81 MG chewable tablet, Chew 81 mg daily., Disp: , Rfl:   •  atorvastatin (LIPITOR) 40 MG tablet, Take 1 tablet by mouth Daily., Disp: 90 tablet, Rfl: 3  •  hydroCHLOROthiazide  "(HYDRODIURIL) 25 MG tablet, Take 1 tablet by mouth Daily., Disp: 90 tablet, Rfl: 3  •  isosorbide mononitrate (IMDUR) 60 MG 24 hr tablet, Take 1 tablet by mouth Daily., Disp: 30 tablet, Rfl: 2  •  lisinopril (PRINIVIL,ZESTRIL) 40 MG tablet, Take 1 tablet by mouth Daily., Disp: 90 tablet, Rfl: 3  •  metoprolol succinate XL (TOPROL-XL) 25 MG 24 hr tablet, Take 1 tablet by mouth Daily., Disp: 90 tablet, Rfl: 3  •  nitroglycerin (NITROSTAT) 0.4 MG SL tablet, Place 1 tablet under the tongue Every 5 (Five) Minutes As Needed for Chest Pain. Take no more than 3 doses in 15 minutes., Disp: 30 tablet, Rfl: 12    Physical Exam:  Vitals:    05/26/21 0909   BP: 108/68   BP Location: Right arm   Patient Position: Sitting   Cuff Size: Adult   Pulse: 87   SpO2: 95%   Weight: 90.4 kg (199 lb 3.2 oz)   Height: 167.6 cm (66\")     Current Pain Level: none  Pulse Ox: Normal        on room air  General: alert, appears stated age and cooperative     Body Habitus: well-nourished    HEENT: Head: Normocephalic, no lesions, without obvious abnormality. No arcus senilis, xanthelasma or xanthomas.    Neuro: alert, oriented x3  Pulses: 2+ and symmetric  JVP: Volume/Pulsation:       Normal.  Normal waveforms.   Appropriate inspiratory decrease.  No Kussmaul's. No Germain's.   Carotid Exam: no bruit normal pulsation bilaterally    Carotid Volume: normal.                     Respirations: no increased work of breathing            Chest:  Normal              Pulmonary:Normal       Precordium: Normal impulses. P2 is not palpable.  RV Heave: absent  LV Heave: absent  [place]:  normal size and placement  Palpable S4: absent.  Heart rate: normal    Heart Rhythm: regular                                     Heart Sounds: S1: normal    S2: normal  S3: absent                               S4: absent  Opening Snap: absent            Pericardial Rub:  Absent: .                 Abdomen:   Appearance: normal .  Palpation: Soft, non-tender to palpation, bowel " sounds positive in all four quadrants; no guarding or rebound tenderness  Extremity: no edema.   LE Skin: no rashes  LE Hair:  normal  LE Pulses: well perfused with normal pulses in the distal extremities  Pallor on elevation: Absent. Rubor on dependency: None       DATA REVIEWED:         ECG/EMG Results (all)     None        ---------------------------------------------------  TTE/GEOFFREY:  Results for orders placed during the hospital encounter of 09/04/19    Adult Transthoracic Echo Complete W/ Cont if Necessary Per Protocol    Interpretation Summary  · Estimated EF = 61%.  · Left ventricular systolic function is normal.  · Left ventricular diastolic dysfunction (grade I) consistent with impaired relaxation.  · Mild mitral valve regurgitation is present  · Mild tricuspid valve regurgitation is present.  · Mild tricuspid valve stenosis is present.    -----------------------------------------------------  CXR/Imaging:   --------------  CT:   No radiology results for the last 30 days.    ----------------------------------------------------      --------------------------------------------------------------------------------------------------  LABS:     The CVD Risk score (Leeann et al., 2008) failed to calculate for the following reasons:    The patient has a prior MI, stroke, CHF, or peripheral vascular disease diagnosis         Lab Results   Component Value Date    GLUCOSE 74 06/16/2020    BUN 17 06/16/2020    CREATININE 0.89 06/16/2020    EGFRIFNONA 87 06/16/2020    BCR 19.1 06/16/2020    K 4.4 06/16/2020    CO2 29.5 (H) 06/16/2020    CALCIUM 9.3 06/16/2020    ALBUMIN 4.70 06/16/2020    AST 22 06/16/2020    ALT 22 06/16/2020     Lab Results   Component Value Date    WBC 8.86 06/16/2020    HGB 14.7 06/16/2020    HCT 42.0 06/16/2020    MCV 91.5 06/16/2020     06/16/2020     Lab Results   Component Value Date    CHOL 173 09/13/2019    TRIG 200 (H) 09/13/2019    HDL 49 09/13/2019    LDL 84 09/13/2019     Lab  Results   Component Value Date    TSH 2.08 03/17/2015     Lab Results   Component Value Date    CKTOTAL 102 03/16/2015    CKMB 3.7 03/16/2015    TROPONINI 0.251 (C) 03/16/2015    TROPONINT <0.010 09/04/2019     No results found for: HGBA1C  No results found for: DDIMER  Lab Results   Component Value Date    ALT 22 06/16/2020     No results found for: HGBA1C  Lab Results   Component Value Date    CREATININE 0.89 06/16/2020     No results found for: IRON, TIBC, FERRITIN  Lab Results   Component Value Date    INR 1.04 09/04/2019    INR 0.9 04/08/2015    PROTIME 13.4 09/04/2019    PROTIME 12.2 04/08/2015       Assessment/Plan     1. Syncope and collapse: No further recurrence  Secondary to vasovagal syncope in the setting of significant coughing and orthostatic hypotension.  Patient was also bradycardic.  cut back on metoprolol with improvement in his heart rate.  Echo cardiogram was without any structural abnormalities.  Carotid Doppler was without any significant abnormalities.  Patient also had a monitor which was without any significant arrhythmias.    2. CAD:  Reviewed his angiogram from 2015 he does have moderate diffuse disease in LAD, he has a significant stenosis in inferior branch of a small OM and L PDA.  Currently patient is asymptomatic.  Continue medical management.  If Patient is worsening chest pain will need a cardiac cath to assess for progression of disease.  Continue aspirin/beta-blocker/nitro    Hypertension on lisinopril 40mg, isosorbide 60mg and HCTZ 25mg along with Toprol-XL and amlodipine 5mg     Peripheral vascular disease -had CTA with abdominal aortogram with runoff which did not show any high-grade stenosis and some of his pain in the legs could be due to neuropathy from his alcohol use    HLD:  Switched to lipitor 40mg. Will need repeat lipid panel     Cardiac Risk Estimation*: per the Revised Cardiac Risk Index (Circ. 100:1043, 1999), the patient's risk factors for cardiac complications  "history of ischemic heart disease place the patient in: RCI RISK CLASS II (1 risk factor, risk of major cardiac compl. appr. 1.3%). The patient is currently Asymptomatic from a cardiovascular standpoint. The patient's current cardiovascular disease(s) are medically optimized. The patient is currently endorsing ability to achieve >4 METS. At the discretion of the operating physician, I do think the patient is of acceptable risk to proceed with surgery.  Patient has known stable coronary artery disease, currently he is asymptomatic.    Pre-Op Evaluation Plan  1. Preoperative workup as follows none.  2. Change in medication regimen before surgery: none, continue medication regimen including morning of surgery, with sip of water.  3. Prophylaxis for cardiac events with perioperative beta-blockers for > three RCRI risks: On Beta blocker.  4. Deep vein thrombosis prophylaxis: Indicated, per surgeon.  5. Intra-op body temperature: Normothermia recommended in noncardiac surgery.    *Cardiovascular Risk Estimates provided by the RCRI are provisional and no guarantee of outcome. \"Surgical clearance\" is not provided. Only  risk assessment and management options are performed.  The final decision for operative intervention is at the discretion of the operating physician. This office follows the 2014 ACCF guidelines for preoperative risk evaluation.      Prevention:  Patient's Body mass index is 32.15 kg/m². BMI is above normal parameters. Recommendations include: exercise counseling and nutrition counseling.      Nima Portillo is a current cigarettes user.  He currently smokes 4 cigarettes per day for a duration of 10 years. I have educated him on the risk of diseases from using tobacco products such as cancer, COPD and heart diease.     I advised him to quit and he is not willing to quit.    I spent 3  minutes counseling the patient.          AAA Screening:   Will check after 65 year           This document has been " electronically signed by Barbara Ardon MD on May 26, 2021 09:23 CDT

## 2021-08-25 ENCOUNTER — OFFICE VISIT (OUTPATIENT)
Dept: FAMILY MEDICINE CLINIC | Facility: CLINIC | Age: 61
End: 2021-08-25

## 2021-08-25 ENCOUNTER — LAB (OUTPATIENT)
Dept: LAB | Facility: HOSPITAL | Age: 61
End: 2021-08-25

## 2021-08-25 VITALS
OXYGEN SATURATION: 95 % | BODY MASS INDEX: 31.76 KG/M2 | SYSTOLIC BLOOD PRESSURE: 130 MMHG | HEART RATE: 83 BPM | TEMPERATURE: 97.7 F | HEIGHT: 66 IN | WEIGHT: 197.6 LBS | DIASTOLIC BLOOD PRESSURE: 78 MMHG

## 2021-08-25 DIAGNOSIS — Z00.00 ANNUAL PHYSICAL EXAM: Primary | ICD-10-CM

## 2021-08-25 DIAGNOSIS — J44.9 CHRONIC OBSTRUCTIVE PULMONARY DISEASE, UNSPECIFIED COPD TYPE (HCC): ICD-10-CM

## 2021-08-25 DIAGNOSIS — Z00.00 ANNUAL PHYSICAL EXAM: ICD-10-CM

## 2021-08-25 DIAGNOSIS — Z12.2 ENCOUNTER FOR SCREENING FOR LUNG CANCER: ICD-10-CM

## 2021-08-25 LAB
CHOLEST SERPL-MCNC: 142 MG/DL (ref 0–200)
HBA1C MFR BLD: 6.13 % (ref 4.8–5.6)
HDLC SERPL QL: 3.02
HDLC SERPL-MCNC: 47 MG/DL (ref 40–60)
LDLC SERPL CALC-MCNC: 54 MG/DL (ref 0–100)
TRIGL SERPL-MCNC: 265 MG/DL (ref 0–150)
VLDLC SERPL-MCNC: 41 MG/DL (ref 5–40)

## 2021-08-25 PROCEDURE — 99396 PREV VISIT EST AGE 40-64: CPT | Performed by: STUDENT IN AN ORGANIZED HEALTH CARE EDUCATION/TRAINING PROGRAM

## 2021-08-25 PROCEDURE — 36415 COLL VENOUS BLD VENIPUNCTURE: CPT

## 2021-08-25 PROCEDURE — 80061 LIPID PANEL: CPT

## 2021-08-25 PROCEDURE — 83036 HEMOGLOBIN GLYCOSYLATED A1C: CPT

## 2021-08-25 NOTE — PROGRESS NOTES
I have seen the patient.  I have reviewed the notes, assessments, and/or procedures performed by Charles Rojas MD during office visit I concur with her/his documentation and assessment and plan for Nima Portillo.            This document has been electronically signed by Familia Sampson MD on August 25, 2021 16:23 CDT

## 2021-08-25 NOTE — PROGRESS NOTES
"  Family Medicine Residency  Charles Rojas MD    Subjective:     Nima Portillo is a 60 y.o. male who presents for annual visit.  Patient is a heavy smoker and reports that he is increased his smoking from 1 and 1/2 packs a day to 3 packs a day.  Patient has been taking Breo that he got from his sister.  This seems to have helped with his breathing and cough.  In the last month he has not had a nightly cough.  He does have low level coughing several times a week.  He has coughing fits once every other month.  Patient has no interest in stopping smoking at this time.    Patient does have intermittent coughing spells that lead to him becoming flush, his arm is feeling shaky and ultimately him \"falling out\".  This happens once every other month at most.  Patient has seen cardiology who believes this is vasovagal.  He has had a full work-up including echo, carotid ultrasound, and Holter.  Work-up was negative.  These events only occur after an extended coughing spell.  Patient has been orthostatic in the past and is occasionally bradycardic.  Patient's metoprolol was decreased    The following portions of the patient's history were reviewed and updated as appropriate: allergies, current medications, past family history, past medical history, past social history, past surgical history and problem list.    Past Medical Hx:  Past Medical History:   Diagnosis Date   • Acute bronchitis    • Acute sinusitis    • Chest pain    • Chronic bronchitis (CMS/HCC)     secondary to smoking   • Claudication (CMS/HCC)    • Coronary arteriosclerosis    • Cough    • Gastroesophageal reflux disease    • Health maintenance examination     Individual health examination   • History of echocardiogram 10/21/2013    Echocardiogram W/ color flow 60687 (1) - Left atrium normal. Mild CLVH. EF 50%. Valves intact. Mild mitral and tricuspid regurg. Pericardium normal.   • History of echocardiogram 10/25/2011    Echocardiogram W/O color flow 54790 " (1) - Normal LV sytolic function with mild LVH & mild diastolic dysfunction   • Hyperlipidemia    • Hypertensive disorder    • Pernicious anemia    • Thumb pain, left 10/14/2019   • Tobacco dependence syndrome        Past Surgical Hx:  Past Surgical History:   Procedure Laterality Date   • CARDIAC CATHETERIZATION  04/08/2015    Cardiac cath 71828 (1) - Slective coronary angiogram. Left heart catheterization with LV ventriculogram.   • COLONOSCOPY N/A 10/26/2020    Procedure: COLONOSCOPY;  Surgeon: Levi Milian MD;  Location: [facility] ENDOSCOPY;  Service: Gastroenterology;  Laterality: N/A;   • DENTAL PROCEDURE      Removal of all teeth       Current Meds:    Current Outpatient Medications:   •  acetaminophen (Tylenol 8 Hour) 650 MG 8 hr tablet, Take 650 mg by mouth Every 8 (Eight) Hours As Needed for Mild Pain ., Disp: , Rfl:   •  amLODIPine (NORVASC) 5 MG tablet, Take 1 tablet by mouth Daily., Disp: 30 tablet, Rfl: 11  •  aspirin 81 MG chewable tablet, Chew 81 mg daily., Disp: , Rfl:   •  atorvastatin (LIPITOR) 40 MG tablet, Take 1 tablet by mouth Daily., Disp: 90 tablet, Rfl: 3  •  hydroCHLOROthiazide (HYDRODIURIL) 25 MG tablet, Take 1 tablet by mouth Daily., Disp: 90 tablet, Rfl: 3  •  isosorbide mononitrate (IMDUR) 60 MG 24 hr tablet, Take 1 tablet by mouth Daily., Disp: 30 tablet, Rfl: 2  •  lisinopril (PRINIVIL,ZESTRIL) 40 MG tablet, Take 1 tablet by mouth Daily., Disp: 90 tablet, Rfl: 3  •  metoprolol succinate XL (TOPROL-XL) 25 MG 24 hr tablet, Take 1 tablet by mouth Daily., Disp: 90 tablet, Rfl: 3  •  nitroglycerin (NITROSTAT) 0.4 MG SL tablet, Place 1 tablet under the tongue Every 5 (Five) Minutes As Needed for Chest Pain. Take no more than 3 doses in 15 minutes., Disp: 30 tablet, Rfl: 12  •  Fluticasone Furoate-Vilanterol (Breo Ellipta) 100-25 MCG/INH inhaler, Inhale 1 puff Daily., Disp: 28 each, Rfl: 2    Allergies:  No Known Allergies    Family Hx:  Family History   Problem Relation Age of Onset   •  "Heart disease Mother    • Hypertension Mother    • Diabetes Mother    • Heart disease Father    • Hypertension Father    • Diabetes Father    • Diabetes Sister    • Heart disease Sister    • Stroke Sister    • Cancer Paternal Aunt         Breast Cancer   • Cancer Paternal Uncle         Unknown cancer        Social History:  Social History     Socioeconomic History   • Marital status:      Spouse name: Not on file   • Number of children: Not on file   • Years of education: Not on file   • Highest education level: Not on file   Tobacco Use   • Smoking status: Current Every Day Smoker     Packs/day: 1.50     Years: 40.00     Pack years: 60.00     Types: Cigarettes   • Smokeless tobacco: Former User   Vaping Use   • Vaping Use: Never used   Substance and Sexual Activity   • Alcohol use: Yes     Alcohol/week: 12.0 standard drinks     Types: 12 Cans of beer per week   • Drug use: No   • Sexual activity: Never     Comment: Marital status:        Review of Systems  Review of Systems   Constitutional: Negative for chills, fatigue and fever.   HENT: Negative for ear pain, hearing loss, postnasal drip and sore throat.    Eyes: Positive for visual disturbance. Negative for photophobia and pain.   Respiratory: Positive for cough and shortness of breath. Negative for wheezing.    Cardiovascular: Negative for chest pain, palpitations and leg swelling.   Gastrointestinal: Negative for abdominal pain, diarrhea, nausea and vomiting.   Genitourinary: Negative for difficulty urinating and dysuria.   Musculoskeletal: Negative for arthralgias and myalgias.   Skin: Negative for rash and wound.   Neurological: Positive for syncope. Negative for dizziness, tremors, seizures, weakness and headaches.   Psychiatric/Behavioral: Negative for dysphoric mood. The patient is not nervous/anxious.        Objective:     /78   Pulse 83   Temp 97.7 °F (36.5 °C)   Ht 167.6 cm (66\")   Wt 89.6 kg (197 lb 9.6 oz)   SpO2 95%   BMI " 31.89 kg/m²   Physical Exam  Vitals reviewed.   Constitutional:       General: He is not in acute distress.     Appearance: He is obese.   HENT:      Head: Normocephalic and atraumatic.      Right Ear: External ear normal.      Left Ear: External ear normal.      Nose: No congestion or rhinorrhea.      Mouth/Throat:      Pharynx: No oropharyngeal exudate or posterior oropharyngeal erythema.   Eyes:      General: No scleral icterus.     Extraocular Movements: Extraocular movements intact.      Conjunctiva/sclera: Conjunctivae normal.      Pupils: Pupils are equal, round, and reactive to light.   Cardiovascular:      Rate and Rhythm: Normal rate and regular rhythm.      Heart sounds: Normal heart sounds. No murmur heard.     Pulmonary:      Effort: Pulmonary effort is normal.      Breath sounds: Normal breath sounds. No wheezing or rales.   Abdominal:      General: Bowel sounds are normal. There is no distension.      Palpations: Abdomen is soft.      Tenderness: There is no abdominal tenderness.   Musculoskeletal:         General: No deformity.      Right lower leg: No edema.      Left lower leg: No edema.   Skin:     General: Skin is warm and dry.      Capillary Refill: Capillary refill takes less than 2 seconds.   Neurological:      General: No focal deficit present.      Mental Status: He is alert and oriented to person, place, and time.          Assessment/Plan:     Diagnoses and all orders for this visit:    1. Annual physical exam (Primary)  -     Lipid Panel w/ Chol/HDL Ratio; Future  -     Hemoglobin A1c; Future  -     Ambulatory Referral to Optometry    2. Encounter for screening for lung cancer  -     CT Chest Low Dose Wo; Future    3. Chronic obstructive pulmonary disease, unspecified COPD type (CMS/HCC)  -     Full Pulmonary Function Test With Bronchodilator; Future  -     Fluticasone Furoate-Vilanterol (Breo Ellipta) 100-25 MCG/INH inhaler; Inhale 1 puff Daily.  Dispense: 28 each; Refill: 2    Other  orders  -     Cancel: CBC & Differential; Future  -     Cancel: Comprehensive metabolic panel; Future    1.  Patient had outpatient labs at another facility.  CBC and CMP only significant for elevated CO2 and elevated glucose.  Patient due for lipid panel.  Refer to optometry for patient reported blurry vision and some difficulty reading that developed over the past several years.    2.  Patient due for low-dose CT    3.  Patient responding to Breo medication well.  On exam he had good air entry and no wheezes heard.  Will perform PFT.  Patient with no interest and stopping smoking at this time discussed that it was very important for his overall health and patient still declined.    · Rx changes: As above  · Patient Education: Smoking cessation importance  · Compliance at present is estimated to be fair.   · Efforts to improve compliance (if necessary) will be directed at increased exercise and think about smoking cessation .    Depression screening: Up to date; last screen 8/25/2021     Follow-up:     Return in about 3 months (around 11/25/2021).    Preventative:  Health Maintenance   Topic Date Due   • ZOSTER VACCINE (2 of 2) 07/11/2019   • ANNUAL PHYSICAL  04/17/2020   • LUNG CANCER SCREENING  07/03/2020   • LIPID PANEL  09/13/2020   • INFLUENZA VACCINE  10/01/2021   • COLORECTAL CANCER SCREENING  10/26/2021   • Pneumococcal Vaccine 0-64 (2 of 2 - PPSV23) 10/29/2025   • TDAP/TD VACCINES (2 - Td or Tdap) 05/16/2029   • HEPATITIS C SCREENING  Completed   • COVID-19 Vaccine  Completed     Male Preventative: Colon cancer screening is up to date  Recommended: Varicella  Vaccine Counseling: will order    Weight  -Class: Obese Class I: 30-34.9kg/m2  -Patient's Body mass index is 31.89 kg/m². indicating that he is obese (BMI >30). Obesity-related health conditions include the following: hypertension, coronary heart disease and dyslipidemias. Obesity is unchanged. BMI is is above average; BMI management plan is  completed. We discussed portion control and increasing exercise..   decrease soda or juice intake, increase water intake and increase physical activity    Alcohol use:  reports current alcohol use of about 12.0 standard drinks of alcohol per week.  Nicotine status  reports that he has been smoking cigarettes. He has a 60.00 pack-year smoking history. He has quit using smokeless tobacco.    Goals     •  Smoking cessation (pt-stated)       Quitting smoking and living longer    Barriers: Wanting to quit            RISK SCORE: 4       Charles Rojas MD   [place]    [place] [address]  Office: [phone]    This document has been electronically signed by Charles Rojas MD on August 25, 2021 11:35 CDT

## 2021-08-27 ENCOUNTER — DOCUMENTATION (OUTPATIENT)
Dept: FAMILY MEDICINE CLINIC | Facility: CLINIC | Age: 61
End: 2021-08-27

## 2021-08-27 DIAGNOSIS — E78.1 HYPERTRIGLYCERIDEMIA: Primary | ICD-10-CM

## 2021-08-27 RX ORDER — AMLODIPINE BESYLATE 5 MG/1
TABLET ORAL
Qty: 90 TABLET | Refills: 3 | Status: SHIPPED | OUTPATIENT
Start: 2021-08-27 | End: 2022-03-31

## 2021-08-27 RX ORDER — ATORVASTATIN CALCIUM 40 MG/1
TABLET, FILM COATED ORAL
Qty: 90 TABLET | Refills: 0 | Status: SHIPPED | OUTPATIENT
Start: 2021-08-27 | End: 2021-11-15

## 2021-08-27 NOTE — PROGRESS NOTES
Reiterated with patient the importance of being fasting for his lipid panel.  Discussed case with Dr. Ardon via messages.  We need to repeat lipid panel while fasting.  Patient stated that he would get his lipid panel done on Monday with at least 12 hours of fasting.       Charles Rojas MD   PGY-2    [facility]  [address] [place] [address]  Office: [phone]    This document has been electronically signed by Charles Rojas MD on August 27, 2021 08:38 CDT

## 2021-08-30 ENCOUNTER — LAB (OUTPATIENT)
Dept: LAB | Facility: HOSPITAL | Age: 61
End: 2021-08-30

## 2021-08-30 DIAGNOSIS — E78.1 HYPERTRIGLYCERIDEMIA: ICD-10-CM

## 2021-08-30 PROCEDURE — 80061 LIPID PANEL: CPT

## 2021-08-30 PROCEDURE — 36415 COLL VENOUS BLD VENIPUNCTURE: CPT

## 2021-08-31 LAB
CHOLEST SERPL-MCNC: 144 MG/DL (ref 0–200)
HDLC SERPL QL: 2.88
HDLC SERPL-MCNC: 50 MG/DL (ref 40–60)
LDLC SERPL CALC-MCNC: 67 MG/DL (ref 0–100)
TRIGL SERPL-MCNC: 158 MG/DL (ref 0–150)
VLDLC SERPL-MCNC: 27 MG/DL (ref 5–40)

## 2021-09-02 ENCOUNTER — PROCEDURE VISIT (OUTPATIENT)
Dept: PULMONOLOGY | Facility: CLINIC | Age: 61
End: 2021-09-02

## 2021-09-02 ENCOUNTER — DOCUMENTATION (OUTPATIENT)
Dept: PULMONOLOGY | Facility: CLINIC | Age: 61
End: 2021-09-02

## 2021-09-02 DIAGNOSIS — J44.9 CHRONIC OBSTRUCTIVE PULMONARY DISEASE, UNSPECIFIED COPD TYPE (HCC): ICD-10-CM

## 2021-09-02 NOTE — PROGRESS NOTES
Pt scheduled for Spirometry and Lung Volumes.     Performed one maneuver of FVC, pt began to shake/spasm, eyes rolled back into his head. Pt did not lose consciousness, but tech was worried about what would happen during future maneuvers.     Spoke with the PCP, Dr. Charles Rojas, to relay what was going on and he was agreeable to stopping the test and not continuing.     Pt's appointment was canceled due to not being able to perform the PFT.     -Virgie Hahn  Cardiovascular/Pulmonary Tech

## 2021-09-02 NOTE — PROGRESS NOTES
Pt scheduled for Spirometry and Lung Volumes.      Performed one maneuver of FVC, pt began to shake/spasm, eyes rolled back into his head. Pt did not lose consciousness, but tech was worried about what would happen during future maneuvers.      Spoke with the PCP, Dr. Charles Rojas, to relay what was going on and he was agreeable to stopping the test and not continuing.      Pt's appointment was canceled due to not being able to perform the PFT.      -Virgie Hahn  Cardiovascular/Pulmonary Tech  [facility]

## 2021-09-21 ENCOUNTER — HOSPITAL ENCOUNTER (OUTPATIENT)
Dept: CT IMAGING | Facility: HOSPITAL | Age: 61
Discharge: HOME | End: 2021-09-21
Admitting: FAMILY MEDICINE

## 2021-09-21 DIAGNOSIS — Z12.2 ENCOUNTER FOR SCREENING FOR LUNG CANCER: ICD-10-CM

## 2021-09-21 PROCEDURE — 71271 CT THORAX LUNG CANCER SCR C-: CPT

## 2021-10-07 DIAGNOSIS — I10 ESSENTIAL HYPERTENSION: ICD-10-CM

## 2021-10-07 RX ORDER — HYDROCHLOROTHIAZIDE 25 MG/1
25 TABLET ORAL DAILY
Qty: 90 TABLET | Refills: 3 | Status: SHIPPED | OUTPATIENT
Start: 2021-10-07 | End: 2022-01-04 | Stop reason: SDUPTHER

## 2021-10-12 ENCOUNTER — OFFICE VISIT (OUTPATIENT)
Dept: GASTROENTEROLOGY | Facility: CLINIC | Age: 61
End: 2021-10-12

## 2021-10-12 VITALS
DIASTOLIC BLOOD PRESSURE: 80 MMHG | HEIGHT: 66 IN | HEART RATE: 74 BPM | WEIGHT: 202.4 LBS | SYSTOLIC BLOOD PRESSURE: 115 MMHG | BODY MASS INDEX: 32.53 KG/M2

## 2021-10-12 DIAGNOSIS — Z12.11 ENCOUNTER FOR HEMOCCULT SCREENING: Primary | ICD-10-CM

## 2021-10-12 PROCEDURE — S0285 CNSLT BEFORE SCREEN COLONOSC: HCPCS | Performed by: INTERNAL MEDICINE

## 2021-10-12 RX ORDER — DEXTROSE AND SODIUM CHLORIDE 5; .45 G/100ML; G/100ML
30 INJECTION, SOLUTION INTRAVENOUS CONTINUOUS PRN
Status: CANCELLED | OUTPATIENT
Start: 2021-11-08

## 2021-10-12 NOTE — PROGRESS NOTES
[facility]      Chief Complaint:   Chief Complaint   Patient presents with   • Colon Cancer Screening     1 year follow up        Subjective     HPI:   Patient for screening colonoscopy.  Patient had colonoscopy last year which showed poor prep.  Patient states he was unable to complete the 1 gallon and would not like to do this again.  Discussed with patient importance of doing a colonoscopy and patient agreed if he can get mag citrate.  Discussed with patient that he will have to stay on clear liquids the day before.    Plan; we will schedule patient for screening colonoscopy    Past Medical History:   Past Medical History:   Diagnosis Date   • Acute bronchitis    • Acute sinusitis    • Chest pain    • Chronic bronchitis (HCC)     secondary to smoking   • Claudication (HCC)    • Coronary arteriosclerosis    • Cough    • Gastroesophageal reflux disease    • Health maintenance examination     Individual health examination   • History of echocardiogram 10/21/2013    Echocardiogram W/ color flow 11617 (1) - Left atrium normal. Mild CLVH. EF 50%. Valves intact. Mild mitral and tricuspid regurg. Pericardium normal.   • History of echocardiogram 10/25/2011    Echocardiogram W/O color flow 87362 (1) - Normal LV sytolic function with mild LVH & mild diastolic dysfunction   • Hyperlipidemia    • Hypertensive disorder    • Pernicious anemia    • Thumb pain, left 10/14/2019   • Tobacco dependence syndrome        Past Surgical History:  Past Surgical History:   Procedure Laterality Date   • CARDIAC CATHETERIZATION  04/08/2015    Cardiac cath 83342 (1) - Slective coronary angiogram. Left heart catheterization with LV ventriculogram.   • COLONOSCOPY N/A 10/26/2020    Procedure: COLONOSCOPY;  Surgeon: Levi Milian MD;  Location: [facility] ENDOSCOPY;  Service: Gastroenterology;  Laterality: N/A;   • DENTAL PROCEDURE      Removal of all teeth       Family History:  Family History   Problem Relation Age of  Onset   • Heart disease Mother    • Hypertension Mother    • Diabetes Mother    • Heart disease Father    • Hypertension Father    • Diabetes Father    • Diabetes Sister    • Heart disease Sister    • Stroke Sister    • Cancer Paternal Aunt         Breast Cancer   • Cancer Paternal Uncle         Unknown cancer       Social History:   reports that he has been smoking cigarettes. He has a 60.00 pack-year smoking history. He has quit using smokeless tobacco. He reports previous alcohol use of about 12.0 standard drinks of alcohol per week. He reports that he does not use drugs.    Medications:   Prior to Admission medications    Medication Sig Start Date End Date Taking? Authorizing Provider   acetaminophen (Tylenol 8 Hour) 650 MG 8 hr tablet Take 650 mg by mouth Every 8 (Eight) Hours As Needed for Mild Pain .   Yes Alma George MD   amLODIPine (NORVASC) 5 MG tablet TAKE 1 TABLET BY MOUTH EVERY DAY 8/27/21  Yes Barbara Ardon MD   aspirin 81 MG chewable tablet Chew 81 mg daily.   Yes Alma George MD   atorvastatin (LIPITOR) 40 MG tablet TAKE 1 TABLET BY MOUTH EVERY DAY 8/27/21  Yes Barbara Ardon MD   Fluticasone Furoate-Vilanterol (Breo Ellipta) 100-25 MCG/INH inhaler Inhale 1 puff Daily. 8/25/21  Yes Familia Sampson MD   hydroCHLOROthiazide (HYDRODIURIL) 25 MG tablet Take 1 tablet by mouth Daily. 10/7/21  Yes Barbara Ardon MD   isosorbide mononitrate (IMDUR) 60 MG 24 hr tablet Take 1 tablet by mouth Daily. 8/25/20  Yes Familia Sampson MD   lisinopril (PRINIVIL,ZESTRIL) 40 MG tablet Take 1 tablet by mouth Daily. 8/25/20  Yes Familia Sampson MD   metoprolol succinate XL (TOPROL-XL) 25 MG 24 hr tablet Take 1 tablet by mouth Daily. 8/25/20  Yes Familia Sampson MD   nitroglycerin (NITROSTAT) 0.4 MG SL tablet Place 1 tablet under the tongue Every 5 (Five) Minutes As Needed for Chest Pain. Take no more than 3 doses in 15 minutes. 10/14/19  Yes Andrew Villatoro III, MD  "      Allergies:  Patient has no known allergies.    ROS:    Review of Systems   Constitutional: Negative for activity change, appetite change and unexpected weight change.   HENT: Negative for congestion, sore throat and trouble swallowing.    Respiratory: Negative for cough, choking and shortness of breath.    Cardiovascular: Negative for chest pain.   Gastrointestinal: Negative for abdominal distention, abdominal pain, anal bleeding, blood in stool, constipation, diarrhea, nausea, rectal pain and vomiting.   Endocrine: Negative for heat intolerance, polydipsia and polyphagia.   Genitourinary: Negative for difficulty urinating.   Musculoskeletal: Negative for arthralgias.   Skin: Negative for color change, pallor, rash and wound.   Allergic/Immunologic: Negative for food allergies.   Neurological: Negative for dizziness, syncope, weakness and headaches.   Psychiatric/Behavioral: Negative for agitation, behavioral problems, confusion and decreased concentration.     Objective     Blood pressure 115/80, pulse 74, height 167.6 cm (66\"), weight 91.8 kg (202 lb 6.4 oz).    Physical Exam  Constitutional:       General: He is not in acute distress.     Appearance: He is well-developed. He is not diaphoretic.   HENT:      Head: Normocephalic and atraumatic.   Cardiovascular:      Rate and Rhythm: Normal rate and regular rhythm.      Heart sounds: Normal heart sounds. No murmur heard.  No friction rub. No gallop.    Pulmonary:      Effort: No respiratory distress.      Breath sounds: Normal breath sounds. No wheezing or rales.   Chest:      Chest wall: No tenderness.   Abdominal:      General: Bowel sounds are normal. There is no distension.      Palpations: Abdomen is soft. There is no mass.      Tenderness: There is no abdominal tenderness. There is no guarding or rebound.      Hernia: No hernia is present.   Musculoskeletal:         General: Normal range of motion.   Skin:     General: Skin is warm and dry.      " Coloration: Skin is not pale.      Findings: No erythema or rash.   Neurological:      Mental Status: He is alert and oriented to person, place, and time.   Psychiatric:         Behavior: Behavior normal.         Thought Content: Thought content normal.         Judgment: Judgment normal.          Assessment/Plan   Diagnoses and all orders for this visit:    1. Encounter for Hemoccult screening (Primary)  -     Case Request; Standing  -     COVID PRE-OP / PRE-PROCEDURE SCREENING ORDER (NO ISOLATION) - Swab, Nasopharynx; Future  -     Case Request    Other orders  -     Follow Anesthesia Guidelines / Standing Orders; Future  -     Obtain Informed Consent; Future        COLONOSCOPY (N/A)     Diagnosis Plan   1. Encounter for Hemoccult screening  Case Request    COVID PRE-OP / PRE-PROCEDURE SCREENING ORDER (NO ISOLATION) - Swab, Nasopharynx    Case Request       Anticipated Surgical Procedure:  Orders Placed This Encounter   Procedures   • COVID PRE-OP / PRE-PROCEDURE SCREENING ORDER (NO ISOLATION) - Swab, Nasopharynx     Standing Status:   Future     Standing Expiration Date:   10/12/2022     Order Specific Question:   Release to patient     Answer:   Immediate     Order Specific Question:   Please select your location:     Answer:   [facility]     Order Specific Question:   COVID Screening Order:     Answer:   In-House: PRE-OP: BD MAX, 8-10 HR TAT [[id number]]     Order Specific Question:   Previously tested for COVID-19?     Answer:   Yes     Order Specific Question:   Employed in healthcare setting?     Answer:   No     Order Specific Question:   Symptomatic for COVID-19 as defined by CDC?     Answer:   No     Order Specific Question:   Hospitalized for COVID-19?     Answer:   No     Order Specific Question:   Admitted to ICU for COVID-19?     Answer:   No     Order Specific Question:   Resident in a congregate (group) care setting?     Answer:   No   • Follow Anesthesia Guidelines / Standing Orders     Standing  Status:   Future   • Obtain Informed Consent     Standing Status:   Future     Order Specific Question:   Informed Consent Given For     Answer:   colonoscopy       The risks, benefits, and alternatives of this procedure have been discussed with the patient or the responsible party- the patient understands and agrees to proceed.

## 2021-10-12 NOTE — PATIENT INSTRUCTIONS
"BMI for Adults  What is BMI?  Body mass index (BMI) is a number that is calculated from a person's weight and height. BMI can help estimate how much of a person's weight is composed of fat. BMI does not measure body fat directly. Rather, it is an alternative to procedures that directly measure body fat, which can be difficult and expensive.  BMI can help identify people who may be at higher risk for certain medical problems.  What are BMI measurements used for?  BMI is used as a screening tool to identify possible weight problems. It helps determine whether a person is obese, overweight, a healthy weight, or underweight.  BMI is useful for:  · Identifying a weight problem that may be related to a medical condition or may increase the risk for medical problems.  · Promoting changes, such as changes in diet and exercise, to help reach a healthy weight. BMI screening can be repeated to see if these changes are working.  How is BMI calculated?  BMI involves measuring your weight in relation to your height. Both height and weight are measured, and the BMI is calculated from those numbers. This can be done either in English (U[place] or metric measurements. Note that charts and online BMI calculators are available to help you find your BMI quickly and easily without having to do these calculations yourself.  To calculate your BMI in English ([place] measurements:    1. Measure your weight in pounds (lb).  2. Multiply the number of pounds by 703.  ? For example, for a person who weighs 180 lb, multiply that number by 703, which equals 126,540.  3. Measure your height in inches. Then multiply that number by itself to get a measurement called \"inches squared.\"  ? For example, for a person who is 70 inches tall, the \"inches squared\" measurement is 70 inches x 70 inches, which equals 4,900 inches squared.  4. Divide the total from step 2 (number of lb x 703) by the total from step 3 (inches squared): 126,540 ÷ 4,900 = 25.8. This is " "your BMI.    To calculate your BMI in metric measurements:  1. Measure your weight in kilograms (kg).  2. Measure your height in meters (m). Then multiply that number by itself to get a measurement called \"meters squared.\"  ? For example, for a person who is 1.75 m tall, the \"meters squared\" measurement is 1.75 m x 1.75 m, which is equal to 3.1 meters squared.  3. Divide the number of kilograms (your weight) by the meters squared number. In this example: 70 ÷ 3.1 = 22.6. This is your BMI.  What do the results mean?  BMI charts are used to identify whether you are underweight, normal weight, overweight, or obese. The following guidelines will be used:  · Underweight: BMI less than 18.5.  · Normal weight: BMI between 18.5 and 24.9.  · Overweight: BMI between 25 and 29.9.  · Obese: BMI of 30 or above.  Keep these notes in mind:  · Weight includes both fat and muscle, so someone with a muscular build, such as an athlete, may have a BMI that is higher than 24.9. In cases like these, BMI is not an accurate measure of body fat.  · To determine if excess body fat is the cause of a BMI of 25 or higher, further assessments may need to be done by a health care provider.  · BMI is usually interpreted in the same way for men and women.  Where to find more information  For more information about BMI, including tools to quickly calculate your BMI, go to these websites:  · Centers for Disease Control and Prevention: www.cdc.gov  · American Heart Association: www.heart.org  · National Heart, Lung, and Blood [employer]: www.nhlbi.nih.gov  Summary  · Body mass index (BMI) is a number that is calculated from a person's weight and height.  · BMI may help estimate how much of a person's weight is composed of fat. BMI can help identify those who may be at higher risk for certain medical problems.  · BMI can be measured using English measurements or metric measurements.  · BMI charts are used to identify whether you are underweight, normal " weight, overweight, or obese.  This information is not intended to replace advice given to you by your health care provider. Make sure you discuss any questions you have with your health care provider.  Document Revised: 09/09/2020 Document Reviewed: 07/17/2020  Elsevier Patient Education © 2021 [employer]

## 2021-10-23 DIAGNOSIS — I10 ESSENTIAL HYPERTENSION: ICD-10-CM

## 2021-10-24 DIAGNOSIS — I10 ESSENTIAL HYPERTENSION: ICD-10-CM

## 2021-10-24 DIAGNOSIS — I25.118 CORONARY ARTERY DISEASE OF NATIVE ARTERY OF NATIVE HEART WITH STABLE ANGINA PECTORIS (HCC): ICD-10-CM

## 2021-10-25 DIAGNOSIS — I10 ESSENTIAL HYPERTENSION: ICD-10-CM

## 2021-10-25 DIAGNOSIS — I25.118 CORONARY ARTERY DISEASE OF NATIVE ARTERY OF NATIVE HEART WITH STABLE ANGINA PECTORIS (HCC): ICD-10-CM

## 2021-10-25 RX ORDER — METOPROLOL SUCCINATE 25 MG/1
TABLET, EXTENDED RELEASE ORAL
Qty: 30 TABLET | Refills: 6 | Status: SHIPPED | OUTPATIENT
Start: 2021-10-25 | End: 2022-01-04 | Stop reason: SDUPTHER

## 2021-10-25 RX ORDER — ISOSORBIDE MONONITRATE 60 MG/1
60 TABLET, EXTENDED RELEASE ORAL DAILY
Qty: 30 TABLET | Refills: 6 | Status: SHIPPED | OUTPATIENT
Start: 2021-10-25 | End: 2022-04-21

## 2021-10-25 RX ORDER — LISINOPRIL 40 MG/1
TABLET ORAL
Qty: 30 TABLET | Refills: 6 | Status: SHIPPED | OUTPATIENT
Start: 2021-10-25 | End: 2022-01-04 | Stop reason: SDUPTHER

## 2021-11-08 ENCOUNTER — HOSPITAL ENCOUNTER (OUTPATIENT)
Facility: HOSPITAL | Age: 61
Setting detail: HOSPITAL OUTPATIENT SURGERY
Discharge: HOME | End: 2021-11-08
Attending: INTERNAL MEDICINE | Admitting: INTERNAL MEDICINE

## 2021-11-08 ENCOUNTER — ANESTHESIA EVENT (OUTPATIENT)
Dept: GASTROENTEROLOGY | Facility: HOSPITAL | Age: 61
End: 2021-11-08

## 2021-11-08 ENCOUNTER — ANESTHESIA (OUTPATIENT)
Dept: GASTROENTEROLOGY | Facility: HOSPITAL | Age: 61
End: 2021-11-08

## 2021-11-08 VITALS
WEIGHT: 202 LBS | SYSTOLIC BLOOD PRESSURE: 149 MMHG | OXYGEN SATURATION: 93 % | HEART RATE: 75 BPM | DIASTOLIC BLOOD PRESSURE: 72 MMHG | RESPIRATION RATE: 18 BRPM | HEIGHT: 66 IN | BODY MASS INDEX: 32.47 KG/M2 | TEMPERATURE: 98.1 F

## 2021-11-08 DIAGNOSIS — Z12.11 ENCOUNTER FOR HEMOCCULT SCREENING: ICD-10-CM

## 2021-11-08 PROCEDURE — 45378 DIAGNOSTIC COLONOSCOPY: CPT | Performed by: INTERNAL MEDICINE

## 2021-11-08 PROCEDURE — 25010000002 PROPOFOL 10 MG/ML EMULSION: Performed by: NURSE ANESTHETIST, CERTIFIED REGISTERED

## 2021-11-08 RX ORDER — LIDOCAINE HYDROCHLORIDE 20 MG/ML
INJECTION, SOLUTION INTRAVENOUS AS NEEDED
Status: DISCONTINUED | OUTPATIENT
Start: 2021-11-08 | End: 2021-11-08 | Stop reason: SURG

## 2021-11-08 RX ORDER — PROPOFOL 10 MG/ML
VIAL (ML) INTRAVENOUS AS NEEDED
Status: DISCONTINUED | OUTPATIENT
Start: 2021-11-08 | End: 2021-11-08 | Stop reason: SURG

## 2021-11-08 RX ORDER — DEXTROSE AND SODIUM CHLORIDE 5; .45 G/100ML; G/100ML
30 INJECTION, SOLUTION INTRAVENOUS CONTINUOUS PRN
Status: DISCONTINUED | OUTPATIENT
Start: 2021-11-08 | End: 2021-11-08 | Stop reason: HOSPADM

## 2021-11-08 RX ADMIN — LIDOCAINE HYDROCHLORIDE 50 MG: 20 INJECTION, SOLUTION INTRAVENOUS at 15:34

## 2021-11-08 RX ADMIN — DEXTROSE AND SODIUM CHLORIDE 30 ML/HR: 5; 450 INJECTION, SOLUTION INTRAVENOUS at 14:52

## 2021-11-08 RX ADMIN — PROPOFOL 100 MG: 10 INJECTION, EMULSION INTRAVENOUS at 15:34

## 2021-11-08 RX ADMIN — PROPOFOL 30 MG: 10 INJECTION, EMULSION INTRAVENOUS at 15:42

## 2021-11-08 RX ADMIN — PROPOFOL 50 MG: 10 INJECTION, EMULSION INTRAVENOUS at 15:37

## 2021-11-08 NOTE — ANESTHESIA PREPROCEDURE EVALUATION
Anesthesia Evaluation     Patient summary reviewed   NPO Solid Status: > 8 hours  NPO Liquid Status: > 4 hours           Airway   Mallampati: II  TM distance: >3 FB  Dental      Pulmonary - normal exam   (+) a smoker Current Smoked day of surgery,   Cardiovascular - normal exam    ECG reviewed  Patient on routine beta blocker    (+) hypertension, CAD, hyperlipidemia,     ROS comment: Vent. Rate : 061 BPM     Atrial Rate : 061 BPM     P-R Int : 148 ms          QRS Dur : 084 ms      QT Int : 432 ms       P-R-T Axes : 053 053 021 degrees     QTc Int : 434 ms     Normal sinus rhythm  Normal ECG  When compared with ECG of 04-SEP-2019 12:25,  No significant change was found  Confirmed by LIVE CHRISTOPHER ([id number]) on 8/28/2020 3:22:09 PM    9/5/2019 TTE  · Estimated EF = 61%.  · Left ventricular systolic function is normal.  · Left ventricular diastolic dysfunction (grade I) consistent with impaired relaxation.  · Mild mitral valve regurgitation is present  · Mild tricuspid valve regurgitation is present.  · Mild tricuspid valve stenosis is present.    Neuro/Psych  (+) numbness,     GI/Hepatic/Renal/Endo      Musculoskeletal     Abdominal    Substance History   (+) alcohol use,      OB/GYN          Other   arthritis,                        Anesthesia Plan    ASA 3     MAC     intravenous induction     Anesthetic plan, all risks, benefits, and alternatives have been provided, discussed and informed consent has been obtained with: patient.    Plan discussed with CRNA.

## 2021-11-08 NOTE — ANESTHESIA POSTPROCEDURE EVALUATION
Patient: Nima Portillo    Procedure Summary     Date: 11/08/21 Room / Location: [facility] 1 / [facility]    Anesthesia Start: 1530 Anesthesia Stop: 1545    Procedure: COLONOSCOPY (N/A ) Diagnosis:       Encounter for Hemoccult screening      (Encounter for Hemoccult screening [Z12.11])    Surgeons: Levi Milian MD Provider: Terrance Vazquez CRNA    Anesthesia Type: MAC ASA Status: 3          Anesthesia Type: MAC    Vitals  No vitals data found for the desired time range.          Post Anesthesia Care and Evaluation    Patient location during evaluation: bedside  Patient participation: complete - patient participated  Level of consciousness: awake and alert  Pain score: 0  Pain management: adequate  Airway patency: patent  Anesthetic complications: No anesthetic complications  PONV Status: none  Cardiovascular status: acceptable  Respiratory status: acceptable  Hydration status: acceptable    Comments: ---------------------------               11/08/21                      1546         ---------------------------   BP:          169/88         Pulse:         85           Resp:          18           Temp:   98.3 °F (36.8 °C)   SpO2:          96%         ---------------------------

## 2021-11-15 RX ORDER — ATORVASTATIN CALCIUM 40 MG/1
TABLET, FILM COATED ORAL
Qty: 90 TABLET | Refills: 3 | Status: SHIPPED | OUTPATIENT
Start: 2021-11-15 | End: 2021-12-01

## 2021-12-01 ENCOUNTER — OFFICE VISIT (OUTPATIENT)
Dept: CARDIOLOGY | Facility: CLINIC | Age: 61
End: 2021-12-01

## 2021-12-01 VITALS
OXYGEN SATURATION: 95 % | HEART RATE: 61 BPM | DIASTOLIC BLOOD PRESSURE: 84 MMHG | WEIGHT: 208.2 LBS | BODY MASS INDEX: 33.46 KG/M2 | HEIGHT: 66 IN | TEMPERATURE: 97.5 F | SYSTOLIC BLOOD PRESSURE: 144 MMHG

## 2021-12-01 DIAGNOSIS — I25.10 CORONARY ARTERY DISEASE DUE TO LIPID RICH PLAQUE: ICD-10-CM

## 2021-12-01 DIAGNOSIS — I25.83 CORONARY ARTERY DISEASE DUE TO LIPID RICH PLAQUE: ICD-10-CM

## 2021-12-01 DIAGNOSIS — I10 HYPERTENSION, ESSENTIAL: Primary | ICD-10-CM

## 2021-12-01 PROCEDURE — 99214 OFFICE O/P EST MOD 30 MIN: CPT | Performed by: INTERNAL MEDICINE

## 2021-12-01 RX ORDER — ATORVASTATIN CALCIUM 80 MG/1
80 TABLET, FILM COATED ORAL DAILY
Qty: 90 TABLET | Refills: 3 | Status: SHIPPED | OUTPATIENT
Start: 2021-12-01 | End: 2022-03-31

## 2021-12-01 NOTE — PROGRESS NOTES
[facility]  OFFICE NOTE    Cardiovascular Medicine  Barbara Ardon M.D., RPVI         No referring provider defined for this encounter.    Thank you for asking me to see Nima Portillo for CAD, syncope.    Coronary Artery Disease      This is a 61 y.o. male with:  1. Coronary artery disease involving native coronary artery of native heart without angina pectoris    2. Essential hypertension    3. Pure hypercholesterolemia    4. Nicotine abuse    5. ETOH abuse          Chief complaint -syncope        History of present Illness- 61 y.o.-year-old gentleman who smokeed about a pack and a half a day and drinks about 6 beers a day/week, he has history of cardiac catheterization 2015 by Dr. Sandhu and which showed small vessel disease in the obtuse marginal one side branch and in the LPDA with moderate diffuse disease in the LAD,   He has cut down on his smoking and drinking.  Patient was admitted to the hospital for syncopal episode in 2019.  Patient reported he had a bout of coughing which is followed by loss of consciousness.  Patient is admitted to the hospital, he was noted to be bradycardic on arrival subsequently his metoprolol was cut back.  Was also also orthostatic.  He had carotid ultrasound done which were without any significant disease.  No recurrence of syncopal episodes since then.  Denies any chest pain or exertion.    02/26/2021:  Patient has done well since last visit.  Denied any further episodes of chest pain.  Has not used nitroglycerin since last visit.  He does have significant carpal tunnel on both hands with bony spur and has surgery coming up.  He is here for preop evaluation.  He is able to perform his activities of daily living he, he does have limitations from his chronic back and knee pain but no chest pain.  He does have chronic shortness of breath from his COPD from smoking which is not worse currently.    05/26/2021:  No acute issues since last visit.   Underwent carpal surgery on the left side and surgery coming up on the right side.  Denying any chest pain.  No side effects from medications.      Review of Systems - ROS  Constitution: Negative for weakness, weight gain and weight loss.   HENT: Negative for congestion.    Eyes: Negative for blurred vision.   Cardiovascular: As mentioned above  Respiratory: Negative for cough and hemoptysis.    Endocrine: Negative for polydipsia and polyuria.   Hematologic/Lymphatic: Negative for bleeding problem. Does not bruise/bleed easily.   Skin: Negative for flushing.   Musculoskeletal: Negative for neck pain and stiffness.   Gastrointestinal: Negative for abdominal pain, diarrhea, jaundice, melena, nausea and vomiting.   Genitourinary: Negative for dysuria and hematuria.   Neurological: Negative for dizziness, focal weakness and numbness.   Psychiatric/Behavioral: Negative for altered mental status and depression.          All other systems were reviewed and were negative.    family history includes Cancer in his paternal aunt and paternal uncle; Diabetes in his father, mother, and sister; Heart disease in his father, mother, and sister; Hypertension in his father and mother; Stroke in his sister.     reports that he has been smoking cigarettes. He has a 60.00 pack-year smoking history. He has quit using smokeless tobacco. He reports previous alcohol use. He reports that he does not use drugs.    No Known Allergies      Current Outpatient Medications:   •  acetaminophen (Tylenol 8 Hour) 650 MG 8 hr tablet, Take 650 mg by mouth Every 8 (Eight) Hours As Needed for Mild Pain ., Disp: , Rfl:   •  amLODIPine (NORVASC) 5 MG tablet, TAKE 1 TABLET BY MOUTH EVERY DAY, Disp: 90 tablet, Rfl: 3  •  aspirin 81 MG chewable tablet, Chew 325 mg Daily., Disp: , Rfl:   •  atorvastatin (LIPITOR) 40 MG tablet, TAKE 1 TABLET BY MOUTH EVERY DAY, Disp: 90 tablet, Rfl: 3  •  hydroCHLOROthiazide (HYDRODIURIL) 25 MG tablet, Take 1 tablet by mouth  "Daily., Disp: 90 tablet, Rfl: 3  •  isosorbide mononitrate (IMDUR) 60 MG 24 hr tablet, Take 1 tablet by mouth Daily., Disp: 30 tablet, Rfl: 6  •  lisinopril (PRINIVIL,ZESTRIL) 40 MG tablet, TAKE 1 TABLET BY MOUTH EVERY DAY, Disp: 30 tablet, Rfl: 6  •  metoprolol succinate XL (TOPROL-XL) 25 MG 24 hr tablet, TAKE 1 TABLET BY MOUTH EVERY DAY, Disp: 30 tablet, Rfl: 6  •  nitroglycerin (NITROSTAT) 0.4 MG SL tablet, Place 1 tablet under the tongue Every 5 (Five) Minutes As Needed for Chest Pain. Take no more than 3 doses in 15 minutes., Disp: 30 tablet, Rfl: 12    Physical Exam:  Vitals:    12/01/21 0908   BP: 144/84   BP Location: Left arm   Patient Position: Sitting   Cuff Size: Adult   Pulse: 61   Temp: 97.5 °F (36.4 °C)   SpO2: 95%   Weight: 94.4 kg (208 lb 3.2 oz)   Height: 167.6 cm (66\")     Current Pain Level: none  Pulse Ox: Normal        on room air  General: alert, appears stated age and cooperative     Body Habitus: well-nourished    HEENT: Head: Normocephalic, no lesions, without obvious abnormality. No arcus senilis, xanthelasma or xanthomas.    Neuro: alert, oriented x3  Pulses: 2+ and symmetric  JVP: Volume/Pulsation:       Normal.  Normal waveforms.   Appropriate inspiratory decrease.  No Kussmaul's. No Germain's.   Carotid Exam: no bruit normal pulsation bilaterally    Carotid Volume: normal.                     Respirations: no increased work of breathing            Chest:  Normal              Pulmonary:Normal       Precordium: Normal impulses. P2 is not palpable.  RV Heave: absent  LV Heave: absent  [place]:  normal size and placement  Palpable S4: absent.  Heart rate: normal    Heart Rhythm: regular                                     Heart Sounds: S1: normal    S2: normal  S3: absent                               S4: absent  Opening Snap: absent            Pericardial Rub:  Absent: .                 Abdomen:   Appearance: normal .  Palpation: Soft, non-tender to palpation, bowel sounds positive in all " four quadrants; no guarding or rebound tenderness  Extremity: no edema.   LE Skin: no rashes  LE Hair:  normal  LE Pulses: well perfused with normal pulses in the distal extremities  Pallor on elevation: Absent. Rubor on dependency: None       DATA REVIEWED:         ECG/EMG Results (all)     None        ---------------------------------------------------  TTE/GEOFFREY:  Results for orders placed during the hospital encounter of 09/04/19    Adult Transthoracic Echo Complete W/ Cont if Necessary Per Protocol    Interpretation Summary  · Estimated EF = 61%.  · Left ventricular systolic function is normal.  · Left ventricular diastolic dysfunction (grade I) consistent with impaired relaxation.  · Mild mitral valve regurgitation is present  · Mild tricuspid valve regurgitation is present.  · Mild tricuspid valve stenosis is present.    -----------------------------------------------------  CXR/Imaging:   --------------  CT:   No radiology results for the last 30 days.    ----------------------------------------------------      --------------------------------------------------------------------------------------------------  LABS:     The CVD Risk score (Leeann et al., 2008) failed to calculate for the following reasons:    The patient has a prior MI, stroke, CHF, or peripheral vascular disease diagnosis         Lab Results   Component Value Date    GLUCOSE 74 06/16/2020    BUN 15 04/05/2021    CREATININE 0.9 04/05/2021    EGFRIFNONA 87 06/16/2020    EGFRIFAFRI 104 04/05/2021    BCR 19.1 06/16/2020    K 3.5 04/05/2021    CO2 28 04/05/2021    CALCIUM 9.1 04/05/2021    ALBUMIN 4.70 06/16/2020    AST 22 06/16/2020    ALT 22 06/16/2020     Lab Results   Component Value Date    WBC 8.86 06/16/2020    HGB 14.7 06/16/2020    HCT 42.0 06/16/2020    MCV 91.5 06/16/2020     06/16/2020     Lab Results   Component Value Date    CHOL 144 08/30/2021    TRIG 158 (H) 08/30/2021    HDL 50 08/30/2021    LDL 67 08/30/2021     Lab  Results   Component Value Date    TSH 2.08 03/17/2015     Lab Results   Component Value Date    CKTOTAL 102 03/16/2015    CKMB 3.7 03/16/2015    TROPONINI 0.251 (C) 03/16/2015    TROPONINT <0.010 09/04/2019     Lab Results   Component Value Date    HGBA1C 6.13 (H) 08/25/2021     No results found for: DDIMER  Lab Results   Component Value Date    ALT 22 06/16/2020     Lab Results   Component Value Date    HGBA1C 6.13 (H) 08/25/2021     Lab Results   Component Value Date    CREATININE 0.9 04/05/2021     No results found for: IRON, TIBC, FERRITIN  Lab Results   Component Value Date    INR 1.04 09/04/2019    INR 0.9 04/08/2015    PROTIME 13.4 09/04/2019    PROTIME 12.2 04/08/2015       Assessment/Plan     1. Syncope and collapse: No further recurrence  Secondary to vasovagal syncope in the setting of significant coughing and orthostatic hypotension.  Patient was also bradycardic.  cut back on metoprolol with improvement in his heart rate.  Echo cardiogram was without any structural abnormalities.  Carotid Doppler was without any significant abnormalities.  Patient also had a monitor which was without any significant arrhythmias.    2. CAD:  Reviewed his angiogram from 2015 he does have moderate diffuse disease in LAD, he has a significant stenosis in inferior branch of a small OM and L PDA.  Currently patient is asymptomatic.  Continue medical management.  If Patient is worsening chest pain will need a cardiac cath to assess for progression of disease.  Continue aspirin/beta-blocker/nitro    Hypertension on lisinopril 40mg, isosorbide 60mg and HCTZ 25mg along with Toprol-XL and amlodipine 5mg     Peripheral vascular disease -had CTA with abdominal aortogram with runoff which did not show any high-grade stenosis and some of his pain in the legs could be due to neuropathy from his alcohol use    HLD:  Switched to lipitor 40mg. LDL is down to 67, with his diffuse disease will uptitrate to 80mg.         Prevention:  Patient's Body mass index is 33.6 kg/m². BMI is above normal parameters. Recommendations include: exercise counseling and nutrition counseling.      Nima Portillo is a current cigarettes user.  He currently smokes 4 cigarettes per day for a duration of 10 years. I have educated him on the risk of diseases from using tobacco products such as cancer, COPD and heart diease.     I advised him to quit and he is not willing to quit.    I spent 3  minutes counseling the patient.          AAA Screening:   Will check after 65 year           This document has been electronically signed by Barbara Ardon MD on December 1, 2021 09:37 CST

## 2021-12-10 ENCOUNTER — OFFICE VISIT (OUTPATIENT)
Dept: FAMILY MEDICINE CLINIC | Facility: CLINIC | Age: 61
End: 2021-12-10

## 2021-12-10 VITALS
HEIGHT: 66 IN | BODY MASS INDEX: 33.6 KG/M2 | OXYGEN SATURATION: 97 % | HEART RATE: 80 BPM | TEMPERATURE: 98.4 F | DIASTOLIC BLOOD PRESSURE: 80 MMHG | SYSTOLIC BLOOD PRESSURE: 132 MMHG | WEIGHT: 209.06 LBS

## 2021-12-10 DIAGNOSIS — J44.9 CHRONIC OBSTRUCTIVE PULMONARY DISEASE, UNSPECIFIED COPD TYPE (HCC): Primary | ICD-10-CM

## 2021-12-10 PROCEDURE — 99213 OFFICE O/P EST LOW 20 MIN: CPT | Performed by: STUDENT IN AN ORGANIZED HEALTH CARE EDUCATION/TRAINING PROGRAM

## 2021-12-10 RX ORDER — CETIRIZINE HYDROCHLORIDE 10 MG/1
TABLET ORAL
COMMUNITY
Start: 2021-12-04 | End: 2022-03-31

## 2021-12-10 RX ORDER — ALBUTEROL SULFATE 90 UG/1
2 AEROSOL, METERED RESPIRATORY (INHALATION) EVERY 4 HOURS PRN
Qty: 6.7 G | Refills: 6 | Status: SHIPPED | OUTPATIENT
Start: 2021-12-10

## 2021-12-10 RX ORDER — DEXTROMETHORPHAN HYDROBROMIDE AND PROMETHAZINE HYDROCHLORIDE 15; 6.25 MG/5ML; MG/5ML
SYRUP ORAL
COMMUNITY
Start: 2021-12-04 | End: 2022-03-31

## 2021-12-10 NOTE — PROGRESS NOTES
Family Medicine Residency  Perfecto Barr MD    Subjective:     Nima Portillo is a 61 y.o. male who presents for 3 month follow up and management of COPD.    Patient referred to optometry for reported blurry vision and some difficulty reading that developed over the past several years, pt do not attended optometry.     Patient completed low-dose CT, 8/25/2021 1.  No suspicious lung nodule identified. Evidence of old granulomatous disease. LAD and left circumflex coronary artery atherosclerotic vascular calcifications. Old healed right mid shaft clavicle fracture.     Patient continues responding to Breo medication well, needs refill    Patient continues to smoke    The following portions of the patient's history were reviewed and updated as appropriate: allergies, current medications, past family history, past medical history, past social history, past surgical history and problem list.    Past Medical Hx:  Past Medical History:   Diagnosis Date   • Acute bronchitis    • Acute sinusitis    • Chest pain    • Chronic bronchitis (HCC)     secondary to smoking   • Claudication (HCC)    • Coronary arteriosclerosis    • Cough    • Gastroesophageal reflux disease    • Health maintenance examination     Individual health examination   • History of echocardiogram 10/21/2013    Echocardiogram W/ color flow 99439 (1) - Left atrium normal. Mild CLVH. EF 50%. Valves intact. Mild mitral and tricuspid regurg. Pericardium normal.   • History of echocardiogram 10/25/2011    Echocardiogram W/O color flow 14332 (1) - Normal LV sytolic function with mild LVH & mild diastolic dysfunction   • Hyperlipidemia    • Hypertensive disorder    • Pernicious anemia    • Thumb pain, left 10/14/2019   • Tobacco dependence syndrome        Past Surgical Hx:  Past Surgical History:   Procedure Laterality Date   • CARDIAC CATHETERIZATION  04/08/2015    Cardiac cath 04019 (1) - Slective coronary angiogram. Left heart catheterization with LV  ventriculogram.   • COLONOSCOPY N/A 10/26/2020    Procedure: COLONOSCOPY;  Surgeon: Levi Milian MD;  Location: [facility] ENDOSCOPY;  Service: Gastroenterology;  Laterality: N/A;   • COLONOSCOPY N/A 11/8/2021    Procedure: COLONOSCOPY;  Surgeon: Levi Milian MD;  Location: [facility] ENDOSCOPY;  Service: Gastroenterology;  Laterality: N/A;   • DENTAL PROCEDURE      Removal of all teeth       Current Meds:    Current Outpatient Medications:   •  acetaminophen (Tylenol 8 Hour) 650 MG 8 hr tablet, Take 650 mg by mouth Every 8 (Eight) Hours As Needed for Mild Pain ., Disp: , Rfl:   •  amLODIPine (NORVASC) 5 MG tablet, TAKE 1 TABLET BY MOUTH EVERY DAY, Disp: 90 tablet, Rfl: 3  •  aspirin 81 MG chewable tablet, Chew 325 mg Daily., Disp: , Rfl:   •  atorvastatin (LIPITOR) 80 MG tablet, Take 1 tablet by mouth Daily., Disp: 90 tablet, Rfl: 3  •  cetirizine (zyrTEC) 10 MG tablet, , Disp: , Rfl:   •  hydroCHLOROthiazide (HYDRODIURIL) 25 MG tablet, Take 1 tablet by mouth Daily., Disp: 90 tablet, Rfl: 3  •  isosorbide mononitrate (IMDUR) 60 MG 24 hr tablet, Take 1 tablet by mouth Daily., Disp: 30 tablet, Rfl: 6  •  lisinopril (PRINIVIL,ZESTRIL) 40 MG tablet, TAKE 1 TABLET BY MOUTH EVERY DAY, Disp: 30 tablet, Rfl: 6  •  metoprolol succinate XL (TOPROL-XL) 25 MG 24 hr tablet, TAKE 1 TABLET BY MOUTH EVERY DAY, Disp: 30 tablet, Rfl: 6  •  nitroglycerin (NITROSTAT) 0.4 MG SL tablet, Place 1 tablet under the tongue Every 5 (Five) Minutes As Needed for Chest Pain. Take no more than 3 doses in 15 minutes., Disp: 30 tablet, Rfl: 12  •  promethazine-dextromethorphan (PROMETHAZINE-DM) 6.25-15 MG/5ML syrup, , Disp: , Rfl:   •  albuterol sulfate  (90 Base) MCG/ACT inhaler, Inhale 2 puffs Every 4 (Four) Hours As Needed for Wheezing., Disp: 6.7 g, Rfl: 6  •  Fluticasone Furoate-Vilanterol (Breo Ellipta) 100-25 MCG/INH inhaler, Inhale 1 puff Daily., Disp: 60 each, Rfl: 1    Allergies:  No Known Allergies    Family Hx:  Family History    Problem Relation Age of Onset   • Heart disease Mother    • Hypertension Mother    • Diabetes Mother    • Heart disease Father    • Hypertension Father    • Diabetes Father    • Diabetes Sister    • Heart disease Sister    • Stroke Sister    • Cancer Paternal Aunt         Breast Cancer   • Cancer Paternal Uncle         Unknown cancer        Social History:  Social History     Socioeconomic History   • Marital status: Single   Tobacco Use   • Smoking status: Current Every Day Smoker     Packs/day: 1.50     Years: 40.00     Pack years: 60.00     Types: Cigarettes   • Smokeless tobacco: Former User   Vaping Use   • Vaping Use: Never used   Substance and Sexual Activity   • Alcohol use: Not Currently   • Drug use: No   • Sexual activity: Never     Comment: Marital status:        Review of Systems  Review of Systems   Constitutional: Negative for activity change, appetite change, chills, diaphoresis, fatigue and fever.   HENT: Negative for congestion, dental problem, ear discharge, ear pain, hearing loss, mouth sores, nosebleeds, postnasal drip, sinus pain, sore throat, tinnitus, trouble swallowing and voice change.    Eyes: Negative for photophobia, pain, discharge and itching.   Respiratory: Negative for cough, choking, chest tightness, shortness of breath and wheezing.    Cardiovascular: Negative for chest pain, palpitations and leg swelling.   Gastrointestinal: Negative for abdominal distention, abdominal pain, blood in stool, constipation, diarrhea, nausea and vomiting.   Endocrine: Negative for cold intolerance and heat intolerance.   Genitourinary: Negative for difficulty urinating, dysuria, flank pain and hematuria.   Musculoskeletal: Negative for back pain, joint swelling and neck pain.   Skin: Negative for color change and rash.   Neurological: Negative for dizziness, tremors, seizures, weakness, light-headedness, numbness and headaches.   Hematological: Negative for adenopathy.  "  Psychiatric/Behavioral: Negative for behavioral problems, confusion, hallucinations, self-injury and sleep disturbance.       Objective:     /80   Pulse 80   Temp 98.4 °F (36.9 °C)   Ht 167.6 cm (66\")   Wt 94.8 kg (209 lb 1 oz)   SpO2 97%   BMI 33.74 kg/m²   Physical Exam  Vitals and nursing note reviewed.   Constitutional:       Appearance: Normal appearance. He is not ill-appearing or diaphoretic.   HENT:      Head: Normocephalic and atraumatic.      Right Ear: External ear normal.      Left Ear: External ear normal.      Nose: Nose normal. No rhinorrhea.      Mouth/Throat:      Mouth: Mucous membranes are moist.      Pharynx: No posterior oropharyngeal erythema.   Eyes:      General: No scleral icterus.        Right eye: No discharge.         Left eye: No discharge.      Extraocular Movements: Extraocular movements intact.   Neck:      Vascular: No carotid bruit.   Cardiovascular:      Rate and Rhythm: Normal rate and regular rhythm.      Pulses: Normal pulses.      Heart sounds: Normal heart sounds. No gallop.    Pulmonary:      Effort: Pulmonary effort is normal.      Breath sounds: Wheezing present.   Chest:      Chest wall: No tenderness.   Abdominal:      General: Bowel sounds are normal.      Palpations: Abdomen is soft.      Tenderness: There is no rebound.   Musculoskeletal:         General: No swelling.      Cervical back: No muscular tenderness.      Right lower leg: No edema.      Left lower leg: No edema.   Lymphadenopathy:      Cervical: No cervical adenopathy.   Skin:     General: Skin is warm and dry.      Capillary Refill: Capillary refill takes less than 2 seconds.      Findings: No erythema or rash.   Neurological:      General: No focal deficit present.      Mental Status: He is alert and oriented to person, place, and time.      Motor: No weakness.   Psychiatric:         Mood and Affect: Mood normal.         Behavior: Behavior normal.         Thought Content: Thought content " normal.         Judgment: Judgment normal.          Assessment   Nima Portillo is a 61 y.o. male who presents for COPD and 3 month follow up.      Fluticasone Furoate-Vilanterol (Breo Ellipta) 100-25 MCG/INH inhaler, Inhale 1 puff Daily., refilled    Albuterol inhaler added and ordered    Patient still smoking at this time discussed that it was very important for his overall health and patient still declined.    Plan     Next visit Dr. Rojas to do basic laboratory tests  Next visit check if patient is using inhalers  Next visit check patient to see if would like stop smoking and begin nicotine patches  Next visit check patients medication regime.  Next visit check patients compliance to medication regime.    Diagnoses and all orders for this visit:    1. Chronic obstructive pulmonary disease, unspecified COPD type (HCC) (Primary)  -     Ambulatory Referral to Pulmonology  -     Fluticasone Furoate-Vilanterol (Breo Ellipta) 100-25 MCG/INH inhaler; Inhale 1 puff Daily.  Dispense: 60 each; Refill: 1  -     albuterol sulfate  (90 Base) MCG/ACT inhaler; Inhale 2 puffs Every 4 (Four) Hours As Needed for Wheezing.  Dispense: 6.7 g; Refill: 6      · Rx changes: Added albuterol and Breo Ellipta  · Patient Education: Education on diet exercise lifestyle smoking cessation  · Compliance at present is estimated to be poor.   · Efforts to improve compliance (if necessary) will be directed at increased exercise.    Depression screening: Depression screening performed today; result negative; no follow up needed       Follow-up:     Return in about 6 weeks (around 1/21/2022).    Preventative:  Health Maintenance   Topic Date Due   • ZOSTER VACCINE (2 of 2) 07/11/2019   • ANNUAL PHYSICAL  04/17/2020   • INFLUENZA VACCINE  08/01/2021   • COVID-19 Vaccine (3 - Booster for Pfizer series) 09/25/2021   • LIPID PANEL  08/30/2022   • LUNG CANCER SCREENING  09/21/2022   • COLORECTAL CANCER SCREENING  11/08/2022   • Pneumococcal  Vaccine 0-64 (2 of 2 - PPSV23) 10/29/2025   • TDAP/TD VACCINES (2 - Td or Tdap) 05/16/2029   • HEPATITIS C SCREENING  Completed     Male Preventative: Patient does Testicular self exam  Recommended: none  Vaccine Counseling: N/A    Weight  -Class: Obese Class I: 30-34.9kg/m2  -Patient's Body mass index is 33.74 kg/m². indicating that he is overweight (BMI 25-29.9). Obesity-related health conditions include the following: none. Obesity is unchanged. BMI is is above average; BMI management plan is completed. We discussed portion control and increasing exercise..   eat more fruits and vegetables, decrease soda or juice intake, increase water intake, increase physical activity and reduce screen time    Alcohol use:  reports previous alcohol use.  Nicotine status  reports that he has been smoking cigarettes. He has a 60.00 pack-year smoking history. He has quit using smokeless tobacco.    Goals     •  Smoking cessation (pt-stated)       Quitting smoking and living longer    Barriers: Wanting to quit            RISK SCORE: 5    Kirill request #PDMP. Reviewed and appropriate.     Signature  [place] [address]  Office: [phone]      This document has been electronically signed by Perfecto Barr MD on December 10, 2021 14:45 CST      EMR Dragon/transcription disclaimer: Much of this encounter note was created utilizing an electronic transcription/translation of spoken language to printed text.  Electronic translation of spoken language may permit erroneous, or at times, nonsensical words or phrases to be in advertently transcribed; although I have reviewed the note for such errors to the best of my ability, some may still exist.

## 2021-12-13 NOTE — PROGRESS NOTES
I have seen the patient.  I have reviewed the notes, assessments, and/or procedures performed by Dr. Hernandez, I concur with her/his documentation and assessment and plan for Nima Portillo.               This document has been electronically signed by Hubert Thomas MD on December 13, 2021 11:45 CST

## 2022-01-04 ENCOUNTER — TELEPHONE (OUTPATIENT)
Dept: CARDIOLOGY | Facility: CLINIC | Age: 62
End: 2022-01-04

## 2022-01-04 DIAGNOSIS — I25.118 CORONARY ARTERY DISEASE OF NATIVE ARTERY OF NATIVE HEART WITH STABLE ANGINA PECTORIS: ICD-10-CM

## 2022-01-04 DIAGNOSIS — I10 ESSENTIAL HYPERTENSION: ICD-10-CM

## 2022-01-04 RX ORDER — METOPROLOL SUCCINATE 25 MG/1
25 TABLET, EXTENDED RELEASE ORAL DAILY
Qty: 30 TABLET | Refills: 6 | Status: SHIPPED | OUTPATIENT
Start: 2022-01-04 | End: 2022-06-21 | Stop reason: SDUPTHER

## 2022-01-04 RX ORDER — LISINOPRIL 40 MG/1
40 TABLET ORAL DAILY
Qty: 30 TABLET | Refills: 6 | Status: SHIPPED | OUTPATIENT
Start: 2022-01-04 | End: 2022-06-21 | Stop reason: SDUPTHER

## 2022-01-04 RX ORDER — HYDROCHLOROTHIAZIDE 25 MG/1
25 TABLET ORAL DAILY
Qty: 90 TABLET | Refills: 3 | Status: SHIPPED | OUTPATIENT
Start: 2022-01-04 | End: 2022-06-21 | Stop reason: SDUPTHER

## 2022-01-04 NOTE — TELEPHONE ENCOUNTER
----- Message from Tessie Stephens sent at 1/4/2022  4:18 PM CST -----  Dr. Ardon     [facility]     Cetirizine 10 mg    Lisinopril 40 mgs     Metoprolol and     HCTZ  he is out or almost out on all of these       Thanks    Tessie

## 2022-01-28 ENCOUNTER — LAB (OUTPATIENT)
Dept: LAB | Facility: HOSPITAL | Age: 62
End: 2022-01-28

## 2022-01-28 ENCOUNTER — OFFICE VISIT (OUTPATIENT)
Dept: FAMILY MEDICINE CLINIC | Facility: CLINIC | Age: 62
End: 2022-01-28

## 2022-01-28 ENCOUNTER — DOCUMENTATION (OUTPATIENT)
Dept: FAMILY MEDICINE CLINIC | Facility: CLINIC | Age: 62
End: 2022-01-28

## 2022-01-28 VITALS
OXYGEN SATURATION: 99 % | HEIGHT: 66 IN | SYSTOLIC BLOOD PRESSURE: 140 MMHG | DIASTOLIC BLOOD PRESSURE: 72 MMHG | WEIGHT: 216.3 LBS | BODY MASS INDEX: 34.76 KG/M2 | HEART RATE: 62 BPM | TEMPERATURE: 96.8 F

## 2022-01-28 DIAGNOSIS — I25.118 CORONARY ARTERY DISEASE OF NATIVE ARTERY OF NATIVE HEART WITH STABLE ANGINA PECTORIS: ICD-10-CM

## 2022-01-28 DIAGNOSIS — M65.4 DE QUERVAIN'S TENOSYNOVITIS, RIGHT: ICD-10-CM

## 2022-01-28 DIAGNOSIS — M25.531 BILATERAL WRIST PAIN: ICD-10-CM

## 2022-01-28 DIAGNOSIS — M25.532 CHRONIC PAIN OF BOTH WRISTS: Primary | ICD-10-CM

## 2022-01-28 DIAGNOSIS — M25.531 CHRONIC PAIN OF BOTH WRISTS: Primary | ICD-10-CM

## 2022-01-28 DIAGNOSIS — J44.9 CHRONIC OBSTRUCTIVE PULMONARY DISEASE, UNSPECIFIED COPD TYPE: ICD-10-CM

## 2022-01-28 DIAGNOSIS — J44.9 CHRONIC OBSTRUCTIVE PULMONARY DISEASE, UNSPECIFIED COPD TYPE: Primary | ICD-10-CM

## 2022-01-28 DIAGNOSIS — M25.532 BILATERAL WRIST PAIN: ICD-10-CM

## 2022-01-28 DIAGNOSIS — G89.29 CHRONIC PAIN OF BOTH WRISTS: Primary | ICD-10-CM

## 2022-01-28 PROCEDURE — 36415 COLL VENOUS BLD VENIPUNCTURE: CPT | Performed by: STUDENT IN AN ORGANIZED HEALTH CARE EDUCATION/TRAINING PROGRAM

## 2022-01-28 PROCEDURE — 83036 HEMOGLOBIN GLYCOSYLATED A1C: CPT

## 2022-01-28 PROCEDURE — 99213 OFFICE O/P EST LOW 20 MIN: CPT | Performed by: STUDENT IN AN ORGANIZED HEALTH CARE EDUCATION/TRAINING PROGRAM

## 2022-01-28 PROCEDURE — 85025 COMPLETE CBC W/AUTO DIFF WBC: CPT

## 2022-01-28 PROCEDURE — 80053 COMPREHEN METABOLIC PANEL: CPT | Performed by: STUDENT IN AN ORGANIZED HEALTH CARE EDUCATION/TRAINING PROGRAM

## 2022-01-28 PROCEDURE — 80061 LIPID PANEL: CPT | Performed by: STUDENT IN AN ORGANIZED HEALTH CARE EDUCATION/TRAINING PROGRAM

## 2022-01-28 NOTE — PROGRESS NOTES
Family Medicine Residency  Perfecto Barr MD    Subjective:     Nima Portillo is a 61 y.o. male who presents for COPD and management of bilateral wrist pain.     The following portions of the patient's history were reviewed and updated as appropriate: allergies, current medications, past family history, past medical history, past social history, past surgical history and problem list.    Past Medical Hx:  Past Medical History:   Diagnosis Date   • Acute bronchitis    • Acute sinusitis    • Chest pain    • Chronic bronchitis (HCC)     secondary to smoking   • Claudication (HCC)    • Coronary arteriosclerosis    • Cough    • Gastroesophageal reflux disease    • Health maintenance examination     Individual health examination   • History of echocardiogram 10/21/2013    Echocardiogram W/ color flow 33845 (1) - Left atrium normal. Mild CLVH. EF 50%. Valves intact. Mild mitral and tricuspid regurg. Pericardium normal.   • History of echocardiogram 10/25/2011    Echocardiogram W/O color flow 20353 (1) - Normal LV sytolic function with mild LVH & mild diastolic dysfunction   • Hyperlipidemia    • Hypertensive disorder    • Pernicious anemia    • Thumb pain, left 10/14/2019   • Tobacco dependence syndrome        Past Surgical Hx:  Past Surgical History:   Procedure Laterality Date   • CARDIAC CATHETERIZATION  04/08/2015    Cardiac cath 20357 (1) - Slective coronary angiogram. Left heart catheterization with LV ventriculogram.   • COLONOSCOPY N/A 10/26/2020    Procedure: COLONOSCOPY;  Surgeon: Levi Milian MD;  Location: [facility] ENDOSCOPY;  Service: Gastroenterology;  Laterality: N/A;   • COLONOSCOPY N/A 11/8/2021    Procedure: COLONOSCOPY;  Surgeon: Levi Milian MD;  Location: [facility] ENDOSCOPY;  Service: Gastroenterology;  Laterality: N/A;   • DENTAL PROCEDURE      Removal of all teeth       Current Meds:    Current Outpatient Medications:   •  acetaminophen (Tylenol 8 Hour) 650 MG 8 hr tablet, Take 650 mg by  mouth Every 8 (Eight) Hours As Needed for Mild Pain ., Disp: , Rfl:   •  albuterol sulfate  (90 Base) MCG/ACT inhaler, Inhale 2 puffs Every 4 (Four) Hours As Needed for Wheezing., Disp: 6.7 g, Rfl: 6  •  amLODIPine (NORVASC) 5 MG tablet, TAKE 1 TABLET BY MOUTH EVERY DAY, Disp: 90 tablet, Rfl: 3  •  aspirin 81 MG chewable tablet, Chew 325 mg Daily., Disp: , Rfl:   •  atorvastatin (LIPITOR) 80 MG tablet, Take 1 tablet by mouth Daily., Disp: 90 tablet, Rfl: 3  •  cetirizine (zyrTEC) 10 MG tablet, , Disp: , Rfl:   •  Fluticasone Furoate-Vilanterol (Breo Ellipta) 100-25 MCG/INH inhaler, Inhale 1 puff Daily., Disp: 60 each, Rfl: 1  •  hydroCHLOROthiazide (HYDRODIURIL) 25 MG tablet, Take 1 tablet by mouth Daily., Disp: 90 tablet, Rfl: 3  •  isosorbide mononitrate (IMDUR) 60 MG 24 hr tablet, Take 1 tablet by mouth Daily., Disp: 30 tablet, Rfl: 6  •  lisinopril (PRINIVIL,ZESTRIL) 40 MG tablet, Take 1 tablet by mouth Daily., Disp: 30 tablet, Rfl: 6  •  metoprolol succinate XL (TOPROL-XL) 25 MG 24 hr tablet, Take 1 tablet by mouth Daily., Disp: 30 tablet, Rfl: 6  •  nitroglycerin (NITROSTAT) 0.4 MG SL tablet, Place 1 tablet under the tongue Every 5 (Five) Minutes As Needed for Chest Pain. Take no more than 3 doses in 15 minutes., Disp: 30 tablet, Rfl: 12  •  promethazine-dextromethorphan (PROMETHAZINE-DM) 6.25-15 MG/5ML syrup, , Disp: , Rfl:     Allergies:  No Known Allergies    Family Hx:  Family History   Problem Relation Age of Onset   • Heart disease Mother    • Hypertension Mother    • Diabetes Mother    • Heart disease Father    • Hypertension Father    • Diabetes Father    • Diabetes Sister    • Heart disease Sister    • Stroke Sister    • Cancer Paternal Aunt         Breast Cancer   • Cancer Paternal Uncle         Unknown cancer        Social History:  Social History     Socioeconomic History   • Marital status: Single   Tobacco Use   • Smoking status: Current Every Day Smoker     Packs/day: 1.50     Years:  "40.00     Pack years: 60.00     Types: Cigarettes   • Smokeless tobacco: Former User   Vaping Use   • Vaping Use: Never used   Substance and Sexual Activity   • Alcohol use: Not Currently   • Drug use: No   • Sexual activity: Never     Comment: Marital status:        Review of Systems  Review of Systems   Constitutional: Negative for activity change, appetite change, chills, diaphoresis, fatigue and fever.   HENT: Negative for congestion, dental problem, ear discharge, ear pain, hearing loss, mouth sores, nosebleeds, postnasal drip, sinus pain, sore throat, tinnitus, trouble swallowing and voice change.    Eyes: Negative for photophobia, pain, discharge and itching.   Respiratory: Negative for cough, choking, chest tightness, shortness of breath and wheezing.    Cardiovascular: Negative for chest pain, palpitations and leg swelling.   Gastrointestinal: Negative for abdominal distention, abdominal pain, blood in stool, constipation, diarrhea, nausea and vomiting.   Endocrine: Negative for cold intolerance and heat intolerance.   Genitourinary: Negative for difficulty urinating, dysuria, flank pain and hematuria.   Musculoskeletal: Negative for back pain, joint swelling and neck pain.   Skin: Negative for color change and rash.   Neurological: Negative for dizziness, tremors, seizures, weakness, light-headedness, numbness and headaches.   Hematological: Negative for adenopathy.   Psychiatric/Behavioral: Negative for behavioral problems, confusion, hallucinations, self-injury and sleep disturbance.       Objective:     /72   Pulse 62   Temp 96.8 °F (36 °C)   Ht 167.6 cm (66\")   Wt 98.1 kg (216 lb 4.8 oz)   SpO2 99%   BMI 34.91 kg/m²   Physical Exam  Vitals and nursing note reviewed.   Constitutional:       Appearance: Normal appearance. He is not ill-appearing or diaphoretic.   HENT:      Head: Normocephalic and atraumatic.      Right Ear: External ear normal.      Left Ear: External ear normal.     "  Nose: Nose normal. No rhinorrhea.      Mouth/Throat:      Mouth: Mucous membranes are moist.      Pharynx: No posterior oropharyngeal erythema.   Eyes:      General: No scleral icterus.        Right eye: No discharge.         Left eye: No discharge.      Extraocular Movements: Extraocular movements intact.   Neck:      Vascular: No carotid bruit.   Cardiovascular:      Rate and Rhythm: Normal rate and regular rhythm.      Pulses: Normal pulses.      Heart sounds: Normal heart sounds. No gallop.    Pulmonary:      Effort: Pulmonary effort is normal.      Breath sounds: Normal breath sounds. No wheezing.   Chest:      Chest wall: No tenderness.   Abdominal:      General: Bowel sounds are normal.      Palpations: Abdomen is soft.      Tenderness: There is no rebound.   Musculoskeletal:         General: No swelling.        Arms:       Cervical back: No muscular tenderness.      Right lower leg: No edema.      Left lower leg: No edema.   Lymphadenopathy:      Cervical: No cervical adenopathy.   Skin:     General: Skin is warm and dry.      Capillary Refill: Capillary refill takes less than 2 seconds.      Findings: No erythema or rash.   Neurological:      General: No focal deficit present.      Mental Status: He is alert and oriented to person, place, and time.      Motor: No weakness.   Psychiatric:         Mood and Affect: Mood normal.         Behavior: Behavior normal.         Thought Content: Thought content normal.         Judgment: Judgment normal.          Assessment   Nima Portillo is a 61 y.o. male who presents for bilateral wrist pain and COPD.    Bilateral wrist pain:  Referred on to physical therapy  X-ray bilateral wrists    De Quervain's tenosynovitis:  Physical therapy intervention    COPD:  Continue on current plan    Close care gaps:  Ordered labs for patient      The patient was educated on the risks, benefits and alternatives to therapy. The patient was in agreement with therapy and plan of  care.    Plan     Next visit review patient's x-rays  Next visit check patient's progress with physical therapy  Next visit check patient COPD  Next visit discuss patient's lab results  Next visit review blood pressure and decide if increasing amlodipine to 10  Next visit check patients medication regime.  Next visit check patients compliance to medication regime.    Diagnoses and all orders for this visit:    1. Chronic obstructive pulmonary disease, unspecified COPD type (HCC) (Primary)  -     Lipid Panel  -     Hemoglobin A1c; Future    2. Coronary artery disease of native artery of native heart with stable angina pectoris (HCC)  -     CBC w AUTO Differential; Future  -     Comprehensive Metabolic Panel  -     Lipid Panel  -     Hemoglobin A1c; Future    3. De Quervain's tenosynovitis, right  -     Ambulatory Referral to Physical Therapy    4. Bilateral wrist pain  -     XR Wrist 2 View Bilateral; Future  -     Ambulatory Referral to Physical Therapy      · Rx changes: As above  · Patient Education: Placed education diet lifestyle exercise and managing bilateral wrist pain  · Compliance at present is estimated to be good.   · Efforts to improve compliance (if necessary) will be directed at increased exercise.    Depression screening: Depression screening performed today; result negative; no follow up needed       Follow-up:     Return in about 4 weeks (around 2/25/2022).    Preventative:  Health Maintenance   Topic Date Due   • ZOSTER VACCINE (2 of 2) 07/11/2019   • ANNUAL PHYSICAL  04/17/2020   • INFLUENZA VACCINE  08/01/2021   • COVID-19 Vaccine (3 - Booster for Pfizer series) 08/25/2021   • LIPID PANEL  08/30/2022   • LUNG CANCER SCREENING  09/21/2022   • COLORECTAL CANCER SCREENING  11/08/2022   • Pneumococcal Vaccine 0-64 (2 of 2 - PPSV23) 10/29/2025   • TDAP/TD VACCINES (2 - Td or Tdap) 05/16/2029   • HEPATITIS C SCREENING  Completed     Male Preventative: Patient does Testicular self exam  Recommended:  none  Vaccine Counseling: N/A    Weight  -Class: Obese Class I: 30-34.9kg/m2  -Patient's Body mass index is 34.91 kg/m². indicating that he is obese (BMI >30). Obesity-related health conditions include the following: dyslipidemias. Obesity is unchanged. BMI is is above average; BMI management plan is completed. We discussed portion control and increasing exercise..   eat more fruits and vegetables, decrease soda or juice intake, increase water intake, increase physical activity and reduce screen time    Alcohol use:  reports previous alcohol use.  Nicotine status  reports that he has been smoking cigarettes. He has a 60.00 pack-year smoking history. He has quit using smokeless tobacco.    Goals     •  Smoking cessation (pt-stated)       Quitting smoking and living longer    Barriers: Wanting to quit            RISK SCORE: 4    Kirill request #PDMP. Reviewed and appropriate.     Signature  [place] [address]  Office: [phone]      This document has been electronically signed by Perfecto Barr MD on January 28, 2022 10:29 [facility], [place]      EMR Dragon/transcription disclaimer: Much of this encounter note was created utilizing an electronic transcription/translation of spoken language to printed text.  Electronic translation of spoken language may permit erroneous, or at times, nonsensical words or phrases to be in advertently transcribed; although I have reviewed the note for such errors to the best of my ability, some may still exist.

## 2022-01-28 NOTE — PROGRESS NOTES
I have seen the patient.  I have reviewed the notes, assessments, and/or procedures performed by *Dr Barr** during office visit I concur with her/his documentation and assessment and plan for Nima Portillo.              This document has been electronically signed by Sudhir Zamorano MD on January 28, 2022 16:38 CST

## 2022-01-28 NOTE — PROGRESS NOTES
Time of phone conversation: 13:19 CST 1/28/2022       Nima Portillo is a 61 y.o. y.o. male  who I contacted in relation to their recent X-Ray results.      The results were:  Impression:     1.  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 This is of  uncertain etiology. This may represent changes from surgical  removal and/or resorption. Recommend clinical correlation.  2.  Right wrist asymmetric joint space loss of the first carpal  metacarpal joint space with osteophytosis consistent with severe  osteoarthritic changes.   3.  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   4.  Remainder of bilateral wrist exams are unremarkable    The patient was consulted appropriately. The patient had full understanding and awareness in relation to their results. Nima Portillo is happy to return in 4 week to recheck their results and any further consultation deemed necessary.        At the end of our phone conversation Nima Portillo was happy, content and will continue with their healthy eating and exercise plan. The patient will contact me should they have any queries or concerns.     Signature  Perfecto Barr MD  [facility]  [address] [place] [address]  Office: [phone]

## 2022-01-29 LAB
ALBUMIN SERPL-MCNC: 4.1 G/DL (ref 3.5–5.2)
ALBUMIN/GLOB SERPL: 1.6 G/DL
ALP SERPL-CCNC: 71 U/L (ref 39–117)
ALT SERPL W P-5'-P-CCNC: 28 U/L (ref 1–41)
ANION GAP SERPL CALCULATED.3IONS-SCNC: 10.9 MMOL/L (ref 5–15)
AST SERPL-CCNC: 18 U/L (ref 1–40)
BASOPHILS # BLD AUTO: 0.03 10*3/MM3 (ref 0–0.2)
BASOPHILS NFR BLD AUTO: 0.4 % (ref 0–1.5)
BILIRUB SERPL-MCNC: 0.3 MG/DL (ref 0–1.2)
BUN SERPL-MCNC: 11 MG/DL (ref 8–23)
BUN/CREAT SERPL: 13.8 (ref 7–25)
CALCIUM SPEC-SCNC: 9.1 MG/DL (ref 8.6–10.5)
CHLORIDE SERPL-SCNC: 103 MMOL/L (ref 98–107)
CHOLEST SERPL-MCNC: 111 MG/DL (ref 0–200)
CO2 SERPL-SCNC: 26.1 MMOL/L (ref 22–29)
CREAT SERPL-MCNC: 0.8 MG/DL (ref 0.76–1.27)
DEPRECATED RDW RBC AUTO: 43.2 FL (ref 37–54)
EOSINOPHIL # BLD AUTO: 0.32 10*3/MM3 (ref 0–0.4)
EOSINOPHIL NFR BLD AUTO: 3.9 % (ref 0.3–6.2)
ERYTHROCYTE [DISTWIDTH] IN BLOOD BY AUTOMATED COUNT: 13.6 % (ref 12.3–15.4)
GFR SERPL CREATININE-BSD FRML MDRD: 98 ML/MIN/1.73
GLOBULIN UR ELPH-MCNC: 2.5 GM/DL
GLUCOSE SERPL-MCNC: 127 MG/DL (ref 65–99)
HBA1C MFR BLD: 7.34 % (ref 4.8–5.6)
HCT VFR BLD AUTO: 40.1 % (ref 37.5–51)
HDLC SERPL-MCNC: 28 MG/DL (ref 40–60)
HGB BLD-MCNC: 13.5 G/DL (ref 13–17.7)
IMM GRANULOCYTES # BLD AUTO: 0.03 10*3/MM3 (ref 0–0.05)
IMM GRANULOCYTES NFR BLD AUTO: 0.4 % (ref 0–0.5)
LDLC SERPL CALC-MCNC: 41 MG/DL (ref 0–100)
LDLC/HDLC SERPL: 1.02 {RATIO}
LYMPHOCYTES # BLD AUTO: 1.84 10*3/MM3 (ref 0.7–3.1)
LYMPHOCYTES NFR BLD AUTO: 22.3 % (ref 19.6–45.3)
MCH RBC QN AUTO: 29.5 PG (ref 26.6–33)
MCHC RBC AUTO-ENTMCNC: 33.7 G/DL (ref 31.5–35.7)
MCV RBC AUTO: 87.7 FL (ref 79–97)
MONOCYTES # BLD AUTO: 0.78 10*3/MM3 (ref 0.1–0.9)
MONOCYTES NFR BLD AUTO: 9.4 % (ref 5–12)
NEUTROPHILS NFR BLD AUTO: 5.26 10*3/MM3 (ref 1.7–7)
NEUTROPHILS NFR BLD AUTO: 63.6 % (ref 42.7–76)
NRBC BLD AUTO-RTO: 0 /100 WBC (ref 0–0.2)
PLATELET # BLD AUTO: 284 10*3/MM3 (ref 140–450)
PMV BLD AUTO: 9.8 FL (ref 6–12)
POTASSIUM SERPL-SCNC: 4.4 MMOL/L (ref 3.5–5.2)
PROT SERPL-MCNC: 6.6 G/DL (ref 6–8.5)
RBC # BLD AUTO: 4.57 10*6/MM3 (ref 4.14–5.8)
SODIUM SERPL-SCNC: 140 MMOL/L (ref 136–145)
TRIGL SERPL-MCNC: 272 MG/DL (ref 0–150)
VLDLC SERPL-MCNC: 42 MG/DL (ref 5–40)
WBC NRBC COR # BLD: 8.26 10*3/MM3 (ref 3.4–10.8)

## 2022-01-31 ENCOUNTER — DOCUMENTATION (OUTPATIENT)
Dept: FAMILY MEDICINE CLINIC | Facility: CLINIC | Age: 62
End: 2022-01-31

## 2022-01-31 DIAGNOSIS — E78.1 HYPERTRIGLYCERIDEMIA: Primary | ICD-10-CM

## 2022-01-31 RX ORDER — FENOFIBRATE 43 MG/1
43 CAPSULE ORAL
Qty: 30 CAPSULE | Refills: 11 | Status: SHIPPED | OUTPATIENT
Start: 2022-01-31 | End: 2022-03-31

## 2022-01-31 NOTE — PROGRESS NOTES
Time of phone conversation: 08:10 CST 1/31/2022       Nima Portillo is a 61 y.o. y.o. male  who I contacted in relation to their recent Blood  results.  The patient's sister was present during the conversation and is noted as his healthcare proxy and helps to patient with his medical decisions if patient has any questions.     The results were Abnormal:.   The patient had creased HbA1c and increased triglycerides.  The patient was consulted appropriately, in relation to weight loss and starting fenofibrate.  At this point in time was agreed to give the patient time for weight loss and clean up his diet before starting a medication for his increased HbA1c reading.      Plan to repeat HbA1c reading in 90 days.     The patient and his sister who is his healthcare proxy had full understanding and awareness in relation to their results.     Nima Portillo is happy to return in 4 week to recheck their results and any further consultation deemed necessary.        At the end of our phone conversation Nima Portillo was happy, content and will continue with their healthy eating and exercise plan.       The patient will contact me should they have any queries or concerns.     Signature  Perfecto Barr MD  [place] [id number]  Office: [phone]

## 2022-02-08 ENCOUNTER — APPOINTMENT (OUTPATIENT)
Dept: PHYSICAL THERAPY | Facility: HOSPITAL | Age: 62
End: 2022-02-08

## 2022-02-10 ENCOUNTER — HOSPITAL ENCOUNTER (OUTPATIENT)
Dept: PHYSICAL THERAPY | Facility: HOSPITAL | Age: 62
Setting detail: THERAPIES SERIES
Discharge: HOME | End: 2022-02-10
Payer: COMMERCIAL

## 2022-02-10 DIAGNOSIS — M25.532 BILATERAL WRIST PAIN: ICD-10-CM

## 2022-02-10 DIAGNOSIS — M65.4 DE QUERVAIN'S TENOSYNOVITIS, RIGHT: Primary | ICD-10-CM

## 2022-02-10 DIAGNOSIS — M25.531 BILATERAL WRIST PAIN: ICD-10-CM

## 2022-02-10 PROCEDURE — 97110 THERAPEUTIC EXERCISES: CPT | Performed by: PHYSICAL THERAPIST

## 2022-02-10 PROCEDURE — 97162 PT EVAL MOD COMPLEX 30 MIN: CPT | Performed by: PHYSICAL THERAPIST

## 2022-02-10 NOTE — THERAPY EVALUATION
Outpatient Physical Therapy Ortho Initial Evaluation  [facility]     Patient Name: Nima Portillo  : 1960  MRN: [id number]  Today's Date: 2/10/2022      Visit Date: 02/10/2022  Visit   Return to MD: SARA  Re-certification date: 3/3/22  Patient Active Problem List   Diagnosis   • Claudication of both lower extremities (HCC)   • Coronary artery disease involving native coronary artery of native heart without angina pectoris   • Essential hypertension   • Pure hypercholesterolemia   • ETOH abuse   • Screen for colon cancer   • Bilateral carpal tunnel syndrome   • Near syncope   • Mucopurulent chronic bronchitis (HCC)   • Symptomatic hypotension   • Chronic pain of right thumb   • Cigarette nicotine dependence in remission   • Arthritis of carpometacarpal (CMC) joint of both thumbs   • Bilateral hand pain        Past Medical History:   Diagnosis Date   • Acute bronchitis    • Acute sinusitis    • Chest pain    • Chronic bronchitis (HCC)     secondary to smoking   • Claudication (HCC)    • Coronary arteriosclerosis    • Cough    • Gastroesophageal reflux disease    • Health maintenance examination     Individual health examination   • History of echocardiogram 10/21/2013    Echocardiogram W/ color flow 45756 (1) - Left atrium normal. Mild CLVH. EF 50%. Valves intact. Mild mitral and tricuspid regurg. Pericardium normal.   • History of echocardiogram 10/25/2011    Echocardiogram W/O color flow 82864 (1) - Normal LV sytolic function with mild LVH & mild diastolic dysfunction   • Hyperlipidemia    • Hypertensive disorder    • Pernicious anemia    • Thumb pain, left 10/14/2019   • Tobacco dependence syndrome         Past Surgical History:   Procedure Laterality Date   • CARDIAC CATHETERIZATION  2015    Cardiac cath 50732 (1) - Slective coronary angiogram. Left heart catheterization with LV ventriculogram.   • COLONOSCOPY N/A 10/26/2020    Procedure: COLONOSCOPY;  Surgeon: Levi Milian MD;   Location: [facility] ENDOSCOPY;  Service: Gastroenterology;  Laterality: N/A;   • COLONOSCOPY N/A 11/8/2021    Procedure: COLONOSCOPY;  Surgeon: eLvi Milian MD;  Location: [facility] ENDOSCOPY;  Service: Gastroenterology;  Laterality: N/A;   • DENTAL PROCEDURE      Removal of all teeth       Visit Dx:     ICD-10-CM ICD-9-CM   1. De Quervain's tenosynovitis, right  M65.4 727.04   2. Bilateral wrist pain  M25.531 719.43    M25.532      Medications (Admitted on 2/10/2022)         acetaminophen (Tylenol 8 Hour) 650 MG 8 hr tablet    albuterol sulfate  (90 Base) MCG/ACT inhaler    amLODIPine (NORVASC) 5 MG tablet    aspirin 81 MG chewable tablet    atorvastatin (LIPITOR) 80 MG tablet    cetirizine (zyrTEC) 10 MG tablet    fenofibrate micronized (ANTARA) 43 MG capsule    Fluticasone Furoate-Vilanterol (Breo Ellipta) 100-25 MCG/INH inhaler    hydroCHLOROthiazide (HYDRODIURIL) 25 MG tablet    isosorbide mononitrate (IMDUR) 60 MG 24 hr tablet    lisinopril (PRINIVIL,ZESTRIL) 40 MG tablet    metoprolol succinate XL (TOPROL-XL) 25 MG 24 hr tablet    nitroglycerin (NITROSTAT) 0.4 MG SL tablet    promethazine-dextromethorphan (PROMETHAZINE-DM) 6.25-15 MG/5ML syrup    Allergies: NKA     PT Ortho     Row Name 02/10/22 1351       Subjective Comments    Subjective Comments 62 yo male with R>L wrist pain, diagnosed with R DeQuervains tenosynovitis. Onset of R wrist pain since last year. Insidious onset. On disability. Has severe R hand osteoarthritis per recent x-ray report.  -BS       Precautions and Contraindications    Precautions/Limitations no known precautions/limitations  -BS       Subjective Pain    Able to rate subjective pain? yes  -BS    Pre-Treatment Pain Level 6  -BS    Subjective Pain Comment 6/10 R wrist; L wrist 2/10  -BS       General ROM    GENERAL ROM COMMENTS AROM: R wrist-ext 43 flex 47 RD/UD 8/24 forearm sup/pro 68/90 L wrist-ext 40 flex 35 UD/RD 18/20 forearm sup/pro 77/80  -BS       MMT (Manual Muscle  Testing)    General MMT Comments R wrist 5/5 except 4/5 RD and 4/5 forearm sup 4+/5 pro 5/5 L wrist: 5/5 L forearm 5/5  -BS        Strength Right    Right  Test 1 33  -BS    Right  Test 2 49  -BS     Strength Average Right 41  -BS        Strength Left    Left  Test 1 56  -BS    Left  Test 2 55  -BS     Strength Average Left 55.5  -BS       Sensation    Light Touch Partial deficits in the LUE  numb L thumb only  -BS          User Key  (r) = Recorded By, (t) = Taken By, (c) = Cosigned By    Initials Name Provider Type    BS Rishabh Fan, PT Physical Therapist                                   PT OP Goals     Row Name 02/10/22 1300          PT Short Term Goals    STG Date to Achieve 02/24/22  -BS     STG 1 Improve R wrist flex/ext AROM to 55°  -BS     STG 1 Progress New  -BS     STG 2 Improve R forearm sup AROM to 78°  -BS     STG 2 Progress New  -BS     STG 3 Improve R  strength by 10 lbs  -BS     STG 3 Progress New  -BS     STG 4 Reduce B wrist pain by 25% performing ADL's  -BS     STG 4 Progress New  -BS            Long Term Goals    LTG Date to Achieve 03/10/22  -BS     LTG 1 Improve R  strength by 20 lbs  -BS     LTG 1 Progress New  -BS     LTG 2 Reduce B wrist pain by 50% performing ADL's  -BS     LTG 2 Progress New  -BS     LTG 3 Improve R wrist RD and R forearm sup MMT to 5/5  -BS     LTG 3 Progress New  -BS            Time Calculation    PT Goal Re-Cert Due Date 03/03/22  -BS           User Key  (r) = Recorded By, (t) = Taken By, (c) = Cosigned By    Initials Name Provider Type    BS Rishabh Fan, PT Physical Therapist                 PT Assessment/Plan     Row Name 02/10/22 1351          PT Assessment    Functional Limitations Performance in work activities; Performance in leisure activities  -BS     Impairments Pain; Range of motion; Muscle strength; Impaired flexibility; Coordination  -BS     Assessment Comments 60 yo male with R wrist pain due to DeQuervain's  tenosynovitis and severe R hand OA as well as L wrist pain. Limted B wrist ROM, impaired  strength R>L and loss in function.  -BS     Please refer to paper survey for additional self-reported information No  -BS     Rehab Potential Fair  -BS     Patient/caregiver participated in establishment of treatment plan and goals Yes  -BS     Patient would benefit from skilled therapy intervention Yes  -BS            PT Plan    PT Frequency 2x/week  -BS     Predicted Duration of Therapy Intervention (PT) 3-4 weeks  -BS     Planned CPT's? PT EVAL LOW COMPLEXITY: 39303; PT RE-EVAL: 00758; PT THER PROC EA 15 MIN: 19396; PT MANUAL THERAPY EA 15 MIN: 51705; PT ELECTRICAL STIM UNATTEND: ; PT ULTRASOUND EA 15 MIN: 63619; PT HOT/COLD PACK WC NONMCARE: 28471; PT PARAFFIN BATH: 15749; PT THER SUPP EA 15 MIN  -BS     Physical Therapy Interventions (Optional Details) home exercise program; joint mobilization; manual therapy techniques; modalities; patient/family education; dry needling; ROM (Range of Motion); strengthening; stretching  -BS     PT Plan Comments Address low weight DB resisted R forearm sup and RD as well as addressing light manual to maximize R wrist ROM gains.  -BS           User Key  (r) = Recorded By, (t) = Taken By, (c) = Cosigned By    Initials Name Provider Type    Rishabh Doran, PT Physical Therapist                   OP Exercises     Row Name 02/10/22 4869             Precautions    Existing Precautions/Restrictions other (see comments)  -BS              Subjective Comments    Subjective Comments 62 yo male with R>L wrist pain, diagnosed with R DeQuervains tenosynovitis. Onset of R wrist pain since last year. Insidious onset. On disability. Has severe R hand osteoarthritis per recent x-ray report.  -BS              Subjective Pain    Able to rate subjective pain? yes  -BS      Pre-Treatment Pain Level 6  -BS      Post-Treatment Pain Level 0  -BS      Subjective Pain Comment 6/10 R wrist; L wrist 2/10   "-BS              Exercise 1    Exercise Name 1 finger 6 pack  -BS      Sets 1 1  -BS      Reps 1 10 ea  -BS      Additional Comments bilate  -BS              Exercise 2    Exercise Name 2 Wrist flex/ext S  -BS      Sets 2 1  -BS      Reps 2 3  -BS      Time 2 30\" hold  -BS              Exercise 3    Exercise Name 3 seated putty: yellow  -BS      Reps 3 R 2mins  -BS      Time 3 L 1 min  -BS              Exercise 4    Exercise Name 4 see modalities  -BS            User Key  (r) = Recorded By, (t) = Taken By, (c) = Cosigned By    Initials Name Provider Type    BS Rishabh Fan, PT Physical Therapist                                        Time Calculation:     Start Time: 1351  Stop Time: 1444  Time Calculation (min): 53 min  PT Non-Billable Time (min): 10 min  Total Timed Code Minutes- PT: 43 minute(s)     Therapy Charges for Today     Code Description Service Date Service Provider Modifiers Qty    05851853687  PT EVAL MOD COMPLEXITY 3 2/10/2022 Rishabh Fan, PT GP 1    23797765110 HC PT THER PROC EA 15 MIN 2/10/2022 Rishabh Fan, PT GP 1    97027339017 HC PT THER SUPP EA 15 MIN 2/10/2022 Rishabh Fan, PT GP 1                    Rishabh Fan, PT  2/10/2022      "

## 2022-02-11 ENCOUNTER — TELEPHONE (OUTPATIENT)
Dept: FAMILY MEDICINE CLINIC | Facility: CLINIC | Age: 62
End: 2022-02-11

## 2022-02-11 NOTE — TELEPHONE ENCOUNTER
This patient called stating his insurance has sent a letter asking for further information on the FENOFIBRATE 40 mg, that Dr Mendez prescribed.    He said his insurance is [employer] & his pharmacy is. [facility] in [place].    If you need to speak with him call [phone].    Thank you,  Niesha

## 2022-02-17 ENCOUNTER — HOSPITAL ENCOUNTER (OUTPATIENT)
Dept: PHYSICAL THERAPY | Facility: HOSPITAL | Age: 62
Setting detail: THERAPIES SERIES
Discharge: HOME | End: 2022-02-17
Payer: COMMERCIAL

## 2022-02-17 DIAGNOSIS — M25.531 BILATERAL WRIST PAIN: ICD-10-CM

## 2022-02-17 DIAGNOSIS — M65.4 DE QUERVAIN'S TENOSYNOVITIS, RIGHT: Primary | ICD-10-CM

## 2022-02-17 DIAGNOSIS — M25.532 BILATERAL WRIST PAIN: ICD-10-CM

## 2022-02-17 PROCEDURE — 97140 MANUAL THERAPY 1/> REGIONS: CPT | Performed by: PHYSICAL THERAPIST

## 2022-02-17 PROCEDURE — 97110 THERAPEUTIC EXERCISES: CPT | Performed by: PHYSICAL THERAPIST

## 2022-02-17 NOTE — THERAPY TREATMENT NOTE
Outpatient Physical Therapy Ortho Treatment Note  [facility]     Patient Name: Nima Portillo  : 1960  MRN: [id number]  Today's Date: 2022      Visit Date: 2022  Visit   Return to MD: SARA  Re-certification date: 3/3/22    Visit Dx:    ICD-10-CM ICD-9-CM   1. De Quervain's tenosynovitis, right  M65.4 727.04   2. Bilateral wrist pain  M25.531 719.43    M25.532        Patient Active Problem List   Diagnosis   • Claudication of both lower extremities (HCC)   • Coronary artery disease involving native coronary artery of native heart without angina pectoris   • Essential hypertension   • Pure hypercholesterolemia   • ETOH abuse   • Screen for colon cancer   • Bilateral carpal tunnel syndrome   • Near syncope   • Mucopurulent chronic bronchitis (HCC)   • Symptomatic hypotension   • Chronic pain of right thumb   • Cigarette nicotine dependence in remission   • Arthritis of carpometacarpal (CMC) joint of both thumbs   • Bilateral hand pain        Past Medical History:   Diagnosis Date   • Acute bronchitis    • Acute sinusitis    • Chest pain    • Chronic bronchitis (HCC)     secondary to smoking   • Claudication (HCC)    • Coronary arteriosclerosis    • Cough    • Gastroesophageal reflux disease    • Health maintenance examination     Individual health examination   • History of echocardiogram 10/21/2013    Echocardiogram W/ color flow 14519 (1) - Left atrium normal. Mild CLVH. EF 50%. Valves intact. Mild mitral and tricuspid regurg. Pericardium normal.   • History of echocardiogram 10/25/2011    Echocardiogram W/O color flow 96574 (1) - Normal LV sytolic function with mild LVH & mild diastolic dysfunction   • Hyperlipidemia    • Hypertensive disorder    • Pernicious anemia    • Thumb pain, left 10/14/2019   • Tobacco dependence syndrome         Past Surgical History:   Procedure Laterality Date   • CARDIAC CATHETERIZATION  2015    Cardiac cath 02919 (1) - Slective coronary  angiogram. Left heart catheterization with LV ventriculogram.   • COLONOSCOPY N/A 10/26/2020    Procedure: COLONOSCOPY;  Surgeon: Levi Milian MD;  Location: [facility] ENDOSCOPY;  Service: Gastroenterology;  Laterality: N/A;   • COLONOSCOPY N/A 11/8/2021    Procedure: COLONOSCOPY;  Surgeon: Levi Milian MD;  Location: [facility] ENDOSCOPY;  Service: Gastroenterology;  Laterality: N/A;   • DENTAL PROCEDURE      Removal of all teeth        PT Ortho     Row Name 02/17/22 1400       Subjective Comments    Subjective Comments Pt reports doing HEP constantly during the day. Irritated his R hand picking up heavy tree limb yesterday.  -BS       Precautions and Contraindications    Precautions/Limitations no known precautions/limitations  -BS       Subjective Pain    Able to rate subjective pain? yes  -BS    Pre-Treatment Pain Level 4  -BS    Post-Treatment Pain Level 0  -BS          User Key  (r) = Recorded By, (t) = Taken By, (c) = Cosigned By    Initials Name Provider Type    Rishabh Doran, PT Physical Therapist                             PT Assessment/Plan     Row Name 02/17/22 3613          PT Assessment    Assessment Comments Reduction in B wrist pain post fluidotherapy. Moderate R hand/thumb pain elicited with joint mobs and MFR to the R hand.  -BS            PT Plan    PT Frequency 2x/week  -BS     Predicted Duration of Therapy Intervention (PT) 3-4 weeks  -BS     PT Plan Comments initiate with fluidotherapy. follow up with MFR/STM to the R thumb/hand. Progress strengthening to R hand as able.  -BS           User Key  (r) = Recorded By, (t) = Taken By, (c) = Cosigned By    Initials Name Provider Type    Rishabh Doran, PT Physical Therapist                   OP Exercises     Row Name 02/17/22 1400             Subjective Comments    Subjective Comments Pt reports doing HEP constantly during the day. Irritated his R hand picking up heavy tree limb yesterday.  -BS              Subjective Pain    Able to rate  "subjective pain? yes  -BS      Pre-Treatment Pain Level 4  -BS      Post-Treatment Pain Level 0  -BS              Exercise 1    Exercise Name 1 towel squeeze  -BS      Sets 1 1  -BS      Reps 1 20  -BS      Time 1 5\" hold  -BS      Additional Comments bilat  -BS              Exercise 2    Exercise Name 2 Wrist flex/ext S  -BS      Sets 2 1  -BS      Reps 2 3  -BS      Time 2 30\" hold  -BS              Exercise 3    Exercise Name 3 jt mobs: R wrist/thumb  -BS      Time 3 10'  -BS              Exercise 4    Exercise Name 4 fluidotherapy  -BS      Time 4 10 mins  -BS      Additional Comments B hands  -BS              Exercise 5    Exercise Name 5 LLPS: R RD + 2 lb db  -BS      Time 5 2'  -BS              Exercise 6    Exercise Name 6 LLPS: R wrist extension S  -BS      Time 6 2'  -BS              Exercise 7    Exercise Name 7 resisted R wrist ext + 2 lb db  -BS      Sets 7 1  -BS      Reps 7 20  -BS              Exercise 8    Exercise Name 8 resisted R RD/UD + 2lb db  -BS      Sets 8 1  -BS      Reps 8 20  -BS              Exercise 9    Exercise Name 9 resisted R forearm sup/pro + 2lb db  -BS      Sets 9 1  -BS      Reps 9 20  -BS            User Key  (r) = Recorded By, (t) = Taken By, (c) = Cosigned By    Initials Name Provider Type    Rishabh Doran, PT Physical Therapist                              PT OP Goals     Row Name 02/17/22 1400          PT Short Term Goals    STG Date to Achieve 02/24/22  -BS     STG 1 Improve R wrist flex/ext AROM to 55°  -BS     STG 1 Progress Ongoing  -BS     STG 2 Improve R forearm sup AROM to 78°  -BS     STG 2 Progress Ongoing  -BS     STG 3 Improve R  strength by 10 lbs  -BS     STG 3 Progress Ongoing  -BS     STG 4 Reduce B wrist pain by 25% performing ADL's  -BS     STG 4 Progress Ongoing  -BS            Long Term Goals    LTG Date to Achieve 03/10/22  -BS     LTG 1 Improve R  strength by 20 lbs  -BS     LTG 1 Progress Ongoing  -BS     LTG 2 Reduce B wrist pain by 50% " performing ADL's  -BS     LTG 2 Progress Ongoing  -BS     LTG 3 Improve R wrist RD and R forearm sup MMT to 5/5  -BS     LTG 3 Progress Ongoing  -BS            Time Calculation    PT Goal Re-Cert Due Date 03/03/22  -BS           User Key  (r) = Recorded By, (t) = Taken By, (c) = Cosigned By    Initials Name Provider Type    Rishabh Doran, PT Physical Therapist                               Time Calculation:   Start Time: 1433  Stop Time: 1530  Time Calculation (min): 57 min  Total Timed Code Minutes- PT: 57 minute(s)  Therapy Charges for Today     Code Description Service Date Service Provider Modifiers Qty    13778065493  PT THER PROC EA 15 MIN 2/17/2022 Rishabh Fan, PT GP 3    [id number]  PT MANUAL THERAPY EA 15 MIN 2/17/2022 Rishabh Fan, PT GP 1                    Rishabh Fan, PT  2/17/2022

## 2022-02-21 ENCOUNTER — APPOINTMENT (OUTPATIENT)
Dept: PHYSICAL THERAPY | Facility: HOSPITAL | Age: 62
End: 2022-02-21
Payer: COMMERCIAL

## 2022-02-24 ENCOUNTER — OFFICE VISIT (OUTPATIENT)
Dept: FAMILY MEDICINE CLINIC | Facility: CLINIC | Age: 62
End: 2022-02-24

## 2022-02-24 ENCOUNTER — HOSPITAL ENCOUNTER (OUTPATIENT)
Dept: PHYSICAL THERAPY | Facility: HOSPITAL | Age: 62
Setting detail: THERAPIES SERIES
Discharge: HOME | End: 2022-02-24
Payer: COMMERCIAL

## 2022-02-24 VITALS
DIASTOLIC BLOOD PRESSURE: 82 MMHG | TEMPERATURE: 97.7 F | OXYGEN SATURATION: 96 % | HEART RATE: 85 BPM | HEIGHT: 66 IN | SYSTOLIC BLOOD PRESSURE: 132 MMHG | BODY MASS INDEX: 34.81 KG/M2 | WEIGHT: 216.6 LBS

## 2022-02-24 DIAGNOSIS — M25.531 BILATERAL WRIST PAIN: ICD-10-CM

## 2022-02-24 DIAGNOSIS — M25.532 BILATERAL WRIST PAIN: ICD-10-CM

## 2022-02-24 DIAGNOSIS — I10 ESSENTIAL HYPERTENSION: Primary | ICD-10-CM

## 2022-02-24 DIAGNOSIS — M65.4 DE QUERVAIN'S TENOSYNOVITIS, RIGHT: Primary | ICD-10-CM

## 2022-02-24 PROCEDURE — 99213 OFFICE O/P EST LOW 20 MIN: CPT | Performed by: STUDENT IN AN ORGANIZED HEALTH CARE EDUCATION/TRAINING PROGRAM

## 2022-02-24 PROCEDURE — 97018 PARAFFIN BATH THERAPY: CPT

## 2022-02-24 PROCEDURE — 97110 THERAPEUTIC EXERCISES: CPT

## 2022-02-24 RX ORDER — FENOFIBRATE 48 MG/1
TABLET, COATED ORAL
COMMUNITY
Start: 2022-02-11 | End: 2022-03-31

## 2022-02-24 RX ORDER — ATORVASTATIN CALCIUM 80 MG/1
80 TABLET, FILM COATED ORAL DAILY
COMMUNITY
Start: 2022-02-09 | End: 2022-06-21 | Stop reason: SDUPTHER

## 2022-02-24 NOTE — THERAPY TREATMENT NOTE
Outpatient Physical Therapy Ortho Treatment Note  [facility]     Patient Name: Nima Portillo  : 1960  MRN: [id number]  Today's Date: 2022      Visit Date: 2022    Attendance:   3/5 of 20 Approved  Subjective Improvement:  none   MD Appt:   Dr Bloom 3-01-22  Recheck Due:   3-03-22    Visit Dx:    ICD-10-CM ICD-9-CM   1. De Quervain's tenosynovitis, right  M65.4 727.04       Patient Active Problem List   Diagnosis   • Claudication of both lower extremities (HCC)   • Coronary artery disease involving native coronary artery of native heart without angina pectoris   • Essential hypertension   • Pure hypercholesterolemia   • ETOH abuse   • Screen for colon cancer   • Bilateral carpal tunnel syndrome   • Near syncope   • Mucopurulent chronic bronchitis (HCC)   • Symptomatic hypotension   • Chronic pain of right thumb   • Cigarette nicotine dependence in remission   • Arthritis of carpometacarpal (CMC) joint of both thumbs   • Bilateral hand pain        Past Medical History:   Diagnosis Date   • Acute bronchitis    • Acute sinusitis    • Chest pain    • Chronic bronchitis (HCC)     secondary to smoking   • Claudication (HCC)    • Coronary arteriosclerosis    • Cough    • Gastroesophageal reflux disease    • Health maintenance examination     Individual health examination   • History of echocardiogram 10/21/2013    Echocardiogram W/ color flow 89511 (1) - Left atrium normal. Mild CLVH. EF 50%. Valves intact. Mild mitral and tricuspid regurg. Pericardium normal.   • History of echocardiogram 10/25/2011    Echocardiogram W/O color flow 92460 (1) - Normal LV sytolic function with mild LVH & mild diastolic dysfunction   • Hyperlipidemia    • Hypertensive disorder    • Pernicious anemia    • Thumb pain, left 10/14/2019   • Tobacco dependence syndrome         Past Surgical History:   Procedure Laterality Date   • CARDIAC CATHETERIZATION  2015    Cardiac cath 93585 (1) - Slective coronary  angiogram. Left heart catheterization with LV ventriculogram.   • COLONOSCOPY N/A 10/26/2020    Procedure: COLONOSCOPY;  Surgeon: Levi Milian MD;  Location: [facility] ENDOSCOPY;  Service: Gastroenterology;  Laterality: N/A;   • COLONOSCOPY N/A 11/8/2021    Procedure: COLONOSCOPY;  Surgeon: Levi Milian MD;  Location: [facility] ENDOSCOPY;  Service: Gastroenterology;  Laterality: N/A;   • DENTAL PROCEDURE      Removal of all teeth        PT Ortho     Row Name 02/24/22 1400       Subjective Comments    Subjective Comments Saw MD today.  He recommended trying paraffin.  Referred to ortho for possible injection.  -TM       Precautions and Contraindications    Precautions/Limitations no known precautions/limitations  -TM       Subjective Pain    Able to rate subjective pain? yes  -TM    Pre-Treatment Pain Level 5  -TM    Post-Treatment Pain Level 4  -TM          User Key  (r) = Recorded By, (t) = Taken By, (c) = Cosigned By    Initials Name Provider Type     Karissa Hope PTA Physical Therapy Assistant                             PT Assessment/Plan     Row Name 02/24/22 1400          PT Assessment    Assessment Comments Tried paraffin vs Fluidotherapy per MD recommendation.  Good tolerance for therex.  -TM            PT Plan    PT Frequency 2x/week  -TM     Predicted Duration of Therapy Intervention (PT) 3-4 weeks  -TM     PT Plan Comments Assess benefit of paraffin vs fluidotherapy and proceed as indicated.  Appt with Dr. Bloom 3-01 @ 3:00 to assess thumb pain.  -TM           User Key  (r) = Recorded By, (t) = Taken By, (c) = Cosigned By    Initials Name Provider Type     Karissa Hope PTA Physical Therapy Assistant                 Modalities     Row Name 02/24/22 1400             Parrafin    Paraffin 43336 Location R hand/wrist  -TM      Rx Minutes 8  -TM            User Key  (r) = Recorded By, (t) = Taken By, (c) = Cosigned By    Initials Name Provider Type    TM Karissa Hope PTA Physical  "Therapy Assistant               OP Exercises     Row Name 02/24/22 1400             Subjective Comments    Subjective Comments Saw MD today.  He recommended trying paraffin.  Referred to ortho for possible injection.  -TM              Subjective Pain    Able to rate subjective pain? yes  -TM      Pre-Treatment Pain Level 5  -TM      Post-Treatment Pain Level 4  -TM              Exercise 1    Exercise Name 1 paraffin R hand/wrist  -TM      Time 1 8 min  -TM              Exercise 2    Exercise Name 2 Wrist flex/ext S  -TM      Reps 2 3  -TM      Time 2 30\" hold  -TM              Exercise 3    Exercise Name 3 LLPS: R RD  -TM      Sets 3 2  -TM      Time 3 1 min  -TM              Exercise 4    Exercise Name 4 LLPS: R wrist ext  -TM      Sets 4 2  -TM      Time 4 1 min  -TM              Exercise 5    Exercise Name 5 PRE: wrist flexion, ext  -TM      Sets 5 2  -TM      Reps 5 15  -TM      Additional Comments 2# DB  -TM              Exercise 6    Exercise Name 6 PRE: wrist UD/RD  -TM      Sets 6 2  -TM      Reps 6 15  -TM      Additional Comments 2# DB  -TM              Exercise 7    Exercise Name 7 Finkelstein stretch  -TM      Time 7 1 min  -TM              Exercise 8    Exercise Name 8 PRE: forearm sup/pron  -TM      Sets 8 2  -TM      Reps 8 15  -TM      Additional Comments 2# DB  -TM            User Key  (r) = Recorded By, (t) = Taken By, (c) = Cosigned By    Initials Name Provider Type    TM Karissa Hope PTA Physical Therapy Assistant                         Manual Rx (last 36 hours)     Manual Treatments     Row Name 02/24/22 1500             Manual Rx 1    Manual Rx 1 Location R thumb  -TM      Manual Rx 1 Type CMC joint mobes  -TM      Manual Rx 1 Duration 3 min  -TM            User Key  (r) = Recorded By, (t) = Taken By, (c) = Cosigned By    Initials Name Provider Type    TM Karissa Hope PTA Physical Therapy Assistant                 PT OP Goals     Row Name 02/24/22 1400          PT Short Term " Goals    STG Date to Achieve 02/24/22  -     STG 1 Improve R wrist flex/ext AROM to 55°  -     STG 1 Progress Ongoing  -     STG 2 Improve R forearm sup AROM to 78°  -     STG 2 Progress Ongoing  -     STG 3 Improve R  strength by 10 lbs  -     STG 3 Progress Ongoing  -     STG 4 Reduce B wrist pain by 25% performing ADL's  -     STG 4 Progress Ongoing  -            Long Term Goals    LTG Date to Achieve 03/10/22  -     LTG 1 Improve R  strength by 20 lbs  -     LTG 1 Progress Ongoing  -     LTG 2 Reduce B wrist pain by 50% performing ADL's  -     LTG 2 Progress Ongoing  -     LTG 3 Improve R wrist RD and R forearm sup MMT to 5/5  -     LTG 3 Progress Ongoing  -           User Key  (r) = Recorded By, (t) = Taken By, (c) = Cosigned By    Initials Name Provider Type    TM Karissa Hope PTA Physical Therapy Assistant                               Time Calculation:   Start Time: 1430  Stop Time: 1515  Time Calculation (min): 45 min  Therapy Charges for Today     Code Description Service Date Service Provider Modifiers Qty    46729666255 HC PT PARAFFIN BATH 2/24/2022 Karissa Hope, PTA GP 1    27465542361 HC PT THER PROC EA 15 MIN 2/24/2022 Karissa Hope PTA GP 1                    Karissa Hope PTA  2/24/2022

## 2022-02-24 NOTE — PROGRESS NOTES
Family Medicine Residency  Perfecto Barr MD    Subjective:     Nima Portillo is a 61 y.o. male who presents for HTN and management of medications, Pt's BP today 132/82. Pt c/o RIGHT hand pain, dull ache to sharp pain intermittently, patient continues with physical therapy.  Patient has appointment today with sister who encourages patient to have better health, patient continues to smoke at this point has no desire to stop.  Patient has completely stopped drinking alcohol, attempting to eat healthier.    The following portions of the patient's history were reviewed and updated as appropriate: allergies, current medications, past family history, past medical history, past social history, past surgical history and problem list.    Past Medical Hx:  Past Medical History:   Diagnosis Date   • Acute bronchitis    • Acute sinusitis    • Chest pain    • Chronic bronchitis (HCC)     secondary to smoking   • Claudication (HCC)    • Coronary arteriosclerosis    • Cough    • Gastroesophageal reflux disease    • Health maintenance examination     Individual health examination   • History of echocardiogram 10/21/2013    Echocardiogram W/ color flow 95683 (1) - Left atrium normal. Mild CLVH. EF 50%. Valves intact. Mild mitral and tricuspid regurg. Pericardium normal.   • History of echocardiogram 10/25/2011    Echocardiogram W/O color flow 95918 (1) - Normal LV sytolic function with mild LVH & mild diastolic dysfunction   • Hyperlipidemia    • Hypertensive disorder    • Pernicious anemia    • Thumb pain, left 10/14/2019   • Tobacco dependence syndrome        Past Surgical Hx:  Past Surgical History:   Procedure Laterality Date   • CARDIAC CATHETERIZATION  04/08/2015    Cardiac cath 50552 (1) - Slective coronary angiogram. Left heart catheterization with LV ventriculogram.   • COLONOSCOPY N/A 10/26/2020    Procedure: COLONOSCOPY;  Surgeon: Levi Milian MD;  Location: [facility] ENDOSCOPY;  Service: Gastroenterology;   Laterality: N/A;   • COLONOSCOPY N/A 11/8/2021    Procedure: COLONOSCOPY;  Surgeon: Levi Milian MD;  Location: [facility] ENDOSCOPY;  Service: Gastroenterology;  Laterality: N/A;   • DENTAL PROCEDURE      Removal of all teeth       Current Meds:    Current Outpatient Medications:   •  acetaminophen (Tylenol 8 Hour) 650 MG 8 hr tablet, Take 650 mg by mouth Every 8 (Eight) Hours As Needed for Mild Pain ., Disp: , Rfl:   •  albuterol sulfate  (90 Base) MCG/ACT inhaler, Inhale 2 puffs Every 4 (Four) Hours As Needed for Wheezing., Disp: 6.7 g, Rfl: 6  •  amLODIPine (NORVASC) 5 MG tablet, TAKE 1 TABLET BY MOUTH EVERY DAY, Disp: 90 tablet, Rfl: 3  •  aspirin 81 MG chewable tablet, Chew 325 mg Daily., Disp: , Rfl:   •  atorvastatin (LIPITOR) 80 MG tablet, Take 1 tablet by mouth Daily., Disp: 90 tablet, Rfl: 3  •  cetirizine (zyrTEC) 10 MG tablet, , Disp: , Rfl:   •  fenofibrate micronized (ANTARA) 43 MG capsule, Take 1 capsule by mouth Every Morning Before Breakfast., Disp: 30 capsule, Rfl: 11  •  Fluticasone Furoate-Vilanterol (Breo Ellipta) 100-25 MCG/INH inhaler, Inhale 1 puff Daily., Disp: 60 each, Rfl: 1  •  hydroCHLOROthiazide (HYDRODIURIL) 25 MG tablet, Take 1 tablet by mouth Daily., Disp: 90 tablet, Rfl: 3  •  isosorbide mononitrate (IMDUR) 60 MG 24 hr tablet, Take 1 tablet by mouth Daily., Disp: 30 tablet, Rfl: 6  •  lisinopril (PRINIVIL,ZESTRIL) 40 MG tablet, Take 1 tablet by mouth Daily., Disp: 30 tablet, Rfl: 6  •  metoprolol succinate XL (TOPROL-XL) 25 MG 24 hr tablet, Take 1 tablet by mouth Daily., Disp: 30 tablet, Rfl: 6  •  nitroglycerin (NITROSTAT) 0.4 MG SL tablet, Place 1 tablet under the tongue Every 5 (Five) Minutes As Needed for Chest Pain. Take no more than 3 doses in 15 minutes., Disp: 30 tablet, Rfl: 12  •  promethazine-dextromethorphan (PROMETHAZINE-DM) 6.25-15 MG/5ML syrup, , Disp: , Rfl:   •  atorvastatin (LIPITOR) 40 MG tablet, , Disp: , Rfl:   •  fenofibrate (TRICOR) 48 MG tablet, ,  Disp: , Rfl:     Allergies:  No Known Allergies    Family Hx:  Family History   Problem Relation Age of Onset   • Heart disease Mother    • Hypertension Mother    • Diabetes Mother    • Heart disease Father    • Hypertension Father    • Diabetes Father    • Diabetes Sister    • Heart disease Sister    • Stroke Sister    • Cancer Paternal Aunt         Breast Cancer   • Cancer Paternal Uncle         Unknown cancer        Social History:  Social History     Socioeconomic History   • Marital status: Single   Tobacco Use   • Smoking status: Current Every Day Smoker     Packs/day: 1.50     Years: 40.00     Pack years: 60.00     Types: Cigarettes   • Smokeless tobacco: Former User   Vaping Use   • Vaping Use: Never used   Substance and Sexual Activity   • Alcohol use: Not Currently   • Drug use: No   • Sexual activity: Never     Comment: Marital status:        Review of Systems  Review of Systems   Constitutional: Negative for activity change, appetite change, chills, diaphoresis, fatigue and fever.   HENT: Negative for congestion, dental problem, ear discharge, ear pain, hearing loss, mouth sores, nosebleeds, postnasal drip, sinus pain, sore throat, tinnitus, trouble swallowing and voice change.    Eyes: Negative for photophobia, pain, discharge and itching.   Respiratory: Negative for cough, choking, chest tightness, shortness of breath and wheezing.    Cardiovascular: Negative for chest pain, palpitations and leg swelling.   Gastrointestinal: Negative for abdominal distention, abdominal pain, blood in stool, constipation, diarrhea, nausea and vomiting.   Endocrine: Negative for cold intolerance and heat intolerance.   Genitourinary: Negative for difficulty urinating, dysuria, flank pain and hematuria.   Musculoskeletal: Positive for arthralgias and joint swelling. Negative for back pain and neck pain.        RIGHT thumb/hand pain   Skin: Negative for color change and rash.   Neurological: Negative for dizziness,  "tremors, seizures, weakness, light-headedness, numbness and headaches.   Hematological: Negative for adenopathy.   Psychiatric/Behavioral: Negative for behavioral problems, confusion, hallucinations, self-injury and sleep disturbance.       Objective:     /82   Pulse 85   Temp 97.7 °F (36.5 °C) (Tympanic)   Ht 167.6 cm (66\")   Wt 98.2 kg (216 lb 9.6 oz)   SpO2 96%   BMI 34.96 kg/m²   Physical Exam  Vitals and nursing note reviewed.   Constitutional:       Appearance: Normal appearance. He is not ill-appearing or diaphoretic.   HENT:      Head: Normocephalic and atraumatic.      Right Ear: External ear normal.      Left Ear: External ear normal.      Nose: Nose normal. No rhinorrhea.      Mouth/Throat:      Mouth: Mucous membranes are moist.      Pharynx: No posterior oropharyngeal erythema.   Eyes:      General: No scleral icterus.        Right eye: No discharge.         Left eye: No discharge.      Extraocular Movements: Extraocular movements intact.   Neck:      Vascular: No carotid bruit.   Cardiovascular:      Rate and Rhythm: Normal rate and regular rhythm.      Pulses: Normal pulses.      Heart sounds: Normal heart sounds. No gallop.    Pulmonary:      Effort: Pulmonary effort is normal.      Breath sounds: Normal breath sounds. No wheezing.   Chest:      Chest wall: No tenderness.   Abdominal:      General: Bowel sounds are normal.      Palpations: Abdomen is soft.      Tenderness: There is no rebound.   Musculoskeletal:         General: No swelling.        Arms:       Cervical back: No muscular tenderness.      Right lower leg: No edema.      Left lower leg: No edema.   Lymphadenopathy:      Cervical: No cervical adenopathy.   Skin:     General: Skin is warm and dry.      Capillary Refill: Capillary refill takes less than 2 seconds.      Findings: No erythema or rash.   Neurological:      General: No focal deficit present.      Mental Status: He is alert and oriented to person, place, and time. "      Motor: No weakness.   Psychiatric:         Mood and Affect: Mood normal.         Behavior: Behavior normal.         Thought Content: Thought content normal.         Judgment: Judgment normal.          Assessment   Nima Portillo is a 61 y.o. male who presents for hypertension, the patient's recorded blood pressure is 132/82.    Smoking cessation:  Patient counseled on smoking cessation    Right wrist pain:  Patient referred on to orthopedics  Patient continues under physical therapy    The patient was educated on the risks, benefits and alternatives to therapy. The patient was in agreement with therapy and plan of care.    Plan     Next visit ANNUAL PHYSICAL  Next visit checkup patient has attended orthopedics Dr. Bloom for injection into the right wrist  Next visit check to see patient's blood pressures under control if not can increase amlodipine to 10 mg  Next visit check to see if patient is attending physical therapy in right hand pain has decreased  Next visit check patients medication regime.  Next visit check patients compliance to medication regime.    Diagnoses and all orders for this visit:    1. Essential hypertension (Primary)    2. Bilateral wrist pain  -     Ambulatory Referral to Orthopedic Surgery      · Rx changes: Patient orthopedics for injection of right wrist  · Patient Education: advice and education on diet, lifestyle and exercise  · Compliance at present is estimated to be fair.   · Efforts to improve compliance (if necessary) will be directed at increased exercise.    Depression screening: Depression screening performed today; result negative; no follow up needed       Follow-up:     Return in about 4 weeks (around 3/24/2022), or Annual Physical, for Annual.    Preventative:  Health Maintenance   Topic Date Due   • ZOSTER VACCINE (2 of 2) 07/11/2019   • ANNUAL PHYSICAL  04/17/2020   • INFLUENZA VACCINE  08/01/2021   • COVID-19 Vaccine (3 - Booster for Pfizer series) 08/25/2021   •  LUNG CANCER SCREENING  09/21/2022   • COLORECTAL CANCER SCREENING  11/08/2022   • LIPID PANEL  01/28/2023   • Pneumococcal Vaccine 0-64 (2 of 2 - PPSV23) 10/29/2025   • TDAP/TD VACCINES (2 - Td or Tdap) 05/16/2029   • HEPATITIS C SCREENING  Completed     Male Preventative: Patient does Testicular self exam  Recommended: none  Vaccine Counseling: N/A    Weight  -Class: Obese Class I: 30-34.9kg/m2  -Patient's Body mass index is 34.96 kg/m². indicating that he is obese (BMI >30). Obesity-related health conditions include the following: hypertension. Obesity is unchanged. BMI is is above average; BMI management plan is completed. We discussed portion control and increasing exercise..   eat more fruits and vegetables, decrease soda or juice intake, increase water intake, increase physical activity, reduce screen time, reduce portion size and cut out extra servings    Alcohol use:  reports previous alcohol use.  Nicotine status  reports that he has been smoking cigarettes. He has a 60.00 pack-year smoking history. He has quit using smokeless tobacco.    Goals     •  Smoking cessation (pt-stated)       Quitting smoking and living longer    Barriers: Wanting to quit            RISK SCORE: 3    Kirill request #[id number]. Reviewed and appropriate.     [place] [address]  Office: [phone]      This document has been electronically signed by Perfecto Barr MD on February 24, 2022 14:24 CST      Part of this note may be an electronic transcription/translation of spoken language to printed text using the Dragon Dictation System.

## 2022-03-01 ENCOUNTER — HOSPITAL ENCOUNTER (OUTPATIENT)
Dept: PHYSICAL THERAPY | Facility: HOSPITAL | Age: 62
Setting detail: THERAPIES SERIES
Discharge: HOME | End: 2022-03-01

## 2022-03-01 ENCOUNTER — OFFICE VISIT (OUTPATIENT)
Dept: ORTHOPEDIC SURGERY | Facility: CLINIC | Age: 62
End: 2022-03-01

## 2022-03-01 VITALS — BODY MASS INDEX: 34.72 KG/M2 | HEIGHT: 66 IN | WEIGHT: 216 LBS

## 2022-03-01 DIAGNOSIS — M25.531 BILATERAL WRIST PAIN: ICD-10-CM

## 2022-03-01 DIAGNOSIS — I10 ESSENTIAL HYPERTENSION: ICD-10-CM

## 2022-03-01 DIAGNOSIS — M79.644 CHRONIC PAIN OF RIGHT THUMB: ICD-10-CM

## 2022-03-01 DIAGNOSIS — M25.532 BILATERAL WRIST PAIN: ICD-10-CM

## 2022-03-01 DIAGNOSIS — I25.10 CORONARY ARTERY DISEASE INVOLVING NATIVE CORONARY ARTERY OF NATIVE HEART WITHOUT ANGINA PECTORIS: ICD-10-CM

## 2022-03-01 DIAGNOSIS — G89.29 CHRONIC PAIN OF RIGHT THUMB: ICD-10-CM

## 2022-03-01 DIAGNOSIS — M65.4 DE QUERVAIN'S TENOSYNOVITIS, RIGHT: Primary | ICD-10-CM

## 2022-03-01 DIAGNOSIS — M19.031 LOCALIZED PRIMARY OSTEOARTHRITIS OF CARPOMETACARPAL (CMC) JOINT OF RIGHT WRIST: Primary | ICD-10-CM

## 2022-03-01 PROCEDURE — 97110 THERAPEUTIC EXERCISES: CPT

## 2022-03-01 PROCEDURE — 97018 PARAFFIN BATH THERAPY: CPT

## 2022-03-01 PROCEDURE — 97140 MANUAL THERAPY 1/> REGIONS: CPT

## 2022-03-01 PROCEDURE — 99214 OFFICE O/P EST MOD 30 MIN: CPT | Performed by: ORTHOPAEDIC SURGERY

## 2022-03-01 NOTE — THERAPY TREATMENT NOTE
Outpatient Physical Therapy Ortho Treatment Note  [facility]     Patient Name: Nima Portillo  : 1960  MRN: [id number]  Today's Date: 3/1/2022      Visit Date: 2022    Attendance:    (20 approved)  Subjective Improvement:   none  MD Appt:   Dr Bloom 3-01-22  Recheck Due:    3-03-22    Visit Dx:    ICD-10-CM ICD-9-CM   1. De Quervain's tenosynovitis, right  M65.4 727.04   2. Bilateral wrist pain  M25.531 719.43    M25.532        Patient Active Problem List   Diagnosis   • Claudication of both lower extremities (HCC)   • Coronary artery disease involving native coronary artery of native heart without angina pectoris   • Essential hypertension   • Pure hypercholesterolemia   • ETOH abuse   • Screen for colon cancer   • Bilateral carpal tunnel syndrome   • Near syncope   • Mucopurulent chronic bronchitis (HCC)   • Symptomatic hypotension   • Chronic pain of right thumb   • Cigarette nicotine dependence in remission   • Arthritis of carpometacarpal (CMC) joint of both thumbs   • Bilateral hand pain        Past Medical History:   Diagnosis Date   • Acute bronchitis    • Acute sinusitis    • Chest pain    • Chronic bronchitis (HCC)     secondary to smoking   • Claudication (HCC)    • Coronary arteriosclerosis    • Cough    • Gastroesophageal reflux disease    • Health maintenance examination     Individual health examination   • History of echocardiogram 10/21/2013    Echocardiogram W/ color flow 41132 (1) - Left atrium normal. Mild CLVH. EF 50%. Valves intact. Mild mitral and tricuspid regurg. Pericardium normal.   • History of echocardiogram 10/25/2011    Echocardiogram W/O color flow 62580 (1) - Normal LV sytolic function with mild LVH & mild diastolic dysfunction   • Hyperlipidemia    • Hypertensive disorder    • Pernicious anemia    • Thumb pain, left 10/14/2019   • Tobacco dependence syndrome         Past Surgical History:   Procedure Laterality Date   • CARDIAC CATHETERIZATION   04/08/2015    Cardiac cath 52833 (1) - Slective coronary angiogram. Left heart catheterization with LV ventriculogram.   • COLONOSCOPY N/A 10/26/2020    Procedure: COLONOSCOPY;  Surgeon: Levi Milian MD;  Location: [facility] ENDOSCOPY;  Service: Gastroenterology;  Laterality: N/A;   • COLONOSCOPY N/A 11/8/2021    Procedure: COLONOSCOPY;  Surgeon: Levi Milian MD;  Location: [facility] ENDOSCOPY;  Service: Gastroenterology;  Laterality: N/A;   • DENTAL PROCEDURE      Removal of all teeth        PT Ortho     Row Name 03/01/22 1300       Precautions and Contraindications    Precautions/Limitations no known precautions/limitations  -TM       Subjective Pain    Able to rate subjective pain? yes  -TM    Pre-Treatment Pain Level 4  -TM       General ROM    GENERAL ROM COMMENTS AROM R wrist: flex 40°; ext 42°; RD 16°; UD 26°  L wrist: flex 54°; ext 48°; RD; 14°; UD 30°  -TM          User Key  (r) = Recorded By, (t) = Taken By, (c) = Cosigned By    Initials Name Provider Type    TM Karissa Hope PTA Physical Therapy Assistant                             PT Assessment/Plan     Row Name 03/01/22 1300          PT Assessment    Assessment Comments Paraffin B hands per pt request.  Also performed PREs bilaterally.  Remains TTP at R CMC and thenar area.  Appt with Dr. Bloom today to assess T thumb.  -TM            PT Plan    PT Frequency 2x/week  -TM     Predicted Duration of Therapy Intervention (PT) 3-4 weeks  -TM     PT Plan Comments yellow putty starburst finger/thumb extension  -TM           User Key  (r) = Recorded By, (t) = Taken By, (c) = Cosigned By    Initials Name Provider Type    TM Karissa Hope PTA Physical Therapy Assistant                 Modalities     Row Name 03/01/22 1300             Parrafin    Paraffin 54634 Location B hand/wrist  -TM      Rx Minutes 8 min  -TM            User Key  (r) = Recorded By, (t) = Taken By, (c) = Cosigned By    Initials Name Provider Type    TM Karissa Hope PTA  "Physical Therapy Assistant               OP Exercises     Row Name 03/01/22 1300             Subjective Comments    Subjective Comments Pt reports continued R thumb pain.  Has appt with Dr. Bloom today.  Feels that the paraffin worked better for him than fluidotherapy.  -TM              Subjective Pain    Able to rate subjective pain? yes  -TM      Pre-Treatment Pain Level 4  -TM      Subjective Pain Comment R thumb; 1/10 L wrist  -TM              Exercise 1    Exercise Name 1 paraffin B hand/wrist  -TM      Time 1 8 min  -TM              Exercise 2    Exercise Name 2 B wrist flex/ext stretch at wall  -TM      Sets 2 2 ea  -TM      Time 2 30\"  -TM              Exercise 3    Exercise Name 3 R Finkelstein stretch  -TM      Sets 3 2  -TM      Time 3 30\"  -TM              Exercise 4    Exercise Name 4 LLPS: R RD  -TM      Sets 4 2  -TM      Time 4 1 min  -TM              Exercise 5    Exercise Name 5 LLPS: R wrist ext  -TM      Sets 5 2  -TM      Time 5 1 min  -TM              Exercise 6    Exercise Name 6 PRE: B wrist flex, ext  -TM      Sets 6 2  -TM      Reps 6 10  -TM      Additional Comments 2# DB  -TM              Exercise 7    Exercise Name 7 PRE: B wrist UD/RD  -TM      Sets 7 2  -TM      Reps 7 10  -TM      Additional Comments 2# DB  -TM              Exercise 8    Exercise Name 8 PRE: B forearm sup/pron  -TM      Sets 8 2  -TM      Reps 8 10  -TM      Additional Comments 2# DB  -TM              Exercise 9    Exercise Name 9 clothespins R hand; medium bar  -TM      Time 9 2 min  -TM            User Key  (r) = Recorded By, (t) = Taken By, (c) = Cosigned By    Initials Name Provider Type    TM Karissa Hope, PTA Physical Therapy Assistant                         Manual Rx (last 36 hours)     Manual Treatments     Row Name 03/01/22 1300             Manual Rx 1    Manual Rx 1 Location R thumb  -TM      Manual Rx 1 Type CMC joint mobes  -TM      Manual Rx 1 Duration 3 min  -TM              Manual Rx 2    Manual " Rx 2 Location R wrist  -TM      Manual Rx 2 Type joint mobes  -TM      Manual Rx 2 Duration 3 min  -TM            User Key  (r) = Recorded By, (t) = Taken By, (c) = Cosigned By    Initials Name Provider Type    TM Karissa Hope PTA Physical Therapy Assistant                 PT OP Goals     Row Name 03/01/22 1300          PT Short Term Goals    STG Date to Achieve 02/24/22  -TM     STG 1 Improve R wrist flex/ext AROM to 55°  -TM     STG 1 Progress Ongoing  -TM     STG 2 Improve R forearm sup AROM to 78°  -TM     STG 2 Progress Ongoing  -TM     STG 3 Improve R  strength by 10 lbs  -TM     STG 3 Progress Ongoing  -TM     STG 4 Reduce B wrist pain by 25% performing ADL's  -TM     STG 4 Progress Ongoing  -            Long Term Goals    LTG Date to Achieve 03/10/22  -TM     LTG 1 Improve R  strength by 20 lbs  -TM     LTG 1 Progress Ongoing  -TM     LTG 2 Reduce B wrist pain by 50% performing ADL's  -TM     LTG 2 Progress Ongoing  -TM     LTG 3 Improve R wrist RD and R forearm sup MMT to 5/5  -TM     LTG 3 Progress Ongoing  -           User Key  (r) = Recorded By, (t) = Taken By, (c) = Cosigned By    Initials Name Provider Type    TM Karissa Hope PTA Physical Therapy Assistant                               Time Calculation:   Start Time: 1300  Stop Time: 1345  Time Calculation (min): 45 min  Total Timed Code Minutes- PT: 37 minute(s)  Therapy Charges for Today     Code Description Service Date Service Provider Modifiers Qty    04871377045 HC PT MANUAL THERAPY EA 15 MIN 3/1/2022 Karissa Hope, PTA GP 1    05931782970 HC PT THER PROC EA 15 MIN 3/1/2022 Karissa Hope, PTA GP 1    11683145862 HC PT PARAFFIN BATH 3/1/2022 Karissa Hope, PTA GP 1                    Karissa Hpoe PTA  3/1/2022

## 2022-03-01 NOTE — PROGRESS NOTES
Nima Portillo is a 61 y.o. male   Primary provider:  Charles Rojas MD       Chief Complaint   Patient presents with   • Left Wrist - Initial Evaluation   • Right Wrist - Initial Evaluation       HISTORY OF PRESENT ILLNESS:  Patient in for bilateral wrist pain.   Xray on 01/28/2022  Right wrist is numb in the morning and is giving more pain than the left.   Patient previously had a trapeziectomy on the left and has been doing well.  He has been having worse right wrist pain.  He has pain with activity.  He has difficult time grasping or grabbing objects with his right hand.  He denies any numbness or tingling.  He states the pain is worse in the morning.  He states that wearing a brace has not been helpful.  He has pain with activities of daily living.    Wrist Pain   The pain is present in the left wrist and right wrist. The current episode started more than 1 year ago. There has been no history of extremity trauma. The quality of the pain is described as aching. The pain is moderate. Pertinent negatives include no fever. Associated symptoms comments: Popping/snapping and swelling. He has tried acetaminophen for the symptoms.        CONCURRENT MEDICAL HISTORY:    Past Medical History:   Diagnosis Date   • Acute bronchitis    • Acute sinusitis    • Chest pain    • Chronic bronchitis (HCC)     secondary to smoking   • Claudication (HCC)    • Coronary arteriosclerosis    • Cough    • Gastroesophageal reflux disease    • Health maintenance examination     Individual health examination   • History of echocardiogram 10/21/2013    Echocardiogram W/ color flow 81756 (1) - Left atrium normal. Mild CLVH. EF 50%. Valves intact. Mild mitral and tricuspid regurg. Pericardium normal.   • History of echocardiogram 10/25/2011    Echocardiogram W/O color flow 55931 (1) - Normal LV sytolic function with mild LVH & mild diastolic dysfunction   • Hyperlipidemia    • Hypertensive disorder    • Pernicious anemia    • Thumb pain,  left 10/14/2019   • Tobacco dependence syndrome        No Known Allergies      Current Outpatient Medications:   •  acetaminophen (Tylenol 8 Hour) 650 MG 8 hr tablet, Take 650 mg by mouth Every 8 (Eight) Hours As Needed for Mild Pain ., Disp: , Rfl:   •  albuterol sulfate  (90 Base) MCG/ACT inhaler, Inhale 2 puffs Every 4 (Four) Hours As Needed for Wheezing., Disp: 6.7 g, Rfl: 6  •  amLODIPine (NORVASC) 5 MG tablet, TAKE 1 TABLET BY MOUTH EVERY DAY, Disp: 90 tablet, Rfl: 3  •  aspirin 81 MG chewable tablet, Chew 325 mg Daily., Disp: , Rfl:   •  atorvastatin (LIPITOR) 40 MG tablet, , Disp: , Rfl:   •  atorvastatin (LIPITOR) 80 MG tablet, Take 1 tablet by mouth Daily., Disp: 90 tablet, Rfl: 3  •  cetirizine (zyrTEC) 10 MG tablet, , Disp: , Rfl:   •  fenofibrate (TRICOR) 48 MG tablet, , Disp: , Rfl:   •  fenofibrate micronized (ANTARA) 43 MG capsule, Take 1 capsule by mouth Every Morning Before Breakfast., Disp: 30 capsule, Rfl: 11  •  Fluticasone Furoate-Vilanterol (Breo Ellipta) 100-25 MCG/INH inhaler, Inhale 1 puff Daily., Disp: 60 each, Rfl: 1  •  hydroCHLOROthiazide (HYDRODIURIL) 25 MG tablet, Take 1 tablet by mouth Daily., Disp: 90 tablet, Rfl: 3  •  isosorbide mononitrate (IMDUR) 60 MG 24 hr tablet, Take 1 tablet by mouth Daily., Disp: 30 tablet, Rfl: 6  •  lisinopril (PRINIVIL,ZESTRIL) 40 MG tablet, Take 1 tablet by mouth Daily., Disp: 30 tablet, Rfl: 6  •  metoprolol succinate XL (TOPROL-XL) 25 MG 24 hr tablet, Take 1 tablet by mouth Daily., Disp: 30 tablet, Rfl: 6  •  nitroglycerin (NITROSTAT) 0.4 MG SL tablet, Place 1 tablet under the tongue Every 5 (Five) Minutes As Needed for Chest Pain. Take no more than 3 doses in 15 minutes., Disp: 30 tablet, Rfl: 12  •  promethazine-dextromethorphan (PROMETHAZINE-DM) 6.25-15 MG/5ML syrup, , Disp: , Rfl:     Past Surgical History:   Procedure Laterality Date   • CARDIAC CATHETERIZATION  04/08/2015    Cardiac cath 57241 (1) - Slective coronary angiogram. Left  "heart catheterization with LV ventriculogram.   • COLONOSCOPY N/A 10/26/2020    Procedure: COLONOSCOPY;  Surgeon: Levi Milian MD;  Location: [facility] ENDOSCOPY;  Service: Gastroenterology;  Laterality: N/A;   • COLONOSCOPY N/A 11/8/2021    Procedure: COLONOSCOPY;  Surgeon: Levi Milian MD;  Location: [facility] ENDOSCOPY;  Service: Gastroenterology;  Laterality: N/A;   • DENTAL PROCEDURE      Removal of all teeth       Family History   Problem Relation Age of Onset   • Heart disease Mother    • Hypertension Mother    • Diabetes Mother    • Heart disease Father    • Hypertension Father    • Diabetes Father    • Diabetes Sister    • Heart disease Sister    • Stroke Sister    • Cancer Paternal Aunt         Breast Cancer   • Cancer Paternal Uncle         Unknown cancer        Social History     Socioeconomic History   • Marital status: Single   Tobacco Use   • Smoking status: Current Every Day Smoker     Packs/day: 1.50     Years: 40.00     Pack years: 60.00     Types: Cigarettes   • Smokeless tobacco: Former User   Vaping Use   • Vaping Use: Never used   Substance and Sexual Activity   • Alcohol use: Not Currently   • Drug use: No   • Sexual activity: Never     Comment: Marital status:         Review of Systems   Constitutional: Negative for chills and fever.   Respiratory: Negative.    Cardiovascular: Negative.    Gastrointestinal: Negative.    Musculoskeletal:        Right wrist pain.   All other systems reviewed and are negative.      PHYSICAL EXAMINATION:       Ht 167.6 cm (66\")   Wt 98 kg (216 lb)   BMI 34.86 kg/m²     Physical Exam  Vitals reviewed.   Constitutional:       General: He is not in acute distress.     Appearance: Normal appearance. He is well-developed.   Cardiovascular:      Rate and Rhythm: Normal rate and regular rhythm.      Heart sounds: Normal heart sounds.   Pulmonary:      Effort: Pulmonary effort is normal.      Breath sounds: Normal breath sounds.   Abdominal:      General: " Bowel sounds are normal.      Palpations: Abdomen is soft.   Neurological:      Mental Status: He is alert and oriented to person, place, and time.   Psychiatric:         Behavior: Behavior normal.         Thought Content: Thought content normal.         Judgment: Judgment normal.         GAIT:     []  Normal  []  Antalgic    Assistive device: [x]  None  []  Walker     []  Crutches  []  Cane     []  Wheelchair  []  Stretcher    Right Hand Exam     Comments:  Right wrist has tenderness with motion.  He has a positive grind test at the first CMC joint.  Good capillary refill.  He has significant pain with attempted apposition of the small finger and the thumb.  He has pain with .  Decrease in  strength.      Left Hand Exam     Comments:  Good capillary refill.  Good finger motion.  He is able to oppose the small finger and the thumb.  He has a well-healed incision on the radial aspect of his wrist base of the first metacarpal.  Good muscle tone and strength.            XR wrist 3+ vw bilateral  Order date: 1/28/2022  Authorizing: Perfecto Barr MD  Ordered by Perfecto Barr MD on 1/28/2022.       Narrative & Impression       Procedure: Bilateral wrist 3 views     Reason for exam: Bilateral wrist pain, M25.531 Pain in right  wrist M25.532 Pain in left wrist     Findings: Comparison exam dated October 14, 2019. 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 This is of uncertain etiology. This  may represent changes from surgical removal and/or resorption.  Left wrist bony and soft tissue structures are otherwise  unremarkable. There is no evidence of fracture or dislocation.     Right wrist asymmetric joint space loss of the first carpal  metacarpal joint space with osteophytosis consistent with severe  osteoarthritic changes. Stable ununited ossification center of  the right ulnar styloid process. Otherwise bony structures of the  right wrist are unremarkable. There is no evidence of fracture  or  dislocation.           IMPRESSION:  Impression:     1.  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 This is of  uncertain etiology. This may represent changes from surgical  removal and/or resorption. Recommend clinical correlation.  2.  Right wrist asymmetric joint space loss of the first carpal  metacarpal joint space with osteophytosis consistent with severe  osteoarthritic changes.   3.  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   4.  Remainder of bilateral wrist exams are unremarkable.     Electronically signed by:  Rishabh Quiros MD  1/28/2022 11:44 AM CST  Workstation: [id number]     Lab Results   Component Value Date    HGBA1C 7.34 (H) 01/28/2022                 ASSESSMENT:    Diagnoses and all orders for this visit:    Localized primary osteoarthritis of carpometacarpal (CMC) joint of right wrist  -     Case Request; Standing  -     COVID PRE-OP / PRE-PROCEDURE SCREENING ORDER (NO ISOLATION) - Swab, Nasopharynx; Future  -     MRSA Screen, PCR (Inpatient) - Swab, Nares; Future  -     Case Request    Bilateral wrist pain  -     Case Request; Standing  -     COVID PRE-OP / PRE-PROCEDURE SCREENING ORDER (NO ISOLATION) - Swab, Nasopharynx; Future  -     MRSA Screen, PCR (Inpatient) - Swab, Nares; Future  -     Case Request    Essential hypertension  -     Case Request; Standing  -     COVID PRE-OP / PRE-PROCEDURE SCREENING ORDER (NO ISOLATION) - Swab, Nasopharynx; Future  -     MRSA Screen, PCR (Inpatient) - Swab, Nares; Future  -     Case Request    Chronic pain of right thumb  -     Case Request; Standing  -     COVID PRE-OP / PRE-PROCEDURE SCREENING ORDER (NO ISOLATION) - Swab, Nasopharynx; Future  -     MRSA Screen, PCR (Inpatient) - Swab, Nares; Future  -     Case Request    Coronary artery disease involving native coronary artery of native heart without angina pectoris  -     Case Request; Standing  -     COVID PRE-OP / PRE-PROCEDURE SCREENING ORDER (NO ISOLATION) - Swab,  Nasopharynx; Future  -     MRSA Screen, PCR (Inpatient) - Swab, Nares; Future  -     Case Request    Other orders  -     Follow Anesthesia Guidelines / Standing Orders; Future  -     Chlorhexidine Skin Prep - Educate and Review With Patient; Future  -     Provide instructions to patient regarding NPO status; Future          PLAN    Long discussion was held with the patient regarding further treatment options.  He has had previous injections into the base of the right thumb.  He has had definitive treatment on the left side and has been happy with his results.  He has constant pain and pain that is worse with any attempted use of his right arm.  He is unhappy with his quality of life and wishes to discuss definitive treatment options.    The patient voiced understanding of the risks, benefits, and alternative forms of treatment that were discussed and the patient consents to proceed with surgery.  All risks, benefits and alternatives were discussed.  Risks include, but not exclusive to anesthetic complications, including death, MI, CVA, infection, bleeding DVT, fracture, residual pain and need for future surgery.    This discussion was held with the patient by Kerwin Bloom MD and all questions were answered.    Plan trapeziectomy right wrist with suspension arthroplasty 1st CMC joint    Return for Post-operative eval.    Kerwin Bloom MD

## 2022-03-03 ENCOUNTER — HOSPITAL ENCOUNTER (OUTPATIENT)
Dept: PHYSICAL THERAPY | Facility: HOSPITAL | Age: 62
Setting detail: THERAPIES SERIES
Discharge: HOME | End: 2022-03-03

## 2022-03-03 DIAGNOSIS — M65.4 DE QUERVAIN'S TENOSYNOVITIS, RIGHT: Primary | ICD-10-CM

## 2022-03-03 DIAGNOSIS — M25.531 BILATERAL WRIST PAIN: ICD-10-CM

## 2022-03-03 DIAGNOSIS — M25.532 BILATERAL WRIST PAIN: ICD-10-CM

## 2022-03-03 PROCEDURE — 97110 THERAPEUTIC EXERCISES: CPT | Performed by: PHYSICAL THERAPIST

## 2022-03-03 PROCEDURE — 97018 PARAFFIN BATH THERAPY: CPT | Performed by: PHYSICAL THERAPIST

## 2022-03-03 PROCEDURE — 97140 MANUAL THERAPY 1/> REGIONS: CPT | Performed by: PHYSICAL THERAPIST

## 2022-03-03 NOTE — THERAPY TREATMENT NOTE
Outpatient Physical Therapy Ortho Progress Note  [facility]     Patient Name: Nima Portillo  : 1960  MRN: [id number]  Today's Date: 3/3/2022      Visit Date: 2022  Attendance:    (20 approved)  Subjective Improvement:   none  MD Appt:   3/31/22  Recheck Due:    3-24-22  Visit Dx:    ICD-10-CM ICD-9-CM   1. De Quervain's tenosynovitis, right  M65.4 727.04   2. Bilateral wrist pain  M25.531 719.43    M25.532        Patient Active Problem List   Diagnosis   • Claudication of both lower extremities (HCC)   • Coronary artery disease involving native coronary artery of native heart without angina pectoris   • Essential hypertension   • Pure hypercholesterolemia   • ETOH abuse   • Screen for colon cancer   • Bilateral carpal tunnel syndrome   • Near syncope   • Mucopurulent chronic bronchitis (HCC)   • Symptomatic hypotension   • Chronic pain of right thumb   • Cigarette nicotine dependence in remission   • Arthritis of carpometacarpal (CMC) joint of both thumbs   • Bilateral hand pain        Past Medical History:   Diagnosis Date   • Acute bronchitis    • Acute sinusitis    • Chest pain    • Chronic bronchitis (HCC)     secondary to smoking   • Claudication (HCC)    • Coronary arteriosclerosis    • Cough    • Gastroesophageal reflux disease    • Health maintenance examination     Individual health examination   • History of echocardiogram 10/21/2013    Echocardiogram W/ color flow 76859 (1) - Left atrium normal. Mild CLVH. EF 50%. Valves intact. Mild mitral and tricuspid regurg. Pericardium normal.   • History of echocardiogram 10/25/2011    Echocardiogram W/O color flow 83763 (1) - Normal LV sytolic function with mild LVH & mild diastolic dysfunction   • Hyperlipidemia    • Hypertensive disorder    • Pernicious anemia    • Thumb pain, left 10/14/2019   • Tobacco dependence syndrome         Past Surgical History:   Procedure Laterality Date   • CARDIAC CATHETERIZATION  2015    Cardiac  cath 48963 (1) - Slective coronary angiogram. Left heart catheterization with LV ventriculogram.   • COLONOSCOPY N/A 10/26/2020    Procedure: COLONOSCOPY;  Surgeon: Levi Milian MD;  Location: [facility] ENDOSCOPY;  Service: Gastroenterology;  Laterality: N/A;   • COLONOSCOPY N/A 11/8/2021    Procedure: COLONOSCOPY;  Surgeon: Levi Milian MD;  Location: [facility] ENDOSCOPY;  Service: Gastroenterology;  Laterality: N/A;   • DENTAL PROCEDURE      Removal of all teeth        PT Ortho     Row Name 03/03/22 1300       Subjective Comments    Subjective Comments Patient reports very little improvement with the L hand/wrist, less so with the R wrist/hand. Scheduled for possible surgery for the right hand in the near future.  -BS       Precautions and Contraindications    Precautions/Limitations no known precautions/limitations  -BS       General ROM    GENERAL ROM COMMENTS AROM: R wrist-flex 62 ext 65 R forearm sup 62  -BS       MMT (Manual Muscle Testing)    General MMT Comments R wrist-RD 5/5 R forearm sup 4+/5  -BS        Strength Right    Right  Test 1 31  -BS    Right  Test 2 33  -BS     Strength Average Right 32  -BS        Strength Left    Left  Test 1 55  -BS    Left  Test 2 55  -BS     Strength Average Left 55  -BS          User Key  (r) = Recorded By, (t) = Taken By, (c) = Cosigned By    Initials Name Provider Type    Rishabh Doran, PT Physical Therapist                             PT Assessment/Plan     Row Name 03/03/22 3858          PT Assessment    Assessment Comments Minimal progress made toward goals. Reduced R wrist/thumb pain post STM/MFR to the R distal forearm. Improved ROM of R wrist/forearm overall. No change in R hand/ strength.  -BS            PT Plan    PT Frequency 2x/week  -BS     Predicted Duration of Therapy Intervention (PT) 3-4 weeks  -BS     PT Plan Comments continue STM/MFR to distal forearm flexors/extensors and check effect on wrist ROM.  -BS         "   User Key  (r) = Recorded By, (t) = Taken By, (c) = Cosigned By    Initials Name Provider Type    Rishabh Doran, PT Physical Therapist                   OP Exercises     Row Name 03/03/22 1300             Subjective Comments    Subjective Comments Patient reports very little improvement with the L hand/wrist, less so with the R wrist/hand. Scheduled for possible surgery for the right hand in the near future.  -BS              Subjective Pain    Able to rate subjective pain? yes  -BS      Pre-Treatment Pain Level 2  -BS      Post-Treatment Pain Level 1  -BS      Subjective Pain Comment R thumb 2/10, L wrist at base of thumb 1/10  -BS              Exercise 1    Exercise Name 1 paraffin B hand/wrist  -BS      Time 1 10 mins  -BS              Exercise 2    Exercise Name 2 B wrist flex/ext stretch at wall  -BS      Sets 2 2 ea  -BS      Time 2 30\"  -BS              Exercise 3    Exercise Name 3 R Finkelstein stretch  -BS      Sets 3 2  -BS      Time 3 30\"  -BS              Exercise 4    Exercise Name 4 see manual  -BS            User Key  (r) = Recorded By, (t) = Taken By, (c) = Cosigned By    Initials Name Provider Type    Rishabh Doran, PT Physical Therapist                         Manual Rx (last 36 hours)     Manual Treatments     Row Name 03/03/22 1304             Manual Rx 1    Manual Rx 1 Location R flexor carpi radialis  -BS      Manual Rx 1 Type STM/MFR to L lat distal forearm  -BS      Manual Rx 1 Duration 10 mins  -BS            User Key  (r) = Recorded By, (t) = Taken By, (c) = Cosigned By    Initials Name Provider Type    Rishabh Doran, PT Physical Therapist                 PT OP Goals     Row Name 03/03/22 1300          PT Short Term Goals    STG Date to Achieve 03/17/22  -BS     STG 1 Improve R wrist flex/ext AROM to 55°  -BS     STG 1 Progress Met  -BS     STG 2 Improve R forearm sup AROM to 78°  -BS     STG 2 Progress Ongoing; Progressing  -BS     STG 3 Improve R  strength by 10 lbs  " -BS     STG 3 Progress Not Met  -BS     STG 4 Reduce B wrist pain by 25% performing ADL's  -BS     STG 4 Progress Partially Met  met for L wrist only  -BS            Long Term Goals    LTG Date to Achieve 03/31/22  -BS     LTG 1 Improve R  strength by 20 lbs  -BS     LTG 1 Progress Ongoing  -BS     LTG 2 Reduce B wrist pain by 50% performing ADL's  -BS     LTG 2 Progress Ongoing  -BS     LTG 3 Improve R wrist RD and R forearm sup MMT to 5/5  -BS     LTG 3 Progress Ongoing; Progressing; Partially Met  -BS            Time Calculation    PT Goal Re-Cert Due Date 03/24/22  -BS           User Key  (r) = Recorded By, (t) = Taken By, (c) = Cosigned By    Initials Name Provider Type    Rishabh Doran, PT Physical Therapist                               Time Calculation:   Start Time: 1304  Stop Time: 1347  Time Calculation (min): 43 min  Total Timed Code Minutes- PT: 43 minute(s)  Therapy Charges for Today     Code Description Service Date Service Provider Modifiers Qty    [id number]  PT MANUAL THERAPY EA 15 MIN 3/3/2022 Rishabh aFn, PT GP 1    [id number]  PT THER PROC EA 15 MIN 3/3/2022 Rishabh Fan, PT GP 1    [id number]  PT PARAFFIN BATH 3/3/2022 Rishabh Fan, PT GP 1                    Rishabh Fan, PT  3/3/2022

## 2022-03-04 RX ORDER — BUPIVACAINE HCL/0.9 % NACL/PF 0.1 %
2 PLASTIC BAG, INJECTION (ML) EPIDURAL ONCE
Status: CANCELLED | OUTPATIENT
Start: 2022-04-15 | End: 2022-03-04

## 2022-03-10 PROBLEM — M19.031 LOCALIZED PRIMARY OSTEOARTHRITIS OF CARPOMETACARPAL (CMC) JOINT OF RIGHT WRIST: Status: ACTIVE | Noted: 2022-03-10

## 2022-03-10 PROBLEM — M25.532 BILATERAL WRIST PAIN: Status: ACTIVE | Noted: 2022-03-10

## 2022-03-10 PROBLEM — M25.531 BILATERAL WRIST PAIN: Status: ACTIVE | Noted: 2022-03-10

## 2022-03-15 ENCOUNTER — APPOINTMENT (OUTPATIENT)
Dept: PHYSICAL THERAPY | Facility: HOSPITAL | Age: 62
End: 2022-03-15

## 2022-03-17 ENCOUNTER — APPOINTMENT (OUTPATIENT)
Dept: PHYSICAL THERAPY | Facility: HOSPITAL | Age: 62
End: 2022-03-17

## 2022-03-31 ENCOUNTER — OFFICE VISIT (OUTPATIENT)
Dept: ORTHOPEDIC SURGERY | Facility: CLINIC | Age: 62
End: 2022-03-31

## 2022-03-31 ENCOUNTER — PRE-ADMISSION TESTING (OUTPATIENT)
Dept: PREADMISSION TESTING | Facility: HOSPITAL | Age: 62
End: 2022-03-31

## 2022-03-31 VITALS
HEIGHT: 66 IN | WEIGHT: 210 LBS | HEART RATE: 64 BPM | BODY MASS INDEX: 33.75 KG/M2 | DIASTOLIC BLOOD PRESSURE: 88 MMHG | OXYGEN SATURATION: 96 % | SYSTOLIC BLOOD PRESSURE: 130 MMHG

## 2022-03-31 VITALS
HEIGHT: 66 IN | OXYGEN SATURATION: 94 % | SYSTOLIC BLOOD PRESSURE: 158 MMHG | DIASTOLIC BLOOD PRESSURE: 82 MMHG | HEART RATE: 77 BPM | RESPIRATION RATE: 18 BRPM | WEIGHT: 211 LBS | BODY MASS INDEX: 33.91 KG/M2

## 2022-03-31 DIAGNOSIS — M79.644 CHRONIC PAIN OF RIGHT THUMB: ICD-10-CM

## 2022-03-31 DIAGNOSIS — I25.10 CORONARY ARTERY DISEASE INVOLVING NATIVE CORONARY ARTERY OF NATIVE HEART WITHOUT ANGINA PECTORIS: ICD-10-CM

## 2022-03-31 DIAGNOSIS — M19.031 LOCALIZED PRIMARY OSTEOARTHRITIS OF CARPOMETACARPAL (CMC) JOINT OF RIGHT WRIST: ICD-10-CM

## 2022-03-31 DIAGNOSIS — M25.531 BILATERAL WRIST PAIN: ICD-10-CM

## 2022-03-31 DIAGNOSIS — I10 ESSENTIAL HYPERTENSION: ICD-10-CM

## 2022-03-31 DIAGNOSIS — M25.532 BILATERAL WRIST PAIN: ICD-10-CM

## 2022-03-31 DIAGNOSIS — M18.0 ARTHRITIS OF CARPOMETACARPAL (CMC) JOINT OF BOTH THUMBS: Primary | ICD-10-CM

## 2022-03-31 DIAGNOSIS — G89.29 CHRONIC PAIN OF RIGHT THUMB: ICD-10-CM

## 2022-03-31 LAB
ANION GAP SERPL CALCULATED.3IONS-SCNC: 11 MMOL/L (ref 5–15)
BUN SERPL-MCNC: 15 MG/DL (ref 8–23)
BUN/CREAT SERPL: 16.3 (ref 7–25)
CALCIUM SPEC-SCNC: 8.9 MG/DL (ref 8.6–10.5)
CHLORIDE SERPL-SCNC: 103 MMOL/L (ref 98–107)
CO2 SERPL-SCNC: 27 MMOL/L (ref 22–29)
CREAT SERPL-MCNC: 0.92 MG/DL (ref 0.76–1.27)
EGFRCR SERPLBLD CKD-EPI 2021: 94.6 ML/MIN/1.73
GLUCOSE SERPL-MCNC: 105 MG/DL (ref 65–99)
MRSA DNA SPEC QL NAA+PROBE: NEGATIVE
POTASSIUM SERPL-SCNC: 3.6 MMOL/L (ref 3.5–5.2)
SODIUM SERPL-SCNC: 141 MMOL/L (ref 136–145)

## 2022-03-31 PROCEDURE — 93010 ELECTROCARDIOGRAM REPORT: CPT | Performed by: INTERNAL MEDICINE

## 2022-03-31 PROCEDURE — 36415 COLL VENOUS BLD VENIPUNCTURE: CPT

## 2022-03-31 PROCEDURE — 93005 ELECTROCARDIOGRAM TRACING: CPT

## 2022-03-31 PROCEDURE — 87641 MR-STAPH DNA AMP PROBE: CPT

## 2022-03-31 PROCEDURE — 80048 BASIC METABOLIC PNL TOTAL CA: CPT

## 2022-03-31 PROCEDURE — S0260 H&P FOR SURGERY: HCPCS | Performed by: ORTHOPAEDIC SURGERY

## 2022-03-31 RX ORDER — SODIUM CHLORIDE, SODIUM GLUCONATE, SODIUM ACETATE, POTASSIUM CHLORIDE AND MAGNESIUM CHLORIDE 526; 502; 368; 37; 30 MG/100ML; MG/100ML; MG/100ML; MG/100ML; MG/100ML
1000 INJECTION, SOLUTION INTRAVENOUS CONTINUOUS PRN
Status: CANCELLED | OUTPATIENT
Start: 2022-04-15

## 2022-03-31 RX ORDER — AMLODIPINE BESYLATE 5 MG/1
5 TABLET ORAL DAILY
COMMUNITY
End: 2022-06-21 | Stop reason: SDUPTHER

## 2022-03-31 NOTE — PROGRESS NOTES
Nima Portillo is a 61 y.o. male   Primary provider:  Provider, No Known       Chief Complaint   Patient presents with   • Right Wrist - Pre-op Exam       HISTORY OF PRESENT ILLNESS:  Patient in for bilateral wrist pain.   Xray on 01/28/2022  Right wrist is numb in the morning and is giving more pain than the left.   Patient previously had a trapeziectomy on the left and has been doing well.  He has been having worse right wrist pain.  He has pain with activity.  He has difficult time grasping or grabbing objects with his right hand.  He denies any numbness or tingling.  He states the pain is worse in the morning.  He states that wearing a brace has not been helpful.  He has pain with activities of daily living.    Wrist Pain   The pain is present in the left wrist and right wrist. The current episode started more than 1 year ago. There has been no history of extremity trauma. The quality of the pain is described as aching. The pain is moderate. Pertinent negatives include no fever. Associated symptoms comments: Popping/snapping and swelling. He has tried acetaminophen for the symptoms.        CONCURRENT MEDICAL HISTORY:    Past Medical History:   Diagnosis Date   • Acute bronchitis    • Acute sinusitis    • Chest pain    • Chronic bronchitis (HCC)     secondary to smoking   • Claudication (HCC)    • COPD (chronic obstructive pulmonary disease) (HCC)    • Coronary arteriosclerosis    • Cough    • Gastroesophageal reflux disease    • Health maintenance examination     Individual health examination   • History of echocardiogram 10/21/2013    Echocardiogram W/ color flow 12306 (1) - Left atrium normal. Mild CLVH. EF 50%. Valves intact. Mild mitral and tricuspid regurg. Pericardium normal.   • History of echocardiogram 10/25/2011    Echocardiogram W/O color flow 29108 (1) - Normal LV sytolic function with mild LVH & mild diastolic dysfunction   • Hyperlipidemia    • Hypertensive disorder    • Pernicious anemia    •  Thumb pain, left 10/14/2019   • Tobacco dependence syndrome        No Known Allergies      Current Outpatient Medications:   •  acetaminophen (TYLENOL) 650 MG 8 hr tablet, Take 650 mg by mouth Every 8 (Eight) Hours As Needed for Mild Pain ., Disp: , Rfl:   •  albuterol sulfate  (90 Base) MCG/ACT inhaler, Inhale 2 puffs Every 4 (Four) Hours As Needed for Wheezing., Disp: 6.7 g, Rfl: 6  •  amLODIPine (NORVASC) 5 MG tablet, Take 5 mg by mouth Daily., Disp: , Rfl:   •  aspirin 81 MG chewable tablet, Chew 81 mg Daily., Disp: , Rfl:   •  atorvastatin (LIPITOR) 80 MG tablet, Take 80 mg by mouth Daily., Disp: , Rfl:   •  Fluticasone Furoate-Vilanterol (Breo Ellipta) 100-25 MCG/INH inhaler, Inhale 1 puff Daily., Disp: 60 each, Rfl: 1  •  hydroCHLOROthiazide (HYDRODIURIL) 25 MG tablet, Take 1 tablet by mouth Daily., Disp: 90 tablet, Rfl: 3  •  isosorbide mononitrate (IMDUR) 60 MG 24 hr tablet, Take 1 tablet by mouth Daily., Disp: 30 tablet, Rfl: 6  •  lisinopril (PRINIVIL,ZESTRIL) 40 MG tablet, Take 1 tablet by mouth Daily., Disp: 30 tablet, Rfl: 6  •  metoprolol succinate XL (TOPROL-XL) 25 MG 24 hr tablet, Take 1 tablet by mouth Daily., Disp: 30 tablet, Rfl: 6  •  nitroglycerin (NITROSTAT) 0.4 MG SL tablet, Place 1 tablet under the tongue Every 5 (Five) Minutes As Needed for Chest Pain. Take no more than 3 doses in 15 minutes., Disp: 30 tablet, Rfl: 12    Past Surgical History:   Procedure Laterality Date   • CARDIAC CATHETERIZATION  04/08/2015    Cardiac cath 68959 (1) - Slective coronary angiogram. Left heart catheterization with LV ventriculogram.   • COLONOSCOPY N/A 10/26/2020    Procedure: COLONOSCOPY;  Surgeon: Levi Milian MD;  Location: [facility] ENDOSCOPY;  Service: Gastroenterology;  Laterality: N/A;   • COLONOSCOPY N/A 11/08/2021    Procedure: COLONOSCOPY;  Surgeon: Levi Milian MD;  Location: [facility] ENDOSCOPY;  Service: Gastroenterology;  Laterality: N/A;   • DENTAL PROCEDURE      Removal of all teeth  "      Family History   Problem Relation Age of Onset   • Heart disease Mother    • Hypertension Mother    • Diabetes Mother    • Heart disease Father    • Hypertension Father    • Diabetes Father    • Diabetes Sister    • Heart disease Sister    • Stroke Sister    • Cancer Paternal Aunt         Breast Cancer   • Cancer Paternal Uncle         Unknown cancer        Social History     Socioeconomic History   • Marital status: Single   Tobacco Use   • Smoking status: Current Every Day Smoker     Packs/day: 2.00     Years: 40.00     Pack years: 80.00     Types: Cigarettes   • Smokeless tobacco: Former User   Vaping Use   • Vaping Use: Never used   Substance and Sexual Activity   • Alcohol use: Not Currently   • Drug use: No   • Sexual activity: Defer     Comment: Marital status:         Review of Systems   Constitutional: Negative for chills and fever.   Respiratory: Negative.    Cardiovascular: Negative.    Gastrointestinal: Negative.    Musculoskeletal:        Right wrist pain.   All other systems reviewed and are negative.      PHYSICAL EXAMINATION:       /88   Pulse 64   Ht 167.6 cm (66\")   Wt 95.3 kg (210 lb)   SpO2 96%   BMI 33.89 kg/m²     Physical Exam  Vitals reviewed.   Constitutional:       General: He is not in acute distress.     Appearance: Normal appearance. He is well-developed.   Cardiovascular:      Rate and Rhythm: Normal rate and regular rhythm.      Heart sounds: Normal heart sounds.   Pulmonary:      Effort: Pulmonary effort is normal.      Breath sounds: Normal breath sounds.   Abdominal:      General: Bowel sounds are normal.      Palpations: Abdomen is soft.   Neurological:      Mental Status: He is alert and oriented to person, place, and time.   Psychiatric:         Behavior: Behavior normal.         Thought Content: Thought content normal.         Judgment: Judgment normal.         GAIT:     []  Normal  []  Antalgic    Assistive device: [x]  None  []  Walker     []  " Crutches  []  Cane     []  Wheelchair  []  Stretcher    Right Hand Exam     Comments:  Right wrist has tenderness with motion.  He has a positive grind test at the first CMC joint.  Good capillary refill.  He has significant pain with attempted apposition of the small finger and the thumb.  He has pain with .  Decrease in  strength.      Left Hand Exam     Comments:  Good capillary refill.  Good finger motion.  He is able to oppose the small finger and the thumb.  He has a well-healed incision on the radial aspect of his wrist base of the first metacarpal.  Good muscle tone and strength.            XR wrist 3+ vw bilateral  Order date: 1/28/2022  Authorizing: Perfecto Barr MD  Ordered by Perfecto Barr MD on 1/28/2022.       Narrative & Impression       Procedure: Bilateral wrist 3 views     Reason for exam: Bilateral wrist pain, M25.531 Pain in right  wrist M25.532 Pain in left wrist     Findings: Comparison exam dated October 14, 2019. 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 This is of uncertain etiology. This  may represent changes from surgical removal and/or resorption.  Left wrist bony and soft tissue structures are otherwise  unremarkable. There is no evidence of fracture or dislocation.     Right wrist asymmetric joint space loss of the first carpal  metacarpal joint space with osteophytosis consistent with severe  osteoarthritic changes. Stable ununited ossification center of  the right ulnar styloid process. Otherwise bony structures of the  right wrist are unremarkable. There is no evidence of fracture or  dislocation.           IMPRESSION:  Impression:     1.  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 This is of  uncertain etiology. This may represent changes from surgical  removal and/or resorption. Recommend clinical correlation.  2.  Right wrist asymmetric joint space loss of the first carpal  metacarpal joint space with osteophytosis consistent with severe  osteoarthritic  changes.   3.  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   4.  Remainder of bilateral wrist exams are unremarkable.     Electronically signed by:  Rishabh Quiros MD  1/28/2022 11:44 AM CST  Workstation: [id number]     Lab Results   Component Value Date    HGBA1C 7.34 (H) 01/28/2022                 ASSESSMENT:    Diagnoses and all orders for this visit:    Arthritis of carpometacarpal (CMC) joint of both thumbs    Bilateral wrist pain    Chronic pain of right thumb    Essential hypertension    Coronary artery disease involving native coronary artery of native heart without angina pectoris          PLAN    Long discussion was held with the patient regarding further treatment options.  He has had previous injections into the base of the right thumb.  He has had definitive treatment on the left side and has been happy with his results.  He has constant pain and pain that is worse with any attempted use of his right arm.  He is unhappy with his quality of life and wishes to discuss definitive treatment options.    The patient voiced understanding of the risks, benefits, and alternative forms of treatment that were discussed and the patient consents to proceed with surgery.  All risks, benefits and alternatives were discussed.  Risks include, but not exclusive to anesthetic complications, including death, MI, CVA, infection, bleeding DVT, fracture, residual pain and need for future surgery.    This discussion was held with the patient by Kerwin Bloom MD and all questions were answered.    Plan trapeziectomy right wrist with suspension arthroplasty 1st CMC joint    Return for Post-operative eval.    Kerwin Bloom MD

## 2022-04-02 LAB
QT INTERVAL: 404 MS
QTC INTERVAL: 410 MS

## 2022-04-12 ENCOUNTER — LAB (OUTPATIENT)
Dept: LAB | Facility: HOSPITAL | Age: 62
End: 2022-04-12

## 2022-04-12 DIAGNOSIS — M25.532 BILATERAL WRIST PAIN: ICD-10-CM

## 2022-04-12 DIAGNOSIS — M25.531 BILATERAL WRIST PAIN: ICD-10-CM

## 2022-04-12 DIAGNOSIS — I25.10 CORONARY ARTERY DISEASE INVOLVING NATIVE CORONARY ARTERY OF NATIVE HEART WITHOUT ANGINA PECTORIS: ICD-10-CM

## 2022-04-12 DIAGNOSIS — M19.031 LOCALIZED PRIMARY OSTEOARTHRITIS OF CARPOMETACARPAL (CMC) JOINT OF RIGHT WRIST: ICD-10-CM

## 2022-04-12 DIAGNOSIS — G89.29 CHRONIC PAIN OF RIGHT THUMB: ICD-10-CM

## 2022-04-12 DIAGNOSIS — M79.644 CHRONIC PAIN OF RIGHT THUMB: ICD-10-CM

## 2022-04-12 DIAGNOSIS — I10 ESSENTIAL HYPERTENSION: ICD-10-CM

## 2022-04-12 LAB — SARS-COV-2 N GENE RESP QL NAA+PROBE: NOT DETECTED

## 2022-04-12 PROCEDURE — 87635 SARS-COV-2 COVID-19 AMP PRB: CPT

## 2022-04-12 PROCEDURE — C9803 HOPD COVID-19 SPEC COLLECT: HCPCS

## 2022-04-13 ENCOUNTER — OFFICE VISIT (OUTPATIENT)
Dept: FAMILY MEDICINE CLINIC | Facility: CLINIC | Age: 62
End: 2022-04-13

## 2022-04-13 VITALS
BODY MASS INDEX: 33.94 KG/M2 | SYSTOLIC BLOOD PRESSURE: 130 MMHG | OXYGEN SATURATION: 94 % | WEIGHT: 211.2 LBS | HEART RATE: 74 BPM | DIASTOLIC BLOOD PRESSURE: 64 MMHG | HEIGHT: 66 IN

## 2022-04-13 DIAGNOSIS — I10 ESSENTIAL HYPERTENSION: Primary | ICD-10-CM

## 2022-04-13 DIAGNOSIS — M25.532 BILATERAL WRIST PAIN: ICD-10-CM

## 2022-04-13 DIAGNOSIS — M25.531 BILATERAL WRIST PAIN: ICD-10-CM

## 2022-04-13 PROCEDURE — 99213 OFFICE O/P EST LOW 20 MIN: CPT | Performed by: STUDENT IN AN ORGANIZED HEALTH CARE EDUCATION/TRAINING PROGRAM

## 2022-04-13 NOTE — PROGRESS NOTES
Family Medicine Residency  Charles Rojas MD    Subjective:     Nima Portillo is a 61 y.o. male who presents for overdue follow up for HTN.  His blood pressures well controlled today.  130/64.  Patient is not 100% certain what medications he is taking.  States he does not any refills of the medication at this time.  Denies headache, vision changes.  No leg swelling, no shortness of breath, no chest pain.  Patient does report that he has a history of a heart attack a long time ago.  Recently has stopped drinking and has been sober for 7 months per patient.    Patient has upcoming orthopedic surgery on Friday.    The following portions of the patient's history were reviewed and updated as appropriate: allergies, current medications, past family history, past medical history, past social history, past surgical history and problem list.    Past Medical Hx:  Past Medical History:   Diagnosis Date   • Acute bronchitis    • Acute sinusitis    • Chest pain    • Chronic bronchitis (HCC)     secondary to smoking   • Claudication (HCC)    • COPD (chronic obstructive pulmonary disease) (HCC)    • Coronary arteriosclerosis    • Cough    • Gastroesophageal reflux disease    • Health maintenance examination     Individual health examination   • History of echocardiogram 10/21/2013    Echocardiogram W/ color flow 74520 (1) - Left atrium normal. Mild CLVH. EF 50%. Valves intact. Mild mitral and tricuspid regurg. Pericardium normal.   • History of echocardiogram 10/25/2011    Echocardiogram W/O color flow 89838 (1) - Normal LV sytolic function with mild LVH & mild diastolic dysfunction   • Hyperlipidemia    • Hypertensive disorder    • Pernicious anemia    • Thumb pain, left 10/14/2019   • Tobacco dependence syndrome        Past Surgical Hx:  Past Surgical History:   Procedure Laterality Date   • CARDIAC CATHETERIZATION  04/08/2015    Cardiac cath 67884 (1) - Slective coronary angiogram. Left heart catheterization with LV  ventriculogram.   • COLONOSCOPY N/A 10/26/2020    Procedure: COLONOSCOPY;  Surgeon: Levi Milian MD;  Location: [facility] ENDOSCOPY;  Service: Gastroenterology;  Laterality: N/A;   • COLONOSCOPY N/A 11/08/2021    Procedure: COLONOSCOPY;  Surgeon: Levi Milian MD;  Location: [facility] ENDOSCOPY;  Service: Gastroenterology;  Laterality: N/A;   • DENTAL PROCEDURE      Removal of all teeth       Current Meds:    Current Outpatient Medications:   •  albuterol sulfate  (90 Base) MCG/ACT inhaler, Inhale 2 puffs Every 4 (Four) Hours As Needed for Wheezing., Disp: 6.7 g, Rfl: 6  •  amLODIPine (NORVASC) 5 MG tablet, Take 5 mg by mouth Daily., Disp: , Rfl:   •  aspirin 81 MG chewable tablet, Chew 81 mg Daily., Disp: , Rfl:   •  atorvastatin (LIPITOR) 80 MG tablet, Take 80 mg by mouth Daily., Disp: , Rfl:   •  Fluticasone Furoate-Vilanterol (Breo Ellipta) 100-25 MCG/INH inhaler, Inhale 1 puff Daily., Disp: 60 each, Rfl: 1  •  hydroCHLOROthiazide (HYDRODIURIL) 25 MG tablet, Take 1 tablet by mouth Daily., Disp: 90 tablet, Rfl: 3  •  lisinopril (PRINIVIL,ZESTRIL) 40 MG tablet, Take 1 tablet by mouth Daily., Disp: 30 tablet, Rfl: 6  •  metoprolol succinate XL (TOPROL-XL) 25 MG 24 hr tablet, Take 1 tablet by mouth Daily., Disp: 30 tablet, Rfl: 6  •  nitroglycerin (NITROSTAT) 0.4 MG SL tablet, Place 1 tablet under the tongue Every 5 (Five) Minutes As Needed for Chest Pain. Take no more than 3 doses in 15 minutes., Disp: 30 tablet, Rfl: 12  •  acetaminophen (TYLENOL) 650 MG 8 hr tablet, Take 650 mg by mouth Every 8 (Eight) Hours As Needed for Mild Pain ., Disp: , Rfl:   •  isosorbide mononitrate (IMDUR) 60 MG 24 hr tablet, Take 1 tablet by mouth Daily., Disp: 30 tablet, Rfl: 6    Allergies:  No Known Allergies    Family Hx:  Family History   Problem Relation Age of Onset   • Heart disease Mother    • Hypertension Mother    • Diabetes Mother    • Heart disease Father    • Hypertension Father    • Diabetes Father    • Diabetes  "Sister    • Heart disease Sister    • Stroke Sister    • Cancer Paternal Aunt         Breast Cancer   • Cancer Paternal Uncle         Unknown cancer        Social History:  Social History     Socioeconomic History   • Marital status: Single   Tobacco Use   • Smoking status: Current Every Day Smoker     Packs/day: 2.00     Years: 40.00     Pack years: 80.00     Types: Cigarettes   • Smokeless tobacco: Former User   Vaping Use   • Vaping Use: Never used   Substance and Sexual Activity   • Alcohol use: Not Currently   • Drug use: No   • Sexual activity: Defer     Comment: Marital status:        Review of Systems  Review of Systems   Constitutional: Negative for chills, fatigue and fever.   HENT: Negative for ear pain, hearing loss, postnasal drip and sore throat.    Eyes: Negative for pain and visual disturbance.   Respiratory: Negative for cough, shortness of breath and wheezing.    Cardiovascular: Negative for chest pain, palpitations and leg swelling.   Gastrointestinal: Negative for abdominal pain, diarrhea, nausea and vomiting.   Genitourinary: Negative for difficulty urinating and dysuria.   Musculoskeletal: Positive for arthralgias (wrists (L>R)). Negative for myalgias.   Skin: Negative for rash and wound.   Neurological: Negative for syncope, weakness and headaches.   Psychiatric/Behavioral: Negative for dysphoric mood. The patient is not nervous/anxious.        Objective:     /64   Pulse 74   Ht 167.6 cm (66\")   Wt 95.8 kg (211 lb 3.2 oz)   SpO2 94%   BMI 34.09 kg/m²   Physical Exam  Vitals reviewed.   Constitutional:       General: He is not in acute distress.  HENT:      Head: Normocephalic and atraumatic.      Mouth/Throat:      Pharynx: No oropharyngeal exudate.   Eyes:      General: No scleral icterus.     Conjunctiva/sclera: Conjunctivae normal.   Cardiovascular:      Rate and Rhythm: Normal rate and regular rhythm.      Heart sounds: Normal heart sounds.   Pulmonary:      Effort: " Pulmonary effort is normal.      Breath sounds: Normal breath sounds. No wheezing or rales.   Abdominal:      General: Bowel sounds are normal. There is no distension.      Palpations: Abdomen is soft.      Tenderness: There is no abdominal tenderness.   Musculoskeletal:         General: No deformity.      Right lower leg: No edema.      Left lower leg: No edema.   Skin:     General: Skin is warm and dry.   Neurological:      General: No focal deficit present.      Mental Status: He is alert. Mental status is at baseline.          Assessment/Plan:     Diagnoses and all orders for this visit:    1. Essential hypertension (Primary)    2. Bilateral wrist pain    1.  Last 2 readings systolic was 130.  Per patient's medication list he most recently has been on lisinopril 40, HCTZ 25 and metoprolol 25.  Patient is unsure if these are the exact medication and dosages that he is taking.  With his blood pressure readings the last 2 times being well controlled, I will remain on current regimen.  Patient has seen cardiology has appropriate follow-up.  Patient did report to me that he has had a distant MI and I believe that potentially switching HCTZ to chlorthalidone could potentially increase his cardioprotection.    2.  Patient has surgery scheduled on his wrist on Friday.  Secondary to this surgery, I do not want to change his medication regimen at this time.  Want to allow him time to recover from surgery.  He will follow up in 2 months    · Rx changes: none  · Patient Education: chlorthalidone   · Compliance at present is estimated to be fair.   · Efforts to improve compliance (if necessary) will be directed at increased exercise and continued sobriety .    Depression screening: Up to date; last screen      Follow-up:     Return in about 2 months (around 6/13/2022).    Preventative:  Health Maintenance   Topic Date Due   • ZOSTER VACCINE (2 of 2) 07/11/2019   • ANNUAL PHYSICAL  04/17/2020   • Pneumococcal Vaccine 0-64 (2  - PCV) 05/16/2020   • COVID-19 Vaccine (3 - Booster for Pfizer series) 08/25/2021   • INFLUENZA VACCINE  08/01/2022   • LUNG CANCER SCREENING  09/21/2022   • COLORECTAL CANCER SCREENING  11/08/2022   • LIPID PANEL  01/28/2023   • TDAP/TD VACCINES (2 - Td or Tdap) 05/16/2029   • HEPATITIS C SCREENING  Completed       Weight  -Class: Obese Class I: 30-34.9kg/m2  -Patient's Body mass index is 34.09 kg/m². indicating that he is obese (BMI >30). Obesity-related health conditions include the following: hypertension and coronary heart disease. Obesity is unchanged. BMI is is above average; BMI management plan is completed. We discussed portion control and increasing exercise..   decrease soda or juice intake and increase water intake    Alcohol use:  reports previous alcohol use.  Nicotine status  reports that he has been smoking cigarettes. He has a 80.00 pack-year smoking history. He has quit using smokeless tobacco.     Goals     •  Smoking cessation (pt-stated)       Quitting smoking and living longer    Barriers: Wanting to quit              RISK SCORE: 4       Charles Rojas MD   PGY-2    [facility]  [address] [place] [address]  Office: [phone]    This document has been electronically signed by Charles Rojas MD on April 13, 2022 13:57 CDT

## 2022-04-14 ENCOUNTER — ANESTHESIA EVENT (OUTPATIENT)
Dept: PERIOP | Facility: HOSPITAL | Age: 62
End: 2022-04-14

## 2022-04-15 ENCOUNTER — APPOINTMENT (OUTPATIENT)
Dept: GENERAL RADIOLOGY | Facility: HOSPITAL | Age: 62
End: 2022-04-15

## 2022-04-15 ENCOUNTER — HOSPITAL ENCOUNTER (OUTPATIENT)
Facility: HOSPITAL | Age: 62
Setting detail: HOSPITAL OUTPATIENT SURGERY
Discharge: HOME | End: 2022-04-15
Attending: ORTHOPAEDIC SURGERY | Admitting: ORTHOPAEDIC SURGERY

## 2022-04-15 ENCOUNTER — ANESTHESIA (OUTPATIENT)
Dept: PERIOP | Facility: HOSPITAL | Age: 62
End: 2022-04-15

## 2022-04-15 VITALS
OXYGEN SATURATION: 94 % | HEIGHT: 66 IN | RESPIRATION RATE: 18 BRPM | BODY MASS INDEX: 33.27 KG/M2 | WEIGHT: 207.01 LBS | HEART RATE: 62 BPM | TEMPERATURE: 97 F | DIASTOLIC BLOOD PRESSURE: 60 MMHG | SYSTOLIC BLOOD PRESSURE: 102 MMHG

## 2022-04-15 DIAGNOSIS — I25.10 CORONARY ARTERY DISEASE INVOLVING NATIVE CORONARY ARTERY OF NATIVE HEART WITHOUT ANGINA PECTORIS: ICD-10-CM

## 2022-04-15 DIAGNOSIS — I10 ESSENTIAL HYPERTENSION: ICD-10-CM

## 2022-04-15 DIAGNOSIS — M19.031 LOCALIZED PRIMARY OSTEOARTHRITIS OF CARPOMETACARPAL (CMC) JOINT OF RIGHT WRIST: Primary | ICD-10-CM

## 2022-04-15 DIAGNOSIS — M79.644 CHRONIC PAIN OF RIGHT THUMB: ICD-10-CM

## 2022-04-15 DIAGNOSIS — M25.532 BILATERAL WRIST PAIN: ICD-10-CM

## 2022-04-15 DIAGNOSIS — G89.29 CHRONIC PAIN OF RIGHT THUMB: ICD-10-CM

## 2022-04-15 DIAGNOSIS — M25.531 BILATERAL WRIST PAIN: ICD-10-CM

## 2022-04-15 PROCEDURE — 25010000002 PROPOFOL 10 MG/ML EMULSION

## 2022-04-15 PROCEDURE — 25010000002 FENTANYL CITRATE (PF) 50 MCG/ML SOLUTION

## 2022-04-15 PROCEDURE — 25010000002 PHENYLEPHRINE 10 MG/ML SOLUTION

## 2022-04-15 PROCEDURE — 25447 ARTHRP NTRCRP/CRP/MTCR NTRPS: CPT | Performed by: SPECIALIST/TECHNOLOGIST, OTHER

## 2022-04-15 PROCEDURE — C1889 IMPLANT/INSERT DEVICE, NOC: HCPCS | Performed by: ORTHOPAEDIC SURGERY

## 2022-04-15 PROCEDURE — 25447 ARTHRP NTRCRP/CRP/MTCR NTRPS: CPT | Performed by: ORTHOPAEDIC SURGERY

## 2022-04-15 PROCEDURE — 25010000002 MIDAZOLAM PER 1 MG

## 2022-04-15 PROCEDURE — 25010000002 ONDANSETRON PER 1 MG

## 2022-04-15 PROCEDURE — 76000 FLUOROSCOPY <1 HR PHYS/QHP: CPT

## 2022-04-15 PROCEDURE — 25010000002 CEFAZOLIN PER 500 MG: Performed by: ORTHOPAEDIC SURGERY

## 2022-04-15 PROCEDURE — 25010000002 DEXAMETHASONE PER 1 MG

## 2022-04-15 PROCEDURE — 25010000002 ROPIVACAINE PER 1 MG: Performed by: ANESTHESIOLOGY

## 2022-04-15 PROCEDURE — 25010000002 HYDROMORPHONE 1 MG/ML SOLUTION

## 2022-04-15 DEVICE — SUT FW #2 W/TPR NDL 1/2 CIR 38IN 97CM 26.5MM BLU: Type: IMPLANTABLE DEVICE | Site: WRIST | Status: FUNCTIONAL

## 2022-04-15 RX ORDER — DEXAMETHASONE SODIUM PHOSPHATE 4 MG/ML
INJECTION, SOLUTION INTRA-ARTICULAR; INTRALESIONAL; INTRAMUSCULAR; INTRAVENOUS; SOFT TISSUE AS NEEDED
Status: DISCONTINUED | OUTPATIENT
Start: 2022-04-15 | End: 2022-04-15 | Stop reason: SURG

## 2022-04-15 RX ORDER — PROPOFOL 10 MG/ML
VIAL (ML) INTRAVENOUS AS NEEDED
Status: DISCONTINUED | OUTPATIENT
Start: 2022-04-15 | End: 2022-04-15 | Stop reason: SURG

## 2022-04-15 RX ORDER — PROMETHAZINE HYDROCHLORIDE 25 MG/1
25 SUPPOSITORY RECTAL ONCE AS NEEDED
Status: DISCONTINUED | OUTPATIENT
Start: 2022-04-15 | End: 2022-04-15 | Stop reason: HOSPADM

## 2022-04-15 RX ORDER — BUPIVACAINE HCL/0.9 % NACL/PF 0.1 %
2 PLASTIC BAG, INJECTION (ML) EPIDURAL ONCE
Status: COMPLETED | OUTPATIENT
Start: 2022-04-15 | End: 2022-04-15

## 2022-04-15 RX ORDER — ACETAMINOPHEN 325 MG/1
650 TABLET ORAL ONCE AS NEEDED
Status: DISCONTINUED | OUTPATIENT
Start: 2022-04-15 | End: 2022-04-15 | Stop reason: HOSPADM

## 2022-04-15 RX ORDER — FLUMAZENIL 0.1 MG/ML
0.2 INJECTION INTRAVENOUS AS NEEDED
Status: DISCONTINUED | OUTPATIENT
Start: 2022-04-15 | End: 2022-04-15 | Stop reason: HOSPADM

## 2022-04-15 RX ORDER — FENTANYL CITRATE 50 UG/ML
INJECTION, SOLUTION INTRAMUSCULAR; INTRAVENOUS AS NEEDED
Status: DISCONTINUED | OUTPATIENT
Start: 2022-04-15 | End: 2022-04-15 | Stop reason: SURG

## 2022-04-15 RX ORDER — SODIUM CHLORIDE, SODIUM GLUCONATE, SODIUM ACETATE, POTASSIUM CHLORIDE AND MAGNESIUM CHLORIDE 526; 502; 368; 37; 30 MG/100ML; MG/100ML; MG/100ML; MG/100ML; MG/100ML
1000 INJECTION, SOLUTION INTRAVENOUS CONTINUOUS PRN
Status: DISCONTINUED | OUTPATIENT
Start: 2022-04-15 | End: 2022-04-15 | Stop reason: HOSPADM

## 2022-04-15 RX ORDER — ROPIVACAINE HYDROCHLORIDE 5 MG/ML
INJECTION, SOLUTION EPIDURAL; INFILTRATION; PERINEURAL
Status: COMPLETED | OUTPATIENT
Start: 2022-04-15 | End: 2022-04-15

## 2022-04-15 RX ORDER — PHENYLEPHRINE HYDROCHLORIDE 10 MG/ML
INJECTION INTRAVENOUS AS NEEDED
Status: DISCONTINUED | OUTPATIENT
Start: 2022-04-15 | End: 2022-04-15 | Stop reason: SURG

## 2022-04-15 RX ORDER — HYDROCODONE BITARTRATE AND ACETAMINOPHEN 7.5; 325 MG/1; MG/1
1 TABLET ORAL ONCE AS NEEDED
Status: DISCONTINUED | OUTPATIENT
Start: 2022-04-15 | End: 2022-04-15 | Stop reason: HOSPADM

## 2022-04-15 RX ORDER — EPHEDRINE SULFATE 50 MG/ML
INJECTION, SOLUTION INTRAVENOUS AS NEEDED
Status: DISCONTINUED | OUTPATIENT
Start: 2022-04-15 | End: 2022-04-15 | Stop reason: SURG

## 2022-04-15 RX ORDER — MIDAZOLAM HYDROCHLORIDE 1 MG/ML
INJECTION INTRAMUSCULAR; INTRAVENOUS AS NEEDED
Status: DISCONTINUED | OUTPATIENT
Start: 2022-04-15 | End: 2022-04-15 | Stop reason: SURG

## 2022-04-15 RX ORDER — DIPHENHYDRAMINE HYDROCHLORIDE 50 MG/ML
12.5 INJECTION INTRAMUSCULAR; INTRAVENOUS
Status: DISCONTINUED | OUTPATIENT
Start: 2022-04-15 | End: 2022-04-15 | Stop reason: HOSPADM

## 2022-04-15 RX ORDER — ONDANSETRON 2 MG/ML
INJECTION INTRAMUSCULAR; INTRAVENOUS AS NEEDED
Status: DISCONTINUED | OUTPATIENT
Start: 2022-04-15 | End: 2022-04-15 | Stop reason: SURG

## 2022-04-15 RX ORDER — ACETAMINOPHEN 650 MG/1
650 SUPPOSITORY RECTAL ONCE AS NEEDED
Status: DISCONTINUED | OUTPATIENT
Start: 2022-04-15 | End: 2022-04-15 | Stop reason: HOSPADM

## 2022-04-15 RX ORDER — ONDANSETRON 2 MG/ML
4 INJECTION INTRAMUSCULAR; INTRAVENOUS ONCE AS NEEDED
Status: DISCONTINUED | OUTPATIENT
Start: 2022-04-15 | End: 2022-04-15 | Stop reason: HOSPADM

## 2022-04-15 RX ORDER — HYDROCODONE BITARTRATE AND ACETAMINOPHEN 7.5; 325 MG/1; MG/1
1 TABLET ORAL EVERY 4 HOURS PRN
Qty: 30 TABLET | Refills: 0 | Status: SHIPPED | OUTPATIENT
Start: 2022-04-15 | End: 2022-04-28 | Stop reason: SDUPTHER

## 2022-04-15 RX ORDER — PROMETHAZINE HYDROCHLORIDE 25 MG/1
25 TABLET ORAL ONCE AS NEEDED
Status: DISCONTINUED | OUTPATIENT
Start: 2022-04-15 | End: 2022-04-15 | Stop reason: HOSPADM

## 2022-04-15 RX ORDER — ONDANSETRON 4 MG/1
4 TABLET, FILM COATED ORAL ONCE AS NEEDED
Status: DISCONTINUED | OUTPATIENT
Start: 2022-04-15 | End: 2022-04-15 | Stop reason: HOSPADM

## 2022-04-15 RX ORDER — BUPIVACAINE HYDROCHLORIDE 2.5 MG/ML
INJECTION, SOLUTION EPIDURAL; INFILTRATION; INTRACAUDAL AS NEEDED
Status: DISCONTINUED | OUTPATIENT
Start: 2022-04-15 | End: 2022-04-15 | Stop reason: HOSPADM

## 2022-04-15 RX ORDER — EPHEDRINE SULFATE 50 MG/ML
5 INJECTION, SOLUTION INTRAVENOUS ONCE AS NEEDED
Status: DISCONTINUED | OUTPATIENT
Start: 2022-04-15 | End: 2022-04-15 | Stop reason: HOSPADM

## 2022-04-15 RX ORDER — LIDOCAINE HYDROCHLORIDE 20 MG/ML
INJECTION, SOLUTION INFILTRATION; PERINEURAL AS NEEDED
Status: DISCONTINUED | OUTPATIENT
Start: 2022-04-15 | End: 2022-04-15 | Stop reason: SURG

## 2022-04-15 RX ORDER — NALOXONE HCL 0.4 MG/ML
0.4 VIAL (ML) INJECTION AS NEEDED
Status: DISCONTINUED | OUTPATIENT
Start: 2022-04-15 | End: 2022-04-15 | Stop reason: HOSPADM

## 2022-04-15 RX ADMIN — PHENYLEPHRINE HYDROCHLORIDE 100 MCG: 10 INJECTION, SOLUTION INTRAVENOUS at 13:28

## 2022-04-15 RX ADMIN — HYDROMORPHONE HYDROCHLORIDE 0.5 MG: 1 INJECTION, SOLUTION INTRAMUSCULAR; INTRAVENOUS; SUBCUTANEOUS at 14:51

## 2022-04-15 RX ADMIN — DEXAMETHASONE SODIUM PHOSPHATE 4 MG: 4 INJECTION, SOLUTION INTRAMUSCULAR; INTRAVENOUS at 13:09

## 2022-04-15 RX ADMIN — SODIUM CHLORIDE, SODIUM GLUCONATE, SODIUM ACETATE, POTASSIUM CHLORIDE AND MAGNESIUM CHLORIDE 1000 ML: 526; 502; 368; 37; 30 INJECTION, SOLUTION INTRAVENOUS at 11:48

## 2022-04-15 RX ADMIN — ROPIVACAINE HYDROCHLORIDE 30 ML: 5 INJECTION, SOLUTION EPIDURAL; INFILTRATION; PERINEURAL at 12:21

## 2022-04-15 RX ADMIN — HYDROMORPHONE HYDROCHLORIDE 0.5 MG: 1 INJECTION, SOLUTION INTRAMUSCULAR; INTRAVENOUS; SUBCUTANEOUS at 14:40

## 2022-04-15 RX ADMIN — FENTANYL CITRATE 25 MCG: 50 INJECTION INTRAMUSCULAR; INTRAVENOUS at 14:23

## 2022-04-15 RX ADMIN — PROPOFOL 100 MG: 10 INJECTION, EMULSION INTRAVENOUS at 12:58

## 2022-04-15 RX ADMIN — EPHEDRINE SULFATE 10 MG: 50 INJECTION INTRAVENOUS at 13:08

## 2022-04-15 RX ADMIN — FENTANYL CITRATE 50 MCG: 50 INJECTION INTRAMUSCULAR; INTRAVENOUS at 12:53

## 2022-04-15 RX ADMIN — ONDANSETRON 4 MG: 2 INJECTION INTRAMUSCULAR; INTRAVENOUS at 14:06

## 2022-04-15 RX ADMIN — Medication 2 G: at 13:06

## 2022-04-15 RX ADMIN — LIDOCAINE HYDROCHLORIDE 100 MG: 20 INJECTION, SOLUTION INFILTRATION; PERINEURAL at 12:58

## 2022-04-15 RX ADMIN — PROPOFOL 50 MG: 10 INJECTION, EMULSION INTRAVENOUS at 12:59

## 2022-04-15 RX ADMIN — MIDAZOLAM HYDROCHLORIDE 2 MG: 1 INJECTION, SOLUTION INTRAMUSCULAR; INTRAVENOUS at 12:51

## 2022-04-15 RX ADMIN — EPHEDRINE SULFATE 10 MG: 50 INJECTION INTRAVENOUS at 14:04

## 2022-04-15 RX ADMIN — EPHEDRINE SULFATE 10 MG: 50 INJECTION INTRAVENOUS at 13:28

## 2022-04-15 RX ADMIN — EPHEDRINE SULFATE 10 MG: 50 INJECTION INTRAVENOUS at 13:14

## 2022-04-15 NOTE — ANESTHESIA PREPROCEDURE EVALUATION
Anesthesia Evaluation     no history of anesthetic complications:  NPO Solid Status: > 8 hours  NPO Liquid Status: > 4 hours           Airway   Mallampati: I  TM distance: >3 FB  Neck ROM: full  No difficulty expected  Dental    (+) edentulous    Pulmonary     breath sounds clear to auscultation  (+) a smoker (2 ppd) Current Smoked day of surgery, COPD, asthma,decreased breath sounds,   (-) sleep apnea, no home oxygen  Cardiovascular   Exercise tolerance: poor (<4 METS)    ECG reviewed  Patient on routine beta blocker and Beta blocker given within 24 hours of surgery  Rhythm: regular  Rate: normal    (+) hypertension, valvular problems/murmurs MR, past MI  >12 months, CAD, PVD (claudication), hyperlipidemia,   (-) dysrhythmias, angina, cardiac stents, CABG, DVT    ROS comment: Sinus rhythm with marked sinus arrhythmia  Otherwise normal ECG  When compared with ECG of 26-FEB-2021 09:42,  No significant change was found    · Estimated EF = 61%.  · Left ventricular systolic function is normal.  · Left ventricular diastolic dysfunction (grade I) consistent with impaired relaxation.  · Mild mitral valve regurgitation is present  · Mild tricuspid valve regurgitation is present.  · Mild tricuspid valve stenosis is present.        Neuro/Psych  (+) syncope (from coughing sometimes), weakness (both thumbs), numbness (right and left hand),    (-) seizures, TIA, CVA, headaches, psychiatric history  GI/Hepatic/Renal/Endo    (+) obesity,  GERD well controlled,    (-) hepatitis, liver disease, no renal disease, diabetes, no thyroid disorder    Musculoskeletal     (+) arthralgias,   Abdominal   (+) obese,    Substance History   (+) alcohol use (none in 7 months),   (-) drug use     OB/GYN          Other   arthritis,      (-) history of cancer  ROS/Med Hx Other: 1.  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 This is of uncertain etiology. This may represent changes from surgical removal and/or resorption. Recommend clinical  correlation.   2.  Right wrist asymmetric joint space loss of the first carpal metacarpal joint space with osteophytosis consistent with severe osteoarthritic changes.   3.  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   4.  Remainder of bilateral wrist exams are unremarkable.                  Anesthesia Plan    ASA 4     MAC and regional   (Infraclavicular block discussed and patient agrees to proceed)  intravenous induction     Anesthetic plan, all risks, benefits, and alternatives have been provided, discussed and informed consent has been obtained with: patient.        CODE STATUS:

## 2022-04-15 NOTE — INTERVAL H&P NOTE
H&P reviewed. The patient was examined and there are no changes to the H&P.      Vitals:    04/15/22 1133   BP: 108/58   Pulse: 59   Resp: 18   Temp: 96 °F (35.6 °C)   SpO2: 95%       No Known Allergies    Prior to Admission medications    Medication Sig Start Date End Date Taking? Authorizing Provider   acetaminophen (TYLENOL) 650 MG 8 hr tablet Take 650 mg by mouth Every 8 (Eight) Hours As Needed for Mild Pain .   Yes Alma George MD   albuterol sulfate  (90 Base) MCG/ACT inhaler Inhale 2 puffs Every 4 (Four) Hours As Needed for Wheezing. 12/10/21  Yes Perfecto Barr MD   amLODIPine (NORVASC) 5 MG tablet Take 5 mg by mouth Daily.   Yes Alma George MD   hydroCHLOROthiazide (HYDRODIURIL) 25 MG tablet Take 1 tablet by mouth Daily. 1/4/22  Yes Barbara Ardon MD   isosorbide mononitrate (IMDUR) 60 MG 24 hr tablet Take 1 tablet by mouth Daily. 10/25/21  Yes Barbara Ardon MD   lisinopril (PRINIVIL,ZESTRIL) 40 MG tablet Take 1 tablet by mouth Daily. 1/4/22  Yes Barbara Ardon MD   metoprolol succinate XL (TOPROL-XL) 25 MG 24 hr tablet Take 1 tablet by mouth Daily. 1/4/22  Yes Barbara Ardon MD   aspirin 81 MG chewable tablet Chew 81 mg Daily.    Alma George MD   atorvastatin (LIPITOR) 80 MG tablet Take 80 mg by mouth Daily. 2/9/22   Alma George MD   Fluticasone Furoate-Vilanterol (Breo Ellipta) 100-25 MCG/INH inhaler Inhale 1 puff Daily. 12/10/21   Perfecto Barr MD   nitroglycerin (NITROSTAT) 0.4 MG SL tablet Place 1 tablet under the tongue Every 5 (Five) Minutes As Needed for Chest Pain. Take no more than 3 doses in 15 minutes. 10/14/19   Andrew Villatoro III, MD       Past Medical History:   Diagnosis Date    Acute bronchitis     Acute sinusitis     Chest pain     Chronic bronchitis (HCC)     secondary to smoking    Claudication (HCC)     COPD (chronic obstructive pulmonary disease) (HCC)     Coronary arteriosclerosis     Cough     Gastroesophageal reflux disease      Health maintenance examination     Individual health examination    History of echocardiogram 10/21/2013    Echocardiogram W/ color flow 47951 (1) - Left atrium normal. Mild CLVH. EF 50%. Valves intact. Mild mitral and tricuspid regurg. Pericardium normal.    History of echocardiogram 10/25/2011    Echocardiogram W/O color flow 20510 (1) - Normal LV sytolic function with mild LVH & mild diastolic dysfunction    Hyperlipidemia     Hypertensive disorder     Pernicious anemia     Thumb pain, left 10/14/2019    Tobacco dependence syndrome        Past Surgical History:   Procedure Laterality Date    CARDIAC CATHETERIZATION  04/08/2015    Cardiac cath 10972 (1) - Slective coronary angiogram. Left heart catheterization with LV ventriculogram.    COLONOSCOPY N/A 10/26/2020    Procedure: COLONOSCOPY;  Surgeon: Levi Milian MD;  Location: [facility] ENDOSCOPY;  Service: Gastroenterology;  Laterality: N/A;    COLONOSCOPY N/A 11/08/2021    Procedure: COLONOSCOPY;  Surgeon: Levi Milian MD;  Location: [facility] ENDOSCOPY;  Service: Gastroenterology;  Laterality: N/A;    DENTAL PROCEDURE      Removal of all teeth       Social History     Socioeconomic History    Marital status: Single   Tobacco Use    Smoking status: Current Every Day Smoker     Packs/day: 2.00     Years: 40.00     Pack years: 80.00     Types: Cigarettes    Smokeless tobacco: Former User   Vaping Use    Vaping Use: Never used   Substance and Sexual Activity    Alcohol use: Not Currently    Drug use: No    Sexual activity: Defer     Comment: Marital status:        Family History   Problem Relation Age of Onset    Heart disease Mother     Hypertension Mother     Diabetes Mother     Heart disease Father     Hypertension Father     Diabetes Father     Diabetes Sister     Heart disease Sister     Stroke Sister     Cancer Paternal Aunt         Breast Cancer    Cancer Paternal Uncle         Unknown cancer             This document has been electronically  signed by Kerwin Bloom MD on April 15, 2022 12:00 CDT

## 2022-04-15 NOTE — OP NOTE
HAND PROCEDURE WITHOUT BONE WORK  Procedure Note    Name:    Nima Portillo  YOB: 1960  Date of surgery:   4/15/2022    Pre-op Diagnosis:   Bilateral wrist pain [M25.531, M25.532]  Localized primary osteoarthritis of carpometacarpal (CMC) joint of right wrist [M19.031]  Essential hypertension [I10]  Chronic pain of right thumb [M79.644, G89.29]  Coronary artery disease involving native coronary artery of native heart without angina pectoris [I25.10]    Post-op Diagnosis:    Post-Op Diagnosis Codes:     * Bilateral wrist pain [M25.531, M25.532]     * Localized primary osteoarthritis of carpometacarpal (CMC) joint of right wrist [M19.031]     * Essential hypertension [I10]     * Chronic pain of right thumb [M79.644, G89.29]     * Coronary artery disease involving native coronary artery of native heart without angina pectoris [I25.10]    Procedure:  Procedure(s):  TRAPEZIECTOMY RIGHT WRIST WITH SUSPENSION ARTHROPLASTY FIRST CARPOMETA CARPAL    Surgeon:  Surgeon(s):  Kerwin Bloom MD    Assistant: Tavia Jarvis CSA was responsible for performing the following activities: Retraction, Suction, Irrigation, Suturing, Closing and Placing Dressing and their skilled assistance was necessary for the success of this case.     Anesthesia: Regional, Monitored Anesthesia Care    Staff:   Circulator: Olga Claros RN; Dipti Abbasi RN  Scrub Person: Natalie Zelaya  Assistant: Tavia Jarvis CSA    Estimated Blood Loss: minimal    Specimens:                ID Type Source Tests Collected by Time   A (Not marked as sent) : trapezium right Tissue Wrist, Right TISSUE PATHOLOGY EXAM Kerwin Bloom MD 4/15/2022 1352         Drains: * No LDAs found *    Findings:  As below    Complications: None    IMPLANTS:   Implant Name Type Inv. Item Serial No.  Lot No. LRB No. Used Action   SUT FW #2 W/TPR NDL 1/2 CIR 38IN 97CM 26.5MM KAPIL - [id number] Implant SUT FW #2 W/TPR NDL 1/2  CIR 38IN 97CM 26.5MM KAPIL  ARTHREX [id number] Right 1 Implanted         PROCEDURE:    Once consent was obtained, the patient was taken to the operating room and once adequate anesthesia was obtained, the right upper extremity was prepped and draped in standard sterile fashion.  A pneumatic tourniquet was inflated to 250 mmHg.  A surgical time out was performed.      Incision was made over the dorsal aspect of the 1st CMC joint.  Dissection was carried down and care was taken to avoid the dorsal radial sensory branches.   The capsule of the 1st CMC joint was then incised and reflected off of the base of the 1st metacarpal.  The dissection was then carried around the base of the 1st metacarpal exposing the trapezium.  I slowly worked on resecting the capsule off the trapezium medially and laterally as well as proximally and distally.  Traction was held proximally to avoid neurovascular bundle.  The trapezium was then dissected and using a McGlamry retractor the trapezium was then removed without difficulty and elevator was then used to elevate the base of the 1st metacarpal and the large osteophyte on the lower aspect of the metacarpal was then resected.  The spurs were then removed from the ulnar aspect of the metacarpal as well.  The flexor carpi radialis was visualized in the base of the wound.  The wound was copiously irrigated.  There was noted to be some small osteoarthritic changes on the distal pole of the scaphoid as well as on the radial aspect of the trapezoid.  Traction was held on the thumb and a #2 FiberWire was then run from the APL to the FCR.  A weave was then created between these 2 tendons creating a hammock or a suture sling for the base of the 1st metacarpal.  Sutures were then tied completing the suspensionplasty.  The thumb was then compressed and axial load was applied and good spacing was still maintained between the base of the metacarpal and the scaphoid.  C-arm imaging was utilized to confirm  that bony fragments had been removed and that acceptable alignment had been achieved.  It was felt that the base of the thumb was stable and a pin was not necessary.  The wound was then copiously irrigated with saline and then the capsule was closed using 2-0 Vicryl.  Subcutaneous tissue was closed using 2-0 Vicryl.  The wound was closed using nylon sutures. The incision was then covered with Xeroform gauze, 4 x 4s, and a well-padded dressing was applied with a well-padded volar splint and a thumb spica splint.  The patient was then placed in a sling and then he was awakened and taken to the recovery room in good condition.  He tolerated the procedure very well.         Kerwin Bloom MD     Date: 4/15/2022  Time: 14:54 CDT

## 2022-04-15 NOTE — ANESTHESIA PROCEDURE NOTES
Peripheral Block      Patient reassessed immediately prior to procedure    Patient location during procedure: holding area  Start time: 4/15/2022 12:11 PM  Stop time: 4/15/2022 12:22 PM  Reason for block: at surgeon's request and post-op pain management  Performed by  Anesthesiologist: Honorio Lin MD  Assisted by: Luis Fernando Adames SRNA  Preanesthetic Checklist  Completed: patient identified, IV checked, site marked, risks and benefits discussed, surgical consent, monitors and equipment checked, pre-op evaluation and timeout performed  Prep:  Pt Position: supine  Sterile barriers:cap, gloves, mask and washed/disinfected hands  Prep: ChloraPrep  Patient monitoring: blood pressure monitoring and continuous pulse oximetry  Procedure    Sedation: no  Performed under: local infiltration  Guidance:ultrasound guided    ULTRASOUND INTERPRETATION.  Using ultrasound guidance a 21 G gauge needle was placed in close proximity to the brachial plexus nerve, at which point, under ultrasound guidance anesthetic was injected in the area of the nerve and spread of the anesthesia was seen on ultrasound in close proximity thereto.  There were no abnormalities seen on ultrasound; a digital image was taken; and the patient tolerated the procedure with no complications. Images:still images obtained, printed/placed on chart    Laterality:right  Block Type:infraclavicular  Injection Technique:single-shot  Needle Type:echogenic  Needle Gauge:21 G      Medications Used: ropivacaine (NAROPIN) 0.5 % injection, 30 mL  Med administered at 4/15/2022 12:21 PM      Medications  Comment:Pt ID'd  Ultrasound guided  Needle seen throughout  Local infiltration appropriate    Post Assessment  Injection Assessment: negative aspiration for heme, incremental injection and no paresthesia on injection  Patient Tolerance:comfortable throughout block  Complications:no

## 2022-04-15 NOTE — ANESTHESIA POSTPROCEDURE EVALUATION
Patient: Nima Portillo    Procedure Summary     Date: 04/15/22 Room / Location: [facility] OR  / [facility] OR    Anesthesia Start: 1252 Anesthesia Stop: 1433    Procedure: TRAPEZIECTOMY RIGHT WRIST WITH SUSPENSION ARTHROPLASTY FIRST CARPOMETA CARPAL (Right Hand) Diagnosis:       Bilateral wrist pain      Localized primary osteoarthritis of carpometacarpal (CMC) joint of right wrist      Essential hypertension      Chronic pain of right thumb      Coronary artery disease involving native coronary artery of native heart without angina pectoris      (Bilateral wrist pain [M25.531, M25.532])      (Localized primary osteoarthritis of carpometacarpal (CMC) joint of right wrist [M19.031])      (Essential hypertension [I10])      (Chronic pain of right thumb [M79.644, G89.29])      (Coronary artery disease involving native coronary artery of native heart without angina pectoris [I25.10])    Surgeons: Kerwin Bloom MD Provider: Honorio Lin MD    Anesthesia Type: MAC, regional ASA Status: 4          Anesthesia Type: MAC, regional    Vitals  No vitals data found for the desired time range.          Post Anesthesia Care and Evaluation    Patient location during evaluation: PACU  Patient participation: complete - patient cannot participate  Level of consciousness: sleepy but conscious  Pain score: 0  Pain management: adequate  Airway patency: patent  Anesthetic complications: No anesthetic complications  PONV Status: none  Cardiovascular status: acceptable  Respiratory status: acceptable  Hydration status: acceptable    Comments: Vital Signs:    HR: 79  BP: 114/64  SpO2: 93  RR: 24  Temp: 97.3  No anesthesia care post op

## 2022-04-15 NOTE — ANESTHESIA PROCEDURE NOTES
Airway  Urgency: elective    Date/Time: 4/15/2022 1:01 PM  Airway not difficult    General Information and Staff    Patient location during procedure: OR  CRNA: Dominga Hernandez CRNA    Indications and Patient Condition  Indications for airway management: airway protection    Preoxygenated: yes  Mask difficulty assessment: 0 - not attempted    Final Airway Details  Final airway type: supraglottic airway      Successful airway: I-gel  Size 5    Number of attempts at approach: 2  Assessment: lips, teeth, and gum same as pre-op    Additional Comments  First attempt made with a size 4 LMA. LMA was determined to be too small and switched to a size 5.

## 2022-04-19 LAB
LAB AP CASE REPORT: NORMAL
PATH REPORT.FINAL DX SPEC: NORMAL

## 2022-04-21 DIAGNOSIS — I10 ESSENTIAL HYPERTENSION: ICD-10-CM

## 2022-04-21 DIAGNOSIS — I25.118 CORONARY ARTERY DISEASE OF NATIVE ARTERY OF NATIVE HEART WITH STABLE ANGINA PECTORIS: ICD-10-CM

## 2022-04-21 RX ORDER — ISOSORBIDE MONONITRATE 60 MG/1
TABLET, EXTENDED RELEASE ORAL
Qty: 90 TABLET | Refills: 2 | Status: SHIPPED | OUTPATIENT
Start: 2022-04-21 | End: 2023-02-06

## 2022-04-21 NOTE — PROGRESS NOTES
I have seen the patient.  I have reviewed the notes, assessments, and/or procedures performed by Charles Rojas MD, I concur with her/his documentation and assessment and plan for Nima Portillo.               This document has been electronically signed by Hubert Thomas MD on April 21, 2022 15:33 CDT

## 2022-04-22 DIAGNOSIS — M25.531 BILATERAL WRIST PAIN: Primary | ICD-10-CM

## 2022-04-22 DIAGNOSIS — M25.532 BILATERAL WRIST PAIN: Primary | ICD-10-CM

## 2022-04-25 ENCOUNTER — OFFICE VISIT (OUTPATIENT)
Dept: PULMONOLOGY | Facility: CLINIC | Age: 62
End: 2022-04-25

## 2022-04-25 VITALS
OXYGEN SATURATION: 96 % | DIASTOLIC BLOOD PRESSURE: 92 MMHG | BODY MASS INDEX: 34.07 KG/M2 | SYSTOLIC BLOOD PRESSURE: 146 MMHG | HEART RATE: 68 BPM | HEIGHT: 66 IN | WEIGHT: 212 LBS

## 2022-04-25 DIAGNOSIS — F17.210 CIGARETTE NICOTINE DEPENDENCE WITHOUT COMPLICATION: Primary | ICD-10-CM

## 2022-04-25 PROBLEM — F17.211 CIGARETTE NICOTINE DEPENDENCE IN REMISSION: Status: RESOLVED | Noted: 2019-11-13 | Resolved: 2022-04-25

## 2022-04-25 PROCEDURE — 99203 OFFICE O/P NEW LOW 30 MIN: CPT | Performed by: INTERNAL MEDICINE

## 2022-04-25 NOTE — PROGRESS NOTES
"    Pulmonary Consultation    OPerfecto Ospina MD,    Thank you for asking me to see Nima Portillo for   Chief Complaint   Patient presents with   • COPD     Chief Complaint    .      History of Present Illness  Nima Portillo is a 61 y.o. male      Patient referred for \"COPD\"    Patient not sure why he's here today. He denies shortness of breath. It looks like a referral was placed back in December but not clear if he was having symptoms then or not.    He was given a prescription for Breo at some point but hasn't been taking it because he wasn't sure how to use it. He has and albuterol inhaler that he does use as needed    He is smoking 2ppd and adament that he won't quit    Tobacco use history:  Type: cigarettes  Amount: 2 ppd  Duration: 40 years  Cessation: no   Willing to quit: No      Review of Systems: History obtained from chart review and the patient.  Review of Systems  As described in the HPI. Otherwise, remainder of ROS (14 systems) were negative.    Patient Active Problem List   Diagnosis   • Claudication of both lower extremities (HCC)   • Coronary artery disease involving native coronary artery of native heart without angina pectoris   • Essential hypertension   • Pure hypercholesterolemia   • ETOH abuse   • Screen for colon cancer   • Bilateral carpal tunnel syndrome   • Near syncope   • Mucopurulent chronic bronchitis (HCC)   • Symptomatic hypotension   • Chronic pain of right thumb   • Arthritis of carpometacarpal (CMC) joint of both thumbs   • Bilateral hand pain   • Bilateral wrist pain   • Localized primary osteoarthritis of carpometacarpal (CMC) joint of right wrist   • Cigarette nicotine dependence without complication         Current Outpatient Medications:   •  acetaminophen (TYLENOL) 650 MG 8 hr tablet, Take 650 mg by mouth Every 8 (Eight) Hours As Needed for Mild Pain ., Disp: , Rfl:   •  albuterol sulfate  (90 Base) MCG/ACT inhaler, Inhale 2 puffs Every 4 (Four) Hours As " Needed for Wheezing., Disp: 6.7 g, Rfl: 6  •  amLODIPine (NORVASC) 5 MG tablet, Take 5 mg by mouth Daily., Disp: , Rfl:   •  aspirin 81 MG chewable tablet, Chew 81 mg Daily., Disp: , Rfl:   •  atorvastatin (LIPITOR) 80 MG tablet, Take 80 mg by mouth Daily., Disp: , Rfl:   •  Fluticasone Furoate-Vilanterol (Breo Ellipta) 100-25 MCG/INH inhaler, Inhale 1 puff Daily., Disp: 60 each, Rfl: 1  •  hydroCHLOROthiazide (HYDRODIURIL) 25 MG tablet, Take 1 tablet by mouth Daily., Disp: 90 tablet, Rfl: 3  •  HYDROcodone-acetaminophen (NORCO) 7.5-325 MG per tablet, Take 1 tablet by mouth Every 4 (Four) Hours As Needed for Moderate Pain  (Pain)., Disp: 30 tablet, Rfl: 0  •  isosorbide mononitrate (IMDUR) 60 MG 24 hr tablet, TAKE 1 TABLET BY MOUTH EVERY DAY, Disp: 90 tablet, Rfl: 2  •  lisinopril (PRINIVIL,ZESTRIL) 40 MG tablet, Take 1 tablet by mouth Daily., Disp: 30 tablet, Rfl: 6  •  metoprolol succinate XL (TOPROL-XL) 25 MG 24 hr tablet, Take 1 tablet by mouth Daily., Disp: 30 tablet, Rfl: 6  •  nitroglycerin (NITROSTAT) 0.4 MG SL tablet, Place 1 tablet under the tongue Every 5 (Five) Minutes As Needed for Chest Pain. Take no more than 3 doses in 15 minutes., Disp: 30 tablet, Rfl: 12    No Known Allergies    Past Medical History:   Diagnosis Date   • Acute bronchitis    • Acute sinusitis    • Chest pain    • Chronic bronchitis (HCC)     secondary to smoking   • Claudication (HCC)    • COPD (chronic obstructive pulmonary disease) (HCC)    • Coronary arteriosclerosis    • Cough    • Gastroesophageal reflux disease    • Health maintenance examination     Individual health examination   • History of echocardiogram 10/21/2013    Echocardiogram W/ color flow 34519 (1) - Left atrium normal. Mild CLVH. EF 50%. Valves intact. Mild mitral and tricuspid regurg. Pericardium normal.   • History of echocardiogram 10/25/2011    Echocardiogram W/O color flow 50642 (1) - Normal LV sytolic function with mild LVH & mild diastolic dysfunction   •  "Hyperlipidemia    • Hypertensive disorder    • Pernicious anemia    • Thumb pain, left 10/14/2019   • Tobacco dependence syndrome      Past Surgical History:   Procedure Laterality Date   • CARDIAC CATHETERIZATION  04/08/2015    Cardiac cath 52370 (1) - Slective coronary angiogram. Left heart catheterization with LV ventriculogram.   • COLONOSCOPY N/A 10/26/2020    Procedure: COLONOSCOPY;  Surgeon: Levi Milian MD;  Location: [facility] ENDOSCOPY;  Service: Gastroenterology;  Laterality: N/A;   • COLONOSCOPY N/A 11/08/2021    Procedure: COLONOSCOPY;  Surgeon: Levi Milian MD;  Location: [facility] ENDOSCOPY;  Service: Gastroenterology;  Laterality: N/A;   • DENTAL PROCEDURE      Removal of all teeth   • HAND SURGERY Right 4/15/2022    Procedure: TRAPEZIECTOMY RIGHT WRIST WITH SUSPENSION ARTHROPLASTY FIRST CARPOMETA CARPAL;  Surgeon: Kerwin Bloom MD;  Location: [facility] OR;  Service: Orthopedics;  Laterality: Right;     Social History     Socioeconomic History   • Marital status: Single   Tobacco Use   • Smoking status: Current Every Day Smoker     Packs/day: 2.00     Years: 40.00     Pack years: 80.00     Types: Cigarettes   • Smokeless tobacco: Former User   Vaping Use   • Vaping Use: Never used   Substance and Sexual Activity   • Alcohol use: Not Currently   • Drug use: No   • Sexual activity: Defer     Comment: Marital status:      Family History   Problem Relation Age of Onset   • Heart disease Mother    • Hypertension Mother    • Diabetes Mother    • Heart disease Father    • Hypertension Father    • Diabetes Father    • Diabetes Sister    • Heart disease Sister    • Stroke Sister    • Cancer Paternal Aunt         Breast Cancer   • Cancer Paternal Uncle         Unknown cancer          Objective     Blood pressure 146/92, pulse 68, height 167.6 cm (66\"), weight 96.2 kg (212 lb), SpO2 96 %.  Physical Exam  Vitals reviewed.   Constitutional:       Appearance: Normal appearance.   HENT:      Head: " Normocephalic and atraumatic.      Right Ear: Tympanic membrane normal.      Left Ear: Tympanic membrane normal.      Nose: Nose normal.      Mouth/Throat:      Mouth: Mucous membranes are moist.      Pharynx: Oropharynx is clear.   Eyes:      Conjunctiva/sclera: Conjunctivae normal.      Pupils: Pupils are equal, round, and reactive to light.   Cardiovascular:      Rate and Rhythm: Normal rate and regular rhythm.      Pulses: Normal pulses.      Heart sounds: Normal heart sounds.   Pulmonary:      Effort: Pulmonary effort is normal.      Breath sounds: Normal breath sounds.   Abdominal:      General: Abdomen is flat. Bowel sounds are normal.      Palpations: Abdomen is soft.   Musculoskeletal:         General: Normal range of motion.      Cervical back: Normal range of motion and neck supple.      Comments: Right hand in cast   Skin:     General: Skin is warm and dry.   Neurological:      General: No focal deficit present.      Mental Status: He is alert and oriented to person, place, and time.   Psychiatric:         Mood and Affect: Mood normal.         Behavior: Behavior normal.         PFTs:  (independently reviewed and interpreted by me)  Unable to perform    Radiology (independently reviewed and interpreted by me):   LDCT 9/21/21- no concerning nodulse     Assessment/Plan     Diagnoses and all orders for this visit:    1. Cigarette nicotine dependence without complication (Primary)         Discussion/ Recommendations:   Patient with heavy smoking history. He has not been able to complete PFTs and really doesn't seem overly symptomatic at this time. Does endorse periodic coughing which is not surprising. Frankly, with a 2ppd habit, inhalers are not going to have a lot of effect on him and I would not recommend a maintance inhaler at this time unless he starts having more clear dyspnea. Right now, even a reduction in smoking would be beneficial.     He can continue albuterol prn and annual lung cancer screening  CT with primary care             No follow-ups on file.      Thank you for allowing me to participate in the care of Nima Portillo. Please do not hesitate to contact me with any questions.         This document has been electronically signed by Mary Lui DO on April 25, 2022 13:38 CDT

## 2022-04-28 ENCOUNTER — OFFICE VISIT (OUTPATIENT)
Dept: ORTHOPEDIC SURGERY | Facility: CLINIC | Age: 62
End: 2022-04-28

## 2022-04-28 ENCOUNTER — HOSPITAL ENCOUNTER (OUTPATIENT)
Dept: PHYSICAL THERAPY | Facility: HOSPITAL | Age: 62
Setting detail: THERAPIES SERIES
Discharge: HOME | End: 2022-04-28

## 2022-04-28 VITALS — BODY MASS INDEX: 33.59 KG/M2 | WEIGHT: 209 LBS | HEIGHT: 66 IN

## 2022-04-28 DIAGNOSIS — M19.031 LOCALIZED PRIMARY OSTEOARTHRITIS OF CARPOMETACARPAL (CMC) JOINT OF RIGHT WRIST: ICD-10-CM

## 2022-04-28 DIAGNOSIS — M79.644 CHRONIC PAIN OF RIGHT THUMB: ICD-10-CM

## 2022-04-28 DIAGNOSIS — M25.532 BILATERAL WRIST PAIN: Primary | ICD-10-CM

## 2022-04-28 DIAGNOSIS — G89.29 CHRONIC PAIN OF RIGHT THUMB: ICD-10-CM

## 2022-04-28 DIAGNOSIS — M25.531 BILATERAL WRIST PAIN: Primary | ICD-10-CM

## 2022-04-28 DIAGNOSIS — Z09 STATUS POST ORTHOPEDIC SURGERY, FOLLOW-UP EXAM: ICD-10-CM

## 2022-04-28 PROCEDURE — 99024 POSTOP FOLLOW-UP VISIT: CPT | Performed by: NURSE PRACTITIONER

## 2022-04-28 PROCEDURE — 97760 ORTHOTIC MGMT&TRAING 1ST ENC: CPT | Performed by: PHYSICAL THERAPIST

## 2022-04-28 PROCEDURE — 97163 PT EVAL HIGH COMPLEX 45 MIN: CPT | Performed by: PHYSICAL THERAPIST

## 2022-04-28 RX ORDER — HYDROCODONE BITARTRATE AND ACETAMINOPHEN 7.5; 325 MG/1; MG/1
1 TABLET ORAL EVERY 4 HOURS PRN
Qty: 30 TABLET | Refills: 0 | Status: SHIPPED | OUTPATIENT
Start: 2022-04-28 | End: 2022-06-08 | Stop reason: SDUPTHER

## 2022-04-28 NOTE — PROGRESS NOTES
Nima Portillo is a 61 y.o. male is s/p     04/15/22 (13d) Kerwin Bloom MD    Trapeziectomy Right Wrist With Suspension Arthroplasty First Carpometa Carpal - Right          Chief Complaint   Patient presents with   • Left Wrist - Follow-up   • Right Wrist - Follow-up       HISTORY OF PRESENT ILLNESS: This 61-year-old male patient presents today for 2-week follow-up status post trapeziectomy of the right wrist with suspension arthroplasty of the first carpometacarpal joint.  This patient has no unusual complaints.  He reports swelling, pain and limited mobility.  The patient presents today in a thumb spica splint that was placed postsurgically.  The patient denies numbness or tingling.  The patient denies fever or chills.  Sent for x-ray upon arrival.       No Known Allergies      Current Outpatient Medications:   •  HYDROcodone-acetaminophen (NORCO) 7.5-325 MG per tablet, Take 1 tablet by mouth Every 4 (Four) Hours As Needed for Moderate Pain  (Pain)., Disp: 30 tablet, Rfl: 0  •  acetaminophen (TYLENOL) 650 MG 8 hr tablet, Take 650 mg by mouth Every 8 (Eight) Hours As Needed for Mild Pain ., Disp: , Rfl:   •  albuterol sulfate  (90 Base) MCG/ACT inhaler, Inhale 2 puffs Every 4 (Four) Hours As Needed for Wheezing., Disp: 6.7 g, Rfl: 6  •  amLODIPine (NORVASC) 5 MG tablet, Take 5 mg by mouth Daily., Disp: , Rfl:   •  aspirin 81 MG chewable tablet, Chew 81 mg Daily., Disp: , Rfl:   •  atorvastatin (LIPITOR) 80 MG tablet, Take 80 mg by mouth Daily., Disp: , Rfl:   •  Fluticasone Furoate-Vilanterol (Breo Ellipta) 100-25 MCG/INH inhaler, Inhale 1 puff Daily., Disp: 60 each, Rfl: 1  •  hydroCHLOROthiazide (HYDRODIURIL) 25 MG tablet, Take 1 tablet by mouth Daily., Disp: 90 tablet, Rfl: 3  •  isosorbide mononitrate (IMDUR) 60 MG 24 hr tablet, TAKE 1 TABLET BY MOUTH EVERY DAY, Disp: 90 tablet, Rfl: 2  •  lisinopril (PRINIVIL,ZESTRIL) 40 MG tablet, Take 1 tablet by mouth Daily., Disp: 30 tablet, Rfl: 6  •   metoprolol succinate XL (TOPROL-XL) 25 MG 24 hr tablet, Take 1 tablet by mouth Daily., Disp: 30 tablet, Rfl: 6  •  nitroglycerin (NITROSTAT) 0.4 MG SL tablet, Place 1 tablet under the tongue Every 5 (Five) Minutes As Needed for Chest Pain. Take no more than 3 doses in 15 minutes., Disp: 30 tablet, Rfl: 12    No fevers or chills.  No nausea or vomiting.      PHYSICAL EXAMINATION:       Nima Portillo is a 61 y.o. male    Patient is awake and alert, answers questions appropriately and is in no apparent distress.    GAIT:     []  Normal  []  Antalgic    Assistive device: [x]  None  []  Walker     []  Crutches  []  Cane     []  Wheelchair  []  Stretcher    Right Hand Exam     Tenderness   The patient is experiencing tenderness in the snuff box and ulnar area (thumb ).    Other   Sensation: normal  Pulse: present    Comments:  Limited mobility of wrist and thumb but able to move other fingers freely without pain. Suture intact, removed and strei strips applied.  Surgical incision healing appropriately.  Well approximated.  Surrounding skin warm, dry and intact.  No erythema, streaking or drainage noted.  Mild swelling noted.  Cap refill good.  Radial pulse palpable.  Strength and mobility testing deferred due to known surgery.              Peripheral Block    Result Date: 4/15/2022  Narrative: Honorio Lin MD     4/15/2022 12:38 PM Peripheral Block Patient reassessed immediately prior to procedure Patient location during procedure: holding area Start time: 4/15/2022 12:11 PM Stop time: 4/15/2022 12:22 PM Reason for block: at surgeon's request and post-op pain management Performed by Anesthesiologist: Honorio Lin MD Assisted by: Luis Fernando Adames SRNA Preanesthetic Checklist Completed: patient identified, IV checked, site marked, risks and benefits discussed, surgical consent, monitors and equipment checked, pre-op evaluation and timeout performed Prep: Pt Position: supine Sterile barriers:cap,  gloves, mask and washed/disinfected hands Prep: ChloraPrep Patient monitoring: blood pressure monitoring and continuous pulse oximetry Procedure Sedation: no Performed under: local infiltration Guidance:ultrasound guided ULTRASOUND INTERPRETATION.  Using ultrasound guidance a 21 G gauge needle was placed in close proximity to the brachial plexus nerve, at which point, under ultrasound guidance anesthetic was injected in the area of the nerve and spread of the anesthesia was seen on ultrasound in close proximity thereto.  There were no abnormalities seen on ultrasound; a digital image was taken; and the patient tolerated the procedure with no complications. Images:still images obtained, printed/placed on chart Laterality:right Block Type:infraclavicular Injection Technique:single-shot Needle Type:echogenic Needle Gauge:21 G Medications Used: ropivacaine (NAROPIN) 0.5 % injection, 30 mL Med administered at 4/15/2022 12:21 PM Medications Comment:Pt ID'd Ultrasound guided Needle seen throughout Local infiltration appropriate Post Assessment Injection Assessment: negative aspiration for heme, incremental injection and no paresthesia on injection Patient Tolerance:comfortable throughout block Complications:no           ASSESSMENT:    Diagnoses and all orders for this visit:    Bilateral wrist pain  -     Ambulatory Referral to Physical Therapy Evaluate and treat    Chronic pain of right thumb  -     HYDROcodone-acetaminophen (NORCO) 7.5-325 MG per tablet; Take 1 tablet by mouth Every 4 (Four) Hours As Needed for Moderate Pain  (Pain).  -     Ambulatory Referral to Physical Therapy Evaluate and treat    Localized primary osteoarthritis of carpometacarpal (CMC) joint of right wrist  -     HYDROcodone-acetaminophen (NORCO) 7.5-325 MG per tablet; Take 1 tablet by mouth Every 4 (Four) Hours As Needed for Moderate Pain  (Pain).  -     Ambulatory Referral to Physical Therapy Evaluate and treat    Status post orthopedic surgery,  follow-up exam  -     HYDROcodone-acetaminophen (NORCO) 7.5-325 MG per tablet; Take 1 tablet by mouth Every 4 (Four) Hours As Needed for Moderate Pain  (Pain).  -     Ambulatory Referral to Physical Therapy Evaluate and treat        PLAN  Sent for x-ray without splint.  Reviewed and discussed with patient.  Reviewed x-ray with Dr. Bloom.  Discussed plan with Dr. Bloom.  Splint reapplied and advised patient he would follow-up with sports medicine, advised to follow-up with Soren.  Called and made the patient an appointment for 4/28/2022 at 1 PM. Sent to sports medicine for Soren to make a customized Orthoplast splint.    Advised patient he has restrictions with no use of the left hand until follow-up in 4 weeks.  Refill for hydrocodone sent.  Advised patient to not be driving or operating heavy machinery.  Advised to rest, ice and elevate.  Reiterated no lifting or using right hand the patient verbalizes understanding.  Recommend patient follow-up in 4 weeks or sooner as needed if symptoms change or worsen.    All questions and concerns are addressed with understanding noted. They are aware and are in agreement to this plan.      Return in about 4 weeks (around 5/26/2022) for Recheck, repeat xray right wrist.    DEVYN Gray    This document has been electronically signed by DEVYN Gray on April 28, 2022 09:59 CDT      EMR Dragon/Transciption Disclaimer: Some of this note may be an electronic transcription/translation of spoken language to printed text using the Dragon Dictation System.

## 2022-04-29 NOTE — THERAPY EVALUATION
Outpatient Physical Therapy Ortho Initial Evaluation  [facility]     Patient Name: Nima Portillo  : 1960  MRN: [id number]  Today's Date: 2022      Visit Date: 2022    Attendance:  (authorization required for further therapy)  Subjective Improvement: n/a  Next MD Appt: 22  Recert Date: 22    Therapy Diagnosis: R trapeziectomy and 1st CMC suspension arthroplasty 4/15/22       Past Medical History:   Diagnosis Date   • Acute bronchitis    • Acute sinusitis    • Chest pain    • Chronic bronchitis (HCC)     secondary to smoking   • Claudication (HCC)    • COPD (chronic obstructive pulmonary disease) (HCC)    • Coronary arteriosclerosis    • Cough    • Gastroesophageal reflux disease    • Health maintenance examination     Individual health examination   • History of echocardiogram 10/21/2013    Echocardiogram W/ color flow 73322 (1) - Left atrium normal. Mild CLVH. EF 50%. Valves intact. Mild mitral and tricuspid regurg. Pericardium normal.   • History of echocardiogram 10/25/2011    Echocardiogram W/O color flow 01588 (1) - Normal LV sytolic function with mild LVH & mild diastolic dysfunction   • Hyperlipidemia    • Hypertensive disorder    • Pernicious anemia    • Thumb pain, left 10/14/2019   • Tobacco dependence syndrome         Past Surgical History:   Procedure Laterality Date   • CARDIAC CATHETERIZATION  2015    Cardiac cath 92027 (1) - Slective coronary angiogram. Left heart catheterization with LV ventriculogram.   • COLONOSCOPY N/A 10/26/2020    Procedure: COLONOSCOPY;  Surgeon: Levi Milian MD;  Location: [facility] ENDOSCOPY;  Service: Gastroenterology;  Laterality: N/A;   • COLONOSCOPY N/A 2021    Procedure: COLONOSCOPY;  Surgeon: Levi Milian MD;  Location: [facility] ENDOSCOPY;  Service: Gastroenterology;  Laterality: N/A;   • DENTAL PROCEDURE      Removal of all teeth   • HAND SURGERY Right 4/15/2022    Procedure: TRAPEZIECTOMY RIGHT WRIST WITH  "SUSPENSION ARTHROPLASTY FIRST CARPOMETA CARPAL;  Surgeon: Kerwin Bloom MD;  Location: [facility];  Service: Orthopedics;  Laterality: Right;       Visit Dx:     ICD-10-CM ICD-9-CM   1. Bilateral wrist pain  M25.531 719.43    M25.532    2. Chronic pain of right thumb  M79.644 729.5    G89.29 338.29   3. Localized primary osteoarthritis of carpometacarpal (CMC) joint of right wrist  M19.031 715.14          Patient History     Row Name 04/28/22 1300             History    Chief Complaint Difficulty with daily activities;Pain  -SS      Type of Pain Wrist pain;Hand pain  right  -SS      Brief Description of Current Complaint Patient underwent R trapeziectomy and 1st CMC suspension arthroplasty on 4/15/22. Hand is feeling a bit better since surgery. Intermittent pain in the wrist/hand. Has been in post-op brace since surgery except when sutures were removed earlier today. Lives by himself. Single male.  -SS      Patient/Caregiver Goals Comment \"Like I always do.\"  -SS      Current Tobacco Use cigarette smoker  -SS      Smoking Status 2 ppd  -SS      Patient's Rating of General Health Fair  -SS      Hand Dominance right-handed  -SS      Occupation/sports/leisure activities Unemployed. Hobbies: garden,  -      Surgery/Hospitalization 4/15/22 - R trapeziectomy with 1st CMC suspension arthroplasty  -SS              Pain     Pain Location Hand;Wrist  -SS      Pain at Present 2  -SS      Pain at Best 2  since surgery  -SS      Pain at Worst 5  since surgery  -SS      Pain Frequency Constant/continuous  -SS      Pain Description Aching  \"hurts a little bit\"  -SS      What Performance Factors Make the Current Problem(s) WORSE? none  -SS      What Performance Factors Make the Current Problem(s) BETTER? Tylenol  -SS      Is your sleep disturbed? No  -SS      Is medication used to assist with sleep? No  -SS      Difficulties at work? n/a  -SS      Difficulties with ADL's? decreased use R hand  -SS      Difficulties with " recreational activities? decreased use R hand  -SS              Fall Risk Assessment    Any falls in the past year: No  -SS      Does patient have a fear of falling No  -SS              Daily Activities    Primary Language English  -SS              Safety    Are you being hurt, hit, or frightened by anyone at home or in your life? No  -SS      Have you had any of the following issues with --  history of panic attacks  -SS            User Key  (r) = Recorded By, (t) = Taken By, (c) = Cosigned By    Initials Name Provider Type    Soren Akhtar PT DPT Physical Therapist                 PT Ortho     Row Name 04/28/22 1300       Subjective Comments    Subjective Comments see Therapy Patient History  -SS       Precautions and Contraindications    Precautions R trapeziectomy & 1st CMC suspension arthroplasty 4/15/22  -SS       Subjective Pain    Able to rate subjective pain? yes  -SS    Pre-Treatment Pain Level 2  -SS    Post-Treatment Pain Level 0  -SS       Posture/Observations    Posture/Observations Comments Presents wearing post-op brace R wrist and hand. Steristrips over incision radial wrist. Healing well at present. Edema noted R wrist, thumb, and hand.  -SS       General ROM    GENERAL ROM COMMENTS ROM deferred  -SS       MMT (Manual Muscle Testing)    General MMT Comments strength testing deferred  -SS          User Key  (r) = Recorded By, (t) = Taken By, (c) = Cosigned By    Initials Name Provider Type    Soren Akhtar, CARLI DPT Physical Therapist                            Therapy Education  Education Details: orthosis wear, care, and precautions  Given: Symptoms/condition management  Program: New  How Provided: Verbal, Written  Provided to: Patient  Level of Understanding: Verbalized      PT OP Goals     Row Name 04/28/22 1300          PT Short Term Goals    STG Date to Achieve 05/19/22  -SS     STG 1 Independent with orthosis wear and care.  -SS            Time Calculation    PT Goal  Re-Cert Due Date 05/19/22  -           User Key  (r) = Recorded By, (t) = Taken By, (c) = Cosigned By    Initials Name Provider Type     Soren King, PT DPT Physical Therapist                 PT Assessment/Plan     Row Name 04/28/22 1300          PT Assessment    Functional Limitations Limitation in home management;Limitations in community activities;Limitations in functional capacity and performance;Performance in leisure activities;Performance in self-care ADL  -     Impairments Integumentary integrity;Joint mobility;Joint integrity;Range of motion;Dexterity;Edema  -     Assessment Comments Patient is 13 days s/p R trapeziectomy and 1st CMC suspension arthroplasty. Forearm-based, dorsal thumb spica orthosis with IP free fabricated this date. Patient was positioned with 1st CMC between radial and palmar abduction position. Further palmar abduction to achieved due to patient edema and pain. He was provided with verbal and written orthosis instruction.  -     Rehab Potential Good  -     Patient/caregiver participated in establishment of treatment plan and goals Yes  -     Patient would benefit from skilled therapy intervention Yes  -SS            PT Plan    PT Plan Comments PRN for orthosis adjustment. Further therapy to be determined  -           User Key  (r) = Recorded By, (t) = Taken By, (c) = Cosigned By    Initials Name Provider Type    Soren Akhtar, PT DPT Physical Therapist                   OP Exercises     Row Name 04/28/22 1300             Subjective Comments    Subjective Comments see Therapy Patient History  -              Subjective Pain    Able to rate subjective pain? yes  -      Pre-Treatment Pain Level 2  -      Post-Treatment Pain Level 0  -              Exercise 1    Exercise Name 1 Fabrication of forearm-based, dorsal thumb-spica orthosis (SCS: thumb MP extension, CMC palmar abduction immobilization; Type 0(3))  -              Exercise 2     Exercise Name 2 Orthosis wear, care, and precautions instruction  -            User Key  (r) = Recorded By, (t) = Taken By, (c) = Cosigned By    Initials Name Provider Type     Soren King, PT DPT Physical Therapist                                        Time Calculation:     Start Time: 1302  Stop Time: 1341  Time Calculation (min): 39 min     Therapy Charges for Today     Code Description Service Date Service Provider Modifiers Qty    83850972297 HC PT EVAL HIGH COMPLEXITY 1 4/28/2022 Soren King, PT DPT GP 1    46784224238 HC ORTHOTIC(S) MGMT/TRAIN INITIAL ENCOUNTER, EACH 15MIN 4/28/2022 Soren King, PT DPT GP 2                    Soren King PT, DPT, CHT  4/28/2022

## 2022-05-02 ENCOUNTER — TELEPHONE (OUTPATIENT)
Dept: ORTHOPEDIC SURGERY | Facility: CLINIC | Age: 62
End: 2022-05-02

## 2022-05-02 NOTE — TELEPHONE ENCOUNTER
,     PT WANTS SOMEONE TO LET HIM KNOW IF ITS OK FOR HIM TO REMOVE THE SPLINT AND USE AN ICE PACK ON IT TO HELP WITH THE SWELLING.     HIS CALL BACK NUMBER [phone]. HE SAID TO LEAVE A VOICEMAIL IF HE DOES NOT ANSWER.

## 2022-05-06 ENCOUNTER — APPOINTMENT (OUTPATIENT)
Dept: PHYSICAL THERAPY | Facility: HOSPITAL | Age: 62
End: 2022-05-06

## 2022-05-09 ENCOUNTER — TELEPHONE (OUTPATIENT)
Dept: ORTHOPEDIC SURGERY | Facility: CLINIC | Age: 62
End: 2022-05-09

## 2022-05-09 DIAGNOSIS — M19.031 LOCALIZED PRIMARY OSTEOARTHRITIS OF CARPOMETACARPAL (CMC) JOINT OF RIGHT WRIST: Primary | ICD-10-CM

## 2022-05-09 NOTE — TELEPHONE ENCOUNTER
Esperanza BILLY PT needs an OT order for Orthoplast splint instead of PT because the therapist will be out of the office.

## 2022-05-10 ENCOUNTER — APPOINTMENT (OUTPATIENT)
Dept: OCCUPATIONAL THERAPY | Facility: HOSPITAL | Age: 62
End: 2022-05-10

## 2022-05-19 ENCOUNTER — HOSPITAL ENCOUNTER (OUTPATIENT)
Dept: NUTRITION | Facility: HOSPITAL | Age: 62
Discharge: HOME | End: 2022-05-19
Admitting: DIETITIAN, REGISTERED

## 2022-05-19 PROCEDURE — 97802 MEDICAL NUTRITION INDIV IN: CPT | Performed by: DIETITIAN, REGISTERED

## 2022-05-19 NOTE — PROGRESS NOTES
Adult Outpatient Nutrition  Assessment    Patient Name:  Nima Portillo  YOB: 1960  MRN: [id number]    Assessment Date:  5/19/2022    Comments:  Pt was referred for hypertriglyeridemia by dr. Charles roland. Pt lives at home alone and cooks for himself. He doesn't read well he reports. RDN did have one handout with pictures to give pt with handwritten goals he can work with sister who can help him. We discussed goals to: use splenda in place of sugar in any food, choose foods that don't have added sugar, ie snack cakes, etc. Pt is drinking diet soda at this time. He agreed to follow these guidelines to lower his a1c and his triglycerides.   Pt agreed to return for follow up in one month. This RDN will follow then.     General Info     Row Name 05/19/22 1231       Today's Session    Person(s) attending today's session Patient       General Information    Are you able to read and write English? No  pt said he cannot read well-lives alone    People in Home alone               Physical Findings     Row Name 05/19/22 1232          Physical Findings    Overall Physical Appearance obese                  Retired Nutritional Info/Activity     Row Name 05/19/22 1232          Eating Environment    Eating environment Alone            Physical Activity    Are you currently involved in an activity/exercise program?  Yes     Describe physical activity planting a garden.                Home Nutrition Report     Row Name 05/19/22 1232          Home Nutrition Report    Typical Intake (Food/Fluid/EN/PN) pt eats three meals per day and snacks on snack cakes, fudge bars, etc. was drinking pepsi until a month or 2 ago he reports.     Food Preferences likes fruits and vegetables                    Labs/Tests/Procedures/Meds     Row Name 05/19/22 1233          Labs/Procedures/Meds    Lab Results Reviewed reviewed, pertinent     Lab Results Comments aic 7.34, triglycerides 272            Medications    Pertinent  Medications Reviewed reviewed, pertinent                   Electronically signed by:  Ana Mccabe RD  05/19/22 12:35 CDT

## 2022-05-20 DIAGNOSIS — M25.531 RIGHT WRIST PAIN: Primary | ICD-10-CM

## 2022-06-08 ENCOUNTER — OFFICE VISIT (OUTPATIENT)
Dept: CARDIOLOGY | Facility: CLINIC | Age: 62
End: 2022-06-08

## 2022-06-08 ENCOUNTER — OFFICE VISIT (OUTPATIENT)
Dept: ORTHOPEDIC SURGERY | Facility: CLINIC | Age: 62
End: 2022-06-08

## 2022-06-08 VITALS
OXYGEN SATURATION: 96 % | HEIGHT: 66 IN | HEART RATE: 76 BPM | WEIGHT: 209.8 LBS | BODY MASS INDEX: 33.72 KG/M2 | SYSTOLIC BLOOD PRESSURE: 126 MMHG | DIASTOLIC BLOOD PRESSURE: 86 MMHG

## 2022-06-08 VITALS — BODY MASS INDEX: 33.59 KG/M2 | HEIGHT: 66 IN | WEIGHT: 209 LBS

## 2022-06-08 DIAGNOSIS — M79.642 BILATERAL HAND PAIN: ICD-10-CM

## 2022-06-08 DIAGNOSIS — I10 HYPERTENSION, ESSENTIAL: Primary | ICD-10-CM

## 2022-06-08 DIAGNOSIS — I25.83 CORONARY ARTERY DISEASE DUE TO LIPID RICH PLAQUE: ICD-10-CM

## 2022-06-08 DIAGNOSIS — Z74.09 LIMITED MOBILITY: ICD-10-CM

## 2022-06-08 DIAGNOSIS — I25.10 CORONARY ARTERY DISEASE DUE TO LIPID RICH PLAQUE: ICD-10-CM

## 2022-06-08 DIAGNOSIS — M19.031 LOCALIZED PRIMARY OSTEOARTHRITIS OF CARPOMETACARPAL (CMC) JOINT OF RIGHT WRIST: Primary | ICD-10-CM

## 2022-06-08 DIAGNOSIS — M79.644 CHRONIC PAIN OF RIGHT THUMB: ICD-10-CM

## 2022-06-08 DIAGNOSIS — M79.641 BILATERAL HAND PAIN: ICD-10-CM

## 2022-06-08 DIAGNOSIS — G89.29 CHRONIC PAIN OF RIGHT THUMB: ICD-10-CM

## 2022-06-08 DIAGNOSIS — E78.2 HYPERLIPEMIA, MIXED: ICD-10-CM

## 2022-06-08 DIAGNOSIS — Z09 STATUS POST ORTHOPEDIC SURGERY, FOLLOW-UP EXAM: ICD-10-CM

## 2022-06-08 PROCEDURE — 99024 POSTOP FOLLOW-UP VISIT: CPT | Performed by: NURSE PRACTITIONER

## 2022-06-08 PROCEDURE — 99214 OFFICE O/P EST MOD 30 MIN: CPT | Performed by: INTERNAL MEDICINE

## 2022-06-08 RX ORDER — HYDROCODONE BITARTRATE AND ACETAMINOPHEN 7.5; 325 MG/1; MG/1
1 TABLET ORAL EVERY 8 HOURS PRN
Qty: 30 TABLET | Refills: 0 | OUTPATIENT
Start: 2022-06-08 | End: 2022-10-22

## 2022-06-08 NOTE — PROGRESS NOTES
"Nima Portillo is a 61 y.o. male returns for     Chief Complaint   Patient presents with   • Right Hand - Follow-up       HISTORY OF PRESENT ILLNESS: This 61-year-old male patient presents today for a 7-week 5-day follow-up status post trapeziectomy right wrist with suspension arthroplasty of the first CMC joint.  The patient missed his 6-week follow-up.  The patient reports he has limited mobility, aching, and swelling.  The patient reports his swelling has improved but still present.  The patient reports he has soaked his hand in Epson salt, wrapped with an Ace wrap and using the Velcro wrist splint.  The patient reports the customized splint from PT was not comfortable and made his pain worse.  He denies numbness or tingling.  He denies signs or symptoms of infection.         CONCURRENT MEDICAL HISTORY:    The following portions of the patient's history were reviewed and updated as appropriate: allergies, current medications, past family history, past medical history, past social history, past surgical history and problem list.     ROS  No fevers or chills.  No chest pain or shortness of air.  No GI or  disturbances.    PHYSICAL EXAMINATION:       Ht 167.6 cm (66\")   Wt 94.8 kg (209 lb)   BMI 33.73 kg/m²     Physical Exam    GAIT:     []  Normal  []  Antalgic    Assistive device: [x]  None  []  Walker     []  Crutches  []  Cane     []  Wheelchair  []  Stretcher    Right Hand Exam     Other   Erythema: absent  Scars: present  Sensation: normal  Pulse: present    Comments:  Limited mobility.  The patient is only able to touch his first and second digit.  He cannot touch his first digit to his second third or fourth digit.  Mild swelling.  Surgical incision is completely healed.  Surrounding skin is warm, dry and intact.  Neurovascular intact.                No results found.          ASSESSMENT:    Diagnoses and all orders for this visit:    Localized primary osteoarthritis of carpometacarpal (CMC) joint of " right wrist  -     XR Wrist 3+ View Right  -     Ambulatory Referral to Physical Therapy Evaluate and treat  -     HYDROcodone-acetaminophen (NORCO) 7.5-325 MG per tablet; Take 1 tablet by mouth Every 8 (Eight) Hours As Needed for Moderate Pain  (Pain).    Limited mobility    Chronic pain of right thumb  -     HYDROcodone-acetaminophen (NORCO) 7.5-325 MG per tablet; Take 1 tablet by mouth Every 8 (Eight) Hours As Needed for Moderate Pain  (Pain).    Status post orthopedic surgery, follow-up exam  -     HYDROcodone-acetaminophen (NORCO) 7.5-325 MG per tablet; Take 1 tablet by mouth Every 8 (Eight) Hours As Needed for Moderate Pain  (Pain).    Bilateral hand pain        PLAN  Provided the patient with new Ace wraps to wear for both hands.  And a new Velcro wrist splint for the right hand.  Ordered physical therapy to evaluate and start gentle ROM.  Advised no heavy lifting.   Encourage patient to do some gentle ROM, do not force, follow up with PT for further treatment.   Avoid lifting more anything heavier than a piece of paper or forceful gripping.   Follow up in 4 weeks.   Will consult with Dr. Bloom for further instructions and notify patient if differ from our discussion.   Recommended to follow-up with Dr. Bloom in 4 weeks.  All questions and concerns are addressed with understanding noted. They are aware and are in agreement to this plan.    Return in about 4 weeks (around 7/6/2022) for Recheck with Dr. Bloom.    DEVYN Gray    This document has been electronically signed by DEVYN Gray on June 8, 2022 13:21 CDT      EMR Dragon/Transciption Disclaimer: Some of this note may be an electronic transcription/translation of spoken language to printed text using the Dragon Dictation System.

## 2022-06-08 NOTE — PROGRESS NOTES
[facility] Cardiology  OFFICE NOTE    Cardiovascular Medicine  Barbara Ardon M.D., RPVI         No referring provider defined for this encounter.    Thank you for asking me to see Nima Portillo for CAD, syncope.    Coronary Artery Disease      This is a 61 y.o. male with:  1. Coronary artery disease involving native coronary artery of native heart without angina pectoris    2. Essential hypertension    3. Pure hypercholesterolemia    4. Nicotine abuse    5. ETOH abuse          Chief complaint -syncope        History of present Illness- 61 y.o.-year-old gentleman who smokeed about a pack and a half a day and drinks about 6 beers a day/week, he has history of cardiac catheterization 2015 by Dr. Sandhu and which showed small vessel disease in the obtuse marginal one side branch and in the LPDA with moderate diffuse disease in the LAD,   He has cut down on his smoking and drinking.  Patient was admitted to the hospital for syncopal episode in 2019.  Patient reported he had a bout of coughing which is followed by loss of consciousness.  Patient is admitted to the hospital, he was noted to be bradycardic on arrival subsequently his metoprolol was cut back.  Was also also orthostatic.  He had carotid ultrasound done which were without any significant disease.  No recurrence of syncopal episodes since then.  Denies any chest pain or exertion.    02/26/2021:  Patient has done well since last visit.  Denied any further episodes of chest pain.  Has not used nitroglycerin since last visit.  He does have significant carpal tunnel on both hands with bony spur and has surgery coming up.  He is here for preop evaluation.  He is able to perform his activities of daily living he, he does have limitations from his chronic back and knee pain but no chest pain.  He does have chronic shortness of breath from his COPD from smoking which is not worse currently.    05/26/2021:  No acute issues since last visit.   Underwent carpal surgery on the left side and surgery coming up on the right side.  Denying any chest pain.  No side effects from medications.    06/08/2022:  No acute issues since last visit.  Denying any chest pain or shortness of breath.  Has been compliant with medications.        Review of Systems - ROS  Constitution: Negative for weakness, weight gain and weight loss.   HENT: Negative for congestion.    Eyes: Negative for blurred vision.   Cardiovascular: As mentioned above  Respiratory: Negative for cough and hemoptysis.    Endocrine: Negative for polydipsia and polyuria.   Hematologic/Lymphatic: Negative for bleeding problem. Does not bruise/bleed easily.   Skin: Negative for flushing.   Musculoskeletal: Negative for neck pain and stiffness.   Gastrointestinal: Negative for abdominal pain, diarrhea, jaundice, melena, nausea and vomiting.   Genitourinary: Negative for dysuria and hematuria.   Neurological: Negative for dizziness, focal weakness and numbness.   Psychiatric/Behavioral: Negative for altered mental status and depression.          All other systems were reviewed and were negative.    family history includes Cancer in his paternal aunt and paternal uncle; Diabetes in his father, mother, and sister; Heart disease in his father, mother, and sister; Hypertension in his father and mother; Stroke in his sister.     reports that he has been smoking cigarettes. He has a 80.00 pack-year smoking history. He has quit using smokeless tobacco. He reports previous alcohol use. He reports that he does not use drugs.    No Known Allergies      Current Outpatient Medications:   •  acetaminophen (TYLENOL) 650 MG 8 hr tablet, Take 650 mg by mouth Every 8 (Eight) Hours As Needed for Mild Pain ., Disp: , Rfl:   •  albuterol sulfate  (90 Base) MCG/ACT inhaler, Inhale 2 puffs Every 4 (Four) Hours As Needed for Wheezing., Disp: 6.7 g, Rfl: 6  •  amLODIPine (NORVASC) 5 MG tablet, Take 5 mg by mouth Daily., Disp: ,  "Rfl:   •  aspirin 81 MG chewable tablet, Chew 81 mg Daily., Disp: , Rfl:   •  atorvastatin (LIPITOR) 80 MG tablet, Take 80 mg by mouth Daily., Disp: , Rfl:   •  Fluticasone Furoate-Vilanterol (Breo Ellipta) 100-25 MCG/INH inhaler, Inhale 1 puff Daily., Disp: 60 each, Rfl: 1  •  hydroCHLOROthiazide (HYDRODIURIL) 25 MG tablet, Take 1 tablet by mouth Daily., Disp: 90 tablet, Rfl: 3  •  HYDROcodone-acetaminophen (NORCO) 7.5-325 MG per tablet, Take 1 tablet by mouth Every 4 (Four) Hours As Needed for Moderate Pain  (Pain)., Disp: 30 tablet, Rfl: 0  •  isosorbide mononitrate (IMDUR) 60 MG 24 hr tablet, TAKE 1 TABLET BY MOUTH EVERY DAY, Disp: 90 tablet, Rfl: 2  •  lisinopril (PRINIVIL,ZESTRIL) 40 MG tablet, Take 1 tablet by mouth Daily., Disp: 30 tablet, Rfl: 6  •  metoprolol succinate XL (TOPROL-XL) 25 MG 24 hr tablet, Take 1 tablet by mouth Daily., Disp: 30 tablet, Rfl: 6  •  nitroglycerin (NITROSTAT) 0.4 MG SL tablet, Place 1 tablet under the tongue Every 5 (Five) Minutes As Needed for Chest Pain. Take no more than 3 doses in 15 minutes., Disp: 30 tablet, Rfl: 12    Physical Exam:  Vitals:    06/08/22 0802   BP: 126/86   BP Location: Left arm   Patient Position: Sitting   Cuff Size: Adult   Pulse: 76   SpO2: 96%   Weight: 95.2 kg (209 lb 12.8 oz)   Height: 167.6 cm (66\")     Current Pain Level: none  Pulse Ox: Normal        on room air  General: alert, appears stated age and cooperative     Body Habitus: well-nourished    HEENT: Head: Normocephalic, no lesions, without obvious abnormality. No arcus senilis, xanthelasma or xanthomas.    Neuro: alert, oriented x3  Pulses: 2+ and symmetric  JVP: Volume/Pulsation:       Normal.  Normal waveforms.   Appropriate inspiratory decrease.  No Kussmaul's. No Germain's.   Carotid Exam: no bruit normal pulsation bilaterally    Carotid Volume: normal.                     Respirations: no increased work of breathing            Chest:  Normal              Pulmonary:Normal     "   Precordium: Normal impulses. P2 is not palpable.  RV Heave: absent  LV Heave: absent  [place]:  normal size and placement  Palpable S4: absent.  Heart rate: normal    Heart Rhythm: regular                                     Heart Sounds: S1: normal    S2: normal  S3: absent                               S4: absent  Opening Snap: absent            Pericardial Rub:  Absent: .                 Abdomen:   Appearance: normal .  Palpation: Soft, non-tender to palpation, bowel sounds positive in all four quadrants; no guarding or rebound tenderness  Extremity: no edema.   LE Skin: no rashes  LE Hair:  normal  LE Pulses: well perfused with normal pulses in the distal extremities  Pallor on elevation: Absent. Rubor on dependency: None       DATA REVIEWED:         ECG/EMG Results (all)     None        ---------------------------------------------------  TTE/GEOFFREY:  Results for orders placed during the hospital encounter of 09/04/19    Adult Transthoracic Echo Complete W/ Cont if Necessary Per Protocol    Interpretation Summary  · Estimated EF = 61%.  · Left ventricular systolic function is normal.  · Left ventricular diastolic dysfunction (grade I) consistent with impaired relaxation.  · Mild mitral valve regurgitation is present  · Mild tricuspid valve regurgitation is present.  · Mild tricuspid valve stenosis is present.    -----------------------------------------------------  CXR/Imaging:   --------------  CT:   No radiology results for the last 30 days.    ----------------------------------------------------      --------------------------------------------------------------------------------------------------  LABS:     The CVD Risk score (Leeann et al., 2008) failed to calculate for the following reasons:    The patient has a prior MI, stroke, CHF, or peripheral vascular disease diagnosis         Lab Results   Component Value Date    GLUCOSE 105 (H) 03/31/2022    BUN 15 03/31/2022    CREATININE 0.92 03/31/2022     EGFRIFNONA 98 01/28/2022    EGFRIFAFRI 104 04/05/2021    BCR 16.3 03/31/2022    K 3.6 03/31/2022    CO2 27.0 03/31/2022    CALCIUM 8.9 03/31/2022    ALBUMIN 4.10 01/28/2022    AST 18 01/28/2022    ALT 28 01/28/2022     Lab Results   Component Value Date    WBC 8.26 01/28/2022    HGB 13.5 01/28/2022    HCT 40.1 01/28/2022    MCV 87.7 01/28/2022     01/28/2022     Lab Results   Component Value Date    CHOL 111 01/28/2022    TRIG 272 (H) 01/28/2022    HDL 28 (L) 01/28/2022    LDL 41 01/28/2022     Lab Results   Component Value Date    TSH 2.08 03/17/2015     Lab Results   Component Value Date    CKTOTAL 102 03/16/2015    CKMB 3.7 03/16/2015    TROPONINI 0.251 (C) 03/16/2015    TROPONINT <0.010 09/04/2019     Lab Results   Component Value Date    HGBA1C 7.34 (H) 01/28/2022     No results found for: DDIMER  Lab Results   Component Value Date    ALT 28 01/28/2022     Lab Results   Component Value Date    HGBA1C 7.34 (H) 01/28/2022    HGBA1C 6.13 (H) 08/25/2021     Lab Results   Component Value Date    CREATININE 0.92 03/31/2022     No results found for: IRON, TIBC, FERRITIN  Lab Results   Component Value Date    INR 1.04 09/04/2019    INR 0.9 04/08/2015    PROTIME 13.4 09/04/2019    PROTIME 12.2 04/08/2015       Assessment/Plan     1. Syncope and collapse: No further recurrence  Secondary to vasovagal syncope in the setting of significant coughing and orthostatic hypotension.  Patient was also bradycardic.  cut back on metoprolol with improvement in his heart rate.  Echo cardiogram was without any structural abnormalities.  Carotid Doppler was without any significant abnormalities.  Patient also had a monitor which was without any significant arrhythmias.    2. CAD:  Reviewed his angiogram from 2015 he does have moderate diffuse disease in LAD, he has a significant stenosis in inferior branch of a small OM and L PDA.  Currently patient is asymptomatic.  Continue medical management.  If Patient is worsening chest  pain will need a cardiac cath to assess for progression of disease.  Continue aspirin/beta-blocker/imdur/amlodipine    Hypertension on lisinopril 40mg, isosorbide 60mg and HCTZ 25mg along with Toprol-XL and amlodipine 5mg     Peripheral vascular disease -had CTA with abdominal aortogram with runoff which did not show any high-grade stenosis and some of his pain in the legs could be due to neuropathy from his alcohol use    HLD:  Switched to lipitor 40mg. LDL is down to 67, with his diffuse disease uptitrated to 80mg. LDL improved to 41       Prevention:  Patient's Body mass index is 33.86 kg/m². BMI is above normal parameters. Recommendations include: exercise counseling and nutrition counseling.      Nima Portillo is a current cigarettes user.  He currently smokes 4 cigarettes per day for a duration of 10 years. I have educated him on the risk of diseases from using tobacco products such as cancer, COPD and heart diease.     I advised him to quit and he is not willing to quit.    I spent 3  minutes counseling the patient.          AAA Screening:   Will check after 65 year           This document has been electronically signed by Barbara Ardon MD on June 8, 2022 08:15 CDT

## 2022-06-21 ENCOUNTER — OFFICE VISIT (OUTPATIENT)
Dept: FAMILY MEDICINE CLINIC | Facility: CLINIC | Age: 62
End: 2022-06-21

## 2022-06-21 VITALS
TEMPERATURE: 96.8 F | HEART RATE: 72 BPM | DIASTOLIC BLOOD PRESSURE: 70 MMHG | WEIGHT: 205.9 LBS | OXYGEN SATURATION: 98 % | SYSTOLIC BLOOD PRESSURE: 116 MMHG | BODY MASS INDEX: 33.09 KG/M2 | HEIGHT: 66 IN

## 2022-06-21 DIAGNOSIS — I50.32 CHRONIC HEART FAILURE WITH PRESERVED EJECTION FRACTION: ICD-10-CM

## 2022-06-21 DIAGNOSIS — I25.118 CORONARY ARTERY DISEASE OF NATIVE ARTERY OF NATIVE HEART WITH STABLE ANGINA PECTORIS: ICD-10-CM

## 2022-06-21 DIAGNOSIS — I10 ESSENTIAL HYPERTENSION: ICD-10-CM

## 2022-06-21 DIAGNOSIS — I34.0 MITRAL VALVE INSUFFICIENCY, UNSPECIFIED ETIOLOGY: Primary | ICD-10-CM

## 2022-06-21 DIAGNOSIS — E78.1 HYPERTRIGLYCERIDEMIA: ICD-10-CM

## 2022-06-21 PROCEDURE — 99213 OFFICE O/P EST LOW 20 MIN: CPT | Performed by: STUDENT IN AN ORGANIZED HEALTH CARE EDUCATION/TRAINING PROGRAM

## 2022-06-21 RX ORDER — METOPROLOL SUCCINATE 25 MG/1
25 TABLET, EXTENDED RELEASE ORAL DAILY
Qty: 30 TABLET | Refills: 6 | Status: SHIPPED | OUTPATIENT
Start: 2022-06-21 | End: 2022-06-21 | Stop reason: SDUPTHER

## 2022-06-21 RX ORDER — LISINOPRIL 40 MG/1
40 TABLET ORAL DAILY
Qty: 90 TABLET | Refills: 3 | Status: SHIPPED | OUTPATIENT
Start: 2022-06-21

## 2022-06-21 RX ORDER — AMLODIPINE BESYLATE 5 MG/1
5 TABLET ORAL DAILY
Qty: 90 TABLET | Refills: 3 | Status: SHIPPED | OUTPATIENT
Start: 2022-06-21 | End: 2022-08-26

## 2022-06-21 RX ORDER — HYDROCHLOROTHIAZIDE 25 MG/1
25 TABLET ORAL DAILY
Qty: 90 TABLET | Refills: 3 | Status: SHIPPED | OUTPATIENT
Start: 2022-06-21

## 2022-06-21 RX ORDER — LISINOPRIL 40 MG/1
40 TABLET ORAL DAILY
Qty: 30 TABLET | Refills: 6 | Status: SHIPPED | OUTPATIENT
Start: 2022-06-21 | End: 2022-06-21 | Stop reason: SDUPTHER

## 2022-06-21 RX ORDER — METOPROLOL SUCCINATE 25 MG/1
25 TABLET, EXTENDED RELEASE ORAL DAILY
Qty: 90 TABLET | Refills: 3 | Status: SHIPPED | OUTPATIENT
Start: 2022-06-21

## 2022-06-21 RX ORDER — ATORVASTATIN CALCIUM 80 MG/1
80 TABLET, FILM COATED ORAL DAILY
Qty: 90 TABLET | Refills: 3 | Status: SHIPPED | OUTPATIENT
Start: 2022-06-21

## 2022-06-22 ENCOUNTER — DOCUMENTATION (OUTPATIENT)
Dept: NUTRITION | Facility: HOSPITAL | Age: 62
End: 2022-06-22

## 2022-06-22 NOTE — PROGRESS NOTES
Nutrition Services    Patient Name:  Nima Portillo  YOB: 1960  MRN: [id number]  Admit Date:  (Not on file)    Pt had an appt today with this RDN for follow up on his previous Medical nutrition therapy visit for diabetes and hyperlipidemia. Spoke with pt by phone at his request instead of him coming in today. He said he is not buying snack cakes, eats one fudge bar per day, using Splenda instead of sugar to [employer], and is eating oatmeal for breakfast which he said DOES hold him over until lunch. He said he has seen his cardiologist and his family doctor and they are pleased(pt is pleased too )that he has lost weight-209 down to 205lb. Pt wants to weight 150lb but doesn't feel that is possible at his age.He is working outside and I cautioned him to eat three meals a day every day and especially as he is working in the heat and to drink water to avoid dehydration. Pt said he would call RDN with questions if needed.  This RDN will follow as needed.    Electronically signed by:  Ana Mccabe RD  06/22/22 10:20 CDT

## 2022-06-23 NOTE — PROGRESS NOTES
Family Medicine Residency  Charles Rojas MD    Subjective:     Nima Portillo is a 61 y.o. male who presents for overdue follow up for HTN.  His blood pressures well controlled today, 116/70. Denies headache, vision changes.  No leg swelling, no shortness of breath, no chest pain.  Patient does report that he has a history of a heart attack. Patient brought in his medications to go over them. Patient continues to smoke and has no desire to stop. Has stopped drinking months ago. Limited caffeine.     Patient has been having some leg swelling.    The following portions of the patient's history were reviewed and updated as appropriate: allergies, current medications, past family history, past medical history, past social history, past surgical history and problem list.    Past Medical Hx:  Past Medical History:   Diagnosis Date   • Acute bronchitis    • Acute sinusitis    • Chest pain    • Chronic bronchitis (HCC)     secondary to smoking   • Claudication (HCC)    • COPD (chronic obstructive pulmonary disease) (HCC)    • Coronary arteriosclerosis    • Cough    • Gastroesophageal reflux disease    • Health maintenance examination     Individual health examination   • History of echocardiogram 10/21/2013    Echocardiogram W/ color flow 23069 (1) - Left atrium normal. Mild CLVH. EF 50%. Valves intact. Mild mitral and tricuspid regurg. Pericardium normal.   • History of echocardiogram 10/25/2011    Echocardiogram W/O color flow 51512 (1) - Normal LV sytolic function with mild LVH & mild diastolic dysfunction   • Hyperlipidemia    • Hypertensive disorder    • Pernicious anemia    • Thumb pain, left 10/14/2019   • Tobacco dependence syndrome        Past Surgical Hx:  Past Surgical History:   Procedure Laterality Date   • CARDIAC CATHETERIZATION  04/08/2015    Cardiac cath 49241 (1) - Slective coronary angiogram. Left heart catheterization with LV ventriculogram.   • COLONOSCOPY N/A 10/26/2020    Procedure: COLONOSCOPY;   Surgeon: Levi Milian MD;  Location: [facility] ENDOSCOPY;  Service: Gastroenterology;  Laterality: N/A;   • COLONOSCOPY N/A 11/08/2021    Procedure: COLONOSCOPY;  Surgeon: Levi Milian MD;  Location: [facility] ENDOSCOPY;  Service: Gastroenterology;  Laterality: N/A;   • DENTAL PROCEDURE      Removal of all teeth   • HAND SURGERY Right 4/15/2022    Procedure: TRAPEZIECTOMY RIGHT WRIST WITH SUSPENSION ARTHROPLASTY FIRST CARPOMETA CARPAL;  Surgeon: Kerwin Bloom MD;  Location: [facility] OR;  Service: Orthopedics;  Laterality: Right;       Current Meds:    Current Outpatient Medications:   •  acetaminophen (TYLENOL) 650 MG 8 hr tablet, Take 650 mg by mouth Every 8 (Eight) Hours As Needed for Mild Pain ., Disp: , Rfl:   •  albuterol sulfate  (90 Base) MCG/ACT inhaler, Inhale 2 puffs Every 4 (Four) Hours As Needed for Wheezing., Disp: 6.7 g, Rfl: 6  •  amLODIPine (NORVASC) 5 MG tablet, Take 1 tablet by mouth Daily., Disp: 90 tablet, Rfl: 3  •  aspirin 81 MG chewable tablet, Chew 81 mg Daily., Disp: , Rfl:   •  atorvastatin (LIPITOR) 80 MG tablet, Take 1 tablet by mouth Daily., Disp: 90 tablet, Rfl: 3  •  Fluticasone Furoate-Vilanterol (Breo Ellipta) 100-25 MCG/INH inhaler, Inhale 1 puff Daily., Disp: 60 each, Rfl: 1  •  hydroCHLOROthiazide (HYDRODIURIL) 25 MG tablet, Take 1 tablet by mouth Daily., Disp: 90 tablet, Rfl: 3  •  HYDROcodone-acetaminophen (NORCO) 7.5-325 MG per tablet, Take 1 tablet by mouth Every 8 (Eight) Hours As Needed for Moderate Pain  (Pain)., Disp: 30 tablet, Rfl: 0  •  isosorbide mononitrate (IMDUR) 60 MG 24 hr tablet, TAKE 1 TABLET BY MOUTH EVERY DAY, Disp: 90 tablet, Rfl: 2  •  lisinopril (PRINIVIL,ZESTRIL) 40 MG tablet, Take 1 tablet by mouth Daily., Disp: 90 tablet, Rfl: 3  •  metoprolol succinate XL (TOPROL-XL) 25 MG 24 hr tablet, Take 1 tablet by mouth Daily., Disp: 90 tablet, Rfl: 3  •  nitroglycerin (NITROSTAT) 0.4 MG SL tablet, Place 1 tablet under the tongue Every 5 (Five)  "Minutes As Needed for Chest Pain. Take no more than 3 doses in 15 minutes., Disp: 30 tablet, Rfl: 12    Allergies:  No Known Allergies    Family Hx:  Family History   Problem Relation Age of Onset   • Heart disease Mother    • Hypertension Mother    • Diabetes Mother    • Heart disease Father    • Hypertension Father    • Diabetes Father    • Diabetes Sister    • Heart disease Sister    • Stroke Sister    • Cancer Paternal Aunt         Breast Cancer   • Cancer Paternal Uncle         Unknown cancer        Social History:  Social History     Socioeconomic History   • Marital status: Single   Tobacco Use   • Smoking status: Current Every Day Smoker     Packs/day: 2.00     Years: 40.00     Pack years: 80.00     Types: Cigarettes   • Smokeless tobacco: Former User   Vaping Use   • Vaping Use: Never used   Substance and Sexual Activity   • Alcohol use: Not Currently   • Drug use: No   • Sexual activity: Defer     Comment: Marital status:        Review of Systems  Review of Systems   Constitutional: Negative for chills, fatigue and fever.   HENT: Negative for ear pain, hearing loss, postnasal drip and sore throat.    Eyes: Negative for pain and visual disturbance.   Respiratory: Negative for cough, shortness of breath and wheezing.    Cardiovascular: Negative for chest pain, palpitations and leg swelling.   Gastrointestinal: Negative for abdominal pain, diarrhea, nausea and vomiting.   Genitourinary: Negative for difficulty urinating and dysuria.   Musculoskeletal: Positive for arthralgias (wrists (L>R), slightly better than previous visit)). Negative for myalgias.   Skin: Negative for rash and wound.   Neurological: Negative for syncope, weakness and headaches.   Psychiatric/Behavioral: Negative for dysphoric mood. The patient is not nervous/anxious.        Objective:     /70   Pulse 72   Temp 96.8 °F (36 °C)   Ht 167.6 cm (66\")   Wt 93.4 kg (205 lb 14.4 oz)   SpO2 98%   BMI 33.23 kg/m²   Physical " Exam  Vitals reviewed.   Constitutional:       General: He is not in acute distress.  HENT:      Head: Normocephalic and atraumatic.   Eyes:      General: No scleral icterus.     Conjunctiva/sclera: Conjunctivae normal.   Cardiovascular:      Rate and Rhythm: Normal rate and regular rhythm.      Heart sounds: Normal heart sounds.   Pulmonary:      Effort: Pulmonary effort is normal.      Breath sounds: Normal breath sounds. No wheezing or rales.   Abdominal:      General: Bowel sounds are normal. There is no distension.      Palpations: Abdomen is soft.      Tenderness: There is no abdominal tenderness.   Musculoskeletal:         General: No deformity.      Right lower leg: No edema.      Left lower leg: No edema.   Skin:     General: Skin is warm and dry.   Neurological:      General: No focal deficit present.      Mental Status: He is alert. Mental status is at baseline.          Assessment/Plan:     Diagnoses and all orders for this visit:    1. Mitral valve insufficiency, unspecified etiology (Primary)  -     Adult Transthoracic Echo Complete W/ Cont if Necessary Per Protocol; Future    2. Essential hypertension  -     metoprolol succinate XL (TOPROL-XL) 25 MG 24 hr tablet; Take 1 tablet by mouth Daily.  Dispense: 90 tablet; Refill: 3  -     lisinopril (PRINIVIL,ZESTRIL) 40 MG tablet; Take 1 tablet by mouth Daily.  Dispense: 90 tablet; Refill: 3  -     hydroCHLOROthiazide (HYDRODIURIL) 25 MG tablet; Take 1 tablet by mouth Daily.  Dispense: 90 tablet; Refill: 3  -     amLODIPine (NORVASC) 5 MG tablet; Take 1 tablet by mouth Daily.  Dispense: 90 tablet; Refill: 3    3. Coronary artery disease of native artery of native heart with stable angina pectoris (HCC)  -     metoprolol succinate XL (TOPROL-XL) 25 MG 24 hr tablet; Take 1 tablet by mouth Daily.  Dispense: 90 tablet; Refill: 3    4. Chronic heart failure with preserved ejection fraction (HCC)  -     Adult Transthoracic Echo Complete W/ Cont if Necessary Per  Protocol; Future    5. Hypertriglyceridemia  -     atorvastatin (LIPITOR) 80 MG tablet; Take 1 tablet by mouth Daily.  Dispense: 90 tablet; Refill: 3      1, 4. ECHO for previous valve issues and new leg swelling    2, 3, 5. Refill medications, if still this well controlled/soft, stop amlodipine     · Rx changes: none  · Patient Education: need for echo   · Compliance at present is estimated to be fair.   · Efforts to improve compliance (if necessary) will be directed at increased exercise and continued sobriety .    Depression screening: Up to date; last screen      Follow-up:     Return in about 3 months (around 9/21/2022).    Preventative:  Health Maintenance   Topic Date Due   • ZOSTER VACCINE (2 of 2) 07/11/2019   • ANNUAL PHYSICAL  04/17/2020   • Pneumococcal Vaccine 0-64 (2 - PCV) 05/16/2020   • COVID-19 Vaccine (3 - Booster for Pfizer series) 08/25/2021   • LUNG CANCER SCREENING  09/21/2022   • INFLUENZA VACCINE  10/01/2022   • COLORECTAL CANCER SCREENING  11/08/2022   • LIPID PANEL  01/28/2023   • TDAP/TD VACCINES (2 - Td or Tdap) 05/16/2029   • HEPATITIS C SCREENING  Completed       Weight  -Class: Obese Class I: 30-34.9kg/m2  -Patient's Body mass index is 33.23 kg/m². indicating that he is obese (BMI >30). Obesity-related health conditions include the following: hypertension and coronary heart disease. Obesity is unchanged. BMI is is above average; BMI management plan is completed. We discussed portion control and increasing exercise..   decrease soda or juice intake and increase water intake    Alcohol use:  reports previous alcohol use.  Nicotine status  reports that he has been smoking cigarettes. He has a 80.00 pack-year smoking history. He has quit using smokeless tobacco.     Goals     •  Smoking cessation (pt-stated)       Quitting smoking and living longer    Barriers: Wanting to quit              RISK SCORE: 4       Charles Rojas MD   PGY-2    [facility]  [address]  [facility], [place] [address]  Office: [phone]    This document has been electronically signed by Charles Rojas MD on June 23, 2022 16:29 CDT

## 2022-06-28 NOTE — PROGRESS NOTES
I have seen the patient.  I have reviewed the notes, assessments, and/or procedures performed by Charles Rojas MD, I concur with her/his documentation and assessment and plan for Nima Portillo.               This document has been electronically signed by Hubert Thomas MD on June 28, 2022 15:47 CDT

## 2022-07-12 ENCOUNTER — OFFICE VISIT (OUTPATIENT)
Dept: ORTHOPEDIC SURGERY | Facility: CLINIC | Age: 62
End: 2022-07-12

## 2022-07-12 VITALS — BODY MASS INDEX: 32.97 KG/M2 | HEIGHT: 66 IN | WEIGHT: 205.14 LBS

## 2022-07-12 DIAGNOSIS — M19.031 LOCALIZED PRIMARY OSTEOARTHRITIS OF CARPOMETACARPAL (CMC) JOINT OF RIGHT WRIST: Primary | ICD-10-CM

## 2022-07-12 DIAGNOSIS — I10 ESSENTIAL HYPERTENSION: ICD-10-CM

## 2022-07-12 DIAGNOSIS — M25.531 RIGHT WRIST PAIN: ICD-10-CM

## 2022-07-12 DIAGNOSIS — Z09 STATUS POST ORTHOPEDIC SURGERY, FOLLOW-UP EXAM: ICD-10-CM

## 2022-07-12 PROCEDURE — 99024 POSTOP FOLLOW-UP VISIT: CPT | Performed by: ORTHOPAEDIC SURGERY

## 2022-07-12 NOTE — PROGRESS NOTES
"Nima Portillo is a 61 y.o. male returns for     Chief Complaint   Patient presents with   • Right Hand - Post-op       HISTORY OF PRESENT ILLNESS:  Patient is now 3 months status post trapeziectomy.  He is doing better than he was prior to surgery.  He continues to have slow gradual improvement with strength and function.  No fevers or chills.    He does report that he has been having some pain in his shoulder.  No specific injury but he is having pain at night.     CONCURRENT MEDICAL HISTORY:    The following portions of the patient's history were reviewed and updated as appropriate: allergies, current medications, past family history, past medical history, past social history, past surgical history and problem list.     ROS  No fevers or chills.  No chest pain or shortness of air.  No GI or  disturbances.    PHYSICAL EXAMINATION:       Ht 167.6 cm (66\")   Wt 93.1 kg (205 lb 2.2 oz)   BMI 33.11 kg/m²     Physical Exam  Constitutional:       General: He is not in acute distress.     Appearance: Normal appearance.   Pulmonary:      Effort: Pulmonary effort is normal. No respiratory distress.   Neurological:      Mental Status: He is alert and oriented to person, place, and time.         GAIT:     []  Normal  []  Antalgic    Assistive device: []  None  []  Walker     []  Crutches  []  Cane     []  Wheelchair  []  Stretcher    Right Hand Exam     Comments:  Incision is well-healed.  He has good  formation and pretty good  strength.  Good capillary refill.  Good distal pulses and sensation.      Right Shoulder Exam     Range of Motion   Active abduction: 140 (With pain)   Forward flexion: 160 (With pain)     Tests   Barbosa test: positive  Impingement: positive                XR Wrist 3+ View Right  Order date: 6/8/2022  Authorizing: Kimberlee Sal APRN  Ordered by Kimberlee Sal APRN on 6/8/2022.       Narrative & Impression    XR WRIST 3 OR MORE VIEWS     Indication: pain, M19.031 Primary " osteoarthritis, right wrist     Comparison: 3 views right wrist from 4/28/2022     Findings:   No acute fracture-dislocation. There are postoperative changes  from prior resection of the trapezium bone again noted. Proximal  carpal row is well aligned. Evidence of unchanged old mild  displaced ulnar styloid fracture. Mild to moderate osteoarthritic  changes first IP joint again noted. There are no definite erosive  changes.     IMPRESSION:  1.No acute osseous abnormality.    2. Chronic/degenerative changes as above overall similar to prior  study.        Electronically signed by:  Charles Brody MD  6/9/2022 5:59 PM CDT  Workstation: [id number]             ASSESSMENT:    Diagnoses and all orders for this visit:    Localized primary osteoarthritis of carpometacarpal (CMC) joint of right wrist    Status post orthopedic surgery, follow-up exam    Essential hypertension    Right wrist pain          PLAN    He will continue to slowly progress motion and activity in regards to his wrist.  We discussed slowly progressing activity as tolerated.  We will recheck in 3 months with repeat x-rays for the right wrist.    In regards to the shoulder, we discussed continuing activity as tolerated and slowly increasing as pain allows.  We discussed evaluation of the right shoulder if symptoms worsen.  He will recheck as needed with x-rays of the right shoulder.    Return in about 3 months (around 10/12/2022) for Recheck with repeat xrays.    Kerwin Bloom MD

## 2022-08-08 ENCOUNTER — DOCUMENTATION (OUTPATIENT)
Dept: FAMILY MEDICINE CLINIC | Facility: CLINIC | Age: 62
End: 2022-08-08

## 2022-08-08 NOTE — PROGRESS NOTES
Time of phone conversation: 08:14 CDT 8/8/2022       Nima Portillo is a 61 y.o. y.o. male  who I contacted in relation to their recent Echo results, ordered by Dr. Rojas.  She reports he has appointment future with Dr. Rojas, discuss patient's echo.    The echo results were as follows:   · Left ventricular ejection fraction appears to be 66 - 70%. Left ventricular systolic function is normal.  · Left ventricular diastolic function was normal.  · Estimated right ventricular systolic pressure from tricuspid regurgitation is normal (<35 mmHg).  · The right ventricular cavity is mildly dilated.    The patient was consulted appropriately. The patient had full understanding and awareness in relation to their results.     Nima Portillo is happy to return in 2 week to recheck their results and any further consultation deemed necessary.      At the end of our phone conversation Nima Portillo was happy, content and will continue with their healthy eating and exercise plan. The patient will contact me should they have any queries or concerns.     Signature  Perfecto Barr MD  [facility]  [address] [place], [place] [id number]  Office: [phone]

## 2022-08-26 ENCOUNTER — OFFICE VISIT (OUTPATIENT)
Dept: FAMILY MEDICINE CLINIC | Facility: CLINIC | Age: 62
End: 2022-08-26

## 2022-08-26 VITALS
HEART RATE: 91 BPM | WEIGHT: 202.9 LBS | OXYGEN SATURATION: 98 % | SYSTOLIC BLOOD PRESSURE: 116 MMHG | BODY MASS INDEX: 32.61 KG/M2 | TEMPERATURE: 98.4 F | DIASTOLIC BLOOD PRESSURE: 70 MMHG | HEIGHT: 66 IN

## 2022-08-26 DIAGNOSIS — M79.89 LEG SWELLING: ICD-10-CM

## 2022-08-26 DIAGNOSIS — I50.32 CHRONIC HEART FAILURE WITH PRESERVED EJECTION FRACTION: ICD-10-CM

## 2022-08-26 DIAGNOSIS — J44.9 CHRONIC OBSTRUCTIVE PULMONARY DISEASE, UNSPECIFIED COPD TYPE: ICD-10-CM

## 2022-08-26 DIAGNOSIS — I10 ESSENTIAL HYPERTENSION: Primary | ICD-10-CM

## 2022-08-26 PROCEDURE — 99213 OFFICE O/P EST LOW 20 MIN: CPT | Performed by: STUDENT IN AN ORGANIZED HEALTH CARE EDUCATION/TRAINING PROGRAM

## 2022-08-29 NOTE — PROGRESS NOTES
Family Medicine Residency  Charles Rojas MD    Subjective:     Nima Portillo is a 61 y.o. male who presents for overdue follow up for HTN.  His blood pressures well controlled today, 116/70. Denies headache, vision changes.  No shortness of breath, no chest pain.  He has been having some issues with leg swelling.  This occurs when he is on his feet after long period of time.  Bilateral swelling.  Has a history of a heart attack. Patient continues to smoke and has no desire to stop. Has stopped drinking months ago. Limited caffeine.     The following portions of the patient's history were reviewed and updated as appropriate: allergies, current medications, past family history, past medical history, past social history, past surgical history and problem list.    Past Medical Hx:  Past Medical History:   Diagnosis Date   • Acute bronchitis    • Acute sinusitis    • Chest pain    • Chronic bronchitis (HCC)     secondary to smoking   • Claudication (HCC)    • COPD (chronic obstructive pulmonary disease) (HCC)    • Coronary arteriosclerosis    • Cough    • Gastroesophageal reflux disease    • Health maintenance examination     Individual health examination   • History of echocardiogram 10/21/2013    Echocardiogram W/ color flow 06165 (1) - Left atrium normal. Mild CLVH. EF 50%. Valves intact. Mild mitral and tricuspid regurg. Pericardium normal.   • History of echocardiogram 10/25/2011    Echocardiogram W/O color flow 68705 (1) - Normal LV sytolic function with mild LVH & mild diastolic dysfunction   • Hyperlipidemia    • Hypertensive disorder    • Pernicious anemia    • Thumb pain, left 10/14/2019   • Tobacco dependence syndrome        Past Surgical Hx:  Past Surgical History:   Procedure Laterality Date   • CARDIAC CATHETERIZATION  04/08/2015    Cardiac cath 45600 (1) - Slective coronary angiogram. Left heart catheterization with LV ventriculogram.   • COLONOSCOPY N/A 10/26/2020    Procedure: COLONOSCOPY;   Surgeon: Levi Milian MD;  Location: [facility] ENDOSCOPY;  Service: Gastroenterology;  Laterality: N/A;   • COLONOSCOPY N/A 11/08/2021    Procedure: COLONOSCOPY;  Surgeon: Levi Milian MD;  Location: [facility] ENDOSCOPY;  Service: Gastroenterology;  Laterality: N/A;   • DENTAL PROCEDURE      Removal of all teeth   • HAND SURGERY Right 4/15/2022    Procedure: TRAPEZIECTOMY RIGHT WRIST WITH SUSPENSION ARTHROPLASTY FIRST CARPOMETA CARPAL;  Surgeon: Kerwin Bloom MD;  Location: [facility] OR;  Service: Orthopedics;  Laterality: Right;       Current Meds:    Current Outpatient Medications:   •  acetaminophen (TYLENOL) 650 MG 8 hr tablet, Take 650 mg by mouth Every 8 (Eight) Hours As Needed for Mild Pain ., Disp: , Rfl:   •  albuterol sulfate  (90 Base) MCG/ACT inhaler, Inhale 2 puffs Every 4 (Four) Hours As Needed for Wheezing., Disp: 6.7 g, Rfl: 6  •  aspirin 81 MG chewable tablet, Chew 81 mg Daily., Disp: , Rfl:   •  atorvastatin (LIPITOR) 80 MG tablet, Take 1 tablet by mouth Daily., Disp: 90 tablet, Rfl: 3  •  Fluticasone Furoate-Vilanterol (Breo Ellipta) 100-25 MCG/INH inhaler, Inhale 1 puff Daily., Disp: 60 each, Rfl: 1  •  hydroCHLOROthiazide (HYDRODIURIL) 25 MG tablet, Take 1 tablet by mouth Daily., Disp: 90 tablet, Rfl: 3  •  HYDROcodone-acetaminophen (NORCO) 7.5-325 MG per tablet, Take 1 tablet by mouth Every 8 (Eight) Hours As Needed for Moderate Pain  (Pain)., Disp: 30 tablet, Rfl: 0  •  isosorbide mononitrate (IMDUR) 60 MG 24 hr tablet, TAKE 1 TABLET BY MOUTH EVERY DAY, Disp: 90 tablet, Rfl: 2  •  lisinopril (PRINIVIL,ZESTRIL) 40 MG tablet, Take 1 tablet by mouth Daily., Disp: 90 tablet, Rfl: 3  •  metoprolol succinate XL (TOPROL-XL) 25 MG 24 hr tablet, Take 1 tablet by mouth Daily., Disp: 90 tablet, Rfl: 3  •  nitroglycerin (NITROSTAT) 0.4 MG SL tablet, Place 1 tablet under the tongue Every 5 (Five) Minutes As Needed for Chest Pain. Take no more than 3 doses in 15 minutes., Disp: 30 tablet,  "Rfl: 12    Allergies:  No Known Allergies    Family Hx:  Family History   Problem Relation Age of Onset   • Heart disease Mother    • Hypertension Mother    • Diabetes Mother    • Heart disease Father    • Hypertension Father    • Diabetes Father    • Diabetes Sister    • Heart disease Sister    • Stroke Sister    • Cancer Paternal Aunt         Breast Cancer   • Cancer Paternal Uncle         Unknown cancer        Social History:  Social History     Socioeconomic History   • Marital status: Single   Tobacco Use   • Smoking status: Current Every Day Smoker     Packs/day: 2.00     Years: 40.00     Pack years: 80.00     Types: Cigarettes   • Smokeless tobacco: Former User   Vaping Use   • Vaping Use: Never used   Substance and Sexual Activity   • Alcohol use: Not Currently   • Drug use: No   • Sexual activity: Defer     Comment: Marital status:        Review of Systems  Review of Systems   Constitutional: Negative for chills, fatigue and fever.   HENT: Negative for ear pain, hearing loss, postnasal drip and sore throat.    Eyes: Negative for pain and visual disturbance.   Respiratory: Negative for cough, shortness of breath and wheezing.    Cardiovascular: Negative for chest pain, palpitations and leg swelling.   Gastrointestinal: Negative for abdominal pain, diarrhea, nausea and vomiting.   Genitourinary: Negative for difficulty urinating and dysuria.   Musculoskeletal: Positive for arthralgias. Negative for myalgias.   Skin: Negative for rash and wound.   Neurological: Negative for syncope, weakness and headaches.   Psychiatric/Behavioral: Negative for dysphoric mood. The patient is not nervous/anxious.        Objective:     /70   Pulse 91   Temp 98.4 °F (36.9 °C)   Ht 167.6 cm (66\")   Wt 92 kg (202 lb 14.4 oz)   SpO2 98%   BMI 32.75 kg/m²   Physical Exam  Vitals reviewed.   Constitutional:       General: He is not in acute distress.  HENT:      Head: Normocephalic and atraumatic.   Eyes:      " General: No scleral icterus.     Conjunctiva/sclera: Conjunctivae normal.   Cardiovascular:      Rate and Rhythm: Normal rate and regular rhythm.      Heart sounds: Normal heart sounds.   Pulmonary:      Effort: Pulmonary effort is normal.      Breath sounds: Normal breath sounds. No wheezing or rales.   Abdominal:      General: Bowel sounds are normal. There is no distension.      Palpations: Abdomen is soft.      Tenderness: There is no abdominal tenderness.   Musculoskeletal:         General: No deformity.      Right lower leg: No edema.      Left lower leg: No edema.   Skin:     General: Skin is warm and dry.   Neurological:      General: No focal deficit present.      Mental Status: He is alert. Mental status is at baseline.          Assessment/Plan:     Diagnoses and all orders for this visit:    1. Essential hypertension (Primary)    2. Leg swelling  -     Compression Stockings    3. Chronic heart failure with preserved ejection fraction (HCC)    4. Chronic obstructive pulmonary disease, unspecified COPD type (HCC)    1.  Well-controlled on current regimen.    2/3.  We will try compression stockings to help with his dependent edema.  Results of recent echo attached below.    The echo results were as follows:   · Left ventricular ejection fraction appears to be 66 - 70%. Left ventricular systolic function is normal.  · Left ventricular diastolic function was normal.  · Estimated right ventricular systolic pressure from tricuspid regurgitation is normal (<35 mmHg).  · The right ventricular cavity is mildly dilated.    4.  Counseled on benefits of smoking cessation.  Patient still not interested.      · Rx changes: none  · Patient Education: Smoking cessation  · Compliance at present is estimated to be fair.   · Efforts to improve compliance (if necessary) will be directed at increased exercise and continued sobriety .    Depression screening: Up to date; last screen      Follow-up:     Return in about 3 months  (around 11/26/2022).    Preventative:  Health Maintenance   Topic Date Due   • ZOSTER VACCINE (2 of 2) 07/11/2019   • ANNUAL PHYSICAL  04/17/2020   • Pneumococcal Vaccine 0-64 (2 - PCV) 05/16/2020   • COVID-19 Vaccine (3 - Booster for Pfizer series) 08/25/2021   • LUNG CANCER SCREENING  09/21/2022   • INFLUENZA VACCINE  10/01/2022   • COLORECTAL CANCER SCREENING  11/08/2022   • LIPID PANEL  01/28/2023   • TDAP/TD VACCINES (2 - Td or Tdap) 05/16/2029   • HEPATITIS C SCREENING  Completed       Weight  -Class: Obese Class I: 30-34.9kg/m2  -Patient's Body mass index is 32.75 kg/m². indicating that he is obese (BMI >30). Obesity-related health conditions include the following: hypertension and coronary heart disease. Obesity is unchanged. BMI is is above average; BMI management plan is completed. We discussed portion control and increasing exercise..   decrease soda or juice intake and increase water intake    Alcohol use:  reports previous alcohol use.  Nicotine status  reports that he has been smoking cigarettes. He has a 80.00 pack-year smoking history. He has quit using smokeless tobacco.     Goals     •  Smoking cessation (pt-stated)       Quitting smoking and living longer    Barriers: Wanting to quit            RISK SCORE: 4       Charles Rojas MD   [place]    [place] [address]  Office: [phone]    This document has been electronically signed by Charles Rojas MD on August 29, 2022 11:29 CDT

## 2022-10-22 ENCOUNTER — APPOINTMENT (OUTPATIENT)
Dept: GENERAL RADIOLOGY | Facility: HOSPITAL | Age: 62
End: 2022-10-22

## 2022-10-22 ENCOUNTER — HOSPITAL ENCOUNTER (EMERGENCY)
Facility: HOSPITAL | Age: 62
Discharge: HOME | End: 2022-10-22
Attending: EMERGENCY MEDICINE | Admitting: EMERGENCY MEDICINE

## 2022-10-22 VITALS
HEART RATE: 65 BPM | RESPIRATION RATE: 22 BRPM | OXYGEN SATURATION: 96 % | TEMPERATURE: 98.3 F | BODY MASS INDEX: 33.85 KG/M2 | WEIGHT: 210.6 LBS | DIASTOLIC BLOOD PRESSURE: 78 MMHG | SYSTOLIC BLOOD PRESSURE: 142 MMHG | HEIGHT: 66 IN

## 2022-10-22 DIAGNOSIS — S39.012A LUMBOSACRAL STRAIN, INITIAL ENCOUNTER: Primary | ICD-10-CM

## 2022-10-22 LAB
BILIRUB UR QL STRIP: NEGATIVE
CLARITY UR: CLEAR
COLOR UR: YELLOW
GLUCOSE UR STRIP-MCNC: NEGATIVE MG/DL
HGB UR QL STRIP.AUTO: NEGATIVE
KETONES UR QL STRIP: NEGATIVE
LEUKOCYTE ESTERASE UR QL STRIP.AUTO: NEGATIVE
NITRITE UR QL STRIP: NEGATIVE
PH UR STRIP.AUTO: 5.5 [PH] (ref 5–9)
PROT UR QL STRIP: NEGATIVE
SP GR UR STRIP: 1.01 (ref 1–1.03)
UROBILINOGEN UR QL STRIP: NORMAL

## 2022-10-22 PROCEDURE — 81003 URINALYSIS AUTO W/O SCOPE: CPT | Performed by: EMERGENCY MEDICINE

## 2022-10-22 PROCEDURE — 72100 X-RAY EXAM L-S SPINE 2/3 VWS: CPT

## 2022-10-22 PROCEDURE — 99283 EMERGENCY DEPT VISIT LOW MDM: CPT

## 2022-10-22 RX ORDER — METHOCARBAMOL 500 MG/1
1000 TABLET, FILM COATED ORAL 4 TIMES DAILY PRN
Qty: 25 TABLET | Refills: 0 | Status: SHIPPED | OUTPATIENT
Start: 2022-10-22

## 2022-10-22 RX ORDER — HYDROCODONE BITARTRATE AND ACETAMINOPHEN 5; 325 MG/1; MG/1
1 TABLET ORAL EVERY 6 HOURS PRN
Qty: 10 TABLET | Refills: 0 | Status: SHIPPED | OUTPATIENT
Start: 2022-10-22

## 2022-10-22 RX ORDER — DIAZEPAM 5 MG/1
5 TABLET ORAL ONCE
Status: COMPLETED | OUTPATIENT
Start: 2022-10-22 | End: 2022-10-22

## 2022-10-22 RX ORDER — HYDROCODONE BITARTRATE AND ACETAMINOPHEN 5; 325 MG/1; MG/1
1 TABLET ORAL ONCE
Status: COMPLETED | OUTPATIENT
Start: 2022-10-22 | End: 2022-10-22

## 2022-10-22 RX ADMIN — DIAZEPAM 5 MG: 5 TABLET ORAL at 11:01

## 2022-10-22 RX ADMIN — HYDROCODONE BITARTRATE AND ACETAMINOPHEN 1 TABLET: 5; 325 TABLET ORAL at 11:01

## 2022-10-22 NOTE — ED PROVIDER NOTES
Subjective   History of Present Illness  62yo male pmh significant htn/copd/gerd/cad presents ED c/o 2d hx lumbar low back pain onset 1d after moving a heavy couch.  Pt denies direct traumatic injury/abdominal pain/dysuria/hematuria/paresthesia/motor weakness/bowel or bladder dysfunction/saddle anesthesia.    History provided by:  Patient  Back Pain  Location:  Lumbar spine  Duration:  2 days      Review of Systems   Constitutional: Negative.    HENT: Negative.    Respiratory: Negative.    Cardiovascular: Negative.    Gastrointestinal: Negative.    Genitourinary: Negative.    Musculoskeletal: Positive for back pain.   Skin: Negative.    Neurological: Negative.    All other systems reviewed and are negative.      Past Medical History:   Diagnosis Date   • Acute bronchitis    • Acute sinusitis    • Chest pain    • Chronic bronchitis (HCC)     secondary to smoking   • Claudication (HCC)    • COPD (chronic obstructive pulmonary disease) (HCC)    • Coronary arteriosclerosis    • Cough    • Gastroesophageal reflux disease    • Health maintenance examination     Individual health examination   • History of echocardiogram 10/21/2013    Echocardiogram W/ color flow 41863 (1) - Left atrium normal. Mild CLVH. EF 50%. Valves intact. Mild mitral and tricuspid regurg. Pericardium normal.   • History of echocardiogram 10/25/2011    Echocardiogram W/O color flow 34796 (1) - Normal LV sytolic function with mild LVH & mild diastolic dysfunction   • Hyperlipidemia    • Hypertensive disorder    • Pernicious anemia    • Thumb pain, left 10/14/2019   • Tobacco dependence syndrome        No Known Allergies    Past Surgical History:   Procedure Laterality Date   • CARDIAC CATHETERIZATION  04/08/2015    Cardiac cath 80871 (1) - Slective coronary angiogram. Left heart catheterization with LV ventriculogram.   • COLONOSCOPY N/A 10/26/2020    Procedure: COLONOSCOPY;  Surgeon: Levi Milian MD;  Location: [facility] ENDOSCOPY;  Service:  Gastroenterology;  Laterality: N/A;   • COLONOSCOPY N/A 11/08/2021    Procedure: COLONOSCOPY;  Surgeon: Levi Milian MD;  Location: [facility] ENDOSCOPY;  Service: Gastroenterology;  Laterality: N/A;   • DENTAL PROCEDURE      Removal of all teeth   • HAND SURGERY Right 4/15/2022    Procedure: TRAPEZIECTOMY RIGHT WRIST WITH SUSPENSION ARTHROPLASTY FIRST CARPOMETA CARPAL;  Surgeon: Kerwin Bloom MD;  Location: [facility] OR;  Service: Orthopedics;  Laterality: Right;       Family History   Problem Relation Age of Onset   • Heart disease Mother    • Hypertension Mother    • Diabetes Mother    • Heart disease Father    • Hypertension Father    • Diabetes Father    • Diabetes Sister    • Heart disease Sister    • Stroke Sister    • Cancer Paternal Aunt         Breast Cancer   • Cancer Paternal Uncle         Unknown cancer       Social History     Socioeconomic History   • Marital status: Single   Tobacco Use   • Smoking status: Every Day     Packs/day: 2.00     Years: 40.00     Pack years: 80.00     Types: Cigarettes   • Smokeless tobacco: Former   Vaping Use   • Vaping Use: Never used   Substance and Sexual Activity   • Alcohol use: Not Currently   • Drug use: No   • Sexual activity: Defer     Comment: Marital status:            Objective   Physical Exam  Vitals and nursing note reviewed.   Constitutional:       Appearance: Normal appearance.   HENT:      Head: Normocephalic and atraumatic.      Mouth/Throat:      Mouth: Mucous membranes are moist.   Eyes:      Pupils: Pupils are equal, round, and reactive to light.   Cardiovascular:      Rate and Rhythm: Normal rate and regular rhythm.      Pulses: Normal pulses.      Heart sounds: Normal heart sounds. No murmur heard.    No friction rub. No gallop.   Pulmonary:      Effort: Pulmonary effort is normal. No respiratory distress.      Breath sounds: Normal breath sounds. No wheezing, rhonchi or rales.   Abdominal:      General: Abdomen is flat. Bowel sounds  are normal. There is no distension.      Palpations: Abdomen is soft.      Tenderness: There is no abdominal tenderness. There is no guarding or rebound.   Musculoskeletal:         General: Tenderness present. No swelling or deformity.      Cervical back: Normal range of motion and neck supple. No rigidity.      Thoracic back: Normal.      Lumbar back: Spasms and tenderness present. No bony tenderness.        Back:    Lymphadenopathy:      Cervical: No cervical adenopathy.   Neurological:      Mental Status: He is alert.      GCS: GCS eye subscore is 4. GCS verbal subscore is 5. GCS motor subscore is 6.      Sensory: Sensation is intact.      Motor: Motor function is intact.      Deep Tendon Reflexes:      Reflex Scores:       Patellar reflexes are 2+ on the right side and 2+ on the left side.     Comments: Df/pf/sensation intact  Neg seated slr         Procedures           ED Course      Labs Reviewed   URINALYSIS W/ MICROSCOPIC IF INDICATED (NO CULTURE) - Normal    Narrative:     Urine microscopic not indicated.       XR Spine Lumbar 2 or 3 View    Result Date: 10/22/2022  Narrative: EXAM: XR LUMBAR SPINE 2-3 VIEWS ORDERING PROVIDER: ROSSY LINDSEY CLINICAL HISTORY: Back pain COMPARISON STUDY: None. TECHNIQUE:  3 views of the lumbar spine. FINDINGS: Lumbar vertebral bodies are normally aligned. There are 5 non rib bearing lumbar vertebral bodies.  Disc spaces are narrowed with osteophyte formation at multiple levels, and vertebral body heights are well maintained.  There are no lytic or sclerotic lesions. Paraspinal soft tissues are normal.     Impression: No acute displaced fracture, or traumatic subluxation based on current assessment. Mild multilevel degenerative disc disease. Electronically signed by:  Kendall Pratt MD  10/22/2022 11:23 AM CDT Workstation: [id number]                                 [facility] reviewed by Rossy Lindsey MD       MDM  Number of Diagnoses or Management Options  Lumbosacral strain,  initial encounter  Diagnosis management comments: nsaids contraindicated secondary to CAD.      Final diagnoses:   Lumbosacral strain, initial encounter       ED Disposition  ED Disposition     ED Disposition   Discharge    Condition   Good    Comment   --             Charles Rojas MD  [address] [place] [id number] [phone]    In 2 days           Medication List      New Prescriptions    HYDROcodone-acetaminophen 5-325 MG per tablet  Commonly known as: NORCO  Take 1 tablet by mouth Every 6 (Six) Hours As Needed for Moderate Pain.  Replaces: HYDROcodone-acetaminophen 7.5-325 MG per tablet     methocarbamol 500 MG tablet  Commonly known as: ROBAXIN  Take 2 tablets by mouth 4 (Four) Times a Day As Needed for Muscle Spasms.        Stop    HYDROcodone-acetaminophen 7.5-325 MG per tablet  Commonly known as: NORCO  Replaced by: HYDROcodone-acetaminophen 5-325 MG per tablet           Where to Get Your Medications      These medications were sent to [facility]/pharmacy #[id number] - [place], [place] - [address] [place] [address] - [phone]  - [phone] [address] [address]    Phone: [phone]   · HYDROcodone-acetaminophen 5-325 MG per tablet  · methocarbamol 500 MG tablet          Román Porter MD  10/22/22 1200

## 2022-10-22 NOTE — DISCHARGE INSTRUCTIONS
Return ED fever, abdominal pain, motor or sensory loss, bowel or bladder dysfunction, syncope, worse condition, any other concerns  
Erich

## 2022-10-25 ENCOUNTER — OFFICE VISIT (OUTPATIENT)
Dept: FAMILY MEDICINE CLINIC | Facility: CLINIC | Age: 62
End: 2022-10-25

## 2022-10-25 VITALS
OXYGEN SATURATION: 96 % | HEART RATE: 77 BPM | SYSTOLIC BLOOD PRESSURE: 132 MMHG | HEIGHT: 66 IN | TEMPERATURE: 96.4 F | BODY MASS INDEX: 33.11 KG/M2 | DIASTOLIC BLOOD PRESSURE: 78 MMHG | WEIGHT: 206 LBS

## 2022-10-25 DIAGNOSIS — S39.012D LUMBOSACRAL STRAIN, SUBSEQUENT ENCOUNTER: Primary | ICD-10-CM

## 2022-10-25 DIAGNOSIS — M51.36 DEGENERATIVE DISC DISEASE, LUMBAR: ICD-10-CM

## 2022-10-25 PROCEDURE — 99213 OFFICE O/P EST LOW 20 MIN: CPT | Performed by: STUDENT IN AN ORGANIZED HEALTH CARE EDUCATION/TRAINING PROGRAM

## 2022-10-25 RX ORDER — TIZANIDINE HYDROCHLORIDE 2 MG/1
2 CAPSULE, GELATIN COATED ORAL 3 TIMES DAILY
Qty: 60 CAPSULE | Refills: 0 | Status: SHIPPED | OUTPATIENT
Start: 2022-10-25 | End: 2023-01-26 | Stop reason: SDUPTHER

## 2022-10-27 ENCOUNTER — NURSE NAVIGATOR (OUTPATIENT)
Dept: ONCOLOGY | Facility: CLINIC | Age: 62
End: 2022-10-27

## 2022-10-27 NOTE — PROGRESS NOTES
LUNG PATIENT NAVIGATION    Lung Screening Program    Letter mailed to pt regarding LDCT scan due. Note also routed to Primary Care Provider.  Contact number provided for assistance or questions as needed.

## 2022-10-27 NOTE — PROGRESS NOTES
I have spoken with the patient .     I have reviewed the notes, assessments, and/or procedures performed by Dr. Charles Rojas,   I concur with   his  documentation and assessment and plan for Nima Portillo.          This document has been electronically signed by Jacek Quick MD on October 27, 2022 16:27 CDT

## 2022-10-27 NOTE — PROGRESS NOTES
Family Medicine Residency  Charles Rojas MD    Subjective:     Nima Portillo is a 61 y.o. male who presents for ED follow-up after hurting his back lifting a couch.  Pain started initially after lifting a couch.  Currently no sciatic-like pain.  Pain has been improving since the injury.  He is getting some relief from Zanaflex.  No weakness.  Pain is worse when he first gets up from a seated or laying down position.  No loss of bladder or bowel control.    The following portions of the patient's history were reviewed and updated as appropriate: allergies, current medications, past family history, past medical history, past social history, past surgical history and problem list.    Past Medical Hx:  Past Medical History:   Diagnosis Date   • Acute bronchitis    • Acute sinusitis    • Chest pain    • Chronic bronchitis (HCC)     secondary to smoking   • Claudication (HCC)    • COPD (chronic obstructive pulmonary disease) (HCC)    • Coronary arteriosclerosis    • Cough    • Gastroesophageal reflux disease    • Health maintenance examination     Individual health examination   • History of echocardiogram 10/21/2013    Echocardiogram W/ color flow 39412 (1) - Left atrium normal. Mild CLVH. EF 50%. Valves intact. Mild mitral and tricuspid regurg. Pericardium normal.   • History of echocardiogram 10/25/2011    Echocardiogram W/O color flow 56353 (1) - Normal LV sytolic function with mild LVH & mild diastolic dysfunction   • Hyperlipidemia    • Hypertensive disorder    • Pernicious anemia    • Thumb pain, left 10/14/2019   • Tobacco dependence syndrome        Past Surgical Hx:  Past Surgical History:   Procedure Laterality Date   • CARDIAC CATHETERIZATION  04/08/2015    Cardiac cath 86605 (1) - Slective coronary angiogram. Left heart catheterization with LV ventriculogram.   • COLONOSCOPY N/A 10/26/2020    Procedure: COLONOSCOPY;  Surgeon: Levi Milian MD;  Location: [facility] ENDOSCOPY;  Service:  Gastroenterology;  Laterality: N/A;   • COLONOSCOPY N/A 11/08/2021    Procedure: COLONOSCOPY;  Surgeon: Levi Milian MD;  Location: [facility] ENDOSCOPY;  Service: Gastroenterology;  Laterality: N/A;   • DENTAL PROCEDURE      Removal of all teeth   • HAND SURGERY Right 4/15/2022    Procedure: TRAPEZIECTOMY RIGHT WRIST WITH SUSPENSION ARTHROPLASTY FIRST CARPOMETA CARPAL;  Surgeon: Kerwin Bloom MD;  Location: [facility] OR;  Service: Orthopedics;  Laterality: Right;       Current Meds:    Current Outpatient Medications:   •  acetaminophen (TYLENOL) 650 MG 8 hr tablet, Take 650 mg by mouth Every 8 (Eight) Hours As Needed for Mild Pain ., Disp: , Rfl:   •  albuterol sulfate  (90 Base) MCG/ACT inhaler, Inhale 2 puffs Every 4 (Four) Hours As Needed for Wheezing., Disp: 6.7 g, Rfl: 6  •  aspirin 81 MG chewable tablet, Chew 81 mg Daily., Disp: , Rfl:   •  atorvastatin (LIPITOR) 80 MG tablet, Take 1 tablet by mouth Daily., Disp: 90 tablet, Rfl: 3  •  Fluticasone Furoate-Vilanterol (Breo Ellipta) 100-25 MCG/INH inhaler, Inhale 1 puff Daily., Disp: 60 each, Rfl: 1  •  hydroCHLOROthiazide (HYDRODIURIL) 25 MG tablet, Take 1 tablet by mouth Daily., Disp: 90 tablet, Rfl: 3  •  HYDROcodone-acetaminophen (NORCO) 5-325 MG per tablet, Take 1 tablet by mouth Every 6 (Six) Hours As Needed for Moderate Pain., Disp: 10 tablet, Rfl: 0  •  isosorbide mononitrate (IMDUR) 60 MG 24 hr tablet, TAKE 1 TABLET BY MOUTH EVERY DAY, Disp: 90 tablet, Rfl: 2  •  lisinopril (PRINIVIL,ZESTRIL) 40 MG tablet, Take 1 tablet by mouth Daily., Disp: 90 tablet, Rfl: 3  •  methocarbamol (ROBAXIN) 500 MG tablet, Take 2 tablets by mouth 4 (Four) Times a Day As Needed for Muscle Spasms., Disp: 25 tablet, Rfl: 0  •  metoprolol succinate XL (TOPROL-XL) 25 MG 24 hr tablet, Take 1 tablet by mouth Daily., Disp: 90 tablet, Rfl: 3  •  nitroglycerin (NITROSTAT) 0.4 MG SL tablet, Place 1 tablet under the tongue Every 5 (Five) Minutes As Needed for Chest Pain.  "Take no more than 3 doses in 15 minutes., Disp: 30 tablet, Rfl: 12  •  TiZANidine (Zanaflex) 2 MG capsule, Take 1 capsule by mouth 3 (Three) Times a Day., Disp: 60 capsule, Rfl: 0    Allergies:  No Known Allergies    Family Hx:  Family History   Problem Relation Age of Onset   • Heart disease Mother    • Hypertension Mother    • Diabetes Mother    • Heart disease Father    • Hypertension Father    • Diabetes Father    • Diabetes Sister    • Heart disease Sister    • Stroke Sister    • Cancer Paternal Aunt         Breast Cancer   • Cancer Paternal Uncle         Unknown cancer        Social History:  Social History     Socioeconomic History   • Marital status: Single   Tobacco Use   • Smoking status: Every Day     Packs/day: 2.00     Years: 40.00     Pack years: 80.00     Types: Cigarettes   • Smokeless tobacco: Former   Vaping Use   • Vaping Use: Never used   Substance and Sexual Activity   • Alcohol use: Not Currently   • Drug use: No   • Sexual activity: Defer     Comment: Marital status:        Review of Systems  Review of Systems   Constitutional: Negative for chills, fatigue and fever.   HENT: Negative for ear pain, hearing loss, postnasal drip and sore throat.    Eyes: Negative for pain and visual disturbance.   Respiratory: Negative for cough, shortness of breath and wheezing.    Cardiovascular: Negative for chest pain, palpitations and leg swelling.   Gastrointestinal: Negative for abdominal pain, diarrhea, nausea and vomiting.   Genitourinary: Negative for difficulty urinating and dysuria.   Musculoskeletal: Positive for arthralgias and back pain. Negative for myalgias.   Skin: Negative for rash and wound.   Neurological: Negative for syncope, weakness and headaches.   Psychiatric/Behavioral: Negative for dysphoric mood. The patient is not nervous/anxious.        Objective:     /78   Pulse 77   Temp 96.4 °F (35.8 °C)   Ht 167.6 cm (66\")   Wt 93.4 kg (206 lb)   SpO2 96%   BMI 33.25 kg/m² "   Physical Exam  Vitals reviewed.   Constitutional:       General: He is not in acute distress.  HENT:      Head: Normocephalic and atraumatic.   Eyes:      General: No scleral icterus.     Conjunctiva/sclera: Conjunctivae normal.   Cardiovascular:      Rate and Rhythm: Normal rate and regular rhythm.      Heart sounds: Normal heart sounds.   Pulmonary:      Effort: Pulmonary effort is normal.      Breath sounds: Normal breath sounds. No wheezing or rales.   Abdominal:      General: Bowel sounds are normal. There is no distension.      Palpations: Abdomen is soft.      Tenderness: There is no abdominal tenderness.   Musculoskeletal:         General: No deformity.      Right lower leg: No edema.      Left lower leg: No edema.   Skin:     General: Skin is warm and dry.   Neurological:      General: No focal deficit present.      Mental Status: He is alert. Mental status is at baseline.          Assessment/Plan:     Diagnoses and all orders for this visit:    1. Lumbosacral strain, subsequent encounter (Primary)  -     TiZANidine (Zanaflex) 2 MG capsule; Take 1 capsule by mouth 3 (Three) Times a Day.  Dispense: 60 capsule; Refill: 0    2. Degenerative disc disease, lumbar  -     Ambulatory Referral to Physical Therapy Evaluate and treat    1.  Imaging from emergency department was negative.  Patient getting relief with Zanaflex.  We will refill this medication.  Warned patient about potential sedative side effects.  Encouraged patient to use NSAIDs.  Patient has Tylenol his medication list.  Warned about not exceeding 4 g in a day.    2.  Refer to physical therapy for core strengthening to prevent further illness      · Rx changes: as above  · Patient Education: PT options  · Compliance at present is estimated to be fair.   · Efforts to improve compliance (if necessary) will be directed at increased exercise and continued sobriety .    Depression screening: Up to date; last screen      Follow-up:     Return in about 3  months (around 1/25/2023), or or sooner is symptoms don't improve.    Preventative:  Health Maintenance   Topic Date Due   • ZOSTER VACCINE (2 of 2) 07/11/2019   • ANNUAL PHYSICAL  04/17/2020   • Pneumococcal Vaccine 0-64 (2 - PCV) 05/16/2020   • COVID-19 Vaccine (3 - Booster for Pfizer series) 05/20/2021   • INFLUENZA VACCINE  08/01/2022   • LUNG CANCER SCREENING  09/21/2022   • COLORECTAL CANCER SCREENING  11/08/2022   • LIPID PANEL  01/28/2023   • TDAP/TD VACCINES (2 - Td or Tdap) 05/16/2029   • HEPATITIS C SCREENING  Completed       Weight  -Class: Obese Class I: 30-34.9kg/m2  -Patient's Body mass index is 33.25 kg/m². indicating that he is obese (BMI >30). Obesity-related health conditions include the following: hypertension and coronary heart disease. Obesity is unchanged. BMI is is above average; BMI management plan is completed. We discussed portion control and increasing exercise..   decrease soda or juice intake and increase water intake    Alcohol use:  reports that he does not currently use alcohol.  Nicotine status  reports that he has been smoking cigarettes. He has a 80.00 pack-year smoking history. He has quit using smokeless tobacco.     Goals     •  Smoking cessation (pt-stated)       Quitting smoking and living longer    Barriers: Wanting to quit            RISK SCORE: 4       Charles Rojas MD   [facility]    [place] [address]  Office: [phone]    This document has been electronically signed by Charles Rojas MD on October 27, 2022 10:01 CDT

## 2022-11-12 ENCOUNTER — HOSPITAL ENCOUNTER (EMERGENCY)
Facility: HOSPITAL | Age: 62
Discharge: HOME | End: 2022-11-12
Attending: FAMILY MEDICINE | Admitting: FAMILY MEDICINE

## 2022-11-12 VITALS
HEIGHT: 66 IN | WEIGHT: 206 LBS | OXYGEN SATURATION: 95 % | SYSTOLIC BLOOD PRESSURE: 148 MMHG | HEART RATE: 100 BPM | TEMPERATURE: 98.6 F | BODY MASS INDEX: 33.11 KG/M2 | RESPIRATION RATE: 20 BRPM | DIASTOLIC BLOOD PRESSURE: 77 MMHG

## 2022-11-12 DIAGNOSIS — J02.9 PHARYNGITIS, UNSPECIFIED ETIOLOGY: ICD-10-CM

## 2022-11-12 DIAGNOSIS — J10.1 INFLUENZA A: Primary | ICD-10-CM

## 2022-11-12 LAB
FLUAV RNA RESP QL NAA+PROBE: DETECTED
FLUBV RNA RESP QL NAA+PROBE: NOT DETECTED
S PYO AG THROAT QL: NEGATIVE
SARS-COV-2 RNA RESP QL NAA+PROBE: NOT DETECTED

## 2022-11-12 PROCEDURE — C9803 HOPD COVID-19 SPEC COLLECT: HCPCS

## 2022-11-12 PROCEDURE — 87636 SARSCOV2 & INF A&B AMP PRB: CPT | Performed by: NURSE PRACTITIONER

## 2022-11-12 PROCEDURE — 87880 STREP A ASSAY W/OPTIC: CPT | Performed by: NURSE PRACTITIONER

## 2022-11-12 PROCEDURE — 99282 EMERGENCY DEPT VISIT SF MDM: CPT

## 2022-11-12 PROCEDURE — 87081 CULTURE SCREEN ONLY: CPT | Performed by: NURSE PRACTITIONER

## 2022-11-12 RX ORDER — AZITHROMYCIN 250 MG/1
TABLET, FILM COATED ORAL
Qty: 6 TABLET | Refills: 0 | Status: SHIPPED | OUTPATIENT
Start: 2022-11-12

## 2022-11-12 NOTE — DISCHARGE INSTRUCTIONS
You tested positive for flu today.  Although you tested negative for strep throat, given your exposure and symptoms you are being treated with an antibiotic.  Take your antibiotic as directed until completed.  Follow-up with your primary care provider later next week for reevaluation.  Call Monday for an appointment.  Return back to the ER if your symptoms worsen or change in presentation.

## 2022-11-12 NOTE — ED PROVIDER NOTES
Subjective   History of Present Illness  Patient presents to the ER with complaints of throat, cough, headache, nasal drainage, body aches that started 2 days ago.  Has not taken anything for the symptoms.  He states his daughter who lives with him has been sick also.  Patient is a current smoker.        Review of Systems   Constitutional: Positive for chills and fatigue. Negative for fever.   HENT: Positive for congestion, postnasal drip and sore throat.    Respiratory: Positive for cough and shortness of breath (Only during coughing spell).    Cardiovascular: Negative for chest pain.   Gastrointestinal: Negative for abdominal pain, nausea and vomiting.   Genitourinary: Negative.    Musculoskeletal: Positive for myalgias.   Skin: Negative.    Neurological: Positive for headaches. Negative for weakness.   Psychiatric/Behavioral: Negative.        Past Medical History:   Diagnosis Date   • Acute bronchitis    • Acute sinusitis    • Chest pain    • Chronic bronchitis (HCC)     secondary to smoking   • Claudication (HCC)    • COPD (chronic obstructive pulmonary disease) (HCC)    • Coronary arteriosclerosis    • Cough    • Gastroesophageal reflux disease    • Health maintenance examination     Individual health examination   • History of echocardiogram 10/21/2013    Echocardiogram W/ color flow 38453 (1) - Left atrium normal. Mild CLVH. EF 50%. Valves intact. Mild mitral and tricuspid regurg. Pericardium normal.   • History of echocardiogram 10/25/2011    Echocardiogram W/O color flow 73427 (1) - Normal LV sytolic function with mild LVH & mild diastolic dysfunction   • Hyperlipidemia    • Hypertensive disorder    • Pernicious anemia    • Thumb pain, left 10/14/2019   • Tobacco dependence syndrome        No Known Allergies    Past Surgical History:   Procedure Laterality Date   • CARDIAC CATHETERIZATION  04/08/2015    Cardiac cath 74647 (1) - Slective coronary angiogram. Left heart catheterization with LV ventriculogram.  "  • COLONOSCOPY N/A 10/26/2020    Procedure: COLONOSCOPY;  Surgeon: Levi Milian MD;  Location: [facility] ENDOSCOPY;  Service: Gastroenterology;  Laterality: N/A;   • COLONOSCOPY N/A 11/08/2021    Procedure: COLONOSCOPY;  Surgeon: Levi Milian MD;  Location: [facility] ENDOSCOPY;  Service: Gastroenterology;  Laterality: N/A;   • DENTAL PROCEDURE      Removal of all teeth   • HAND SURGERY Right 4/15/2022    Procedure: TRAPEZIECTOMY RIGHT WRIST WITH SUSPENSION ARTHROPLASTY FIRST CARPOMETA CARPAL;  Surgeon: Kerwin Bloom MD;  Location: [facility] OR;  Service: Orthopedics;  Laterality: Right;       Family History   Problem Relation Age of Onset   • Heart disease Mother    • Hypertension Mother    • Diabetes Mother    • Heart disease Father    • Hypertension Father    • Diabetes Father    • Diabetes Sister    • Heart disease Sister    • Stroke Sister    • Cancer Paternal Aunt         Breast Cancer   • Cancer Paternal Uncle         Unknown cancer       Social History     Socioeconomic History   • Marital status: Single   Tobacco Use   • Smoking status: Every Day     Packs/day: 1.00     Years: 40.00     Pack years: 40.00     Types: Cigarettes   • Smokeless tobacco: Former   Vaping Use   • Vaping Use: Never used   Substance and Sexual Activity   • Alcohol use: Not Currently   • Drug use: No   • Sexual activity: Defer     Comment: Marital status:            Objective    /70 (BP Location: Right arm, Patient Position: Sitting)   Pulse 95   Temp 98.6 °F (37 °C) (Oral)   Resp 18   Ht 167.6 cm (66\")   Wt 93.4 kg (206 lb)   SpO2 97%   BMI 33.25 kg/m²     Physical Exam  Vitals and nursing note reviewed.   Constitutional:       General: He is not in acute distress.     Appearance: He is well-developed.   HENT:      Head: Normocephalic and atraumatic.      Right Ear: Ear canal normal.      Left Ear: Ear canal normal.      Nose: Congestion present.      Mouth/Throat:      Mouth: Mucous membranes are moist. "      Pharynx: Posterior oropharyngeal erythema present. No oropharyngeal exudate.      Tonsils: No tonsillar exudate or tonsillar abscesses.   Eyes:      Conjunctiva/sclera: Conjunctivae normal.   Cardiovascular:      Rate and Rhythm: Normal rate and regular rhythm.      Heart sounds: Normal heart sounds. No murmur heard.  Pulmonary:      Effort: Pulmonary effort is normal. No respiratory distress.      Breath sounds: Normal breath sounds. No wheezing.   Abdominal:      General: Bowel sounds are normal. There is no distension.      Palpations: Abdomen is soft.      Tenderness: There is no abdominal tenderness.   Musculoskeletal:         General: Normal range of motion.      Cervical back: Normal range of motion and neck supple.   Skin:     General: Skin is warm and dry.      Capillary Refill: Capillary refill takes less than 2 seconds.   Neurological:      Mental Status: He is alert and oriented to person, place, and time.      Coordination: Coordination normal.   Psychiatric:         Behavior: Behavior normal.         Thought Content: Thought content normal.         Judgment: Judgment normal.         Procedures  Results for orders placed or performed during the hospital encounter of 11/12/22   COVID-19 and FLU A/B PCR - Swab, Nasopharynx    Specimen: Nasopharynx; Swab   Result Value Ref Range    COVID19 Not Detected Not Detected - Ref. Range    Influenza A PCR Detected (A) Not Detected    Influenza B PCR Not Detected Not Detected   Rapid Strep A Screen - Swab, Throat    Specimen: Throat; Swab   Result Value Ref Range    Strep A Ag Negative Negative              ED Course                                           MDM    Final diagnoses:   Influenza A   Pharyngitis, unspecified etiology       ED Disposition  ED Disposition     ED Disposition   Discharge    Condition   Stable    Comment   --             Charles Rojas MD  [address] [place] [address] [address] [place] [address] [phone]    Schedule an appointment as soon as  possible for a visit   ER follow-up         Medication List      New Prescriptions    azithromycin 250 MG tablet  Commonly known as: Zithromax Z-Rubén  Take 2 tablets by mouth on day 1, then 1 tablet daily on days 2-5           Where to Get Your Medications      These medications were sent to [facility]/pharmacy #[id number] - [place], [place] - [address] [place] [address] - [phone]  - [phone] [address] [address]    Phone: [phone]   · azithromycin 250 MG tablet          Lisbeth Wagner, APRN  11/12/22 4690

## 2022-11-14 LAB — BACTERIA SPEC AEROBE CULT: NORMAL

## 2022-11-21 ENCOUNTER — OFFICE VISIT (OUTPATIENT)
Dept: FAMILY MEDICINE CLINIC | Facility: CLINIC | Age: 62
End: 2022-11-21

## 2022-11-21 VITALS
DIASTOLIC BLOOD PRESSURE: 76 MMHG | HEIGHT: 66 IN | TEMPERATURE: 97.1 F | OXYGEN SATURATION: 93 % | BODY MASS INDEX: 32.8 KG/M2 | HEART RATE: 79 BPM | WEIGHT: 204.1 LBS | SYSTOLIC BLOOD PRESSURE: 124 MMHG

## 2022-11-21 DIAGNOSIS — Z23 NEED FOR INFLUENZA VACCINATION: Primary | ICD-10-CM

## 2022-11-21 DIAGNOSIS — R05.1 ACUTE COUGH: ICD-10-CM

## 2022-11-21 PROCEDURE — 90686 IIV4 VACC NO PRSV 0.5 ML IM: CPT | Performed by: STUDENT IN AN ORGANIZED HEALTH CARE EDUCATION/TRAINING PROGRAM

## 2022-11-21 PROCEDURE — 90471 IMMUNIZATION ADMIN: CPT | Performed by: STUDENT IN AN ORGANIZED HEALTH CARE EDUCATION/TRAINING PROGRAM

## 2022-11-21 PROCEDURE — 99213 OFFICE O/P EST LOW 20 MIN: CPT | Performed by: STUDENT IN AN ORGANIZED HEALTH CARE EDUCATION/TRAINING PROGRAM

## 2022-11-22 ENCOUNTER — TELEPHONE (OUTPATIENT)
Dept: FAMILY MEDICINE CLINIC | Facility: CLINIC | Age: 62
End: 2022-11-22

## 2022-11-23 NOTE — PROGRESS NOTES
Family Medicine Residency  Charles Rojas MD    Subjective:     Nima Portillo is a 62 y.o. male who presents for ED follow-up after testing positive for influenza. Patient no longer febrile but still having some sough. Finished course of azithro started by the ED. Baseline SOA. Some fatigue. He also has his baseline low back pain.     The following portions of the patient's history were reviewed and updated as appropriate: allergies, current medications, past family history, past medical history, past social history, past surgical history and problem list.    Past Medical Hx:  Past Medical History:   Diagnosis Date   • Acute bronchitis    • Acute sinusitis    • Chest pain    • Chronic bronchitis (HCC)     secondary to smoking   • Claudication (HCC)    • COPD (chronic obstructive pulmonary disease) (HCC)    • Coronary arteriosclerosis    • Cough    • Gastroesophageal reflux disease    • Health maintenance examination     Individual health examination   • History of echocardiogram 10/21/2013    Echocardiogram W/ color flow 70653 (1) - Left atrium normal. Mild CLVH. EF 50%. Valves intact. Mild mitral and tricuspid regurg. Pericardium normal.   • History of echocardiogram 10/25/2011    Echocardiogram W/O color flow 88929 (1) - Normal LV sytolic function with mild LVH & mild diastolic dysfunction   • Hyperlipidemia    • Hypertensive disorder    • Pernicious anemia    • Thumb pain, left 10/14/2019   • Tobacco dependence syndrome        Past Surgical Hx:  Past Surgical History:   Procedure Laterality Date   • CARDIAC CATHETERIZATION  04/08/2015    Cardiac cath 50482 (1) - Slective coronary angiogram. Left heart catheterization with LV ventriculogram.   • COLONOSCOPY N/A 10/26/2020    Procedure: COLONOSCOPY;  Surgeon: Levi Milian MD;  Location: [facility] ENDOSCOPY;  Service: Gastroenterology;  Laterality: N/A;   • COLONOSCOPY N/A 11/08/2021    Procedure: COLONOSCOPY;  Surgeon: Levi Milian MD;  Location:   [facility] ENDOSCOPY;  Service: Gastroenterology;  Laterality: N/A;   • DENTAL PROCEDURE      Removal of all teeth   • HAND SURGERY Right 4/15/2022    Procedure: TRAPEZIECTOMY RIGHT WRIST WITH SUSPENSION ARTHROPLASTY FIRST CARPOMETA CARPAL;  Surgeon: Kerwin Bloom MD;  Location: [facility];  Service: Orthopedics;  Laterality: Right;       Current Meds:    Current Outpatient Medications:   •  acetaminophen (TYLENOL) 650 MG 8 hr tablet, Take 650 mg by mouth Every 8 (Eight) Hours As Needed for Mild Pain ., Disp: , Rfl:   •  albuterol sulfate  (90 Base) MCG/ACT inhaler, Inhale 2 puffs Every 4 (Four) Hours As Needed for Wheezing., Disp: 6.7 g, Rfl: 6  •  aspirin 81 MG chewable tablet, Chew 81 mg Daily., Disp: , Rfl:   •  atorvastatin (LIPITOR) 80 MG tablet, Take 1 tablet by mouth Daily., Disp: 90 tablet, Rfl: 3  •  azithromycin (Zithromax Z-Rubén) 250 MG tablet, Take 2 tablets by mouth on day 1, then 1 tablet daily on days 2-5, Disp: 6 tablet, Rfl: 0  •  Fluticasone Furoate-Vilanterol (Breo Ellipta) 100-25 MCG/INH inhaler, Inhale 1 puff Daily., Disp: 60 each, Rfl: 1  •  hydroCHLOROthiazide (HYDRODIURIL) 25 MG tablet, Take 1 tablet by mouth Daily., Disp: 90 tablet, Rfl: 3  •  HYDROcodone-acetaminophen (NORCO) 5-325 MG per tablet, Take 1 tablet by mouth Every 6 (Six) Hours As Needed for Moderate Pain., Disp: 10 tablet, Rfl: 0  •  isosorbide mononitrate (IMDUR) 60 MG 24 hr tablet, TAKE 1 TABLET BY MOUTH EVERY DAY, Disp: 90 tablet, Rfl: 2  •  lisinopril (PRINIVIL,ZESTRIL) 40 MG tablet, Take 1 tablet by mouth Daily., Disp: 90 tablet, Rfl: 3  •  methocarbamol (ROBAXIN) 500 MG tablet, Take 2 tablets by mouth 4 (Four) Times a Day As Needed for Muscle Spasms., Disp: 25 tablet, Rfl: 0  •  metoprolol succinate XL (TOPROL-XL) 25 MG 24 hr tablet, Take 1 tablet by mouth Daily., Disp: 90 tablet, Rfl: 3  •  nitroglycerin (NITROSTAT) 0.4 MG SL tablet, Place 1 tablet under the tongue Every 5 (Five) Minutes As Needed for Chest Pain.  "Take no more than 3 doses in 15 minutes., Disp: 30 tablet, Rfl: 12  •  TiZANidine (Zanaflex) 2 MG capsule, Take 1 capsule by mouth 3 (Three) Times a Day., Disp: 60 capsule, Rfl: 0    Allergies:  No Known Allergies    Family Hx:  Family History   Problem Relation Age of Onset   • Heart disease Mother    • Hypertension Mother    • Diabetes Mother    • Heart disease Father    • Hypertension Father    • Diabetes Father    • Diabetes Sister    • Heart disease Sister    • Stroke Sister    • Cancer Paternal Aunt         Breast Cancer   • Cancer Paternal Uncle         Unknown cancer        Social History:  Social History     Socioeconomic History   • Marital status: Single   Tobacco Use   • Smoking status: Every Day     Packs/day: 1.00     Years: 40.00     Pack years: 40.00     Types: Cigarettes   • Smokeless tobacco: Former   Vaping Use   • Vaping Use: Never used   Substance and Sexual Activity   • Alcohol use: Not Currently   • Drug use: No   • Sexual activity: Defer     Comment: Marital status:        Review of Systems  Review of Systems   Constitutional: Negative for chills, fatigue and fever.   HENT: Negative for ear pain, hearing loss, postnasal drip and sore throat.    Eyes: Negative for pain and visual disturbance.   Respiratory: Negative for cough, shortness of breath and wheezing.    Cardiovascular: Negative for chest pain, palpitations and leg swelling.   Gastrointestinal: Negative for abdominal pain, diarrhea, nausea and vomiting.   Genitourinary: Negative for difficulty urinating and dysuria.   Musculoskeletal: Positive for arthralgias and back pain. Negative for myalgias.   Skin: Negative for rash and wound.   Neurological: Negative for syncope, weakness and headaches.   Psychiatric/Behavioral: Negative for dysphoric mood. The patient is not nervous/anxious.        Objective:     /76   Pulse 79   Temp 97.1 °F (36.2 °C)   Ht 167.6 cm (66\")   Wt 92.6 kg (204 lb 1.6 oz)   SpO2 93%   BMI 32.94 " kg/m²   Physical Exam  Vitals reviewed.   Constitutional:       General: He is not in acute distress.  HENT:      Head: Normocephalic and atraumatic.   Eyes:      General: No scleral icterus.     Conjunctiva/sclera: Conjunctivae normal.   Cardiovascular:      Rate and Rhythm: Normal rate and regular rhythm.      Heart sounds: Normal heart sounds.   Pulmonary:      Effort: Pulmonary effort is normal.      Breath sounds: Rhonchi (RLQ) present. No wheezing or rales.   Abdominal:      General: Bowel sounds are normal. There is no distension.      Palpations: Abdomen is soft.      Tenderness: There is no abdominal tenderness.   Musculoskeletal:         General: No deformity.      Right lower leg: No edema.      Left lower leg: No edema.   Skin:     General: Skin is warm and dry.   Neurological:      General: No focal deficit present.      Mental Status: He is alert. Mental status is at baseline.          Assessment/Plan:     Diagnoses and all orders for this visit:    1. Need for influenza vaccination (Primary)  -     FluLaval/Fluzone >6 mos (9179-1541)    2. Acute cough  -     XR Chest PA & Lateral; Future    1.  Routine vaccination    2. CXR to r/o PNA coinfection       · Rx changes: as above  · Patient Education: routine vaccination   · Compliance at present is estimated to be fair.   · Efforts to improve compliance (if necessary) will be directed at increased exercise and continued sobriety .    Depression screening: Up to date; last screen      Follow-up:     Return in about 4 weeks (around 12/19/2022).    Preventative:  Health Maintenance   Topic Date Due   • ZOSTER VACCINE (2 of 2) 07/11/2019   • ANNUAL PHYSICAL  04/17/2020   • Pneumococcal Vaccine 0-64 (2 - PCV) 05/16/2020   • COVID-19 Vaccine (3 - Booster for Pfizer series) 05/20/2021   • LUNG CANCER SCREENING  09/21/2022   • COLORECTAL CANCER SCREENING  11/08/2022   • LIPID PANEL  01/28/2023   • TDAP/TD VACCINES (2 - Td or Tdap) 05/16/2029   • HEPATITIS C  SCREENING  Completed   • INFLUENZA VACCINE  Completed       Weight  -Class: Obese Class I: 30-34.9kg/m2  -Patient's Body mass index is 32.94 kg/m². indicating that he is obese (BMI >30). Obesity-related health conditions include the following: hypertension and coronary heart disease. Obesity is unchanged. BMI is is above average; BMI management plan is completed. We discussed portion control and increasing exercise..   decrease soda or juice intake and increase water intake    Alcohol use:  reports that he does not currently use alcohol.  Nicotine status  reports that he has been smoking cigarettes. He has a 40.00 pack-year smoking history. He has quit using smokeless tobacco.     Goals     •  Smoking cessation (pt-stated)       Quitting smoking and living longer    Barriers: Wanting to quit            RISK SCORE: 4       Charles Rojas MD   PGY[facility]    [place] [address]  Office: [phone]    This document has been electronically signed by Charles Rojas MD on November 23, 2022 10:27 CST

## 2022-12-07 DIAGNOSIS — Z12.2 ENCOUNTER FOR SCREENING FOR LUNG CANCER: ICD-10-CM

## 2022-12-07 DIAGNOSIS — F17.210 NICOTINE DEPENDENCE, CIGARETTES, UNCOMPLICATED: ICD-10-CM

## 2022-12-13 ENCOUNTER — HOSPITAL ENCOUNTER (OUTPATIENT)
Dept: CT IMAGING | Facility: HOSPITAL | Age: 62
Discharge: HOME | End: 2022-12-13
Admitting: RADIOLOGY

## 2022-12-13 DIAGNOSIS — Z12.2 ENCOUNTER FOR SCREENING FOR LUNG CANCER: ICD-10-CM

## 2022-12-13 DIAGNOSIS — F17.210 NICOTINE DEPENDENCE, CIGARETTES, UNCOMPLICATED: ICD-10-CM

## 2022-12-13 PROCEDURE — 71271 CT THORAX LUNG CANCER SCR C-: CPT

## 2022-12-14 ENCOUNTER — OFFICE VISIT (OUTPATIENT)
Dept: FAMILY MEDICINE CLINIC | Facility: CLINIC | Age: 62
End: 2022-12-14

## 2022-12-14 VITALS
HEIGHT: 66 IN | TEMPERATURE: 97.1 F | WEIGHT: 207.6 LBS | OXYGEN SATURATION: 96 % | SYSTOLIC BLOOD PRESSURE: 118 MMHG | DIASTOLIC BLOOD PRESSURE: 76 MMHG | BODY MASS INDEX: 33.37 KG/M2 | HEART RATE: 71 BPM

## 2022-12-14 DIAGNOSIS — J44.9 CHRONIC OBSTRUCTIVE PULMONARY DISEASE, UNSPECIFIED COPD TYPE: Primary | ICD-10-CM

## 2022-12-14 DIAGNOSIS — I10 ESSENTIAL HYPERTENSION: ICD-10-CM

## 2022-12-14 PROCEDURE — 99213 OFFICE O/P EST LOW 20 MIN: CPT | Performed by: STUDENT IN AN ORGANIZED HEALTH CARE EDUCATION/TRAINING PROGRAM

## 2022-12-14 RX ORDER — FLUTICASONE FUROATE AND VILANTEROL 100; 25 UG/1; UG/1
1 POWDER RESPIRATORY (INHALATION)
Qty: 60 EACH | Refills: 11 | Status: SHIPPED | OUTPATIENT
Start: 2022-12-14

## 2022-12-20 ENCOUNTER — TELEPHONE (OUTPATIENT)
Dept: FAMILY MEDICINE CLINIC | Facility: CLINIC | Age: 62
End: 2022-12-20

## 2022-12-20 NOTE — PROGRESS NOTES
Family Medicine Residency  Charles Rojas MD    Subjective:     Nima Portillo is a 62 y.o. male who presents for follow up after URI, COPD and HTN    COPD/URI: Patient recently had the flu approximately 1 month ago.  Recovered from symptoms at this time.  This did cause a worsening of his underlying COPD.  Patient has been off of his controller medication.    Hypertension: Current regimen for this patient is lisinopril 40 mg, metoprolol 25 mg, HCTZ 25 mg.  Patient has no symptomology at this time.  No current episodes of lightheadedness.  Blood pressure today was 118/76.    The following portions of the patient's history were reviewed and updated as appropriate: allergies, current medications, past family history, past medical history, past social history, past surgical history and problem list.    Past Medical Hx:  Past Medical History:   Diagnosis Date   • Acute bronchitis    • Acute sinusitis    • Chest pain    • Chronic bronchitis (HCC)     secondary to smoking   • Claudication (HCC)    • COPD (chronic obstructive pulmonary disease) (HCC)    • Coronary arteriosclerosis    • Cough    • Gastroesophageal reflux disease    • Health maintenance examination     Individual health examination   • History of echocardiogram 10/21/2013    Echocardiogram W/ color flow 73567 (1) - Left atrium normal. Mild CLVH. EF 50%. Valves intact. Mild mitral and tricuspid regurg. Pericardium normal.   • History of echocardiogram 10/25/2011    Echocardiogram W/O color flow 12046 (1) - Normal LV sytolic function with mild LVH & mild diastolic dysfunction   • Hyperlipidemia    • Hypertensive disorder    • Pernicious anemia    • Thumb pain, left 10/14/2019   • Tobacco dependence syndrome        Past Surgical Hx:  Past Surgical History:   Procedure Laterality Date   • CARDIAC CATHETERIZATION  04/08/2015    Cardiac cath 13747 (1) - Slective coronary angiogram. Left heart catheterization with LV ventriculogram.   • COLONOSCOPY N/A  10/26/2020    Procedure: COLONOSCOPY;  Surgeon: Levi Milian MD;  Location: [facility] ENDOSCOPY;  Service: Gastroenterology;  Laterality: N/A;   • COLONOSCOPY N/A 11/08/2021    Procedure: COLONOSCOPY;  Surgeon: Levi Milian MD;  Location: [facility] ENDOSCOPY;  Service: Gastroenterology;  Laterality: N/A;   • DENTAL PROCEDURE      Removal of all teeth   • HAND SURGERY Right 4/15/2022    Procedure: TRAPEZIECTOMY RIGHT WRIST WITH SUSPENSION ARTHROPLASTY FIRST CARPOMETA CARPAL;  Surgeon: Kerwin Bloom MD;  Location: [facility] OR;  Service: Orthopedics;  Laterality: Right;       Current Meds:    Current Outpatient Medications:   •  Fluticasone Furoate-Vilanterol (Breo Ellipta) 100-25 MCG/ACT aerosol powder , Inhale 1 puff Daily., Disp: 60 each, Rfl: 11  •  acetaminophen (TYLENOL) 650 MG 8 hr tablet, Take 650 mg by mouth Every 8 (Eight) Hours As Needed for Mild Pain ., Disp: , Rfl:   •  albuterol sulfate  (90 Base) MCG/ACT inhaler, Inhale 2 puffs Every 4 (Four) Hours As Needed for Wheezing., Disp: 6.7 g, Rfl: 6  •  aspirin 81 MG chewable tablet, Chew 81 mg Daily., Disp: , Rfl:   •  atorvastatin (LIPITOR) 80 MG tablet, Take 1 tablet by mouth Daily., Disp: 90 tablet, Rfl: 3  •  azithromycin (Zithromax Z-Rubén) 250 MG tablet, Take 2 tablets by mouth on day 1, then 1 tablet daily on days 2-5, Disp: 6 tablet, Rfl: 0  •  hydroCHLOROthiazide (HYDRODIURIL) 25 MG tablet, Take 1 tablet by mouth Daily., Disp: 90 tablet, Rfl: 3  •  HYDROcodone-acetaminophen (NORCO) 5-325 MG per tablet, Take 1 tablet by mouth Every 6 (Six) Hours As Needed for Moderate Pain., Disp: 10 tablet, Rfl: 0  •  isosorbide mononitrate (IMDUR) 60 MG 24 hr tablet, TAKE 1 TABLET BY MOUTH EVERY DAY, Disp: 90 tablet, Rfl: 2  •  lisinopril (PRINIVIL,ZESTRIL) 40 MG tablet, Take 1 tablet by mouth Daily., Disp: 90 tablet, Rfl: 3  •  methocarbamol (ROBAXIN) 500 MG tablet, Take 2 tablets by mouth 4 (Four) Times a Day As Needed for Muscle Spasms., Disp: 25  tablet, Rfl: 0  •  metoprolol succinate XL (TOPROL-XL) 25 MG 24 hr tablet, Take 1 tablet by mouth Daily., Disp: 90 tablet, Rfl: 3  •  nitroglycerin (NITROSTAT) 0.4 MG SL tablet, Place 1 tablet under the tongue Every 5 (Five) Minutes As Needed for Chest Pain. Take no more than 3 doses in 15 minutes., Disp: 30 tablet, Rfl: 12  •  TiZANidine (Zanaflex) 2 MG capsule, Take 1 capsule by mouth 3 (Three) Times a Day., Disp: 60 capsule, Rfl: 0    Allergies:  No Known Allergies    Family Hx:  Family History   Problem Relation Age of Onset   • Heart disease Mother    • Hypertension Mother    • Diabetes Mother    • Heart disease Father    • Hypertension Father    • Diabetes Father    • Diabetes Sister    • Heart disease Sister    • Stroke Sister    • Cancer Paternal Aunt         Breast Cancer   • Cancer Paternal Uncle         Unknown cancer        Social History:  Social History     Socioeconomic History   • Marital status: Single   Tobacco Use   • Smoking status: Every Day     Packs/day: 1.00     Years: 40.00     Pack years: 40.00     Types: Cigarettes   • Smokeless tobacco: Former   Vaping Use   • Vaping Use: Never used   Substance and Sexual Activity   • Alcohol use: Not Currently   • Drug use: No   • Sexual activity: Defer     Comment: Marital status:        Review of Systems  Review of Systems   Constitutional: Negative for chills, fatigue and fever.   HENT: Negative for ear pain, hearing loss, postnasal drip and sore throat.    Eyes: Negative for pain and visual disturbance.   Respiratory: Negative for cough, shortness of breath and wheezing.    Cardiovascular: Negative for chest pain, palpitations and leg swelling.   Gastrointestinal: Negative for abdominal pain, diarrhea, nausea and vomiting.   Genitourinary: Negative for difficulty urinating and dysuria.   Musculoskeletal: Positive for arthralgias and back pain. Negative for myalgias.   Skin: Negative for rash and wound.   Neurological: Negative for syncope,  "weakness and headaches.   Psychiatric/Behavioral: Negative for dysphoric mood. The patient is not nervous/anxious.        Objective:     /76   Pulse 71   Temp 97.1 °F (36.2 °C)   Ht 167.6 cm (66\")   Wt 94.2 kg (207 lb 9.6 oz)   SpO2 96%   BMI 33.51 kg/m²   Physical Exam  Vitals reviewed.   Constitutional:       General: He is not in acute distress.  HENT:      Head: Normocephalic and atraumatic.   Eyes:      General: No scleral icterus.     Conjunctiva/sclera: Conjunctivae normal.   Cardiovascular:      Rate and Rhythm: Normal rate and regular rhythm.      Heart sounds: Normal heart sounds.   Pulmonary:      Effort: Pulmonary effort is normal.      Breath sounds: No wheezing, rhonchi or rales.   Abdominal:      General: Bowel sounds are normal. There is no distension.      Palpations: Abdomen is soft.      Tenderness: There is no abdominal tenderness.   Musculoskeletal:         General: No deformity.      Right lower leg: No edema.      Left lower leg: No edema.   Skin:     General: Skin is warm and dry.   Neurological:      General: No focal deficit present.      Mental Status: He is alert. Mental status is at baseline.          Assessment/Plan:     Diagnoses and all orders for this visit:    1. Chronic obstructive pulmonary disease, unspecified COPD type (HCC) (Primary)  -     Fluticasone Furoate-Vilanterol (Breo Ellipta) 100-25 MCG/ACT aerosol powder ; Inhale 1 puff Daily.  Dispense: 60 each; Refill: 11    2. Essential hypertension    1.  Patient needs to be on controller medication. Discussed with our pharmacist to help determine which is preferred by his insurance. Started on Breo.    2. HTN well controlled. Discussed signs of hypotension. With his CV risk factors he is appropriately controlled ([facility]* guidelines).       · Rx changes: as above  · Patient Education: COPD  · Compliance at present is estimated to be fair.   · Efforts to improve compliance (if necessary) will be directed at increased " exercise and continued sobriety .    Depression screening: Up to date; last screen      Follow-up:     Return in about 4 weeks (around 1/11/2023).    Preventative:  Health Maintenance   Topic Date Due   • ZOSTER VACCINE (2 of 2) 07/11/2019   • ANNUAL PHYSICAL  04/16/2020   • Pneumococcal Vaccine 0-64 (2 - PCV) 05/16/2020   • COVID-19 Vaccine (3 - Booster for Pfizer series) 05/20/2021   • COLORECTAL CANCER SCREENING  11/08/2022   • LIPID PANEL  01/28/2023   • LUNG CANCER SCREENING  12/13/2023   • TDAP/TD VACCINES (2 - Td or Tdap) 05/16/2029   • HEPATITIS C SCREENING  Completed   • INFLUENZA VACCINE  Completed       Weight  -Class: Obese Class I: 30-34.9kg/m2  -Patient's Body mass index is 33.51 kg/m². indicating that he is obese (BMI >30). Obesity-related health conditions include the following: hypertension and coronary heart disease. Obesity is unchanged. BMI is is above average; BMI management plan is completed. We discussed portion control and increasing exercise..   decrease soda or juice intake and increase water intake    Alcohol use:  reports that he does not currently use alcohol.  Nicotine status  reports that he has been smoking cigarettes. He has a 40.00 pack-year smoking history. He has quit using smokeless tobacco.     Goals     •  Smoking cessation (pt-stated)       Quitting smoking and living longer    Barriers: Wanting to quit            RISK SCORE: 4       Charles Rojas MD   [facility]    [place] [address]  Office: [phone]    This document has been electronically signed by Charles Rojas MD on December 20, 2022 09:52 CST

## 2022-12-30 NOTE — PROGRESS NOTES
I have seen this patient and discussed the case with the resident and agree with the assessment and plan.  NNAMDI Watson M.D.

## 2023-01-20 ENCOUNTER — APPOINTMENT (OUTPATIENT)
Dept: CT IMAGING | Facility: HOSPITAL | Age: 63
End: 2023-01-20
Payer: COMMERCIAL

## 2023-01-20 ENCOUNTER — HOSPITAL ENCOUNTER (EMERGENCY)
Facility: HOSPITAL | Age: 63
Discharge: HOME | End: 2023-01-20
Attending: STUDENT IN AN ORGANIZED HEALTH CARE EDUCATION/TRAINING PROGRAM | Admitting: STUDENT IN AN ORGANIZED HEALTH CARE EDUCATION/TRAINING PROGRAM
Payer: COMMERCIAL

## 2023-01-20 VITALS
BODY MASS INDEX: 33.04 KG/M2 | DIASTOLIC BLOOD PRESSURE: 69 MMHG | HEART RATE: 68 BPM | OXYGEN SATURATION: 94 % | HEIGHT: 66 IN | SYSTOLIC BLOOD PRESSURE: 119 MMHG | WEIGHT: 205.6 LBS | RESPIRATION RATE: 20 BRPM | TEMPERATURE: 98 F

## 2023-01-20 DIAGNOSIS — K52.9 COLITIS: Primary | ICD-10-CM

## 2023-01-20 LAB
ALBUMIN SERPL-MCNC: 4.3 G/DL (ref 3.5–5.2)
ALBUMIN/GLOB SERPL: 1.7 G/DL
ALP SERPL-CCNC: 85 U/L (ref 39–117)
ALT SERPL W P-5'-P-CCNC: 21 U/L (ref 1–41)
ANION GAP SERPL CALCULATED.3IONS-SCNC: 10 MMOL/L (ref 5–15)
AST SERPL-CCNC: 24 U/L (ref 1–40)
BASOPHILS # BLD AUTO: 0.03 10*3/MM3 (ref 0–0.2)
BASOPHILS NFR BLD AUTO: 0.3 % (ref 0–1.5)
BILIRUB SERPL-MCNC: 0.3 MG/DL (ref 0–1.2)
BILIRUB UR QL STRIP: NEGATIVE
BUN SERPL-MCNC: 14 MG/DL (ref 8–23)
BUN/CREAT SERPL: 15.9 (ref 7–25)
CALCIUM SPEC-SCNC: 8.8 MG/DL (ref 8.6–10.5)
CHLORIDE SERPL-SCNC: 103 MMOL/L (ref 98–107)
CLARITY UR: CLEAR
CO2 SERPL-SCNC: 28 MMOL/L (ref 22–29)
COLOR UR: YELLOW
CREAT SERPL-MCNC: 0.88 MG/DL (ref 0.76–1.27)
D-LACTATE SERPL-SCNC: 0.9 MMOL/L (ref 0.5–2)
DEPRECATED RDW RBC AUTO: 48.4 FL (ref 37–54)
EGFRCR SERPLBLD CKD-EPI 2021: 97.2 ML/MIN/1.73
EOSINOPHIL # BLD AUTO: 0.21 10*3/MM3 (ref 0–0.4)
EOSINOPHIL NFR BLD AUTO: 2.2 % (ref 0.3–6.2)
ERYTHROCYTE [DISTWIDTH] IN BLOOD BY AUTOMATED COUNT: 15 % (ref 12.3–15.4)
GLOBULIN UR ELPH-MCNC: 2.5 GM/DL
GLUCOSE SERPL-MCNC: 109 MG/DL (ref 65–99)
GLUCOSE UR STRIP-MCNC: NEGATIVE MG/DL
HCT VFR BLD AUTO: 43.9 % (ref 37.5–51)
HGB BLD-MCNC: 14.2 G/DL (ref 13–17.7)
HGB UR QL STRIP.AUTO: NEGATIVE
HOLD SPECIMEN: NORMAL
HOLD SPECIMEN: NORMAL
IMM GRANULOCYTES # BLD AUTO: 0.02 10*3/MM3 (ref 0–0.05)
IMM GRANULOCYTES NFR BLD AUTO: 0.2 % (ref 0–0.5)
KETONES UR QL STRIP: NEGATIVE
LEUKOCYTE ESTERASE UR QL STRIP.AUTO: NEGATIVE
LIPASE SERPL-CCNC: 22 U/L (ref 13–60)
LYMPHOCYTES # BLD AUTO: 1.62 10*3/MM3 (ref 0.7–3.1)
LYMPHOCYTES NFR BLD AUTO: 17.3 % (ref 19.6–45.3)
MCH RBC QN AUTO: 28.7 PG (ref 26.6–33)
MCHC RBC AUTO-ENTMCNC: 32.3 G/DL (ref 31.5–35.7)
MCV RBC AUTO: 88.9 FL (ref 79–97)
MONOCYTES # BLD AUTO: 0.83 10*3/MM3 (ref 0.1–0.9)
MONOCYTES NFR BLD AUTO: 8.9 % (ref 5–12)
NEUTROPHILS NFR BLD AUTO: 6.65 10*3/MM3 (ref 1.7–7)
NEUTROPHILS NFR BLD AUTO: 71.1 % (ref 42.7–76)
NITRITE UR QL STRIP: NEGATIVE
NRBC BLD AUTO-RTO: 0 /100 WBC (ref 0–0.2)
PH UR STRIP.AUTO: 5.5 [PH] (ref 5–9)
PLATELET # BLD AUTO: 198 10*3/MM3 (ref 140–450)
PMV BLD AUTO: 9.3 FL (ref 6–12)
POTASSIUM SERPL-SCNC: 3.3 MMOL/L (ref 3.5–5.2)
PROT SERPL-MCNC: 6.8 G/DL (ref 6–8.5)
PROT UR QL STRIP: NEGATIVE
RBC # BLD AUTO: 4.94 10*6/MM3 (ref 4.14–5.8)
SODIUM SERPL-SCNC: 141 MMOL/L (ref 136–145)
SP GR UR STRIP: 1.02 (ref 1–1.03)
UROBILINOGEN UR QL STRIP: NORMAL
WBC NRBC COR # BLD: 9.36 10*3/MM3 (ref 3.4–10.8)
WHOLE BLOOD HOLD COAG: NORMAL
WHOLE BLOOD HOLD SPECIMEN: NORMAL

## 2023-01-20 PROCEDURE — 83605 ASSAY OF LACTIC ACID: CPT | Performed by: STUDENT IN AN ORGANIZED HEALTH CARE EDUCATION/TRAINING PROGRAM

## 2023-01-20 PROCEDURE — 81003 URINALYSIS AUTO W/O SCOPE: CPT | Performed by: STUDENT IN AN ORGANIZED HEALTH CARE EDUCATION/TRAINING PROGRAM

## 2023-01-20 PROCEDURE — 74177 CT ABD & PELVIS W/CONTRAST: CPT

## 2023-01-20 PROCEDURE — 99283 EMERGENCY DEPT VISIT LOW MDM: CPT

## 2023-01-20 PROCEDURE — 83690 ASSAY OF LIPASE: CPT | Performed by: STUDENT IN AN ORGANIZED HEALTH CARE EDUCATION/TRAINING PROGRAM

## 2023-01-20 PROCEDURE — 85025 COMPLETE CBC W/AUTO DIFF WBC: CPT | Performed by: STUDENT IN AN ORGANIZED HEALTH CARE EDUCATION/TRAINING PROGRAM

## 2023-01-20 PROCEDURE — 25010000002 IOPAMIDOL 61 % SOLUTION: Performed by: STUDENT IN AN ORGANIZED HEALTH CARE EDUCATION/TRAINING PROGRAM

## 2023-01-20 PROCEDURE — 80053 COMPREHEN METABOLIC PANEL: CPT | Performed by: STUDENT IN AN ORGANIZED HEALTH CARE EDUCATION/TRAINING PROGRAM

## 2023-01-20 RX ORDER — SODIUM CHLORIDE 0.9 % (FLUSH) 0.9 %
10 SYRINGE (ML) INJECTION AS NEEDED
Status: DISCONTINUED | OUTPATIENT
Start: 2023-01-20 | End: 2023-01-20 | Stop reason: HOSPADM

## 2023-01-20 RX ORDER — AMOXICILLIN AND CLAVULANATE POTASSIUM 875; 125 MG/1; MG/1
1 TABLET, FILM COATED ORAL 2 TIMES DAILY
Qty: 14 TABLET | Refills: 0 | Status: SHIPPED | OUTPATIENT
Start: 2023-01-20 | End: 2023-01-27

## 2023-01-20 RX ORDER — AMOXICILLIN AND CLAVULANATE POTASSIUM 875; 125 MG/1; MG/1
1 TABLET, FILM COATED ORAL ONCE
Status: COMPLETED | OUTPATIENT
Start: 2023-01-20 | End: 2023-01-20

## 2023-01-20 RX ADMIN — IOPAMIDOL 90 ML: 612 INJECTION, SOLUTION INTRAVENOUS at 19:52

## 2023-01-20 RX ADMIN — AMOXICILLIN AND CLAVULANATE POTASSIUM 1 TABLET: 875; 125 TABLET, FILM COATED ORAL at 21:45

## 2023-01-21 NOTE — ED PROVIDER NOTES
Subjective   History of Present Illness  62-year-old male comes to the ER with a chief complaint of 4-day history of lower abdominal pain with diarrhea.  Denies other symptoms.  He has not noticed blood in his bowel movements.  Nothing makes his symptoms better or worse.    History provided by:  Patient   used: No        Review of Systems   Constitutional: Negative for activity change, appetite change, chills, fatigue and fever.   HENT: Negative for drooling.    Eyes: Negative for redness.   Respiratory: Negative for shortness of breath.    Cardiovascular: Negative for chest pain.   Gastrointestinal: Positive for abdominal pain and diarrhea. Negative for blood in stool, constipation, nausea and vomiting.   Genitourinary: Negative for flank pain.   Skin: Negative for color change.   Neurological: Negative for seizures.   Psychiatric/Behavioral: Negative for confusion.       Past Medical History:   Diagnosis Date   • Acute bronchitis    • Acute sinusitis    • Chest pain    • Chronic bronchitis (HCC)     secondary to smoking   • Claudication (HCC)    • COPD (chronic obstructive pulmonary disease) (HCC)    • Coronary arteriosclerosis    • Cough    • Gastroesophageal reflux disease    • Health maintenance examination     Individual health examination   • History of echocardiogram 10/21/2013    Echocardiogram W/ color flow 09369 (1) - Left atrium normal. Mild CLVH. EF 50%. Valves intact. Mild mitral and tricuspid regurg. Pericardium normal.   • History of echocardiogram 10/25/2011    Echocardiogram W/O color flow 40733 (1) - Normal LV sytolic function with mild LVH & mild diastolic dysfunction   • Hyperlipidemia    • Hypertensive disorder    • Pernicious anemia    • Thumb pain, left 10/14/2019   • Tobacco dependence syndrome        No Known Allergies    Past Surgical History:   Procedure Laterality Date   • CARDIAC CATHETERIZATION  04/08/2015    Cardiac cath 60936 (1) - Slective coronary angiogram.  "Left heart catheterization with LV ventriculogram.   • COLONOSCOPY N/A 10/26/2020    Procedure: COLONOSCOPY;  Surgeon: Levi Milian MD;  Location: [facility] ENDOSCOPY;  Service: Gastroenterology;  Laterality: N/A;   • COLONOSCOPY N/A 11/08/2021    Procedure: COLONOSCOPY;  Surgeon: Levi Milian MD;  Location: [facility] ENDOSCOPY;  Service: Gastroenterology;  Laterality: N/A;   • DENTAL PROCEDURE      Removal of all teeth   • HAND SURGERY Right 4/15/2022    Procedure: TRAPEZIECTOMY RIGHT WRIST WITH SUSPENSION ARTHROPLASTY FIRST CARPOMETA CARPAL;  Surgeon: Kerwin Bloom MD;  Location: [facility] OR;  Service: Orthopedics;  Laterality: Right;       Family History   Problem Relation Age of Onset   • Heart disease Mother    • Hypertension Mother    • Diabetes Mother    • Heart disease Father    • Hypertension Father    • Diabetes Father    • Diabetes Sister    • Heart disease Sister    • Stroke Sister    • Cancer Paternal Aunt         Breast Cancer   • Cancer Paternal Uncle         Unknown cancer       Social History     Socioeconomic History   • Marital status: Single   Tobacco Use   • Smoking status: Every Day     Packs/day: 1.00     Years: 40.00     Pack years: 40.00     Types: Cigarettes   • Smokeless tobacco: Former   Vaping Use   • Vaping Use: Never used   Substance and Sexual Activity   • Alcohol use: Not Currently   • Drug use: No   • Sexual activity: Defer     Comment: Marital status:            Objective    Vitals:    01/20/23 1848 01/20/23 1946   BP: 104/69 91/60   BP Location: Right arm Right arm   Patient Position: Sitting Lying   Pulse: 79 82   Resp: 20 20   Temp: 98 °F (36.7 °C)    TempSrc: Oral    SpO2: 95% 94%   Weight: 93.3 kg (205 lb 9.6 oz)    Height: 167.6 cm (66\")        Physical Exam  Vitals and nursing note reviewed.   Constitutional:       General: He is not in acute distress.     Appearance: He is well-developed. He is obese. He is not ill-appearing, toxic-appearing or " diaphoretic.   Cardiovascular:      Rate and Rhythm: Normal rate.   Pulmonary:      Effort: Pulmonary effort is normal. No accessory muscle usage or respiratory distress.      Breath sounds: Normal breath sounds.   Chest:      Chest wall: No tenderness.   Abdominal:      Palpations: Abdomen is soft.      Tenderness: There is abdominal tenderness (deep palpation). There is no right CVA tenderness, left CVA tenderness, guarding or rebound.   Skin:     General: Skin is warm and dry.      Capillary Refill: Capillary refill takes less than 2 seconds.   Neurological:      Mental Status: He is alert and oriented to person, place, and time.         Procedures           ED Course      Results for orders placed or performed during the hospital encounter of 01/20/23   Comprehensive Metabolic Panel    Specimen: Blood   Result Value Ref Range    Glucose 109 (H) 65 - 99 mg/dL    BUN 14 8 - 23 mg/dL    Creatinine 0.88 0.76 - 1.27 mg/dL    Sodium 141 136 - 145 mmol/L    Potassium 3.3 (L) 3.5 - 5.2 mmol/L    Chloride 103 98 - 107 mmol/L    CO2 28.0 22.0 - 29.0 mmol/L    Calcium 8.8 8.6 - 10.5 mg/dL    Total Protein 6.8 6.0 - 8.5 g/dL    Albumin 4.3 3.5 - 5.2 g/dL    ALT (SGPT) 21 1 - 41 U/L    AST (SGOT) 24 1 - 40 U/L    Alkaline Phosphatase 85 39 - 117 U/L    Total Bilirubin 0.3 0.0 - 1.2 mg/dL    Globulin 2.5 gm/dL    A/G Ratio 1.7 g/dL    BUN/Creatinine Ratio 15.9 7.0 - 25.0    Anion Gap 10.0 5.0 - 15.0 mmol/L    eGFR 97.2 >60.0 mL/min/1.73   Lipase    Specimen: Blood   Result Value Ref Range    Lipase 22 13 - 60 U/L   Urinalysis With Microscopic If Indicated (No Culture) - Urine, Clean Catch    Specimen: Urine, Clean Catch   Result Value Ref Range    Color, UA Yellow Yellow, Straw, Dark Yellow, Emily    Appearance, UA Clear Clear    pH, UA 5.5 5.0 - 9.0    Specific Gravity, UA 1.023 1.003 - 1.030    Glucose, UA Negative Negative    Ketones, UA Negative Negative    Bilirubin, UA Negative Negative    Blood, UA Negative Negative     Protein, UA Negative Negative    Leuk Esterase, UA Negative Negative    Nitrite, UA Negative Negative    Urobilinogen, UA 0.2 E.U./dL 0.2 - 1.0 E.U./dL   Lactic Acid, Plasma    Specimen: Blood   Result Value Ref Range    Lactate 0.9 0.5 - 2.0 mmol/L   CBC Auto Differential    Specimen: Blood   Result Value Ref Range    WBC 9.36 3.40 - 10.80 10*3/mm3    RBC 4.94 4.14 - 5.80 10*6/mm3    Hemoglobin 14.2 13.0 - 17.7 g/dL    Hematocrit 43.9 37.5 - 51.0 %    MCV 88.9 79.0 - 97.0 fL    MCH 28.7 26.6 - 33.0 pg    MCHC 32.3 31.5 - 35.7 g/dL    RDW 15.0 12.3 - 15.4 %    RDW-SD 48.4 37.0 - 54.0 fl    MPV 9.3 6.0 - 12.0 fL    Platelets 198 140 - 450 10*3/mm3    Neutrophil % 71.1 42.7 - 76.0 %    Lymphocyte % 17.3 (L) 19.6 - 45.3 %    Monocyte % 8.9 5.0 - 12.0 %    Eosinophil % 2.2 0.3 - 6.2 %    Basophil % 0.3 0.0 - 1.5 %    Immature Grans % 0.2 0.0 - 0.5 %    Neutrophils, Absolute 6.65 1.70 - 7.00 10*3/mm3    Lymphocytes, Absolute 1.62 0.70 - 3.10 10*3/mm3    Monocytes, Absolute 0.83 0.10 - 0.90 10*3/mm3    Eosinophils, Absolute 0.21 0.00 - 0.40 10*3/mm3    Basophils, Absolute 0.03 0.00 - 0.20 10*3/mm3    Immature Grans, Absolute 0.02 0.00 - 0.05 10*3/mm3    nRBC 0.0 0.0 - 0.2 /100 WBC   Green Top (Gel)   Result Value Ref Range    Extra Tube Hold for add-ons.    Lavender Top   Result Value Ref Range    Extra Tube hold for add-on    Gold Top - SST   Result Value Ref Range    Extra Tube Hold for add-ons.    Light Blue Top   Result Value Ref Range    Extra Tube Hold for add-ons.      CT Abdomen Pelvis With Contrast   Final Result   1.  Mild diffuse colonic thickening with trace adjacent fat   stranding.  Findings suggest mild diffuse colitis, favored to be   infectious or inflammatory. Pseudomembranous colitis not   excluded. Recommend short-term follow-up if symptoms persist.   2.  Density in the central aspect of the stomach likely   represents ingested density. In the appropriate clinical setting,   GI bleed could have a  similar appearance.      Electronically signed by:  Buster Duncan MD  1/20/2023 9:17 PM   [facility] Workstation: [id number]                Medical Decision Making  Vital signs are stable, afebrile.  Labs are unremarkable.  CT abdomen and pelvis shows colitis.  Patient received antibiotics.  We will call in antibiotics to his pharmacy.  Recommend follow-up with PCP and GI.  Return precautions given.  Patient states understanding and is agreeable to the plan.    Colitis: acute illness or injury  Amount and/or Complexity of Data Reviewed  Labs: ordered. Decision-making details documented in ED Course.  Radiology: ordered. Decision-making details documented in ED Course.      Risk  Prescription drug management.          Final diagnoses:   Colitis       ED Disposition  ED Disposition     ED Disposition   Discharge    Condition   Stable    Comment   --             Levi Milian MD  [address] [place] [address]   [place] [id number] [phone]    Schedule an appointment as soon as possible for a visit in 2 days  ER follow up    Charles Rojas MD  [address] [place] [address]   [place] [id number] [phone]    Schedule an appointment as soon as possible for a visit in 2 days  ER follow up         Medication List      New Prescriptions    amoxicillin-clavulanate 875-125 MG per tablet  Commonly known as: AUGMENTIN  Take 1 tablet by mouth 2 (Two) Times a Day for 7 days.           Where to Get Your Medications      These medications were sent to [facility]/pharmacy #[id number] - [place], [place] - [address] - [phone]  - [phone] [address] [address]    Phone: [phone]   · amoxicillin-clavulanate 875-125 MG per tablet          Jackson Ibarra MD  01/20/23 8209

## 2023-01-23 ENCOUNTER — PATIENT OUTREACH (OUTPATIENT)
Dept: CASE MANAGEMENT | Facility: OTHER | Age: 63
End: 2023-01-23
Payer: COMMERCIAL

## 2023-01-23 NOTE — OUTREACH NOTE
AMBULATORY CASE MANAGEMENT NOTE    Name and Relationship of Patient/Support Person: Nima Portillo - Self    Patient Outreach    Pt seen at [facility] ED 1/20/23 for abdominal pain and diarrhea. Diagnosed with colitis. AVS and discharge instructions reviewed. Pt is taking abx as prescribed and states it seems to be helping, abdominal pain better. Pt encd to complete abx. Pt has a f/u appt with pcp 1/26/23. Pt denies any concerns or needs at this time.    JV BAUER  Ambulatory Case Management    1/23/2023, 11:22 EST

## 2023-01-26 ENCOUNTER — OFFICE VISIT (OUTPATIENT)
Dept: FAMILY MEDICINE CLINIC | Facility: CLINIC | Age: 63
End: 2023-01-26
Payer: COMMERCIAL

## 2023-01-26 ENCOUNTER — LAB (OUTPATIENT)
Dept: LAB | Facility: HOSPITAL | Age: 63
End: 2023-01-26
Payer: COMMERCIAL

## 2023-01-26 VITALS
TEMPERATURE: 96.9 F | DIASTOLIC BLOOD PRESSURE: 76 MMHG | BODY MASS INDEX: 33.28 KG/M2 | WEIGHT: 207.1 LBS | SYSTOLIC BLOOD PRESSURE: 122 MMHG | OXYGEN SATURATION: 96 % | HEART RATE: 76 BPM | HEIGHT: 66 IN

## 2023-01-26 DIAGNOSIS — Z87.19 HISTORY OF GI BLEED: ICD-10-CM

## 2023-01-26 DIAGNOSIS — Z87.19 HISTORY OF GI BLEED: Primary | ICD-10-CM

## 2023-01-26 DIAGNOSIS — J44.9 CHRONIC OBSTRUCTIVE PULMONARY DISEASE, UNSPECIFIED COPD TYPE: ICD-10-CM

## 2023-01-26 DIAGNOSIS — K52.9 COLITIS: ICD-10-CM

## 2023-01-26 PROCEDURE — 85025 COMPLETE CBC W/AUTO DIFF WBC: CPT

## 2023-01-26 PROCEDURE — 36415 COLL VENOUS BLD VENIPUNCTURE: CPT

## 2023-01-26 PROCEDURE — 99213 OFFICE O/P EST LOW 20 MIN: CPT | Performed by: STUDENT IN AN ORGANIZED HEALTH CARE EDUCATION/TRAINING PROGRAM

## 2023-01-26 RX ORDER — TIZANIDINE 2 MG/1
1 TABLET ORAL 3 TIMES DAILY
COMMUNITY
Start: 2022-10-25

## 2023-01-26 NOTE — PROGRESS NOTES
I have seen the patient.  I have reviewed the notes, assessments, and/or procedures performed by Charles Rojas MD  during office visit I concur with her/his documentation and assessment and plan for Nima Portillo.            This document has been electronically signed by Javad Navarrete MD on January 26, 2023 14:11 CST

## 2023-01-26 NOTE — PROGRESS NOTES
Family Medicine Residency  Charles Rojas MD    Subjective:     Nima Portillo is a 62 y.o. male who presents for follow up ED visit and COPD    Diagnosed with colitis in ED: Improving. Abdominal pain less after starting antibiotics. Day 5 per patient. No fever or chills. Some loose stool prior to starting antibiotics. Abdominal pain resolved.    COPD: Patient recently had the flu approximately 1 month ago.  Recovered from symptoms at this time.  This did cause a worsening of his underlying COPD.  Patient has been off of his controller medication.      The following portions of the patient's history were reviewed and updated as appropriate: allergies, current medications, past family history, past medical history, past social history, past surgical history and problem list.    Past Medical Hx:  Past Medical History:   Diagnosis Date   • Acute bronchitis    • Acute sinusitis    • Chest pain    • Chronic bronchitis (HCC)     secondary to smoking   • Claudication (HCC)    • COPD (chronic obstructive pulmonary disease) (HCC)    • Coronary arteriosclerosis    • Cough    • Gastroesophageal reflux disease    • Health maintenance examination     Individual health examination   • History of echocardiogram 10/21/2013    Echocardiogram W/ color flow 60439 (1) - Left atrium normal. Mild CLVH. EF 50%. Valves intact. Mild mitral and tricuspid regurg. Pericardium normal.   • History of echocardiogram 10/25/2011    Echocardiogram W/O color flow 03944 (1) - Normal LV sytolic function with mild LVH & mild diastolic dysfunction   • Hyperlipidemia    • Hypertensive disorder    • Pernicious anemia    • Thumb pain, left 10/14/2019   • Tobacco dependence syndrome        Past Surgical Hx:  Past Surgical History:   Procedure Laterality Date   • CARDIAC CATHETERIZATION  04/08/2015    Cardiac cath 29745 (1) - Slective coronary angiogram. Left heart catheterization with LV ventriculogram.   • COLONOSCOPY N/A 10/26/2020    Procedure:  COLONOSCOPY;  Surgeon: Levi Milian MD;  Location: [facility] ENDOSCOPY;  Service: Gastroenterology;  Laterality: N/A;   • COLONOSCOPY N/A 11/08/2021    Procedure: COLONOSCOPY;  Surgeon: Levi Milian MD;  Location: [facility] ENDOSCOPY;  Service: Gastroenterology;  Laterality: N/A;   • DENTAL PROCEDURE      Removal of all teeth   • HAND SURGERY Right 4/15/2022    Procedure: TRAPEZIECTOMY RIGHT WRIST WITH SUSPENSION ARTHROPLASTY FIRST CARPOMETA CARPAL;  Surgeon: Kerwin Bloom MD;  Location: [facility] OR;  Service: Orthopedics;  Laterality: Right;       Current Meds:    Current Outpatient Medications:   •  acetaminophen (TYLENOL) 650 MG 8 hr tablet, Take 650 mg by mouth Every 8 (Eight) Hours As Needed for Mild Pain ., Disp: , Rfl:   •  albuterol sulfate  (90 Base) MCG/ACT inhaler, Inhale 2 puffs Every 4 (Four) Hours As Needed for Wheezing., Disp: 6.7 g, Rfl: 6  •  amoxicillin-clavulanate (AUGMENTIN) 875-125 MG per tablet, Take 1 tablet by mouth 2 (Two) Times a Day for 7 days., Disp: 14 tablet, Rfl: 0  •  aspirin 81 MG chewable tablet, Chew 81 mg Daily., Disp: , Rfl:   •  atorvastatin (LIPITOR) 80 MG tablet, Take 1 tablet by mouth Daily., Disp: 90 tablet, Rfl: 3  •  azithromycin (Zithromax Z-Rubén) 250 MG tablet, Take 2 tablets by mouth on day 1, then 1 tablet daily on days 2-5, Disp: 6 tablet, Rfl: 0  •  Fluticasone Furoate-Vilanterol (Breo Ellipta) 100-25 MCG/ACT aerosol powder , Inhale 1 puff Daily., Disp: 60 each, Rfl: 11  •  hydroCHLOROthiazide (HYDRODIURIL) 25 MG tablet, Take 1 tablet by mouth Daily., Disp: 90 tablet, Rfl: 3  •  HYDROcodone-acetaminophen (NORCO) 5-325 MG per tablet, Take 1 tablet by mouth Every 6 (Six) Hours As Needed for Moderate Pain., Disp: 10 tablet, Rfl: 0  •  isosorbide mononitrate (IMDUR) 60 MG 24 hr tablet, TAKE 1 TABLET BY MOUTH EVERY DAY, Disp: 90 tablet, Rfl: 2  •  lisinopril (PRINIVIL,ZESTRIL) 40 MG tablet, Take 1 tablet by mouth Daily., Disp: 90 tablet, Rfl: 3  •   methocarbamol (ROBAXIN) 500 MG tablet, Take 2 tablets by mouth 4 (Four) Times a Day As Needed for Muscle Spasms., Disp: 25 tablet, Rfl: 0  •  metoprolol succinate XL (TOPROL-XL) 25 MG 24 hr tablet, Take 1 tablet by mouth Daily., Disp: 90 tablet, Rfl: 3  •  nitroglycerin (NITROSTAT) 0.4 MG SL tablet, Place 1 tablet under the tongue Every 5 (Five) Minutes As Needed for Chest Pain. Take no more than 3 doses in 15 minutes., Disp: 30 tablet, Rfl: 12  •  tiZANidine (ZANAFLEX) 2 MG tablet, Take 1 tablet by mouth 3 (Three) Times a Day., Disp: , Rfl:     Allergies:  No Known Allergies    Family Hx:  Family History   Problem Relation Age of Onset   • Heart disease Mother    • Hypertension Mother    • Diabetes Mother    • Heart disease Father    • Hypertension Father    • Diabetes Father    • Diabetes Sister    • Heart disease Sister    • Stroke Sister    • Cancer Paternal Aunt         Breast Cancer   • Cancer Paternal Uncle         Unknown cancer        Social History:  Social History     Socioeconomic History   • Marital status: Single   Tobacco Use   • Smoking status: Every Day     Packs/day: 1.00     Years: 40.00     Pack years: 40.00     Types: Cigarettes   • Smokeless tobacco: Former   Vaping Use   • Vaping Use: Never used   Substance and Sexual Activity   • Alcohol use: Not Currently   • Drug use: No   • Sexual activity: Defer     Comment: Marital status:        Review of Systems  Review of Systems   Constitutional: Negative for chills, fatigue and fever.   HENT: Negative for ear pain, hearing loss, postnasal drip and sore throat.    Eyes: Negative for pain and visual disturbance.   Respiratory: Negative for cough, shortness of breath and wheezing.    Cardiovascular: Negative for chest pain, palpitations and leg swelling.   Gastrointestinal: Negative for abdominal pain, constipation, diarrhea, nausea and vomiting.   Genitourinary: Negative for difficulty urinating and dysuria.   Musculoskeletal: Positive for  "arthralgias and back pain. Negative for myalgias.   Skin: Negative for rash and wound.   Neurological: Negative for syncope, weakness and headaches.   Psychiatric/Behavioral: Negative for dysphoric mood. The patient is not nervous/anxious.        Objective:     /76   Pulse 76   Temp 96.9 °F (36.1 °C)   Ht 167.6 cm (66\")   Wt 93.9 kg (207 lb 1.6 oz)   SpO2 96%   BMI 33.43 kg/m²   Physical Exam  Vitals reviewed.   Constitutional:       General: He is not in acute distress.  HENT:      Head: Normocephalic and atraumatic.   Eyes:      General: No scleral icterus.     Conjunctiva/sclera: Conjunctivae normal.   Cardiovascular:      Rate and Rhythm: Normal rate and regular rhythm.      Heart sounds: Normal heart sounds.   Pulmonary:      Effort: Pulmonary effort is normal.      Breath sounds: Wheezing present. No rhonchi or rales.   Abdominal:      General: Bowel sounds are normal. There is no distension.      Palpations: Abdomen is soft.      Tenderness: There is no abdominal tenderness.   Musculoskeletal:         General: No deformity.      Right lower leg: No edema.      Left lower leg: No edema.   Skin:     General: Skin is warm and dry.   Neurological:      General: No focal deficit present.      Mental Status: He is alert. Mental status is at baseline.          Assessment/Plan:     Diagnoses and all orders for this visit:    1. History of GI bleed (Primary)  -     CBC & Differential; Future  -     Ambulatory Referral to Gastroenterology    2. Colitis    3. Chronic obstructive pulmonary disease, unspecified COPD type (HCC)    1/2.  CT showed ? GIB vs infections vs inflammatory colitis. Responding well to Augmentin. Continue out course of antibiotics.    3. COPD doing well. Well controlled.      · Rx changes: as above  · Patient Education: COPD management  · Compliance at present is estimated to be fair.   · Efforts to improve compliance (if necessary) will be directed at increased exercise and continued " sobriety .    Depression screening: Up to date; last screen      Follow-up:     Return in about 6 weeks (around 3/9/2023).    Preventative:  Health Maintenance   Topic Date Due   • ZOSTER VACCINE (2 of 2) 07/11/2019   • ANNUAL PHYSICAL  04/16/2020   • Pneumococcal Vaccine 0-64 (2 - PCV) 05/16/2020   • COVID-19 Vaccine (3 - Booster for Pfizer series) 05/20/2021   • COLORECTAL CANCER SCREENING  11/08/2022   • LIPID PANEL  01/28/2023   • LUNG CANCER SCREENING  12/13/2023   • TDAP/TD VACCINES (2 - Td or Tdap) 05/16/2029   • HEPATITIS C SCREENING  Completed   • INFLUENZA VACCINE  Completed       Weight  -Class: Obese Class I: 30-34.9kg/m2  -Patient's Body mass index is 33.43 kg/m². indicating that he is obese (BMI >30). Obesity-related health conditions include the following: hypertension and coronary heart disease. Obesity is unchanged. BMI is is above average; BMI management plan is completed. We discussed portion control and increasing exercise..   decrease soda or juice intake and increase water intake    Alcohol use:  reports that he does not currently use alcohol.  Nicotine status  reports that he has been smoking cigarettes. He has a 40.00 pack-year smoking history. He has quit using smokeless tobacco.     Goals     •  Smoking cessation (pt-stated)       Quitting smoking and living longer    Barriers: Wanting to quit            RISK SCORE: 4       Charles Rojas MD   [facility]    [place] [address]  Office: [phone]    This document has been electronically signed by Charles Rojas MD on January 26, 2023 13:54 CST

## 2023-01-27 LAB
BASOPHILS # BLD AUTO: 0.05 10*3/MM3 (ref 0–0.2)
BASOPHILS NFR BLD AUTO: 0.6 % (ref 0–1.5)
DEPRECATED RDW RBC AUTO: 46.3 FL (ref 37–54)
EOSINOPHIL # BLD AUTO: 0.26 10*3/MM3 (ref 0–0.4)
EOSINOPHIL NFR BLD AUTO: 3.2 % (ref 0.3–6.2)
ERYTHROCYTE [DISTWIDTH] IN BLOOD BY AUTOMATED COUNT: 14.4 % (ref 12.3–15.4)
HCT VFR BLD AUTO: 43.7 % (ref 37.5–51)
HGB BLD-MCNC: 14.7 G/DL (ref 13–17.7)
IMM GRANULOCYTES # BLD AUTO: 0.03 10*3/MM3 (ref 0–0.05)
IMM GRANULOCYTES NFR BLD AUTO: 0.4 % (ref 0–0.5)
LYMPHOCYTES # BLD AUTO: 2.36 10*3/MM3 (ref 0.7–3.1)
LYMPHOCYTES NFR BLD AUTO: 29.5 % (ref 19.6–45.3)
MCH RBC QN AUTO: 29.9 PG (ref 26.6–33)
MCHC RBC AUTO-ENTMCNC: 33.6 G/DL (ref 31.5–35.7)
MCV RBC AUTO: 88.8 FL (ref 79–97)
MONOCYTES # BLD AUTO: 0.86 10*3/MM3 (ref 0.1–0.9)
MONOCYTES NFR BLD AUTO: 10.7 % (ref 5–12)
NEUTROPHILS NFR BLD AUTO: 4.45 10*3/MM3 (ref 1.7–7)
NEUTROPHILS NFR BLD AUTO: 55.6 % (ref 42.7–76)
NRBC BLD AUTO-RTO: 0.1 /100 WBC (ref 0–0.2)
PLATELET # BLD AUTO: 247 10*3/MM3 (ref 140–450)
PMV BLD AUTO: 10.4 FL (ref 6–12)
RBC # BLD AUTO: 4.92 10*6/MM3 (ref 4.14–5.8)
WBC NRBC COR # BLD: 8.01 10*3/MM3 (ref 3.4–10.8)

## 2023-02-04 DIAGNOSIS — I10 ESSENTIAL HYPERTENSION: ICD-10-CM

## 2023-02-04 DIAGNOSIS — I25.118 CORONARY ARTERY DISEASE OF NATIVE ARTERY OF NATIVE HEART WITH STABLE ANGINA PECTORIS: ICD-10-CM

## 2023-02-06 RX ORDER — ISOSORBIDE MONONITRATE 60 MG/1
TABLET, EXTENDED RELEASE ORAL
Qty: 90 TABLET | Refills: 2 | Status: SHIPPED | OUTPATIENT
Start: 2023-02-06

## 2023-03-08 ENCOUNTER — OFFICE VISIT (OUTPATIENT)
Dept: FAMILY MEDICINE CLINIC | Facility: CLINIC | Age: 63
End: 2023-03-08
Payer: COMMERCIAL

## 2023-03-08 VITALS
WEIGHT: 205.1 LBS | HEART RATE: 66 BPM | HEIGHT: 66 IN | TEMPERATURE: 96.9 F | BODY MASS INDEX: 32.96 KG/M2 | SYSTOLIC BLOOD PRESSURE: 132 MMHG | OXYGEN SATURATION: 94 % | DIASTOLIC BLOOD PRESSURE: 70 MMHG

## 2023-03-08 DIAGNOSIS — K64.9 HEMORRHOIDS, UNSPECIFIED HEMORRHOID TYPE: Primary | ICD-10-CM

## 2023-03-15 NOTE — PROGRESS NOTES
Family Medicine Residency  Charles Rojas MD    Subjective:     Nima Portillo is a 62 y.o. male who presents for follow up question of GIB    GIB: Possible GIB found on CT in abd. Patient has noticed blood on TP when he strains. No pain with defecation. No BRBPR. No blood noticed in toilet. No dark tarry stools. Patient does not want a colonoscopy.     The following portions of the patient's history were reviewed and updated as appropriate: allergies, current medications, past family history, past medical history, past social history, past surgical history and problem list.    Past Medical Hx:  Past Medical History:   Diagnosis Date   • Acute bronchitis    • Acute sinusitis    • Chest pain    • Chronic bronchitis (HCC)     secondary to smoking   • Claudication (HCC)    • COPD (chronic obstructive pulmonary disease) (HCC)    • Coronary arteriosclerosis    • Cough    • Gastroesophageal reflux disease    • Health maintenance examination     Individual health examination   • History of echocardiogram 10/21/2013    Echocardiogram W/ color flow 14515 (1) - Left atrium normal. Mild CLVH. EF 50%. Valves intact. Mild mitral and tricuspid regurg. Pericardium normal.   • History of echocardiogram 10/25/2011    Echocardiogram W/O color flow 37341 (1) - Normal LV sytolic function with mild LVH & mild diastolic dysfunction   • Hyperlipidemia    • Hypertensive disorder    • Pernicious anemia    • Thumb pain, left 10/14/2019   • Tobacco dependence syndrome        Past Surgical Hx:  Past Surgical History:   Procedure Laterality Date   • CARDIAC CATHETERIZATION  04/08/2015    Cardiac cath 51276 (1) - Slective coronary angiogram. Left heart catheterization with LV ventriculogram.   • COLONOSCOPY N/A 10/26/2020    Procedure: COLONOSCOPY;  Surgeon: Levi Milian MD;  Location: [facility] ENDOSCOPY;  Service: Gastroenterology;  Laterality: N/A;   • COLONOSCOPY N/A 11/08/2021    Procedure: COLONOSCOPY;  Surgeon: Levi Milian  MD;  Location: [facility] ENDOSCOPY;  Service: Gastroenterology;  Laterality: N/A;   • DENTAL PROCEDURE      Removal of all teeth   • HAND SURGERY Right 4/15/2022    Procedure: TRAPEZIECTOMY RIGHT WRIST WITH SUSPENSION ARTHROPLASTY FIRST CARPOMETA CARPAL;  Surgeon: Kerwin Bloom MD;  Location: [facility] OR;  Service: Orthopedics;  Laterality: Right;       Current Meds:    Current Outpatient Medications:   •  acetaminophen (TYLENOL) 650 MG 8 hr tablet, Take 650 mg by mouth Every 8 (Eight) Hours As Needed for Mild Pain ., Disp: , Rfl:   •  albuterol sulfate  (90 Base) MCG/ACT inhaler, Inhale 2 puffs Every 4 (Four) Hours As Needed for Wheezing., Disp: 6.7 g, Rfl: 6  •  aspirin 81 MG chewable tablet, Chew 81 mg Daily., Disp: , Rfl:   •  atorvastatin (LIPITOR) 80 MG tablet, Take 1 tablet by mouth Daily., Disp: 90 tablet, Rfl: 3  •  azithromycin (Zithromax Z-Rubén) 250 MG tablet, Take 2 tablets by mouth on day 1, then 1 tablet daily on days 2-5, Disp: 6 tablet, Rfl: 0  •  Fluticasone Furoate-Vilanterol (Breo Ellipta) 100-25 MCG/ACT aerosol powder , Inhale 1 puff Daily., Disp: 60 each, Rfl: 11  •  hydroCHLOROthiazide (HYDRODIURIL) 25 MG tablet, Take 1 tablet by mouth Daily., Disp: 90 tablet, Rfl: 3  •  HYDROcodone-acetaminophen (NORCO) 5-325 MG per tablet, Take 1 tablet by mouth Every 6 (Six) Hours As Needed for Moderate Pain., Disp: 10 tablet, Rfl: 0  •  isosorbide mononitrate (IMDUR) 60 MG 24 hr tablet, TAKE 1 TABLET BY MOUTH EVERY DAY, Disp: 90 tablet, Rfl: 2  •  lisinopril (PRINIVIL,ZESTRIL) 40 MG tablet, Take 1 tablet by mouth Daily., Disp: 90 tablet, Rfl: 3  •  methocarbamol (ROBAXIN) 500 MG tablet, Take 2 tablets by mouth 4 (Four) Times a Day As Needed for Muscle Spasms., Disp: 25 tablet, Rfl: 0  •  metoprolol succinate XL (TOPROL-XL) 25 MG 24 hr tablet, Take 1 tablet by mouth Daily., Disp: 90 tablet, Rfl: 3  •  nitroglycerin (NITROSTAT) 0.4 MG SL tablet, Place 1 tablet under the tongue Every 5 (Five)  Minutes As Needed for Chest Pain. Take no more than 3 doses in 15 minutes., Disp: 30 tablet, Rfl: 12  •  psyllium (METAMUCIL SMOOTH TEXTURE) 28 % packet, Take 1 packet by mouth Daily., Disp: 30 each, Rfl: 1  •  tiZANidine (ZANAFLEX) 2 MG tablet, Take 1 tablet by mouth 3 (Three) Times a Day., Disp: , Rfl:     Allergies:  No Known Allergies    Family Hx:  Family History   Problem Relation Age of Onset   • Heart disease Mother    • Hypertension Mother    • Diabetes Mother    • Heart disease Father    • Hypertension Father    • Diabetes Father    • Diabetes Sister    • Heart disease Sister    • Stroke Sister    • Cancer Paternal Aunt         Breast Cancer   • Cancer Paternal Uncle         Unknown cancer        Social History:  Social History     Socioeconomic History   • Marital status: Single   Tobacco Use   • Smoking status: Every Day     Packs/day: 1.00     Years: 40.00     Pack years: 40.00     Types: Cigarettes   • Smokeless tobacco: Former   Vaping Use   • Vaping Use: Never used   Substance and Sexual Activity   • Alcohol use: Not Currently   • Drug use: No   • Sexual activity: Defer     Comment: Marital status:        Review of Systems  Review of Systems   Constitutional: Negative for chills, fatigue and fever.   HENT: Negative for ear pain, hearing loss, postnasal drip and sore throat.    Eyes: Negative for pain and visual disturbance.   Respiratory: Negative for cough, shortness of breath and wheezing.    Cardiovascular: Negative for chest pain, palpitations and leg swelling.   Gastrointestinal: Negative for abdominal pain, constipation, diarrhea, nausea and vomiting.   Genitourinary: Negative for difficulty urinating and dysuria.   Musculoskeletal: Positive for arthralgias and back pain. Negative for myalgias.   Skin: Negative for rash and wound.   Neurological: Negative for syncope, weakness and headaches.   Psychiatric/Behavioral: Negative for dysphoric mood. The patient is not nervous/anxious.   "      Objective:     /70   Pulse 66   Temp 96.9 °F (36.1 °C)   Ht 167.6 cm (66\")   Wt 93 kg (205 lb 1.6 oz)   SpO2 94%   BMI 33.10 kg/m²   Physical Exam  Vitals reviewed.   Constitutional:       General: He is not in acute distress.  HENT:      Head: Normocephalic and atraumatic.   Eyes:      General: No scleral icterus.     Conjunctiva/sclera: Conjunctivae normal.   Cardiovascular:      Rate and Rhythm: Normal rate and regular rhythm.      Heart sounds: Normal heart sounds.   Pulmonary:      Effort: Pulmonary effort is normal.      Breath sounds: No wheezing, rhonchi or rales.   Abdominal:      General: Bowel sounds are normal. There is no distension.      Palpations: Abdomen is soft.      Tenderness: There is no abdominal tenderness.   Genitourinary:     Rectum: Normal.   Musculoskeletal:         General: No deformity.      Right lower leg: No edema.      Left lower leg: No edema.   Skin:     General: Skin is warm and dry.   Neurological:      General: No focal deficit present.      Mental Status: He is alert. Mental status is at baseline.          Assessment/Plan:     Diagnoses and all orders for this visit:    1. Hemorrhoids, unspecified hemorrhoid type (Primary)  -     psyllium (METAMUCIL SMOOTH TEXTURE) 28 % packet; Take 1 packet by mouth Daily.  Dispense: 30 each; Refill: 1    1. No hemorrhoids noted on exam, likely internal as no pain is felt. Psyllium to prevent need for straining. Discussed risks/possibility for thrombosed hemorrhoid.       · Rx changes: as above  · Patient Education: hemmorrhoids  · Compliance at present is estimated to be fair.   · Efforts to improve compliance (if necessary) will be directed at increased exercise and continued sobriety .    Depression screening: Up to date; last screen      Follow-up:     Return in about 3 months (around 6/8/2023).    Preventative:  Health Maintenance   Topic Date Due   • ZOSTER VACCINE (2 of 2) 07/11/2019   • ANNUAL PHYSICAL  04/16/2020   • " Pneumococcal Vaccine 0-64 (2 - PCV) 05/16/2020   • COVID-19 Vaccine (3 - Booster for Pfizer series) 05/20/2021   • COLORECTAL CANCER SCREENING  11/08/2022   • LIPID PANEL  01/28/2023   • LUNG CANCER SCREENING  12/13/2023   • TDAP/TD VACCINES (2 - Td or Tdap) 05/16/2029   • HEPATITIS C SCREENING  Completed   • INFLUENZA VACCINE  Completed       Weight  -Class: Obese Class I: 30-34.9kg/m2  -Patient's Body mass index is 33.1 kg/m². indicating that he is obese (BMI >30). Obesity-related health conditions include the following: hypertension and coronary heart disease. Obesity is unchanged. BMI is is above average; BMI management plan is completed. We discussed portion control and increasing exercise..   decrease soda or juice intake and increase water intake    Alcohol use:  reports that he does not currently use alcohol.  Nicotine status  reports that he has been smoking cigarettes. He has a 40.00 pack-year smoking history. He has quit using smokeless tobacco.     Goals     •  Smoking cessation (pt-stated)       Quitting smoking and living longer    Barriers: Wanting to quit            RISK SCORE: 4       Charles Rojas MD   PGY-3    [facility]  [address] [place] [address]  Office: [phone]    This document has been electronically signed by Charles Rojas MD on March 15, 2023 09:07 CDT

## 2023-05-06 ENCOUNTER — APPOINTMENT (OUTPATIENT)
Dept: GENERAL RADIOLOGY | Facility: HOSPITAL | Age: 63
End: 2023-05-06
Payer: COMMERCIAL

## 2023-05-06 ENCOUNTER — HOSPITAL ENCOUNTER (EMERGENCY)
Facility: HOSPITAL | Age: 63
Discharge: HOME | End: 2023-05-06
Attending: FAMILY MEDICINE
Payer: COMMERCIAL

## 2023-05-06 VITALS
HEART RATE: 78 BPM | SYSTOLIC BLOOD PRESSURE: 122 MMHG | OXYGEN SATURATION: 92 % | HEIGHT: 66 IN | BODY MASS INDEX: 36.16 KG/M2 | TEMPERATURE: 98.4 F | RESPIRATION RATE: 21 BRPM | DIASTOLIC BLOOD PRESSURE: 67 MMHG | WEIGHT: 225 LBS

## 2023-05-06 DIAGNOSIS — R53.1 GENERALIZED WEAKNESS: Primary | ICD-10-CM

## 2023-05-06 DIAGNOSIS — M17.12 OSTEOARTHRITIS OF LEFT KNEE, UNSPECIFIED OSTEOARTHRITIS TYPE: ICD-10-CM

## 2023-05-06 LAB
ALBUMIN SERPL-MCNC: 3.7 G/DL (ref 3.5–5.2)
ALBUMIN/GLOB SERPL: 1.3 G/DL
ALP SERPL-CCNC: 74 U/L (ref 39–117)
ALT SERPL W P-5'-P-CCNC: 51 U/L (ref 1–41)
ANION GAP SERPL CALCULATED.3IONS-SCNC: 14 MMOL/L (ref 5–15)
AST SERPL-CCNC: 60 U/L (ref 1–40)
BACTERIA UR QL AUTO: NORMAL /HPF
BASOPHILS # BLD AUTO: 0.01 10*3/MM3 (ref 0–0.2)
BASOPHILS NFR BLD AUTO: 0.4 % (ref 0–1.5)
BILIRUB SERPL-MCNC: 0.6 MG/DL (ref 0–1.2)
BILIRUB UR QL STRIP: NEGATIVE
BUN SERPL-MCNC: 19 MG/DL (ref 8–23)
BUN/CREAT SERPL: 15.1 (ref 7–25)
CALCIUM SPEC-SCNC: 8.3 MG/DL (ref 8.6–10.5)
CHLORIDE SERPL-SCNC: 96 MMOL/L (ref 98–107)
CLARITY UR: ABNORMAL
CO2 SERPL-SCNC: 23 MMOL/L (ref 22–29)
COLOR UR: ABNORMAL
CREAT SERPL-MCNC: 1.26 MG/DL (ref 0.76–1.27)
D-LACTATE SERPL-SCNC: 1.4 MMOL/L (ref 0.5–2)
DEPRECATED RDW RBC AUTO: 48.2 FL (ref 37–54)
EGFRCR SERPLBLD CKD-EPI 2021: 64.5 ML/MIN/1.73
EOSINOPHIL # BLD AUTO: 0 10*3/MM3 (ref 0–0.4)
EOSINOPHIL NFR BLD AUTO: 0 % (ref 0.3–6.2)
ERYTHROCYTE [DISTWIDTH] IN BLOOD BY AUTOMATED COUNT: 14.9 % (ref 12.3–15.4)
FLUAV RNA RESP QL NAA+PROBE: NOT DETECTED
FLUBV RNA RESP QL NAA+PROBE: NOT DETECTED
GLOBULIN UR ELPH-MCNC: 2.8 GM/DL
GLUCOSE SERPL-MCNC: 123 MG/DL (ref 65–99)
GLUCOSE UR STRIP-MCNC: NEGATIVE MG/DL
HCT VFR BLD AUTO: 41.5 % (ref 37.5–51)
HGB BLD-MCNC: 13.9 G/DL (ref 13–17.7)
HGB UR QL STRIP.AUTO: NEGATIVE
HOLD SPECIMEN: NORMAL
HOLD SPECIMEN: NORMAL
HYALINE CASTS UR QL AUTO: NORMAL /LPF
IMM GRANULOCYTES # BLD AUTO: 0.02 10*3/MM3 (ref 0–0.05)
IMM GRANULOCYTES NFR BLD AUTO: 0.8 % (ref 0–0.5)
KETONES UR QL STRIP: ABNORMAL
LEUKOCYTE ESTERASE UR QL STRIP.AUTO: NEGATIVE
LYMPHOCYTES # BLD AUTO: 0.46 10*3/MM3 (ref 0.7–3.1)
LYMPHOCYTES NFR BLD AUTO: 18.3 % (ref 19.6–45.3)
MCH RBC QN AUTO: 29.4 PG (ref 26.6–33)
MCHC RBC AUTO-ENTMCNC: 33.5 G/DL (ref 31.5–35.7)
MCV RBC AUTO: 87.7 FL (ref 79–97)
MONOCYTES # BLD AUTO: 0.1 10*3/MM3 (ref 0.1–0.9)
MONOCYTES NFR BLD AUTO: 4 % (ref 5–12)
NEUTROPHILS NFR BLD AUTO: 1.93 10*3/MM3 (ref 1.7–7)
NEUTROPHILS NFR BLD AUTO: 76.5 % (ref 42.7–76)
NITRITE UR QL STRIP: NEGATIVE
NRBC BLD AUTO-RTO: 0 /100 WBC (ref 0–0.2)
PH UR STRIP.AUTO: <=5 [PH] (ref 5–9)
PLATELET # BLD AUTO: 93 10*3/MM3 (ref 140–450)
PMV BLD AUTO: 9.7 FL (ref 6–12)
POTASSIUM SERPL-SCNC: 3.4 MMOL/L (ref 3.5–5.2)
PROT SERPL-MCNC: 6.5 G/DL (ref 6–8.5)
PROT UR QL STRIP: ABNORMAL
QT INTERVAL: 380 MS
QTC INTERVAL: 477 MS
RBC # BLD AUTO: 4.73 10*6/MM3 (ref 4.14–5.8)
RBC # UR STRIP: NORMAL /HPF
RBC MORPH BLD: NORMAL
REF LAB TEST METHOD: NORMAL
SARS-COV-2 RNA RESP QL NAA+PROBE: NOT DETECTED
SMALL PLATELETS BLD QL SMEAR: NORMAL
SODIUM SERPL-SCNC: 133 MMOL/L (ref 136–145)
SP GR UR STRIP: 1.02 (ref 1–1.03)
SQUAMOUS #/AREA URNS HPF: NORMAL /HPF
UROBILINOGEN UR QL STRIP: ABNORMAL
WBC # UR STRIP: NORMAL /HPF
WBC MORPH BLD: NORMAL
WBC NRBC COR # BLD: 2.52 10*3/MM3 (ref 3.4–10.8)
WHOLE BLOOD HOLD COAG: NORMAL
WHOLE BLOOD HOLD SPECIMEN: NORMAL

## 2023-05-06 PROCEDURE — 87636 SARSCOV2 & INF A&B AMP PRB: CPT

## 2023-05-06 PROCEDURE — 80053 COMPREHEN METABOLIC PANEL: CPT | Performed by: FAMILY MEDICINE

## 2023-05-06 PROCEDURE — 73564 X-RAY EXAM KNEE 4 OR MORE: CPT

## 2023-05-06 PROCEDURE — 93005 ELECTROCARDIOGRAM TRACING: CPT | Performed by: FAMILY MEDICINE

## 2023-05-06 PROCEDURE — 93005 ELECTROCARDIOGRAM TRACING: CPT

## 2023-05-06 PROCEDURE — 96360 HYDRATION IV INFUSION INIT: CPT

## 2023-05-06 PROCEDURE — 83605 ASSAY OF LACTIC ACID: CPT | Performed by: FAMILY MEDICINE

## 2023-05-06 PROCEDURE — 71045 X-RAY EXAM CHEST 1 VIEW: CPT

## 2023-05-06 PROCEDURE — 99284 EMERGENCY DEPT VISIT MOD MDM: CPT

## 2023-05-06 PROCEDURE — 85025 COMPLETE CBC W/AUTO DIFF WBC: CPT | Performed by: FAMILY MEDICINE

## 2023-05-06 PROCEDURE — 87040 BLOOD CULTURE FOR BACTERIA: CPT | Performed by: FAMILY MEDICINE

## 2023-05-06 PROCEDURE — 85007 BL SMEAR W/DIFF WBC COUNT: CPT | Performed by: FAMILY MEDICINE

## 2023-05-06 PROCEDURE — 81001 URINALYSIS AUTO W/SCOPE: CPT | Performed by: FAMILY MEDICINE

## 2023-05-06 RX ORDER — SODIUM CHLORIDE 0.9 % (FLUSH) 0.9 %
10 SYRINGE (ML) INJECTION AS NEEDED
Status: DISCONTINUED | OUTPATIENT
Start: 2023-05-06 | End: 2023-05-06 | Stop reason: HOSPADM

## 2023-05-06 RX ADMIN — SODIUM CHLORIDE 1000 ML: 9 INJECTION, SOLUTION INTRAVENOUS at 19:50

## 2023-05-06 NOTE — ED PROVIDER NOTES
Subjective   History of Present Illness  Patient is a 63 years old with past medical history of hypertension, hyperlipidemia, pernicious anemia, COPD, pancreatic disease who presented here today because of generalized weakness.  And also, having some chills, sweating and fever for the past 4 days.  Patient also complained having some knee pain that his left leg gave up on him.  But denied any trauma to the head or any loss of consciousness when he fell.    History provided by:  Patient   used: No        Review of Systems   Constitutional: Positive for fatigue.   All other systems reviewed and are negative.      Past Medical History:   Diagnosis Date   • Acute bronchitis    • Acute sinusitis    • Chest pain    • Chronic bronchitis     secondary to smoking   • Claudication    • COPD (chronic obstructive pulmonary disease)    • Coronary arteriosclerosis    • Cough    • Gastroesophageal reflux disease    • Health maintenance examination     Individual health examination   • History of echocardiogram 10/21/2013    Echocardiogram W/ color flow 83512 (1) - Left atrium normal. Mild CLVH. EF 50%. Valves intact. Mild mitral and tricuspid regurg. Pericardium normal.   • History of echocardiogram 10/25/2011    Echocardiogram W/O color flow 39958 (1) - Normal LV sytolic function with mild LVH & mild diastolic dysfunction   • Hyperlipidemia    • Hypertensive disorder    • Pernicious anemia    • Thumb pain, left 10/14/2019   • Tobacco dependence syndrome        No Known Allergies    Past Surgical History:   Procedure Laterality Date   • CARDIAC CATHETERIZATION  04/08/2015    Cardiac cath 52420 (1) - Slective coronary angiogram. Left heart catheterization with LV ventriculogram.   • COLONOSCOPY N/A 10/26/2020    Procedure: COLONOSCOPY;  Surgeon: Levi Milian MD;  Location: [facility] ENDOSCOPY;  Service: Gastroenterology;  Laterality: N/A;   • COLONOSCOPY N/A 11/08/2021    Procedure: COLONOSCOPY;  Surgeon:  Levi Milian MD;  Location: [facility] ENDOSCOPY;  Service: Gastroenterology;  Laterality: N/A;   • DENTAL PROCEDURE      Removal of all teeth   • HAND SURGERY Right 4/15/2022    Procedure: TRAPEZIECTOMY RIGHT WRIST WITH SUSPENSION ARTHROPLASTY FIRST CARPOMETA CARPAL;  Surgeon: Kerwin Bloom MD;  Location: [facility] OR;  Service: Orthopedics;  Laterality: Right;       Family History   Problem Relation Age of Onset   • Heart disease Mother    • Hypertension Mother    • Diabetes Mother    • Heart disease Father    • Hypertension Father    • Diabetes Father    • Diabetes Sister    • Heart disease Sister    • Stroke Sister    • Cancer Paternal Aunt         Breast Cancer   • Cancer Paternal Uncle         Unknown cancer       Social History     Socioeconomic History   • Marital status: Single   Tobacco Use   • Smoking status: Every Day     Packs/day: 1.00     Years: 40.00     Pack years: 40.00     Types: Cigarettes   • Smokeless tobacco: Former   Vaping Use   • Vaping Use: Never used   Substance and Sexual Activity   • Alcohol use: Not Currently   • Drug use: No   • Sexual activity: Defer     Comment: Marital status:            Objective   Physical Exam  Vitals and nursing note reviewed.   Constitutional:       Appearance: Normal appearance. He is normal weight.   HENT:      Head: Normocephalic and atraumatic.      Right Ear: Tympanic membrane, ear canal and external ear normal.      Left Ear: Tympanic membrane, ear canal and external ear normal.      Nose: Nose normal.   Eyes:      Extraocular Movements: Extraocular movements intact.      Conjunctiva/sclera: Conjunctivae normal.      Pupils: Pupils are equal, round, and reactive to light.   Cardiovascular:      Rate and Rhythm: Normal rate and regular rhythm.      Pulses: Normal pulses.      Heart sounds: Normal heart sounds.   Pulmonary:      Effort: Pulmonary effort is normal.      Breath sounds: Normal breath sounds.   Abdominal:      General: Abdomen  is flat. Bowel sounds are normal.      Palpations: Abdomen is soft.   Musculoskeletal:         General: Swelling present. Normal range of motion.      Cervical back: Normal range of motion and neck supple.      Left knee: Effusion present. Normal range of motion. Tenderness present.        Legs:    Skin:     General: Skin is warm.      Capillary Refill: Capillary refill takes less than 2 seconds.   Neurological:      General: No focal deficit present.      Mental Status: He is alert and oriented to person, place, and time.   Psychiatric:         Mood and Affect: Mood normal.         Behavior: Behavior normal.         Procedures           ED Course            Labs Reviewed   COMPREHENSIVE METABOLIC PANEL - Abnormal; Notable for the following components:       Result Value    Glucose 123 (*)     Sodium 133 (*)     Potassium 3.4 (*)     Chloride 96 (*)     Calcium 8.3 (*)     ALT (SGPT) 51 (*)     AST (SGOT) 60 (*)     All other components within normal limits    Narrative:     GFR Normal >60  Chronic Kidney Disease <60  Kidney Failure <15     URINALYSIS W/ MICROSCOPIC IF INDICATED (NO CULTURE) - Abnormal; Notable for the following components:    Appearance, UA Cloudy (*)     Ketones, UA Trace (*)     Protein, UA 30 mg/dL (1+) (*)     All other components within normal limits   CBC WITH AUTO DIFFERENTIAL - Abnormal; Notable for the following components:    WBC 2.52 (*)     Platelets 93 (*)     Neutrophil % 76.5 (*)     Lymphocyte % 18.3 (*)     Monocyte % 4.0 (*)     Eosinophil % 0.0 (*)     Immature Grans % 0.8 (*)     Lymphocytes, Absolute 0.46 (*)     All other components within normal limits   COVID-19 AND FLU A/B, NP SWAB IN TRANSPORT MEDIA 8-12 HR TAT - Normal    Narrative:     Fact sheet for providers: https://www.fda.gov/media/517161/download    Fact sheet for patients: https://www.fda.gov/media/141172/download    Test performed by PCR.   LACTIC ACID, PLASMA - Normal   BLOOD CULTURE   BLOOD CULTURE   RAINBOW  DRAW    Narrative:     The following orders were created for panel order [facility] Draw.  Procedure                               Abnormality         Status                     ---------                               -----------         ------                     Green Top (Gel)[[id number]]                                  Final result               Lavender Top[[id number]]                                     Final result               Gold Top - SST[[id number]]                                   Final result               Light Blue Top[[id number]]                                   Final result                 Please view results for these tests on the individual orders.   SCAN SLIDE   URINALYSIS, MICROSCOPIC ONLY   CBC AND DIFFERENTIAL    Narrative:     The following orders were created for panel order CBC & Differential.  Procedure                               Abnormality         Status                     ---------                               -----------         ------                     CBC Auto Differential[[id number]]        Abnormal            Final result               Scan Slide[[id number]]                                       Final result                 Please view results for these tests on the individual orders.   GREEN TOP   LAVENDER TOP   GOLD TOP - SST   LIGHT BLUE TOP       XR Chest 1 View   Final Result      XR Knee 4+ View Left   Final Result   No significant radiographic abnormality.                                      Medical Decision Making  Patient is 62 years old who presented here today because of generalized weakness and left knee pain.  X-ray of the knee shows some osteoarthritis.  All the lab work done was normal.  Patient was discharged home to follow-up with orthopedic doctor.    Amount and/or Complexity of Data Reviewed  Labs: ordered.  Radiology: ordered.  ECG/medicine tests: ordered.      Risk  Prescription drug management.          Final diagnoses:   Generalized weakness    Osteoarthritis of left knee, unspecified osteoarthritis type       ED Disposition  ED Disposition     ED Disposition   Discharge    Condition   Stable    Comment   --             Michael Ortgea MD  [address] [place], [place] [id number] [phone]    In 3 days           Medication List      No changes were made to your prescriptions during this visit.          Naresh Joyce MD  05/06/23 2119       Naresh Joyce MD  05/06/23 2121

## 2023-05-07 NOTE — DISCHARGE INSTRUCTIONS
Results were discussed with patient.  Advised to follow-up with orthopedic doctor in 3 days.  Come back to the ER symptoms get worse.

## 2023-05-11 LAB
BACTERIA SPEC AEROBE CULT: NORMAL
BACTERIA SPEC AEROBE CULT: NORMAL

## 2023-05-15 ENCOUNTER — HOSPITAL ENCOUNTER (EMERGENCY)
Facility: HOSPITAL | Age: 63
Discharge: HOME | End: 2023-05-15
Attending: EMERGENCY MEDICINE | Admitting: EMERGENCY MEDICINE
Payer: COMMERCIAL

## 2023-05-15 VITALS
TEMPERATURE: 98.1 F | WEIGHT: 225 LBS | OXYGEN SATURATION: 93 % | HEART RATE: 78 BPM | DIASTOLIC BLOOD PRESSURE: 60 MMHG | BODY MASS INDEX: 38.41 KG/M2 | SYSTOLIC BLOOD PRESSURE: 140 MMHG | RESPIRATION RATE: 21 BRPM | HEIGHT: 64 IN

## 2023-05-15 DIAGNOSIS — I10 ESSENTIAL HYPERTENSION: ICD-10-CM

## 2023-05-15 DIAGNOSIS — I95.1 SYNCOPE DUE TO ORTHOSTATIC HYPOTENSION: Primary | ICD-10-CM

## 2023-05-15 LAB
ABO GROUP BLD: NORMAL
ALBUMIN SERPL-MCNC: 3.7 G/DL (ref 3.5–5.2)
ALBUMIN/GLOB SERPL: 1.1 G/DL
ALP SERPL-CCNC: 84 U/L (ref 39–117)
ALT SERPL W P-5'-P-CCNC: 42 U/L (ref 1–41)
ANION GAP SERPL CALCULATED.3IONS-SCNC: 14 MMOL/L (ref 5–15)
AST SERPL-CCNC: 23 U/L (ref 1–40)
BACTERIA UR QL AUTO: ABNORMAL /HPF
BILIRUB SERPL-MCNC: 0.7 MG/DL (ref 0–1.2)
BILIRUB UR QL STRIP: ABNORMAL
BLD GP AB SCN SERPL QL: NEGATIVE
BUN SERPL-MCNC: 19 MG/DL (ref 8–23)
BUN/CREAT SERPL: 9.9 (ref 7–25)
CALCIUM SPEC-SCNC: 9 MG/DL (ref 8.6–10.5)
CHLORIDE SERPL-SCNC: 95 MMOL/L (ref 98–107)
CK SERPL-CCNC: 84 U/L (ref 20–200)
CLARITY UR: CLEAR
CO2 SERPL-SCNC: 25 MMOL/L (ref 22–29)
COLOR UR: ABNORMAL
CREAT SERPL-MCNC: 1.92 MG/DL (ref 0.76–1.27)
D-LACTATE SERPL-SCNC: 2.5 MMOL/L (ref 0.5–2)
DEPRECATED RDW RBC AUTO: 49.3 FL (ref 37–54)
EGFRCR SERPLBLD CKD-EPI 2021: 38.9 ML/MIN/1.73
ERYTHROCYTE [DISTWIDTH] IN BLOOD BY AUTOMATED COUNT: 15.3 % (ref 12.3–15.4)
GLOBULIN UR ELPH-MCNC: 3.5 GM/DL
GLUCOSE SERPL-MCNC: 205 MG/DL (ref 65–99)
GLUCOSE UR STRIP-MCNC: NEGATIVE MG/DL
HCT VFR BLD AUTO: 38.3 % (ref 37.5–51)
HGB BLD-MCNC: 12.7 G/DL (ref 13–17.7)
HGB UR QL STRIP.AUTO: NEGATIVE
HOLD SPECIMEN: NORMAL
HYALINE CASTS UR QL AUTO: ABNORMAL /LPF
KETONES UR QL STRIP: ABNORMAL
LEUKOCYTE ESTERASE UR QL STRIP.AUTO: ABNORMAL
LYMPHOCYTES # BLD MANUAL: 1.96 10*3/MM3 (ref 0.7–3.1)
LYMPHOCYTES NFR BLD MANUAL: 12 % (ref 5–12)
Lab: NORMAL
MAGNESIUM SERPL-MCNC: 2.2 MG/DL (ref 1.6–2.4)
MCH RBC QN AUTO: 29.1 PG (ref 26.6–33)
MCHC RBC AUTO-ENTMCNC: 33.2 G/DL (ref 31.5–35.7)
MCV RBC AUTO: 87.8 FL (ref 79–97)
MONOCYTES # BLD: 0.64 10*3/MM3 (ref 0.1–0.9)
NEUTROPHILS # BLD AUTO: 2.7 10*3/MM3 (ref 1.7–7)
NEUTROPHILS NFR BLD MANUAL: 49 % (ref 42.7–76)
NEUTS BAND NFR BLD MANUAL: 2 % (ref 0–5)
NITRITE UR QL STRIP: NEGATIVE
NT-PROBNP SERPL-MCNC: 214.5 PG/ML (ref 0–900)
PH UR STRIP.AUTO: <=5 [PH] (ref 5–9)
PLATELET # BLD AUTO: 174 10*3/MM3 (ref 140–450)
PMV BLD AUTO: 10.7 FL (ref 6–12)
POTASSIUM SERPL-SCNC: 3.7 MMOL/L (ref 3.5–5.2)
PROT SERPL-MCNC: 7.2 G/DL (ref 6–8.5)
PROT UR QL STRIP: ABNORMAL
RBC # BLD AUTO: 4.36 10*6/MM3 (ref 4.14–5.8)
RBC # UR STRIP: ABNORMAL /HPF
RBC MORPH BLD: NORMAL
REF LAB TEST METHOD: ABNORMAL
RH BLD: POSITIVE
SMALL PLATELETS BLD QL SMEAR: ADEQUATE
SODIUM SERPL-SCNC: 134 MMOL/L (ref 136–145)
SP GR UR STRIP: 1.02 (ref 1–1.03)
SQUAMOUS #/AREA URNS HPF: ABNORMAL /HPF
T&S EXPIRATION DATE: NORMAL
T4 FREE SERPL-MCNC: 1.4 NG/DL (ref 0.93–1.7)
TROPONIN T SERPL HS-MCNC: 18 NG/L
TROPONIN T SERPL HS-MCNC: 24 NG/L
TSH SERPL DL<=0.05 MIU/L-ACNC: 1.87 UIU/ML (ref 0.27–4.2)
UROBILINOGEN UR QL STRIP: ABNORMAL
VARIANT LYMPHS NFR BLD MANUAL: 3 % (ref 0–5)
VARIANT LYMPHS NFR BLD MANUAL: 34 % (ref 19.6–45.3)
WBC # UR STRIP: ABNORMAL /HPF
WBC MORPH BLD: NORMAL
WBC NRBC COR # BLD: 5.3 10*3/MM3 (ref 3.4–10.8)
WHOLE BLOOD HOLD COAG: NORMAL

## 2023-05-15 PROCEDURE — 84443 ASSAY THYROID STIM HORMONE: CPT | Performed by: EMERGENCY MEDICINE

## 2023-05-15 PROCEDURE — 81001 URINALYSIS AUTO W/SCOPE: CPT | Performed by: EMERGENCY MEDICINE

## 2023-05-15 PROCEDURE — 86900 BLOOD TYPING SEROLOGIC ABO: CPT

## 2023-05-15 PROCEDURE — 80053 COMPREHEN METABOLIC PANEL: CPT

## 2023-05-15 PROCEDURE — 86850 RBC ANTIBODY SCREEN: CPT | Performed by: EMERGENCY MEDICINE

## 2023-05-15 PROCEDURE — 84484 ASSAY OF TROPONIN QUANT: CPT | Performed by: EMERGENCY MEDICINE

## 2023-05-15 PROCEDURE — 93005 ELECTROCARDIOGRAM TRACING: CPT

## 2023-05-15 PROCEDURE — 82550 ASSAY OF CK (CPK): CPT | Performed by: EMERGENCY MEDICINE

## 2023-05-15 PROCEDURE — 86901 BLOOD TYPING SEROLOGIC RH(D): CPT | Performed by: EMERGENCY MEDICINE

## 2023-05-15 PROCEDURE — 86901 BLOOD TYPING SEROLOGIC RH(D): CPT

## 2023-05-15 PROCEDURE — 99284 EMERGENCY DEPT VISIT MOD MDM: CPT

## 2023-05-15 PROCEDURE — 93005 ELECTROCARDIOGRAM TRACING: CPT | Performed by: EMERGENCY MEDICINE

## 2023-05-15 PROCEDURE — 85025 COMPLETE CBC W/AUTO DIFF WBC: CPT

## 2023-05-15 PROCEDURE — 86900 BLOOD TYPING SEROLOGIC ABO: CPT | Performed by: EMERGENCY MEDICINE

## 2023-05-15 PROCEDURE — 85007 BL SMEAR W/DIFF WBC COUNT: CPT

## 2023-05-15 PROCEDURE — 36415 COLL VENOUS BLD VENIPUNCTURE: CPT

## 2023-05-15 PROCEDURE — 83880 ASSAY OF NATRIURETIC PEPTIDE: CPT | Performed by: EMERGENCY MEDICINE

## 2023-05-15 PROCEDURE — 84439 ASSAY OF FREE THYROXINE: CPT | Performed by: EMERGENCY MEDICINE

## 2023-05-15 PROCEDURE — 87040 BLOOD CULTURE FOR BACTERIA: CPT | Performed by: EMERGENCY MEDICINE

## 2023-05-15 PROCEDURE — 83735 ASSAY OF MAGNESIUM: CPT | Performed by: EMERGENCY MEDICINE

## 2023-05-15 PROCEDURE — 83605 ASSAY OF LACTIC ACID: CPT | Performed by: EMERGENCY MEDICINE

## 2023-05-15 RX ORDER — HYDROCHLOROTHIAZIDE 25 MG/1
12.5 TABLET ORAL DAILY
Qty: 90 TABLET | Refills: 0 | Status: SHIPPED | OUTPATIENT
Start: 2023-05-15 | End: 2023-05-22

## 2023-05-15 RX ORDER — SODIUM CHLORIDE 9 MG/ML
INJECTION, SOLUTION INTRAVENOUS
Status: COMPLETED
Start: 2023-05-15 | End: 2023-05-15

## 2023-05-15 RX ADMIN — SODIUM CHLORIDE 1000 ML: 9 INJECTION, SOLUTION INTRAVENOUS at 15:00

## 2023-05-15 RX ADMIN — SODIUM CHLORIDE: 9 INJECTION, SOLUTION INTRAVENOUS at 11:18

## 2023-05-15 RX ADMIN — SODIUM CHLORIDE 1000 ML: 9 INJECTION, SOLUTION INTRAVENOUS at 13:31

## 2023-05-15 NOTE — DISCHARGE INSTRUCTIONS
Please now only take half a tablet of hydrochlorothiazide for your blood pressure in the morning.  Be sure to eat breakfast and drink some water before going to work.  Be sure to take water with you while at work to drink.

## 2023-05-15 NOTE — Clinical Note
[facility] EMERGENCY DEPARTMENT  [address] [place] [id number]  Phone: [phone]    Nima Portillo was seen and treated in our emergency department on 5/15/2023.  He may return to work on 05/17/2023.         Thank you for choosing [facility].    Levi Abbott, DO

## 2023-05-15 NOTE — ED PROVIDER NOTES
"Subjective   History of Present Illness  This is a 62-year-old male who presents for evaluation of a syncopal episode.  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  He states the next thing he knew he was laying on the ground looking up at the side.  Aside from lightheadedness and scotoma he denies any other preceding symptoms such as chest pain or shortness of breath or severe headache.  He has history of anemia but denies any recent blood loss or dark stools.  He denies any vomiting or diarrhea.  He denies any fevers or chills.  Denies rash.  Denies joint swelling.  Denies cough.  Denies dysuria or frequency.  The patient does state that he has had a poor appetite recently and did not eat anything or drink anything before going to work today.  The patient takes multiple home antihypertensives and has been compliant.  Currently he states he feels fine and would like to go outside and smoke a cigarette.        Review of Systems   All other systems reviewed and are negative.      Past Medical History:   Diagnosis Date   • Acute bronchitis    • Acute sinusitis    • Chest pain    • Chronic bronchitis     secondary to smoking   • Claudication    • COPD (chronic obstructive pulmonary disease)    • Coronary arteriosclerosis    • Cough    • Gastroesophageal reflux disease    • Health maintenance examination     Individual health examination   • History of echocardiogram 10/21/2013    Echocardiogram W/ color flow 95226 (1) - Left atrium normal. Mild CLVH. EF 50%. Valves intact. Mild mitral and tricuspid regurg. Pericardium normal.   • History of echocardiogram 10/25/2011    Echocardiogram W/O color flow 77408 (1) - Normal LV sytolic function with mild LVH & mild diastolic dysfunction   • Hyperlipidemia    • Hypertensive disorder    • Pernicious anemia    • Thumb pain, left 10/14/2019   • Tobacco dependence syndrome        No " Known Allergies    Past Surgical History:   Procedure Laterality Date   • CARDIAC CATHETERIZATION  04/08/2015    Cardiac cath 02994 (1) - Slective coronary angiogram. Left heart catheterization with LV ventriculogram.   • COLONOSCOPY N/A 10/26/2020    Procedure: COLONOSCOPY;  Surgeon: Levi Milian MD;  Location: [facility] ENDOSCOPY;  Service: Gastroenterology;  Laterality: N/A;   • COLONOSCOPY N/A 11/08/2021    Procedure: COLONOSCOPY;  Surgeon: Levi Milian MD;  Location: [facility] ENDOSCOPY;  Service: Gastroenterology;  Laterality: N/A;   • DENTAL PROCEDURE      Removal of all teeth   • HAND SURGERY Right 4/15/2022    Procedure: TRAPEZIECTOMY RIGHT WRIST WITH SUSPENSION ARTHROPLASTY FIRST CARPOMETA CARPAL;  Surgeon: Kerwin Bloom MD;  Location: [facility] OR;  Service: Orthopedics;  Laterality: Right;       Family History   Problem Relation Age of Onset   • Heart disease Mother    • Hypertension Mother    • Diabetes Mother    • Heart disease Father    • Hypertension Father    • Diabetes Father    • Diabetes Sister    • Heart disease Sister    • Stroke Sister    • Cancer Paternal Aunt         Breast Cancer   • Cancer Paternal Uncle         Unknown cancer       Social History     Socioeconomic History   • Marital status: Single   Tobacco Use   • Smoking status: Every Day     Packs/day: 2.50     Years: 40.00     Pack years: 100.00     Types: Cigarettes   • Smokeless tobacco: Former   Vaping Use   • Vaping Use: Never used   Substance and Sexual Activity   • Alcohol use: Not Currently     Alcohol/week: 7.0 standard drinks     Types: 7 Cans of beer per week   • Drug use: No   • Sexual activity: Defer     Comment: Marital status:            Objective   Physical Exam  Vitals and nursing note reviewed.   Constitutional:       Appearance: He is not ill-appearing.   HENT:      Head: Normocephalic and atraumatic.      Nose: Nose normal.   Eyes:      General: No scleral icterus.  Cardiovascular:      Rate and  "Rhythm: Normal rate and regular rhythm.   Pulmonary:      Effort: Pulmonary effort is normal.      Breath sounds: Normal breath sounds.   Abdominal:      General: Abdomen is flat.      Tenderness: There is no abdominal tenderness.   Musculoskeletal:         General: No swelling or tenderness. Normal range of motion.   Skin:     General: Skin is warm and dry.   Neurological:      Mental Status: He is alert and oriented to person, place, and time.      Sensory: No sensory deficit.      Motor: No weakness.   Psychiatric:      Comments: Travisal.         Procedures           ED Course  ED Course as of 05/16/23 0055   Mon May 15, 2023   1326 Lactic Acid, Plasma(!!)  Elevated lactate [JV]   1326 Single High Sensitivity Troponin T(!)  Single mildly elevated high-sensitivity troponin.  Will repeat. [JV]   1327 Comprehensive Metabolic Panel(!)  Renal function worse compared to baseline. [JV]      ED Course User Index  [JV] Levi Abbott, DO                                           Medical Decision Making  The patient's recent past medical charts for this facility as well as outside facilities via the \"care everywhere\" application of epic reviewed.  The patient was in the emergency department earlier this month for left knee pain and generalized weakness and was discharged for outpatient follow-up.    The differential diagnosis includes sepsis, anaphylaxis, dehydration, and cardiogenic shock, among others.    On final reevaluation the patient is in stable condition.  His blood pressure has improved and he has been able to eat and drink and ambulate without difficulty.  He is asymptomatic and requesting to go home.  We discussed discharge instructions and need for follow-up.  We discussed reasons for early return to the emergency department.      Syncope due to orthostatic hypotension: acute illness or injury  Amount and/or Complexity of Data Reviewed  Independent Historian: guardian     Details: The patient's family member " provides additional history.  Labs: ordered. Decision-making details documented in ED Course.  ECG/medicine tests: ordered and independent interpretation performed.     Details: EKG interpretation: Interpreted myself.  Normal sinus rhythm.  Rate 89.  Normal P wave and ME interval.  Normal QRS and axis.  Normal QTc interval.  ST segments are normal.      Risk  OTC drugs.  Prescription drug management.  Decision regarding hospitalization.      Critical Care  Total time providing critical care: minutes (35 minutes of critical care provided. This time excludes other billable procedures. Time does include preparation of documents, medical consultations, review of old records, and direct bedside care. Patient is at high risk for life-threatening deterioration due to cardiovascular system failure with hypotension requiring multiple IV fluid boluses.   )      Final diagnoses:   Syncope due to orthostatic hypotension       ED Disposition  ED Disposition     ED Disposition   Discharge    Condition   Stable    Comment   --             Charles Rojas MD  [facility]   [facility] [address] [phone]    Call today  To make an appointment to be reevaluated after emergency department visit with low blood pressure and passing out    [facility] EMERGENCY DEPARTMENT  [address]  [facility] [address] [phone]    As needed, If symptoms worsen at any time         Medication List      Changed    hydroCHLOROthiazide 25 MG tablet  Commonly known as: HYDRODIURIL  Take 0.5 tablets by mouth Daily.  What changed: how much to take           Where to Get Your Medications      These medications were sent to [facility]/pharmacy #[id number] - [place], [place] - [address] [facility] - [phone]  - [phone] [address] [address]    Phone: [phone]   · hydroCHLOROthiazide 25 MG tablet          Levi Abbott DO  05/16/23 0058

## 2023-05-18 LAB
QT INTERVAL: 386 MS
QTC INTERVAL: 469 MS

## 2023-05-20 LAB
BACTERIA SPEC AEROBE CULT: NORMAL
BACTERIA SPEC AEROBE CULT: NORMAL

## 2023-05-22 ENCOUNTER — OFFICE VISIT (OUTPATIENT)
Dept: FAMILY MEDICINE CLINIC | Facility: CLINIC | Age: 63
End: 2023-05-22
Payer: COMMERCIAL

## 2023-05-22 VITALS
SYSTOLIC BLOOD PRESSURE: 118 MMHG | WEIGHT: 199.2 LBS | HEART RATE: 93 BPM | OXYGEN SATURATION: 99 % | HEIGHT: 64 IN | BODY MASS INDEX: 34.01 KG/M2 | TEMPERATURE: 96.9 F | DIASTOLIC BLOOD PRESSURE: 72 MMHG

## 2023-05-22 DIAGNOSIS — I10 ESSENTIAL HYPERTENSION: ICD-10-CM

## 2023-05-22 RX ORDER — HYDROCHLOROTHIAZIDE 12.5 MG/1
6.75 TABLET ORAL DAILY
Qty: 15 TABLET | Refills: 3 | Status: SHIPPED | OUTPATIENT
Start: 2023-05-22

## 2023-05-23 NOTE — PROGRESS NOTES
Family Medicine Residency  Charles Rojas MD    Subjective:     Nima Portillo is a 62 y.o. male who presents for ED follow up for dehydration:    Dehydration: Patient presents to the emergency department after passing out while working outside.  Patient was not drinking normal amount of water.  Felt dizzy/lightheaded, passed out and was brought to the emergency department.  Found to be orthostatic in the ED.  He has not had a recurrence of these symptoms since.  Patient reports he drinks approximately 1 glass of water daily.  He does drink coffee and soda in addition.  ED decreased his HCTZ in half.  Blood pressure today was 118/72.    The following portions of the patient's history were reviewed and updated as appropriate: allergies, current medications, past family history, past medical history, past social history, past surgical history and problem list.    Past Medical Hx:  Past Medical History:   Diagnosis Date   • Acute bronchitis    • Acute sinusitis    • Chest pain    • Chronic bronchitis     secondary to smoking   • Claudication    • COPD (chronic obstructive pulmonary disease)    • Coronary arteriosclerosis    • Cough    • Gastroesophageal reflux disease    • Health maintenance examination     Individual health examination   • History of echocardiogram 10/21/2013    Echocardiogram W/ color flow 73962 (1) - Left atrium normal. Mild CLVH. EF 50%. Valves intact. Mild mitral and tricuspid regurg. Pericardium normal.   • History of echocardiogram 10/25/2011    Echocardiogram W/O color flow 30636 (1) - Normal LV sytolic function with mild LVH & mild diastolic dysfunction   • Hyperlipidemia    • Hypertensive disorder    • Pernicious anemia    • Thumb pain, left 10/14/2019   • Tobacco dependence syndrome        Past Surgical Hx:  Past Surgical History:   Procedure Laterality Date   • CARDIAC CATHETERIZATION  04/08/2015    Cardiac cath 56938 (1) - Slective coronary angiogram. Left heart catheterization with  LV ventriculogram.   • COLONOSCOPY N/A 10/26/2020    Procedure: COLONOSCOPY;  Surgeon: Levi Milian MD;  Location: [facility] ENDOSCOPY;  Service: Gastroenterology;  Laterality: N/A;   • COLONOSCOPY N/A 11/08/2021    Procedure: COLONOSCOPY;  Surgeon: Levi Milian MD;  Location: [facility] ENDOSCOPY;  Service: Gastroenterology;  Laterality: N/A;   • DENTAL PROCEDURE      Removal of all teeth   • HAND SURGERY Right 4/15/2022    Procedure: TRAPEZIECTOMY RIGHT WRIST WITH SUSPENSION ARTHROPLASTY FIRST CARPOMETA CARPAL;  Surgeon: Kerwin Bloom MD;  Location: [facility] OR;  Service: Orthopedics;  Laterality: Right;       Current Meds:    Current Outpatient Medications:   •  hydroCHLOROthiazide (HYDRODIURIL) 12.5 MG tablet, Take 0.5 tablets by mouth Daily., Disp: 15 tablet, Rfl: 3  •  acetaminophen (TYLENOL) 650 MG 8 hr tablet, Take 650 mg by mouth Every 8 (Eight) Hours As Needed for Mild Pain ., Disp: , Rfl:   •  albuterol sulfate  (90 Base) MCG/ACT inhaler, Inhale 2 puffs Every 4 (Four) Hours As Needed for Wheezing., Disp: 6.7 g, Rfl: 6  •  aspirin 81 MG chewable tablet, Chew 1 tablet Daily., Disp: , Rfl:   •  atorvastatin (LIPITOR) 80 MG tablet, Take 1 tablet by mouth Daily., Disp: 90 tablet, Rfl: 3  •  azithromycin (Zithromax Z-Rubén) 250 MG tablet, Take 2 tablets by mouth on day 1, then 1 tablet daily on days 2-5, Disp: 6 tablet, Rfl: 0  •  Fluticasone Furoate-Vilanterol (Breo Ellipta) 100-25 MCG/ACT aerosol powder , Inhale 1 puff Daily., Disp: 60 each, Rfl: 11  •  HYDROcodone-acetaminophen (NORCO) 5-325 MG per tablet, Take 1 tablet by mouth Every 6 (Six) Hours As Needed for Moderate Pain., Disp: 10 tablet, Rfl: 0  •  isosorbide mononitrate (IMDUR) 60 MG 24 hr tablet, TAKE 1 TABLET BY MOUTH EVERY DAY, Disp: 90 tablet, Rfl: 2  •  lisinopril (PRINIVIL,ZESTRIL) 40 MG tablet, Take 1 tablet by mouth Daily., Disp: 90 tablet, Rfl: 3  •  methocarbamol (ROBAXIN) 500 MG tablet, Take 2 tablets by mouth 4 (Four) Times  a Day As Needed for Muscle Spasms., Disp: 25 tablet, Rfl: 0  •  metoprolol succinate XL (TOPROL-XL) 25 MG 24 hr tablet, Take 1 tablet by mouth Daily., Disp: 90 tablet, Rfl: 3  •  nitroglycerin (NITROSTAT) 0.4 MG SL tablet, Place 1 tablet under the tongue Every 5 (Five) Minutes As Needed for Chest Pain. Take no more than 3 doses in 15 minutes., Disp: 30 tablet, Rfl: 12  •  psyllium (METAMUCIL SMOOTH TEXTURE) 28 % packet, Take 1 packet by mouth Daily., Disp: 30 each, Rfl: 1  •  tiZANidine (ZANAFLEX) 2 MG tablet, Take 1 tablet by mouth 3 (Three) Times a Day., Disp: , Rfl:     Allergies:  No Known Allergies    Family Hx:  Family History   Problem Relation Age of Onset   • Heart disease Mother    • Hypertension Mother    • Diabetes Mother    • Heart disease Father    • Hypertension Father    • Diabetes Father    • Diabetes Sister    • Heart disease Sister    • Stroke Sister    • Cancer Paternal Aunt         Breast Cancer   • Cancer Paternal Uncle         Unknown cancer        Social History:  Social History     Socioeconomic History   • Marital status: Single   Tobacco Use   • Smoking status: Every Day     Packs/day: 2.50     Years: 40.00     Pack years: 100.00     Types: Cigarettes   • Smokeless tobacco: Former   Vaping Use   • Vaping Use: Never used   Substance and Sexual Activity   • Alcohol use: Not Currently     Alcohol/week: 7.0 standard drinks     Types: 7 Cans of beer per week   • Drug use: No   • Sexual activity: Defer     Comment: Marital status:        Review of Systems  Review of Systems   Constitutional: Negative for chills, fatigue and fever.   HENT: Negative for ear pain, hearing loss, postnasal drip and sore throat.    Eyes: Negative for pain and visual disturbance.   Respiratory: Negative for cough, shortness of breath and wheezing.    Cardiovascular: Negative for chest pain, palpitations and leg swelling.   Gastrointestinal: Negative for abdominal pain, constipation, diarrhea, nausea and  "vomiting.   Genitourinary: Negative for difficulty urinating and dysuria.   Musculoskeletal: Positive for arthralgias and back pain. Negative for myalgias.   Skin: Negative for rash and wound.   Neurological: Negative for syncope, weakness and headaches.   Psychiatric/Behavioral: Negative for dysphoric mood. The patient is not nervous/anxious.        Objective:     /72   Pulse 93   Temp 96.9 °F (36.1 °C)   Ht 162.6 cm (64\")   Wt 90.4 kg (199 lb 3.2 oz)   SpO2 99%   BMI 34.19 kg/m²   Physical Exam  Vitals reviewed.   Constitutional:       General: He is not in acute distress.  HENT:      Head: Normocephalic and atraumatic.   Eyes:      General: No scleral icterus.     Conjunctiva/sclera: Conjunctivae normal.   Cardiovascular:      Rate and Rhythm: Normal rate and regular rhythm.      Heart sounds: Normal heart sounds.   Pulmonary:      Effort: Pulmonary effort is normal.      Breath sounds: No wheezing, rhonchi or rales.   Abdominal:      General: Bowel sounds are normal. There is no distension.      Palpations: Abdomen is soft.      Tenderness: There is no abdominal tenderness.   Genitourinary:     Rectum: Normal.   Musculoskeletal:         General: No deformity.      Right lower leg: No edema.      Left lower leg: No edema.   Skin:     General: Skin is warm and dry.   Neurological:      General: No focal deficit present.      Mental Status: He is alert. Mental status is at baseline.          Assessment/Plan:     Diagnoses and all orders for this visit:    1. Essential hypertension  -     hydroCHLOROthiazide (HYDRODIURIL) 12.5 MG tablet; Take 0.5 tablets by mouth Daily.  Dispense: 15 tablet; Refill: 3    1.  Advocated for patient to stay hydrated drinking greater than 64 ounces of water daily.  Patient can also use Gatorade or electrolyte rich drinks.  Continue lower dose HCTZ.  In the future likely discontinue HCTZ.  Blood pressure well controlled on current regimen.  Educated patient on dehydration and " gave handout.      · Rx changes: as above  · Patient Education: dehydration   · Compliance at present is estimated to be fair.   · Efforts to improve compliance (if necessary) will be directed at increased exercise and continued sobriety .    Depression screening: Up to date; last screen      Follow-up:     Return in about 4 weeks (around 6/19/2023).    Preventative:  Health Maintenance   Topic Date Due   • ZOSTER VACCINE (2 of 2) 07/11/2019   • ANNUAL PHYSICAL  04/16/2020   • Pneumococcal Vaccine 0-64 (2 - PCV) 05/16/2020   • COVID-19 Vaccine (3 - Booster for Pfizer series) 05/20/2021   • COLORECTAL CANCER SCREENING  11/08/2022   • LIPID PANEL  01/28/2023   • INFLUENZA VACCINE  08/01/2023   • LUNG CANCER SCREENING  12/13/2023   • TDAP/TD VACCINES (2 - Td or Tdap) 05/16/2029   • HEPATITIS C SCREENING  Completed       Alcohol use:  reports that he does not currently use alcohol after a past usage of about 7.0 standard drinks per week.  Nicotine status  reports that he has been smoking cigarettes. He has a 100.00 pack-year smoking history. He has quit using smokeless tobacco.     Goals     •  Smoking cessation (pt-stated)       Quitting smoking and living longer    Barriers: Wanting to quit            RISK SCORE: 4       Charles Rojas MD   [facility]    [place] [address]  Office: [phone]    This document has been electronically signed by Charles Rojas MD on May 23, 2023 11:59 CDT

## 2023-07-17 ENCOUNTER — HOSPITAL ENCOUNTER (EMERGENCY)
Facility: HOSPITAL | Age: 63
Discharge: HOME | End: 2023-07-17
Attending: EMERGENCY MEDICINE | Admitting: EMERGENCY MEDICINE
Payer: COMMERCIAL

## 2023-07-17 VITALS
DIASTOLIC BLOOD PRESSURE: 82 MMHG | SYSTOLIC BLOOD PRESSURE: 137 MMHG | OXYGEN SATURATION: 96 % | HEIGHT: 66 IN | TEMPERATURE: 98.2 F | HEART RATE: 68 BPM | BODY MASS INDEX: 31.34 KG/M2 | WEIGHT: 195 LBS | RESPIRATION RATE: 20 BRPM
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
## 2023-07-17 PROCEDURE — 99283 EMERGENCY DEPT VISIT LOW MDM: CPT

## 2023-07-17 RX ORDER — DOXYCYCLINE 100 MG/1
200 CAPSULE ORAL ONCE
Status: COMPLETED | OUTPATIENT
Start: 2023-07-17 | End: 2023-07-17

## 2023-07-17 RX ADMIN — DOXYCYCLINE 200 MG: 100 CAPSULE ORAL at 20:41

## 2023-07-18 NOTE — ED PROVIDER NOTES
Subjective   History of Present Illness  62 year old male patient presents to ER for removal of a tick from his back. He reports that he felt an itch and his family member looked and saw the tick. He is unsure how long the tick has been attached. He denies body aches, fever, or rash.     Review of Systems   Constitutional: Negative.    HENT: Negative.     Eyes: Negative.    Respiratory: Negative.     Cardiovascular: Negative.    Gastrointestinal: Negative.    Endocrine: Negative.    Genitourinary: Negative.    Musculoskeletal: Negative.    Skin: Negative.         Tick on back   Allergic/Immunologic: Negative.    Neurological: Negative.    Hematological: Negative.    Psychiatric/Behavioral: Negative.       Past Medical History:   Diagnosis Date    Acute bronchitis     Acute sinusitis     Chest pain     Chronic bronchitis     secondary to smoking    Claudication     COPD (chronic obstructive pulmonary disease)     Coronary arteriosclerosis     Cough     Gastroesophageal reflux disease     Health maintenance examination     Individual health examination    History of echocardiogram 10/21/2013    Echocardiogram W/ color flow 22810 (1) - Left atrium normal. Mild CLVH. EF 50%. Valves intact. Mild mitral and tricuspid regurg. Pericardium normal.    History of echocardiogram 10/25/2011    Echocardiogram W/O color flow 08853 (1) - Normal LV sytolic function with mild LVH & mild diastolic dysfunction    Hyperlipidemia     Hypertensive disorder     Pernicious anemia     Thumb pain, left 10/14/2019    Tobacco dependence syndrome        No Known Allergies    Past Surgical History:   Procedure Laterality Date    CARDIAC CATHETERIZATION  04/08/2015    Cardiac cath 31398 (1) - Slective coronary angiogram. Left heart catheterization with LV ventriculogram.    COLONOSCOPY N/A 10/26/2020    Procedure: COLONOSCOPY;  Surgeon: Levi Milian MD;  Location: [facility] ENDOSCOPY;  Service: Gastroenterology;  Laterality: N/A;    COLONOSCOPY  "N/A 11/08/2021    Procedure: COLONOSCOPY;  Surgeon: Levi Milian MD;  Location: [facility] ENDOSCOPY;  Service: Gastroenterology;  Laterality: N/A;    DENTAL PROCEDURE      Removal of all teeth    HAND SURGERY Right 4/15/2022    Procedure: TRAPEZIECTOMY RIGHT WRIST WITH SUSPENSION ARTHROPLASTY FIRST CARPOMETA CARPAL;  Surgeon: Kerwin Bloom MD;  Location: [facility] OR;  Service: Orthopedics;  Laterality: Right;       Family History   Problem Relation Age of Onset    Heart disease Mother     Hypertension Mother     Diabetes Mother     Heart disease Father     Hypertension Father     Diabetes Father     Diabetes Sister     Heart disease Sister     Stroke Sister     Cancer Paternal Aunt         Breast Cancer    Cancer Paternal Uncle         Unknown cancer       Social History     Socioeconomic History    Marital status: Single   Tobacco Use    Smoking status: Every Day     Packs/day: 2.50     Years: 40.00     Pack years: 100.00     Types: Cigarettes    Smokeless tobacco: Former   Vaping Use    Vaping Use: Never used   Substance and Sexual Activity    Alcohol use: Not Currently     Alcohol/week: 7.0 standard drinks     Types: 7 Cans of beer per week    Drug use: No    Sexual activity: Defer     Comment: Marital status:            Objective   /82 (BP Location: Right arm, Patient Position: Sitting)   Pulse 68   Temp 98.2 °F (36.8 °C) (Oral)   Resp 20   Ht 167.6 cm (66\")   Wt 88.5 kg (195 lb)   SpO2 96%   BMI 31.47 kg/m²     Physical Exam  Vitals and nursing note reviewed.   Constitutional:       General: He is not in acute distress.     Appearance: Normal appearance. He is not ill-appearing, toxic-appearing or diaphoretic.   HENT:      Head: Normocephalic.      Nose: Nose normal.      Mouth/Throat:      Mouth: Mucous membranes are moist.   Eyes:      Pupils: Pupils are equal, round, and reactive to light.   Cardiovascular:      Rate and Rhythm: Normal rate and regular rhythm.      Pulses: " Normal pulses.      Heart sounds: Normal heart sounds.   Pulmonary:      Effort: Pulmonary effort is normal.      Breath sounds: Normal breath sounds.   Abdominal:      General: Bowel sounds are normal.      Palpations: Abdomen is soft.   Musculoskeletal:         General: Normal range of motion.      Cervical back: Normal range of motion.   Skin:     General: Skin is warm and dry.      Capillary Refill: Capillary refill takes less than 2 seconds.      Comments: Engorged tick attached to skin of right thoracic back. No erythema, rash, or swelling appreciated.    Neurological:      Mental Status: He is alert and oriented to person, place, and time.   Psychiatric:         Mood and Affect: Mood normal.         Behavior: Behavior normal.         Thought Content: Thought content normal.         Judgment: Judgment normal.       Foreign Body Removal - Embedded    Date/Time: 7/17/2023 8:33 PM  Performed by: Tiffani Eid APRN  Authorized by: Román Porter MD     Consent:     Consent obtained:  Verbal    Consent given by:  Patient    Risks, benefits, and alternatives were discussed: yes      Risks discussed:  Bleeding, infection and pain    Alternatives discussed:  No treatment  Universal protocol:     Procedure explained and questions answered to patient or proxy's satisfaction: yes      Patient identity confirmed:  Verbally with patient and arm band  Location:     Location:  Trunk    Trunk location:  Upper back    Depth:  Intradermal    Tendon involvement:  None  Pre-procedure details:     Imaging:  None    Neurovascular status: intact    Anesthesia:     Anesthesia method:  None  Procedure type:     Procedure complexity:  Simple  Procedure details:     Dissection of underlying tissues: no      Bloodless field: no      Foreign bodies recovered:  1    Description:  Live engorged tick    Intact foreign body removal: yes    Post-procedure details:     Neurovascular status: intact      Confirmation:  No additional  foreign bodies on visualization    Skin closure:  None    Dressing:  Open (no dressing)    Procedure completion:  Tolerated well, no immediate complications           ED Course                                           Medical Decision Making  Problems Addressed:  Tick bite with subsequent removal of tick: complicated acute illness or injury    Risk  Prescription drug management.        Final diagnoses:   Tick bite with subsequent removal of tick       ED Disposition  ED Disposition       ED Disposition   Discharge    Condition   Stable    Comment   --               Arnaldo Yu MD  [address] [place] [address] [address] [place] [address] [phone]    Call   ER follow up, For wound re-check         Medication List      No changes were made to your prescriptions during this visit.            Tiffani Eid, APRN  07/17/23 203

## 2023-07-26 ENCOUNTER — OFFICE VISIT (OUTPATIENT)
Dept: FAMILY MEDICINE CLINIC | Facility: CLINIC | Age: 63
End: 2023-07-26
Payer: COMMERCIAL

## 2023-07-26 ENCOUNTER — LAB (OUTPATIENT)
Dept: LAB | Facility: HOSPITAL | Age: 63
End: 2023-07-26
Payer: COMMERCIAL

## 2023-07-26 VITALS
OXYGEN SATURATION: 92 % | HEIGHT: 66 IN | SYSTOLIC BLOOD PRESSURE: 130 MMHG | DIASTOLIC BLOOD PRESSURE: 80 MMHG | WEIGHT: 198.5 LBS | BODY MASS INDEX: 31.9 KG/M2 | HEART RATE: 78 BPM

## 2023-07-26 DIAGNOSIS — F17.200 TOBACCO USE DISORDER: ICD-10-CM

## 2023-07-26 DIAGNOSIS — Z00.00 ANNUAL PHYSICAL EXAM: Primary | ICD-10-CM

## 2023-07-26 DIAGNOSIS — E66.09 CLASS 1 OBESITY DUE TO EXCESS CALORIES WITH BODY MASS INDEX (BMI) OF 32.0 TO 32.9 IN ADULT, UNSPECIFIED WHETHER SERIOUS COMORBIDITY PRESENT: ICD-10-CM

## 2023-07-26 DIAGNOSIS — I73.9 CLAUDICATION OF BOTH LOWER EXTREMITIES: ICD-10-CM

## 2023-07-26 DIAGNOSIS — E55.9 VITAMIN D DEFICIENCY: ICD-10-CM

## 2023-07-26 DIAGNOSIS — Z12.11 SCREENING FOR COLON CANCER: ICD-10-CM

## 2023-07-26 PROCEDURE — 83036 HEMOGLOBIN GLYCOSYLATED A1C: CPT

## 2023-07-26 PROCEDURE — 85025 COMPLETE CBC W/AUTO DIFF WBC: CPT

## 2023-07-26 PROCEDURE — 80061 LIPID PANEL: CPT

## 2023-07-26 PROCEDURE — 82306 VITAMIN D 25 HYDROXY: CPT

## 2023-07-26 PROCEDURE — 36415 COLL VENOUS BLD VENIPUNCTURE: CPT

## 2023-07-26 PROCEDURE — 80053 COMPREHEN METABOLIC PANEL: CPT

## 2023-07-26 NOTE — PROGRESS NOTES
Family Medicine Residency  Arnaldo Yu MD    Subjective:     Nima Portillo is a 62 y.o. male with CMH of hypertension, hyperlipidemia, diabetes mellitus type 2 and heart failure with preserved ejection fraction of 50%, heavy tobacco use, COPD and GERD who presents for annual physical and med reconciliation.  Today he is also concerned about bilateral that resolved with rest.     Annual: With patient's smoking history as well as a BMI of 32 he is at risk for developing or having a comorbidity of diabetes, cholesterol issues, and vitamin D deficiency.  He is amenable to get labs that assess for these today.  Lifestyle description: Eats like a horse.  Snacks while watching TV all day. Tobacco use of cigarrettes since 10 years old; 3 packs per day. CT chest due 12/2023. Alcohol use: 18 pack of beer per week. Alcohol use since 10 years old. Drinks 2-5 beers on weekends/ per one sitting.  Denies illicit drug use.  He had a colonoscopy last year but be prep was inadequate.  He refuses to go through the prep again but is amenable to do the Cologuard.    Tobacco use: He is pre-contemplative.     Bilateral calf pains: Reports that the pain in his calves had gone away for couple months but have redeveloped after he stopped walking as much. Notes they are in both calves and occur with exertion and resolve with rest. Denies any change with leaning forward or backward. He is aware that this is related with his smoking and can improve with cessation of tobacco use but he is not ready to quit smoking at this time.     The following portions of the patient's history were reviewed and updated as appropriate: allergies, current medications, past family history, past medical history, past social history, past surgical history, and problem list.    Past Medical Hx:  Past Medical History:   Diagnosis Date    Acute bronchitis     Acute sinusitis     Chest pain     Chronic bronchitis     secondary to smoking     Claudication     COPD (chronic obstructive pulmonary disease)     Coronary arteriosclerosis     Cough     Gastroesophageal reflux disease     Health maintenance examination     Individual health examination    History of echocardiogram 10/21/2013    Echocardiogram W/ color flow 07781 (1) - Left atrium normal. Mild CLVH. EF 50%. Valves intact. Mild mitral and tricuspid regurg. Pericardium normal.    History of echocardiogram 10/25/2011    Echocardiogram W/O color flow 72385 (1) - Normal LV sytolic function with mild LVH & mild diastolic dysfunction    Hyperlipidemia     Hypertensive disorder     Pernicious anemia     Thumb pain, left 10/14/2019    Tobacco dependence syndrome        Past Surgical Hx:  Past Surgical History:   Procedure Laterality Date    CARDIAC CATHETERIZATION  04/08/2015    Cardiac cath 19914 (1) - Slective coronary angiogram. Left heart catheterization with LV ventriculogram.    COLONOSCOPY N/A 10/26/2020    Procedure: COLONOSCOPY;  Surgeon: Levi Milian MD;  Location: [facility] ENDOSCOPY;  Service: Gastroenterology;  Laterality: N/A;    COLONOSCOPY N/A 11/08/2021    Procedure: COLONOSCOPY;  Surgeon: Levi Milian MD;  Location: [facility] ENDOSCOPY;  Service: Gastroenterology;  Laterality: N/A;    DENTAL PROCEDURE      Removal of all teeth    HAND SURGERY Right 4/15/2022    Procedure: TRAPEZIECTOMY RIGHT WRIST WITH SUSPENSION ARTHROPLASTY FIRST CARPOMETA CARPAL;  Surgeon: Kerwin Bloom MD;  Location: [facility] OR;  Service: Orthopedics;  Laterality: Right;       Current Meds:    Current Outpatient Medications:     aspirin 81 MG chewable tablet, Chew 1 tablet Daily., Disp: , Rfl:     atorvastatin (LIPITOR) 80 MG tablet, Take 1 tablet by mouth Daily., Disp: 90 tablet, Rfl: 3    hydroCHLOROthiazide (HYDRODIURIL) 12.5 MG tablet, Take 0.5 tablets by mouth Daily., Disp: 15 tablet, Rfl: 3    isosorbide mononitrate (IMDUR) 60 MG 24 hr tablet, TAKE 1 TABLET BY MOUTH EVERY DAY, Disp: 90 tablet, Rfl:  2    lisinopril (PRINIVIL,ZESTRIL) 40 MG tablet, Take 1 tablet by mouth Daily., Disp: 90 tablet, Rfl: 3    metoprolol succinate XL (TOPROL-XL) 25 MG 24 hr tablet, Take 1 tablet by mouth Daily., Disp: 90 tablet, Rfl: 3    nitroglycerin (NITROSTAT) 0.4 MG SL tablet, Place 1 tablet under the tongue Every 5 (Five) Minutes As Needed for Chest Pain. Take no more than 3 doses in 15 minutes., Disp: 30 tablet, Rfl: 12    acetaminophen (TYLENOL) 650 MG 8 hr tablet, Take 1 tablet by mouth Every 8 (Eight) Hours As Needed for Mild Pain. (Patient not taking: Reported on 7/26/2023), Disp: , Rfl:     albuterol sulfate  (90 Base) MCG/ACT inhaler, Inhale 2 puffs Every 4 (Four) Hours As Needed for Wheezing. (Patient not taking: Reported on 7/26/2023), Disp: 6.7 g, Rfl: 6    azithromycin (Zithromax Z-Rubén) 250 MG tablet, Take 2 tablets by mouth on day 1, then 1 tablet daily on days 2-5 (Patient not taking: Reported on 7/26/2023), Disp: 6 tablet, Rfl: 0    Fluticasone Furoate-Vilanterol (Breo Ellipta) 100-25 MCG/ACT aerosol powder , Inhale 1 puff Daily. (Patient not taking: Reported on 7/26/2023), Disp: 60 each, Rfl: 11    HYDROcodone-acetaminophen (NORCO) 5-325 MG per tablet, Take 1 tablet by mouth Every 6 (Six) Hours As Needed for Moderate Pain. (Patient not taking: Reported on 7/26/2023), Disp: 10 tablet, Rfl: 0    methocarbamol (ROBAXIN) 500 MG tablet, Take 2 tablets by mouth 4 (Four) Times a Day As Needed for Muscle Spasms. (Patient not taking: Reported on 7/26/2023), Disp: 25 tablet, Rfl: 0    psyllium (METAMUCIL SMOOTH TEXTURE) 28 % packet, Take 1 packet by mouth Daily. (Patient not taking: Reported on 7/26/2023), Disp: 30 each, Rfl: 1    tiZANidine (ZANAFLEX) 2 MG tablet, Take 1 tablet by mouth 3 (Three) Times a Day. (Patient not taking: Reported on 7/26/2023), Disp: , Rfl:     Allergies:  No Known Allergies    Family Hx:  Family History   Problem Relation Age of Onset    Heart disease Mother     Hypertension Mother      "Diabetes Mother     Heart disease Father     Hypertension Father     Diabetes Father     Diabetes Sister     Heart disease Sister     Stroke Sister     Cancer Paternal Aunt         Breast Cancer    Cancer Paternal Uncle         Unknown cancer        Social History:  Social History     Socioeconomic History    Marital status: Single   Tobacco Use    Smoking status: Every Day     Packs/day: 2.50     Years: 40.00     Pack years: 100.00     Types: Cigarettes    Smokeless tobacco: Former   Vaping Use    Vaping Use: Never used   Substance and Sexual Activity    Alcohol use: Not Currently     Alcohol/week: 7.0 standard drinks     Types: 7 Cans of beer per week    Drug use: No    Sexual activity: Defer     Comment: Marital status:        Review of Systems  Review of Systems   Constitutional:  Positive for appetite change. Negative for activity change, chills, fever and unexpected weight change.   Eyes:  Positive for visual disturbance (lost reading glasses).   Respiratory:  Positive for cough. Negative for shortness of breath.    Cardiovascular:  Negative for chest pain, palpitations and leg swelling.   Musculoskeletal:  Positive for myalgias.   Neurological:  Negative for headaches.     Objective:     /80   Pulse 78   Ht 167.6 cm (66\")   Wt 90 kg (198 lb 8 oz)   SpO2 92%   BMI 32.04 kg/m²   Physical Exam  Vitals reviewed.   Constitutional:       General: He is not in acute distress.     Appearance: Normal appearance. He is obese. He is not ill-appearing or diaphoretic.   Eyes:      Conjunctiva/sclera: Conjunctivae normal.   Neck:      Vascular: No carotid bruit.   Cardiovascular:      Rate and Rhythm: Normal rate and regular rhythm.      Pulses: Normal pulses.      Heart sounds: Normal heart sounds.   Pulmonary:      Effort: Pulmonary effort is normal.      Breath sounds: Normal breath sounds. No wheezing, rhonchi or rales.   Musculoskeletal:         General: No swelling, tenderness, deformity or signs " of injury.      Right lower leg: No edema.      Left lower leg: No edema.   Skin:     General: Skin is warm and dry.   Neurological:      Mental Status: He is alert.      Gait: Gait normal.   Psychiatric:         Mood and Affect: Mood normal.         Behavior: Behavior normal.        Assessment/Plan:     Diagnoses and all orders for this visit:    1. Annual physical exam (Primary)  Patient had a colonoscopy last year with poor prep but is agreeable to get a Cologuard for colon cancer screening.  Ordered lipid panel for cholesterol screening.  Patient is also due for the last dose of his zoster vaccine as well at this both of his pneumococcal vaccines which we will discuss at a future appointment.    2. Class 1 obesity due to excess calories with body mass index (BMI) of 32.0 to 32.9 in adult, unspecified whether serious comorbidity present  -     Lipid panel; Future  -     Hemoglobin A1c; Future  -     Comprehensive metabolic panel; Future  -     CBC w AUTO Differential; Future  -     Vitamin D 25 hydroxy; Future  Patient recently diagnosed with diabetes mellitus type 2 last year in January with an A1c of 7.34.  Not currently on any diabetic medications.  As I notified him I will call him if there is any abnormal otherwise we will discuss results at next follow-up.  We did well to have a GLP on board.    3. Screening for colon cancer  -     Cologuard - Stool, Per Rectum; Future    4. Vitamin D deficiency  -     Vitamin D 25 hydroxy; Future  If requires supplementation we will give him a call to start and send in a prescription for him.    5. Tobacco use disorder  Precontemplative.  Educated him that the reason for his worsening claudication is likely related to his smoking habits.  He is aware.    6. Claudication of both lower extremities  Same as above.      Rx changes: Pending.   Patient Education: As above.   Compliance at present is estimated to be excellent.   I spent 30 minutes caring for  on this  date of service. This time includes time spent by me in the following activities: preparing for the visit, reviewing tests, obtaining and/or reviewing a separately obtained history, performing a medically appropriate examination and/or evaluation, counseling and educating the patient/family/caregiver, ordering medications, tests, or procedures, referring and communicating with other health care professionals, documenting information in the medical record, independently interpreting results and communicating that information with the patient/family/caregiver and care coordination.       Follow-up:     No follow-ups on file.    Preventative:  Health Maintenance   Topic Date Due    ZOSTER VACCINE (2 of 2) 07/11/2019    ANNUAL PHYSICAL  04/16/2020    Pneumococcal Vaccine 0-64 (2 - PCV) 05/16/2020    COVID-19 Vaccine (3 - Pfizer series) 05/20/2021    COLORECTAL CANCER SCREENING  11/08/2022    INFLUENZA VACCINE  10/01/2023    LUNG CANCER SCREENING  12/13/2023    LIPID PANEL  07/26/2024    TDAP/TD VACCINES (2 - Td or Tdap) 05/16/2029    HEPATITIS C SCREENING  Completed     Male Preventative: Last colonoscopy was [id number]  Cholesterol screening up to date    Weight  -Class: Obese Class I: 30-34.9kg/m2      Alcohol use:  reports that he does not currently use alcohol after a past usage of about 7.0 standard drinks per week.  Nicotine status  reports that he has been smoking cigarettes. He has a 100.00 pack-year smoking history. He has quit using smokeless tobacco.     Goals         Smoking cessation (pt-stated)       Quitting smoking and living longer    Barriers: Wanting to quit            RISK SCORE: 4          This document has been electronically signed by Arnaldo Yu MD on July 27, 2023 13:00 CDT

## 2023-07-27 LAB
25(OH)D3 SERPL-MCNC: 39.5 NG/ML (ref 30–100)
ALBUMIN SERPL-MCNC: 4.7 G/DL (ref 3.5–5.2)
ALBUMIN/GLOB SERPL: 2 G/DL
ALP SERPL-CCNC: 68 U/L (ref 39–117)
ALT SERPL W P-5'-P-CCNC: 33 U/L (ref 1–41)
ANION GAP SERPL CALCULATED.3IONS-SCNC: 11 MMOL/L (ref 5–15)
AST SERPL-CCNC: 27 U/L (ref 1–40)
BASOPHILS # BLD AUTO: 0.04 10*3/MM3 (ref 0–0.2)
BASOPHILS NFR BLD AUTO: 0.5 % (ref 0–1.5)
BILIRUB SERPL-MCNC: 0.3 MG/DL (ref 0–1.2)
BUN SERPL-MCNC: 17 MG/DL (ref 8–23)
BUN/CREAT SERPL: 24.6 (ref 7–25)
CALCIUM SPEC-SCNC: 9.2 MG/DL (ref 8.6–10.5)
CHLORIDE SERPL-SCNC: 104 MMOL/L (ref 98–107)
CHOLEST SERPL-MCNC: 133 MG/DL (ref 0–200)
CO2 SERPL-SCNC: 25 MMOL/L (ref 22–29)
CREAT SERPL-MCNC: 0.69 MG/DL (ref 0.76–1.27)
DEPRECATED RDW RBC AUTO: 43.5 FL (ref 37–54)
EGFRCR SERPLBLD CKD-EPI 2021: 104.6 ML/MIN/1.73
EOSINOPHIL # BLD AUTO: 0.2 10*3/MM3 (ref 0–0.4)
EOSINOPHIL NFR BLD AUTO: 2.3 % (ref 0.3–6.2)
ERYTHROCYTE [DISTWIDTH] IN BLOOD BY AUTOMATED COUNT: 13.6 % (ref 12.3–15.4)
GLOBULIN UR ELPH-MCNC: 2.4 GM/DL
GLUCOSE SERPL-MCNC: 101 MG/DL (ref 65–99)
HBA1C MFR BLD: 5.9 % (ref 4.8–5.6)
HCT VFR BLD AUTO: 43.7 % (ref 37.5–51)
HDLC SERPL-MCNC: 49 MG/DL (ref 40–60)
HGB BLD-MCNC: 14.6 G/DL (ref 13–17.7)
IMM GRANULOCYTES # BLD AUTO: 0.02 10*3/MM3 (ref 0–0.05)
IMM GRANULOCYTES NFR BLD AUTO: 0.2 % (ref 0–0.5)
LDLC SERPL CALC-MCNC: 56 MG/DL (ref 0–100)
LDLC/HDLC SERPL: 1.02 {RATIO}
LYMPHOCYTES # BLD AUTO: 2.34 10*3/MM3 (ref 0.7–3.1)
LYMPHOCYTES NFR BLD AUTO: 26.5 % (ref 19.6–45.3)
MCH RBC QN AUTO: 29.4 PG (ref 26.6–33)
MCHC RBC AUTO-ENTMCNC: 33.4 G/DL (ref 31.5–35.7)
MCV RBC AUTO: 87.9 FL (ref 79–97)
MONOCYTES # BLD AUTO: 0.74 10*3/MM3 (ref 0.1–0.9)
MONOCYTES NFR BLD AUTO: 8.4 % (ref 5–12)
NEUTROPHILS NFR BLD AUTO: 5.49 10*3/MM3 (ref 1.7–7)
NEUTROPHILS NFR BLD AUTO: 62.1 % (ref 42.7–76)
NRBC BLD AUTO-RTO: 0 /100 WBC (ref 0–0.2)
PLATELET # BLD AUTO: 209 10*3/MM3 (ref 140–450)
PMV BLD AUTO: 10.2 FL (ref 6–12)
POTASSIUM SERPL-SCNC: 4.2 MMOL/L (ref 3.5–5.2)
PROT SERPL-MCNC: 7.1 G/DL (ref 6–8.5)
RBC # BLD AUTO: 4.97 10*6/MM3 (ref 4.14–5.8)
SODIUM SERPL-SCNC: 140 MMOL/L (ref 136–145)
TRIGL SERPL-MCNC: 171 MG/DL (ref 0–150)
VLDLC SERPL-MCNC: 28 MG/DL (ref 5–40)
WBC NRBC COR # BLD: 8.83 10*3/MM3 (ref 3.4–10.8)

## 2023-07-31 NOTE — PROGRESS NOTES
I have seen the patient and I have reviewed the notes, assessments, and/or procedures performed by Arnaldo Yu MDduring office visit. I concur with her/his documentation and assessment and plan for Nima Portillo.           This document has been electronically signed by Hubert Thomas MD on July 31, 2023 10:31 CDT

## 2023-08-28 DIAGNOSIS — I10 ESSENTIAL HYPERTENSION: ICD-10-CM

## 2023-08-28 RX ORDER — HYDROCHLOROTHIAZIDE 12.5 MG/1
6.75 TABLET ORAL DAILY
Qty: 15 TABLET | Refills: 3 | Status: SHIPPED | OUTPATIENT
Start: 2023-08-28

## 2023-09-05 DIAGNOSIS — I10 ESSENTIAL HYPERTENSION: ICD-10-CM

## 2023-09-05 DIAGNOSIS — E78.1 HYPERTRIGLYCERIDEMIA: ICD-10-CM

## 2023-09-05 RX ORDER — LISINOPRIL 40 MG/1
40 TABLET ORAL DAILY
Qty: 90 TABLET | Refills: 3 | Status: SHIPPED | OUTPATIENT
Start: 2023-09-05

## 2023-09-05 RX ORDER — ATORVASTATIN CALCIUM 80 MG/1
80 TABLET, FILM COATED ORAL DAILY
Qty: 90 TABLET | Refills: 3 | Status: SHIPPED | OUTPATIENT
Start: 2023-09-05

## 2023-09-11 ENCOUNTER — LAB (OUTPATIENT)
Dept: LAB | Facility: HOSPITAL | Age: 63
End: 2023-09-11
Payer: COMMERCIAL

## 2023-09-11 ENCOUNTER — OFFICE VISIT (OUTPATIENT)
Dept: FAMILY MEDICINE CLINIC | Facility: CLINIC | Age: 63
End: 2023-09-11
Payer: COMMERCIAL

## 2023-09-11 VITALS
SYSTOLIC BLOOD PRESSURE: 132 MMHG | DIASTOLIC BLOOD PRESSURE: 76 MMHG | HEIGHT: 66 IN | OXYGEN SATURATION: 96 % | HEART RATE: 71 BPM | BODY MASS INDEX: 31.36 KG/M2 | TEMPERATURE: 98 F | WEIGHT: 195.1 LBS

## 2023-09-11 DIAGNOSIS — E11.65 TYPE 2 DIABETES MELLITUS WITH HYPERGLYCEMIA, WITHOUT LONG-TERM CURRENT USE OF INSULIN: Primary | ICD-10-CM

## 2023-09-11 DIAGNOSIS — F10.20 ALCOHOLISM, CHRONIC: ICD-10-CM

## 2023-09-11 DIAGNOSIS — E11.65 TYPE 2 DIABETES MELLITUS WITH HYPERGLYCEMIA, WITHOUT LONG-TERM CURRENT USE OF INSULIN: ICD-10-CM

## 2023-09-11 LAB — ALBUMIN UR-MCNC: <1.2 MG/DL

## 2023-09-11 PROCEDURE — 82043 UR ALBUMIN QUANTITATIVE: CPT

## 2023-09-11 RX ORDER — LANOLIN ALCOHOL/MO/W.PET/CERES
100 CREAM (GRAM) TOPICAL DAILY
Qty: 30 TABLET | Refills: 0 | Status: SHIPPED | OUTPATIENT
Start: 2023-09-11 | End: 2023-10-11

## 2023-09-11 RX ORDER — MULTIPLE VITAMINS W/ MINERALS TAB 9MG-400MCG
1 TAB ORAL DAILY
Qty: 90 TABLET | Refills: 3 | Status: SHIPPED | OUTPATIENT
Start: 2023-09-11

## 2023-09-11 NOTE — PROGRESS NOTES
"  Family Medicine Residency  Arnaldo Yu MD    Subjective:     Nima Portillo is a 62 y.o. male  with CMH of hypertension, hyperlipidemia, diabetes mellitus type 2 diagnosed last year and heart failure with preserved ejection fraction of 50%, heavy tobacco use, COPD and GERD, who presents for lab follow up.    Lipid panel: Improved in overall panel with trigs and HDL especially. Cholesterol still controlled under 133 mg/dL.   A1c: T2DM well-controlled now at 5.9% from 7.34%. From weight loss and dietary changes. Mostly eats chicken. Not needing diabetic medication.   CMP: Creatinine improved from 1.92 to 0.69  CBC: Normal  Vit D: Normal    Drinking alcohol which can make A1c look lower as it is replacing his meals and contributing to his \"abnormal weight loss \". Alcoholism: started on Thiamine.     DMT2: Patient aware that he is diabetic but forgets frequently.  He is not doing any fingersticks at this time but reports that he is agreeable to start doing them.    Cologuard: Has the box but has not yet completed because he was concerned of leakage from his recent episode of diarrhea. Diarrhea has resolved.     Previously concerned about bilateral calf pains with YOKO done in October 2019 was normal right YOKO and left YOKO which at that time was done for her syncope and collapse.Calf pain intermittent no longer bothering him.     The following portions of the patient's history were reviewed and updated as appropriate: allergies, current medications, past family history, past medical history, past social history, past surgical history, and problem list.    Past Medical Hx:  Past Medical History:   Diagnosis Date    Acute bronchitis     Acute sinusitis     Chest pain     Chronic bronchitis     secondary to smoking    Claudication     COPD (chronic obstructive pulmonary disease)     Coronary arteriosclerosis     Cough     Gastroesophageal reflux disease     Health maintenance examination     Individual " health examination    History of echocardiogram 10/21/2013    Echocardiogram W/ color flow 97988 (1) - Left atrium normal. Mild CLVH. EF 50%. Valves intact. Mild mitral and tricuspid regurg. Pericardium normal.    History of echocardiogram 10/25/2011    Echocardiogram W/O color flow 97239 (1) - Normal LV sytolic function with mild LVH & mild diastolic dysfunction    Hyperlipidemia     Hypertensive disorder     Pernicious anemia     Thumb pain, left 10/14/2019    Tobacco dependence syndrome        Past Surgical Hx:  Past Surgical History:   Procedure Laterality Date    CARDIAC CATHETERIZATION  04/08/2015    Cardiac cath 21150 (1) - Slective coronary angiogram. Left heart catheterization with LV ventriculogram.    COLONOSCOPY N/A 10/26/2020    Procedure: COLONOSCOPY;  Surgeon: Levi Milian MD;  Location: [facility] ENDOSCOPY;  Service: Gastroenterology;  Laterality: N/A;    COLONOSCOPY N/A 11/08/2021    Procedure: COLONOSCOPY;  Surgeon: Levi Milian MD;  Location: [facility] ENDOSCOPY;  Service: Gastroenterology;  Laterality: N/A;    DENTAL PROCEDURE      Removal of all teeth    HAND SURGERY Right 4/15/2022    Procedure: TRAPEZIECTOMY RIGHT WRIST WITH SUSPENSION ARTHROPLASTY FIRST CARPOMETA CARPAL;  Surgeon: Kerwin Bloom MD;  Location: [facility] OR;  Service: Orthopedics;  Laterality: Right;     Current Meds:    Current Outpatient Medications:     aspirin 81 MG chewable tablet, Chew 1 tablet Daily., Disp: , Rfl:     atorvastatin (LIPITOR) 80 MG tablet, Take 1 tablet by mouth Daily., Disp: 90 tablet, Rfl: 3    Blood Glucose Monitoring Suppl (D-Care Glucometer) w/Device kit, Use 1 each Daily for 90 days., Disp: 1 each, Rfl: 3    hydroCHLOROthiazide (HYDRODIURIL) 12.5 MG tablet, Take 0.5 tablets by mouth Daily., Disp: 15 tablet, Rfl: 3    isosorbide mononitrate (IMDUR) 60 MG 24 hr tablet, TAKE 1 TABLET BY MOUTH EVERY DAY, Disp: 90 tablet, Rfl: 2    lisinopril (PRINIVIL,ZESTRIL) 40 MG tablet, Take 1 tablet by  mouth Daily., Disp: 90 tablet, Rfl: 3    metoprolol succinate XL (TOPROL-XL) 25 MG 24 hr tablet, Take 1 tablet by mouth Daily., Disp: 90 tablet, Rfl: 3    multivitamin with minerals tablet tablet, Take 1 tablet by mouth Daily., Disp: 90 tablet, Rfl: 3    nitroglycerin (NITROSTAT) 0.4 MG SL tablet, Place 1 tablet under the tongue Every 5 (Five) Minutes As Needed for Chest Pain. Take no more than 3 doses in 15 minutes., Disp: 30 tablet, Rfl: 12    thiamine (VITAMIN B1) 100 MG tablet, Take 1 tablet by mouth Daily for 30 days., Disp: 30 tablet, Rfl: 0    Allergies:  No Known Allergies    Family Hx:  Family History   Problem Relation Age of Onset    Heart disease Mother     Hypertension Mother     Diabetes Mother     Heart disease Father     Hypertension Father     Diabetes Father     Diabetes Sister     Heart disease Sister     Stroke Sister     Cancer Paternal Aunt         Breast Cancer    Cancer Paternal Uncle         Unknown cancer      Social History:  Social History     Socioeconomic History    Marital status: Single   Tobacco Use    Smoking status: Every Day     Packs/day: 2.50     Years: 40.00     Pack years: 100.00     Types: Cigarettes    Smokeless tobacco: Former   Vaping Use    Vaping Use: Never used   Substance and Sexual Activity    Alcohol use: Not Currently     Alcohol/week: 7.0 standard drinks     Types: 7 Cans of beer per week    Drug use: No    Sexual activity: Defer     Comment: Marital status:        Review of Systems  Review of Systems   Constitutional:  Negative for activity change, appetite change, chills and fever.   Eyes:  Positive for visual disturbance.   Respiratory:  Negative for cough and shortness of breath.    Cardiovascular:  Negative for chest pain.   Gastrointestinal:  Negative for abdominal pain, constipation, diarrhea, nausea and vomiting.   Neurological:  Positive for numbness.   Psychiatric/Behavioral:  Negative for confusion.      Objective:     /76   Pulse 71    "Temp 98 °F (36.7 °C)   Ht 167.6 cm (66\")   Wt 88.5 kg (195 lb 1.6 oz)   SpO2 96%   BMI 31.49 kg/m²   Physical Exam  Vitals reviewed.   Constitutional:       General: He is not in acute distress.     Appearance: Normal appearance. He is not ill-appearing.   Eyes:      Conjunctiva/sclera: Conjunctivae normal.   Cardiovascular:      Rate and Rhythm: Normal rate.   Pulmonary:      Effort: Pulmonary effort is normal.   Skin:     General: Skin is warm and dry.   Neurological:      Mental Status: He is alert.      Gait: Gait normal.   Psychiatric:         Mood and Affect: Mood normal.         Speech: Speech is slurred.         Behavior: Behavior normal.        Assessment/Plan:     Diagnoses and all orders for this visit:    1. Type 2 diabetes mellitus with hyperglycemia, without long-term current use of insulin (Primary)  -     Ambulatory Referral for Diabetic Eye Exam-Optometry  -     Blood Glucose Monitoring Suppl (D-Care Glucometer) w/Device kit; Use 1 each Daily for 90 days.  Dispense: 1 each; Refill: 3  -     MicroAlbumin, Urine, Random - Urine, Clean Catch; Future  Discussed with patient that he does have diabetes mellitus type 2 and even though it is controlled by the fact that he is not eating and is drinking alcohol instead, strongly encouraged him to start multivitamins and thiamine.  Also discussed that it would be good for him to start at least checking his blood glucose levels once every day or every few days.  He is agreeable to have a glucometer kit sent to him and will try to take these multivitamins as instructed.  We will follow-up in 1 month.      2. Alcoholism, chronic-precontemplative  -     thiamine (VITAMIN B1) 100 MG tablet; Take 1 tablet by mouth Daily for 30 days.  Dispense: 30 tablet; Refill: 0  -     multivitamin with minerals tablet tablet; Take 1 tablet by mouth Daily.  Dispense: 90 tablet; Refill: 3  Patient reports that he drinks 18 cans of beer a day but does not do this every day.  He " does do this most days.  He did do this on Labor Day and about 2 days ago was his last drink.  Denies any issues with withdrawal however he does notice his memory has been declining.      Colorectal cancer screening-patient has the box and will complete now that his diarrhea has resolved.  We will follow-up at next visit.    Rx changes: Started on thiamin and multivitamins secondary to weight loss and low nutritional meals.   Patient Education: As above.   Compliance at present is estimated to be excellent.   I spent 15 minutes caring for  on this date of service. This time includes time spent by me in the following activities: preparing for the visit, reviewing tests, obtaining and/or reviewing a separately obtained history, performing a medically appropriate examination and/or evaluation, counseling and educating the patient/family/caregiver, ordering medications, tests, or procedures, referring and communicating with other health care professionals, documenting information in the medical record, independently interpreting results and communicating that information with the patient/family/caregiver and care coordination.       Follow-up:     Return in about 3 months (around 12/11/2023) for Recheck DM .and discussion of vaccines (zoster and pneumococcal)    Preventative:  Health Maintenance   Topic Date Due    DIABETIC FOOT EXAM  Never done    DIABETIC EYE EXAM  Never done    ZOSTER VACCINE (2 of 2) 07/11/2019    Pneumococcal Vaccine 0-64 (2 - PCV) 05/16/2020    COVID-19 Vaccine (3 - Pfizer series) 05/20/2021    COLORECTAL CANCER SCREENING  11/08/2022    INFLUENZA VACCINE  10/01/2023    LUNG CANCER SCREENING  12/13/2023    HEMOGLOBIN A1C  01/26/2024    BMI FOLLOWUP  03/08/2024    ANNUAL PHYSICAL  07/26/2024    LIPID PANEL  07/26/2024    URINE MICROALBUMIN  09/11/2024    TDAP/TD VACCINES (2 - Td or Tdap) 05/16/2029    HEPATITIS C SCREENING  Completed     RISK SCORE: 3          This document has been  electronically signed by Arnaldo Yu MD on September 11, 2023 12:20 CDT

## (undated) DEVICE — STERILE POLYISOPRENE POWDER-FREE SURGICAL GLOVES WITH EMOLLIENT COATING: Brand: PROTEXIS

## (undated) DEVICE — CANN SMPL SOFTECH BIFLO ETCO2 A/M 7FT

## (undated) DEVICE — GLV SURG SIGNATURE ESSENTIAL PF LTX SZ7

## (undated) DEVICE — UNDRPD BREATH 23X36 BG/10

## (undated) DEVICE — GOWN,AURORA,NOREINF,RAGLAN,XL,STERILE: Brand: MEDLINE

## (undated) DEVICE — GLV SURG SIGNATURE ESSENTIAL PF LTX SZ6.5

## (undated) DEVICE — GLV SURG SENSICARE POLYISPRN W/ALOE PF LF 6.5 GRN STRL

## (undated) DEVICE — SPLNT ORTHOGLASS P/C 3X35IN LF

## (undated) DEVICE — PATIENT RETURN ELECTRODE, SINGLE-USE, CONTACT QUALITY MONITORING, ADULT, WITH 9FT CORD, FOR PATIENTS WEIGING OVER 33LBS. (15KG): Brand: MEGADYNE

## (undated) DEVICE — CVR SURG EQUIP BND RECTG 36X28

## (undated) DEVICE — TBG PENCL TELESCP MEGADYNE SMOKE EVAC 10FT

## (undated) DEVICE — INTENDED FOR TISSUE SEPARATION, AND OTHER PROCEDURES THAT REQUIRE A SHARP SURGICAL BLADE TO PUNCTURE OR CUT.: Brand: BARD-PARKER ® CARBON RIB-BACK BLADES

## (undated) DEVICE — BLD SCLPL BEAVR MINI STR 2BVL 180D LF

## (undated) DEVICE — GLV SURG SIGNATURE ESSENTIAL PF LTX SZ8

## (undated) DEVICE — PK EXTRM LF 60

## (undated) DEVICE — BNDG ELAS ELITE V/CLOSE 4IN 5YD LF STRL

## (undated) DEVICE — SOL IRR NACL 0.9PCT BT 1000ML

## (undated) DEVICE — DRSNG GZ CURAD XEROFORM NONADHS 5X9IN STRL

## (undated) DEVICE — SPNG GZ WOVN 4X4IN 12PLY 10/BX STRL

## (undated) DEVICE — PAD UNDERCAST WYTEX 4IN 4YD LF STRL